# Patient Record
Sex: MALE | Race: WHITE | NOT HISPANIC OR LATINO | Employment: UNEMPLOYED | URBAN - METROPOLITAN AREA
[De-identification: names, ages, dates, MRNs, and addresses within clinical notes are randomized per-mention and may not be internally consistent; named-entity substitution may affect disease eponyms.]

---

## 2017-01-03 ENCOUNTER — ALLSCRIPTS OFFICE VISIT (OUTPATIENT)
Dept: OTHER | Facility: OTHER | Age: 57
End: 2017-01-03

## 2017-01-03 ENCOUNTER — HOSPITAL ENCOUNTER (OUTPATIENT)
Dept: RADIOLOGY | Facility: HOSPITAL | Age: 57
Discharge: HOME | End: 2017-01-03
Attending: ORTHOPAEDIC SURGERY
Payer: COMMERCIAL

## 2017-01-03 DIAGNOSIS — S82.891D OTHER FRACTURE OF RIGHT LOWER LEG, SUBSEQUENT ENCOUNTER FOR CLOSED FRACTURE WITH ROUTINE HEALING: ICD-10-CM

## 2017-01-03 PROCEDURE — 73610 X-RAY EXAM OF ANKLE: CPT

## 2017-01-12 ENCOUNTER — HOSPITAL ENCOUNTER (OUTPATIENT)
Dept: RADIOLOGY | Facility: HOSPITAL | Age: 57
Discharge: HOME | End: 2017-01-12
Attending: ORTHOPAEDIC SURGERY
Payer: COMMERCIAL

## 2017-01-12 ENCOUNTER — ALLSCRIPTS OFFICE VISIT (OUTPATIENT)
Dept: OTHER | Facility: OTHER | Age: 57
End: 2017-01-12

## 2017-01-12 DIAGNOSIS — M79.671 PAIN OF RIGHT FOOT: ICD-10-CM

## 2017-01-12 PROCEDURE — 73630 X-RAY EXAM OF FOOT: CPT

## 2017-01-16 ENCOUNTER — TRANSCRIBE ORDERS (OUTPATIENT)
Dept: LAB | Facility: CLINIC | Age: 57
End: 2017-01-16

## 2017-01-16 ENCOUNTER — ALLSCRIPTS OFFICE VISIT (OUTPATIENT)
Dept: OTHER | Facility: OTHER | Age: 57
End: 2017-01-16

## 2017-01-16 DIAGNOSIS — M79.674 PAIN IN TOE OF RIGHT FOOT: Primary | ICD-10-CM

## 2017-01-16 DIAGNOSIS — M79.674 PAIN OF TOE OF RIGHT FOOT: ICD-10-CM

## 2017-01-17 ENCOUNTER — GENERIC CONVERSION - ENCOUNTER (OUTPATIENT)
Dept: OTHER | Facility: OTHER | Age: 57
End: 2017-01-17

## 2017-01-17 ENCOUNTER — HOSPITAL ENCOUNTER (OUTPATIENT)
Dept: ULTRASOUND IMAGING | Facility: CLINIC | Age: 57
Discharge: HOME | End: 2017-01-17
Payer: COMMERCIAL

## 2017-01-17 ENCOUNTER — HOSPITAL ENCOUNTER (OUTPATIENT)
Dept: MAMMOGRAPHY | Facility: CLINIC | Age: 57
Discharge: HOME | End: 2017-01-17
Payer: COMMERCIAL

## 2017-01-17 DIAGNOSIS — N64.4 BREAST PAIN: ICD-10-CM

## 2017-01-17 DIAGNOSIS — N64.4 MASTODYNIA: ICD-10-CM

## 2017-01-17 DIAGNOSIS — N62 HYPERTROPHY OF BREAST: ICD-10-CM

## 2017-01-17 PROCEDURE — 76642 ULTRASOUND BREAST LIMITED: CPT

## 2017-01-17 PROCEDURE — G0204 DX MAMMO INCL CAD BI: HCPCS

## 2017-01-19 ENCOUNTER — LAB CONVERSION - ENCOUNTER (OUTPATIENT)
Dept: OTHER | Facility: OTHER | Age: 57
End: 2017-01-19

## 2017-01-19 ENCOUNTER — GENERIC CONVERSION - ENCOUNTER (OUTPATIENT)
Dept: OTHER | Facility: OTHER | Age: 57
End: 2017-01-19

## 2017-01-19 LAB
A/G RATIO (HISTORICAL): 1.6 (CALC) (ref 1–2.5)
ALBUMIN SERPL BCP-MCNC: 4 G/DL (ref 3.6–5.1)
ALP SERPL-CCNC: 90 U/L (ref 40–115)
ALT SERPL W P-5'-P-CCNC: 32 U/L (ref 9–46)
AST SERPL W P-5'-P-CCNC: 34 U/L (ref 10–35)
BASOPHILS # BLD AUTO: 0.3 %
BASOPHILS # BLD AUTO: 20 CELLS/UL (ref 0–200)
BILIRUB SERPL-MCNC: 0.4 MG/DL (ref 0.2–1.2)
BUN SERPL-MCNC: 19 MG/DL (ref 7–25)
BUN/CREA RATIO (HISTORICAL): NORMAL (CALC) (ref 6–22)
CALCIUM SERPL-MCNC: 8.8 MG/DL (ref 8.6–10.3)
CHLORIDE SERPL-SCNC: 103 MMOL/L (ref 98–110)
CO2 SERPL-SCNC: 26 MMOL/L (ref 20–31)
CREAT SERPL-MCNC: 1.3 MG/DL (ref 0.7–1.33)
DEPRECATED RDW RBC AUTO: 16.4 % (ref 11–15)
EGFR AFRICAN AMERICAN (HISTORICAL): 70 ML/MIN/1.73M2
EGFR-AMERICAN CALC (HISTORICAL): 61 ML/MIN/1.73M2
EOSINOPHIL # BLD AUTO: 12.6 %
EOSINOPHIL # BLD AUTO: 857 CELLS/UL (ref 15–500)
GAMMA GLOBULIN (HISTORICAL): 2.5 G/DL (CALC) (ref 1.9–3.7)
GLUCOSE (HISTORICAL): 95 MG/DL (ref 65–99)
HBA1C MFR BLD HPLC: 5.7 % OF TOTAL HGB
HCT VFR BLD AUTO: 38.6 % (ref 38.5–50)
HGB BLD-MCNC: 12.7 G/DL (ref 13.2–17.1)
LYMPHOCYTES # BLD AUTO: 1897 CELLS/UL (ref 850–3900)
LYMPHOCYTES # BLD AUTO: 27.9 %
MCH RBC QN AUTO: 29.6 PG (ref 27–33)
MCHC RBC AUTO-ENTMCNC: 32.8 G/DL (ref 32–36)
MCV RBC AUTO: 90.1 FL (ref 80–100)
MONOCYTES # BLD AUTO: 476 CELLS/UL (ref 200–950)
MONOCYTES (HISTORICAL): 7 %
NEUTROPHILS # BLD AUTO: 3550 CELLS/UL (ref 1500–7800)
NEUTROPHILS # BLD AUTO: 52.2 %
PLATELET # BLD AUTO: 157 THOUSAND/UL (ref 140–400)
PMV BLD AUTO: 13.3 FL (ref 7.5–11.5)
POTASSIUM SERPL-SCNC: 4.3 MMOL/L (ref 3.5–5.3)
RBC # BLD AUTO: 4.28 MILLION/UL (ref 4.2–5.8)
SODIUM SERPL-SCNC: 140 MMOL/L (ref 135–146)
TOTAL PROTEIN (HISTORICAL): 6.5 G/DL (ref 6.1–8.1)
URIC ACID (HISTORICAL): 10.3 MG/DL (ref 4–8)
WBC # BLD AUTO: 6.8 THOUSAND/UL (ref 3.8–10.8)

## 2017-01-20 ENCOUNTER — HOSPITAL ENCOUNTER (OUTPATIENT)
Dept: RADIOLOGY | Facility: HOSPITAL | Age: 57
Discharge: HOME | End: 2017-01-20
Attending: ORTHOPAEDIC SURGERY
Payer: COMMERCIAL

## 2017-01-20 DIAGNOSIS — M79.671 RIGHT FOOT PAIN: ICD-10-CM

## 2017-01-20 PROCEDURE — 73718 MRI LOWER EXTREMITY W/O DYE: CPT

## 2017-01-23 ENCOUNTER — ALLSCRIPTS OFFICE VISIT (OUTPATIENT)
Dept: OTHER | Facility: OTHER | Age: 57
End: 2017-01-23

## 2017-01-23 LAB
BILIRUB UR QL STRIP: NEGATIVE
CLARITY UR: NORMAL
COLOR UR: YELLOW
GLUCOSE (HISTORICAL): NEGATIVE
HGB UR QL STRIP.AUTO: NEGATIVE
KETONES UR STRIP-MCNC: NEGATIVE MG/DL
LEUKOCYTE ESTERASE UR QL STRIP: NEGATIVE
NITRITE UR QL STRIP: NEGATIVE
PH UR STRIP.AUTO: 6.5 [PH]
PROT UR STRIP-MCNC: 1 MG/DL
SP GR UR STRIP.AUTO: 1
UROBILINOGEN UR QL STRIP.AUTO: 0.2

## 2017-01-25 ENCOUNTER — ALLSCRIPTS OFFICE VISIT (OUTPATIENT)
Dept: OTHER | Facility: OTHER | Age: 57
End: 2017-01-25

## 2017-02-07 ENCOUNTER — HOSPITAL ENCOUNTER (OUTPATIENT)
Dept: RADIOLOGY | Facility: HOSPITAL | Age: 57
Discharge: HOME | End: 2017-02-07
Attending: ORTHOPAEDIC SURGERY
Payer: COMMERCIAL

## 2017-02-07 ENCOUNTER — ALLSCRIPTS OFFICE VISIT (OUTPATIENT)
Dept: OTHER | Facility: OTHER | Age: 57
End: 2017-02-07

## 2017-02-07 DIAGNOSIS — S82.891D OTHER FRACTURE OF RIGHT LOWER LEG, SUBSEQUENT ENCOUNTER FOR CLOSED FRACTURE WITH ROUTINE HEALING: ICD-10-CM

## 2017-02-07 PROCEDURE — 73610 X-RAY EXAM OF ANKLE: CPT

## 2017-02-10 ENCOUNTER — GENERIC CONVERSION - ENCOUNTER (OUTPATIENT)
Dept: OTHER | Facility: OTHER | Age: 57
End: 2017-02-10

## 2017-02-10 ENCOUNTER — APPOINTMENT (OUTPATIENT)
Dept: PHYSICAL THERAPY | Facility: REHABILITATION | Age: 57
End: 2017-02-10
Payer: COMMERCIAL

## 2017-02-10 DIAGNOSIS — S82.891D OTHER FRACTURE OF RIGHT LOWER LEG, SUBSEQUENT ENCOUNTER FOR CLOSED FRACTURE WITH ROUTINE HEALING: ICD-10-CM

## 2017-02-10 PROCEDURE — 97161 PT EVAL LOW COMPLEX 20 MIN: CPT

## 2017-02-15 ENCOUNTER — APPOINTMENT (OUTPATIENT)
Dept: PHYSICAL THERAPY | Facility: REHABILITATION | Age: 57
End: 2017-02-15
Payer: COMMERCIAL

## 2017-02-15 PROCEDURE — 97140 MANUAL THERAPY 1/> REGIONS: CPT

## 2017-02-15 PROCEDURE — 97110 THERAPEUTIC EXERCISES: CPT

## 2017-02-22 ENCOUNTER — APPOINTMENT (OUTPATIENT)
Dept: PHYSICAL THERAPY | Facility: REHABILITATION | Age: 57
End: 2017-02-22
Payer: COMMERCIAL

## 2017-02-22 PROCEDURE — 97110 THERAPEUTIC EXERCISES: CPT

## 2017-02-22 PROCEDURE — 97140 MANUAL THERAPY 1/> REGIONS: CPT

## 2017-02-24 ENCOUNTER — APPOINTMENT (OUTPATIENT)
Dept: PHYSICAL THERAPY | Facility: REHABILITATION | Age: 57
End: 2017-02-24
Payer: COMMERCIAL

## 2017-02-24 PROCEDURE — 97110 THERAPEUTIC EXERCISES: CPT

## 2017-02-24 PROCEDURE — 97140 MANUAL THERAPY 1/> REGIONS: CPT

## 2017-02-27 ENCOUNTER — APPOINTMENT (OUTPATIENT)
Dept: PHYSICAL THERAPY | Facility: REHABILITATION | Age: 57
End: 2017-02-27
Payer: COMMERCIAL

## 2017-02-27 ENCOUNTER — ALLSCRIPTS OFFICE VISIT (OUTPATIENT)
Dept: OTHER | Facility: OTHER | Age: 57
End: 2017-02-27

## 2017-02-27 DIAGNOSIS — F11.20 UNCOMPLICATED OPIOID DEPENDENCE (HCC): ICD-10-CM

## 2017-02-27 DIAGNOSIS — G89.4 CHRONIC PAIN SYNDROME: ICD-10-CM

## 2017-02-27 DIAGNOSIS — M54.16 RADICULOPATHY OF LUMBAR REGION: ICD-10-CM

## 2017-02-27 DIAGNOSIS — Z79.899 OTHER LONG TERM (CURRENT) DRUG THERAPY: ICD-10-CM

## 2017-02-27 DIAGNOSIS — M48.061 SPINAL STENOSIS OF LUMBAR REGION: ICD-10-CM

## 2017-02-27 PROCEDURE — 97110 THERAPEUTIC EXERCISES: CPT

## 2017-02-27 PROCEDURE — 97164 PT RE-EVAL EST PLAN CARE: CPT

## 2017-03-01 ENCOUNTER — GENERIC CONVERSION - ENCOUNTER (OUTPATIENT)
Dept: OTHER | Facility: OTHER | Age: 57
End: 2017-03-01

## 2017-03-03 ENCOUNTER — APPOINTMENT (OUTPATIENT)
Dept: PHYSICAL THERAPY | Facility: REHABILITATION | Age: 57
End: 2017-03-03
Payer: COMMERCIAL

## 2017-03-03 PROCEDURE — 97110 THERAPEUTIC EXERCISES: CPT

## 2017-03-03 PROCEDURE — 97140 MANUAL THERAPY 1/> REGIONS: CPT

## 2017-03-06 ENCOUNTER — APPOINTMENT (OUTPATIENT)
Dept: PHYSICAL THERAPY | Facility: REHABILITATION | Age: 57
End: 2017-03-06
Payer: COMMERCIAL

## 2017-03-09 ENCOUNTER — APPOINTMENT (OUTPATIENT)
Dept: PHYSICAL THERAPY | Facility: REHABILITATION | Age: 57
End: 2017-03-09
Payer: COMMERCIAL

## 2017-03-09 PROCEDURE — 97140 MANUAL THERAPY 1/> REGIONS: CPT

## 2017-03-09 PROCEDURE — 97110 THERAPEUTIC EXERCISES: CPT

## 2017-03-13 ENCOUNTER — APPOINTMENT (OUTPATIENT)
Dept: PHYSICAL THERAPY | Facility: REHABILITATION | Age: 57
End: 2017-03-13
Payer: COMMERCIAL

## 2017-03-16 ENCOUNTER — APPOINTMENT (OUTPATIENT)
Dept: PHYSICAL THERAPY | Facility: REHABILITATION | Age: 57
End: 2017-03-16
Payer: COMMERCIAL

## 2017-03-16 PROCEDURE — 97110 THERAPEUTIC EXERCISES: CPT

## 2017-03-16 PROCEDURE — 97140 MANUAL THERAPY 1/> REGIONS: CPT

## 2017-03-20 ENCOUNTER — APPOINTMENT (OUTPATIENT)
Dept: PHYSICAL THERAPY | Facility: REHABILITATION | Age: 57
End: 2017-03-20
Payer: COMMERCIAL

## 2017-03-20 PROCEDURE — 97110 THERAPEUTIC EXERCISES: CPT

## 2017-03-23 ENCOUNTER — APPOINTMENT (OUTPATIENT)
Dept: PHYSICAL THERAPY | Facility: REHABILITATION | Age: 57
End: 2017-03-23
Payer: COMMERCIAL

## 2017-03-23 PROCEDURE — 97110 THERAPEUTIC EXERCISES: CPT

## 2017-03-27 ENCOUNTER — APPOINTMENT (OUTPATIENT)
Dept: PHYSICAL THERAPY | Facility: REHABILITATION | Age: 57
End: 2017-03-27
Payer: COMMERCIAL

## 2017-03-27 PROCEDURE — 97140 MANUAL THERAPY 1/> REGIONS: CPT

## 2017-03-27 PROCEDURE — 97110 THERAPEUTIC EXERCISES: CPT

## 2017-04-02 ENCOUNTER — LAB CONVERSION - ENCOUNTER (OUTPATIENT)
Dept: OTHER | Facility: OTHER | Age: 57
End: 2017-04-02

## 2017-04-02 LAB
BACTERIA UR QL AUTO: ABNORMAL /HPF
BILIRUB UR QL STRIP: NEGATIVE
CALCIUM OXALATE (HISTORICAL): ABNORMAL /HPF
COLOR UR: ABNORMAL
COMMENT (HISTORICAL): CLEAR
FECAL OCCULT BLOOD DIAGNOSTIC (HISTORICAL): NEGATIVE
GLUCOSE (HISTORICAL): NEGATIVE
HYALINE CASTS #/AREA URNS LPF: ABNORMAL /LPF
KETONES UR STRIP-MCNC: ABNORMAL MG/DL
LEUKOCYTE ESTERASE UR QL STRIP: NEGATIVE
NITRITE UR QL STRIP: NEGATIVE
PH UR STRIP.AUTO: 6 [PH] (ref 5–8)
PROSTATE SPECIFIC ANTIGEN TOTAL (HISTORICAL): 0.3 NG/ML
PROT UR STRIP-MCNC: ABNORMAL MG/DL
RBC (HISTORICAL): ABNORMAL /HPF
SP GR UR STRIP.AUTO: 1.03 (ref 1–1.03)
SQUAMOUS EPITHELIAL CELLS (HISTORICAL): ABNORMAL /HPF
URIC ACID (HISTORICAL): 8.4 MG/DL (ref 4–8)
WBC # BLD AUTO: ABNORMAL /HPF

## 2017-04-03 ENCOUNTER — GENERIC CONVERSION - ENCOUNTER (OUTPATIENT)
Dept: OTHER | Facility: OTHER | Age: 57
End: 2017-04-03

## 2017-04-03 ENCOUNTER — APPOINTMENT (OUTPATIENT)
Dept: PHYSICAL THERAPY | Facility: REHABILITATION | Age: 57
End: 2017-04-03
Payer: COMMERCIAL

## 2017-04-03 ENCOUNTER — LAB CONVERSION - ENCOUNTER (OUTPATIENT)
Dept: OTHER | Facility: OTHER | Age: 57
End: 2017-04-03

## 2017-04-03 LAB
BACTERIA UR QL AUTO: ABNORMAL /HPF
BILIRUB UR QL STRIP: NEGATIVE
CALCIUM OXALATE (HISTORICAL): ABNORMAL /HPF
COLOR UR: ABNORMAL
COMMENT (HISTORICAL): CLEAR
CREATININE, RANDOM URINE (HISTORICAL): 509 MG/DL (ref 20–370)
FECAL OCCULT BLOOD DIAGNOSTIC (HISTORICAL): NEGATIVE
GLUCOSE (HISTORICAL): NEGATIVE
HYALINE CASTS #/AREA URNS LPF: ABNORMAL /LPF
KETONES UR STRIP-MCNC: ABNORMAL MG/DL
LEUKOCYTE ESTERASE UR QL STRIP: NEGATIVE
NITRITE UR QL STRIP: NEGATIVE
PH UR STRIP.AUTO: 6 [PH] (ref 5–8)
PROT UR STRIP-MCNC: ABNORMAL MG/DL
PROT UR-MCNC: 40 MG/DL (ref 5–25)
PROT/CREAT UR: 79 MG/G CREAT (ref 22–128)
RBC (HISTORICAL): ABNORMAL /HPF
SP GR UR STRIP.AUTO: 1.03 (ref 1–1.03)
SQUAMOUS EPITHELIAL CELLS (HISTORICAL): ABNORMAL /HPF
WBC # BLD AUTO: ABNORMAL /HPF

## 2017-04-03 PROCEDURE — 97110 THERAPEUTIC EXERCISES: CPT

## 2017-04-03 PROCEDURE — 97112 NEUROMUSCULAR REEDUCATION: CPT

## 2017-04-06 ENCOUNTER — APPOINTMENT (OUTPATIENT)
Dept: PHYSICAL THERAPY | Facility: REHABILITATION | Age: 57
End: 2017-04-06
Payer: COMMERCIAL

## 2017-04-06 PROCEDURE — 97140 MANUAL THERAPY 1/> REGIONS: CPT

## 2017-04-06 PROCEDURE — 97110 THERAPEUTIC EXERCISES: CPT

## 2017-04-06 PROCEDURE — 97112 NEUROMUSCULAR REEDUCATION: CPT

## 2017-04-07 ENCOUNTER — ALLSCRIPTS OFFICE VISIT (OUTPATIENT)
Dept: OTHER | Facility: OTHER | Age: 57
End: 2017-04-07

## 2017-04-10 ENCOUNTER — APPOINTMENT (OUTPATIENT)
Dept: PHYSICAL THERAPY | Facility: REHABILITATION | Age: 57
End: 2017-04-10
Payer: COMMERCIAL

## 2017-04-10 PROCEDURE — 97140 MANUAL THERAPY 1/> REGIONS: CPT

## 2017-04-10 PROCEDURE — 97112 NEUROMUSCULAR REEDUCATION: CPT

## 2017-04-10 PROCEDURE — 97110 THERAPEUTIC EXERCISES: CPT

## 2017-04-13 ENCOUNTER — APPOINTMENT (OUTPATIENT)
Dept: PHYSICAL THERAPY | Facility: REHABILITATION | Age: 57
End: 2017-04-13
Payer: COMMERCIAL

## 2017-04-13 PROCEDURE — 97110 THERAPEUTIC EXERCISES: CPT

## 2017-04-13 PROCEDURE — 97112 NEUROMUSCULAR REEDUCATION: CPT

## 2017-04-13 PROCEDURE — 97140 MANUAL THERAPY 1/> REGIONS: CPT

## 2017-04-17 ENCOUNTER — TRANSCRIBE ORDERS (OUTPATIENT)
Dept: ADMINISTRATIVE | Facility: HOSPITAL | Age: 57
End: 2017-04-17

## 2017-04-17 ENCOUNTER — APPOINTMENT (OUTPATIENT)
Dept: PHYSICAL THERAPY | Facility: REHABILITATION | Age: 57
End: 2017-04-17
Payer: COMMERCIAL

## 2017-04-17 DIAGNOSIS — R79.89 OTHER ABNORMAL BLOOD CHEMISTRY: Primary | ICD-10-CM

## 2017-04-17 PROCEDURE — 97110 THERAPEUTIC EXERCISES: CPT

## 2017-04-17 PROCEDURE — 97140 MANUAL THERAPY 1/> REGIONS: CPT

## 2017-04-17 PROCEDURE — 97112 NEUROMUSCULAR REEDUCATION: CPT

## 2017-04-20 ENCOUNTER — APPOINTMENT (OUTPATIENT)
Dept: PHYSICAL THERAPY | Facility: REHABILITATION | Age: 57
End: 2017-04-20
Payer: COMMERCIAL

## 2017-04-20 PROCEDURE — 97112 NEUROMUSCULAR REEDUCATION: CPT

## 2017-04-20 PROCEDURE — 97110 THERAPEUTIC EXERCISES: CPT

## 2017-04-21 ENCOUNTER — HOSPITAL ENCOUNTER (OUTPATIENT)
Dept: ULTRASOUND IMAGING | Facility: HOSPITAL | Age: 57
Discharge: HOME | End: 2017-04-21
Attending: INTERNAL MEDICINE
Payer: COMMERCIAL

## 2017-04-21 DIAGNOSIS — R79.89 OTHER ABNORMAL BLOOD CHEMISTRY: ICD-10-CM

## 2017-04-21 PROCEDURE — 51798 US URINE CAPACITY MEASURE: CPT

## 2017-04-22 ENCOUNTER — LAB CONVERSION - ENCOUNTER (OUTPATIENT)
Dept: OTHER | Facility: OTHER | Age: 57
End: 2017-04-22

## 2017-04-22 LAB — CALCIUM SERPL-MCNC: 9.2 MG/DL (ref 8.6–10.3)

## 2017-04-24 ENCOUNTER — LAB CONVERSION - ENCOUNTER (OUTPATIENT)
Dept: OTHER | Facility: OTHER | Age: 57
End: 2017-04-24

## 2017-04-24 ENCOUNTER — APPOINTMENT (OUTPATIENT)
Dept: PHYSICAL THERAPY | Facility: REHABILITATION | Age: 57
End: 2017-04-24
Payer: COMMERCIAL

## 2017-04-24 ENCOUNTER — ALLSCRIPTS OFFICE VISIT (OUTPATIENT)
Dept: OTHER | Facility: OTHER | Age: 57
End: 2017-04-24

## 2017-04-24 LAB
BUN SERPL-MCNC: 13 MG/DL (ref 7–25)
BUN/CREA RATIO (HISTORICAL): ABNORMAL (CALC) (ref 6–22)
CALCIUM SERPL-MCNC: 9.2 MG/DL (ref 8.6–10.3)
CALCIUM SERPL-MCNC: 9.2 MG/DL (ref 8.6–10.3)
CHLORIDE SERPL-SCNC: 100 MMOL/L (ref 98–110)
CO2 SERPL-SCNC: 26 MMOL/L (ref 20–31)
CREAT SERPL-MCNC: 1.33 MG/DL (ref 0.7–1.33)
EGFR AFRICAN AMERICAN (HISTORICAL): 68 ML/MIN/1.73M2
EGFR-AMERICAN CALC (HISTORICAL): 59 ML/MIN/1.73M2
GLUCOSE (HISTORICAL): 91 MG/DL (ref 65–99)
PHOSPHATE SERPL-MCNC: 3.5 MG/DL (ref 2.5–4.5)
POTASSIUM SERPL-SCNC: 3.9 MMOL/L (ref 3.5–5.3)
PTH-INTACT SERPL-MCNC: 77 PG/ML (ref 14–64)
SODIUM SERPL-SCNC: 138 MMOL/L (ref 135–146)

## 2017-04-24 PROCEDURE — 97110 THERAPEUTIC EXERCISES: CPT

## 2017-04-24 PROCEDURE — 97140 MANUAL THERAPY 1/> REGIONS: CPT

## 2017-04-24 PROCEDURE — 97112 NEUROMUSCULAR REEDUCATION: CPT

## 2017-04-27 ENCOUNTER — APPOINTMENT (OUTPATIENT)
Dept: PHYSICAL THERAPY | Facility: REHABILITATION | Age: 57
End: 2017-04-27
Payer: COMMERCIAL

## 2017-05-04 ENCOUNTER — APPOINTMENT (OUTPATIENT)
Dept: PHYSICAL THERAPY | Facility: REHABILITATION | Age: 57
End: 2017-05-04
Payer: COMMERCIAL

## 2017-05-04 PROCEDURE — 97110 THERAPEUTIC EXERCISES: CPT

## 2017-05-04 PROCEDURE — 97140 MANUAL THERAPY 1/> REGIONS: CPT

## 2017-05-05 ENCOUNTER — APPOINTMENT (OUTPATIENT)
Dept: PHYSICAL THERAPY | Facility: REHABILITATION | Age: 57
End: 2017-05-05
Payer: COMMERCIAL

## 2017-05-10 ENCOUNTER — ALLSCRIPTS OFFICE VISIT (OUTPATIENT)
Dept: OTHER | Facility: OTHER | Age: 57
End: 2017-05-10

## 2017-06-15 ENCOUNTER — ALLSCRIPTS OFFICE VISIT (OUTPATIENT)
Dept: OTHER | Facility: OTHER | Age: 57
End: 2017-06-15

## 2017-06-22 ENCOUNTER — GENERIC CONVERSION - ENCOUNTER (OUTPATIENT)
Dept: OTHER | Facility: OTHER | Age: 57
End: 2017-06-22

## 2017-06-27 ENCOUNTER — TRANSCRIBE ORDERS (OUTPATIENT)
Dept: ADMINISTRATIVE | Facility: HOSPITAL | Age: 57
End: 2017-06-27

## 2017-06-27 DIAGNOSIS — R06.81 APNEA: Primary | ICD-10-CM

## 2017-07-19 ENCOUNTER — HOSPITAL ENCOUNTER (EMERGENCY)
Facility: HOSPITAL | Age: 57
Discharge: HOME | End: 2017-07-19
Attending: EMERGENCY MEDICINE
Payer: COMMERCIAL

## 2017-07-19 ENCOUNTER — APPOINTMENT (EMERGENCY)
Dept: CT IMAGING | Facility: HOSPITAL | Age: 57
End: 2017-07-19
Payer: COMMERCIAL

## 2017-07-19 VITALS
DIASTOLIC BLOOD PRESSURE: 80 MMHG | RESPIRATION RATE: 17 BRPM | OXYGEN SATURATION: 97 % | WEIGHT: 315 LBS | BODY MASS INDEX: 43.87 KG/M2 | HEART RATE: 84 BPM | SYSTOLIC BLOOD PRESSURE: 160 MMHG | TEMPERATURE: 97.9 F

## 2017-07-19 DIAGNOSIS — K52.9 GASTROENTERITIS: Primary | ICD-10-CM

## 2017-07-19 DIAGNOSIS — R11.0 NAUSEA: ICD-10-CM

## 2017-07-19 LAB
ALBUMIN SERPL BCP-MCNC: 3.8 G/DL (ref 3.5–5)
ALP SERPL-CCNC: 96 U/L (ref 46–116)
ALT SERPL W P-5'-P-CCNC: 47 U/L (ref 12–78)
ANION GAP SERPL CALCULATED.3IONS-SCNC: 8 MMOL/L (ref 4–13)
AST SERPL W P-5'-P-CCNC: 36 U/L (ref 5–45)
ATRIAL RATE: 79 BPM
BASOPHILS # BLD AUTO: 0.01 THOUSANDS/ΜL (ref 0–0.1)
BASOPHILS NFR BLD AUTO: 0 % (ref 0–1)
BILIRUB SERPL-MCNC: 0.6 MG/DL (ref 0.2–1)
BUN SERPL-MCNC: 16 MG/DL (ref 5–25)
CALCIUM SERPL-MCNC: 9.2 MG/DL (ref 8.3–10.1)
CHLORIDE SERPL-SCNC: 98 MMOL/L (ref 100–108)
CO2 SERPL-SCNC: 33 MMOL/L (ref 21–32)
CREAT SERPL-MCNC: 1.43 MG/DL (ref 0.6–1.3)
EOSINOPHIL # BLD AUTO: 0.03 THOUSAND/ΜL (ref 0–0.61)
EOSINOPHIL NFR BLD AUTO: 0 % (ref 0–6)
ERYTHROCYTE [DISTWIDTH] IN BLOOD BY AUTOMATED COUNT: 15.7 % (ref 11.6–15.1)
GFR SERPL CREATININE-BSD FRML MDRD: 51 ML/MIN/1.73SQ M
GLUCOSE SERPL-MCNC: 140 MG/DL (ref 65–140)
HCT VFR BLD AUTO: 43.4 % (ref 36.5–49.3)
HGB BLD-MCNC: 13.5 G/DL (ref 12–17)
LACTATE SERPL-SCNC: 1.6 MMOL/L (ref 0.5–2)
LIPASE SERPL-CCNC: 107 U/L (ref 73–393)
LYMPHOCYTES # BLD AUTO: 1.25 THOUSANDS/ΜL (ref 0.6–4.47)
LYMPHOCYTES NFR BLD AUTO: 13 % (ref 14–44)
MCH RBC QN AUTO: 25.1 PG (ref 26.8–34.3)
MCHC RBC AUTO-ENTMCNC: 31.1 G/DL (ref 31.4–37.4)
MCV RBC AUTO: 81 FL (ref 82–98)
MONOCYTES # BLD AUTO: 0.85 THOUSAND/ΜL (ref 0.17–1.22)
MONOCYTES NFR BLD AUTO: 9 % (ref 4–12)
NEUTROPHILS # BLD AUTO: 7.46 THOUSANDS/ΜL (ref 1.85–7.62)
NEUTS SEG NFR BLD AUTO: 78 % (ref 43–75)
P AXIS: 69 DEGREES
PLATELET # BLD AUTO: 176 THOUSANDS/UL (ref 149–390)
POTASSIUM SERPL-SCNC: 3 MMOL/L (ref 3.5–5.3)
PR INTERVAL: 148 MS
PROT SERPL-MCNC: 7.9 G/DL (ref 6.4–8.2)
QRS AXIS: -41 DEGREES
QRSD INTERVAL: 150 MS
QT INTERVAL: 450 MS
QTC INTERVAL: 516 MS
RBC # BLD AUTO: 5.37 MILLION/UL (ref 3.88–5.62)
SODIUM SERPL-SCNC: 139 MMOL/L (ref 136–145)
T WAVE AXIS: 3 DEGREES
TROPONIN I SERPL-MCNC: 0.02 NG/ML
VENTRICULAR RATE: 79 BPM
WBC # BLD AUTO: 9.6 THOUSAND/UL (ref 4.31–10.16)

## 2017-07-19 PROCEDURE — 36415 COLL VENOUS BLD VENIPUNCTURE: CPT | Performed by: EMERGENCY MEDICINE

## 2017-07-19 PROCEDURE — 84484 ASSAY OF TROPONIN QUANT: CPT | Performed by: PHYSICIAN ASSISTANT

## 2017-07-19 PROCEDURE — 85025 COMPLETE CBC W/AUTO DIFF WBC: CPT | Performed by: EMERGENCY MEDICINE

## 2017-07-19 PROCEDURE — 74177 CT ABD & PELVIS W/CONTRAST: CPT

## 2017-07-19 PROCEDURE — 83605 ASSAY OF LACTIC ACID: CPT | Performed by: EMERGENCY MEDICINE

## 2017-07-19 PROCEDURE — 80053 COMPREHEN METABOLIC PANEL: CPT | Performed by: EMERGENCY MEDICINE

## 2017-07-19 PROCEDURE — 96361 HYDRATE IV INFUSION ADD-ON: CPT

## 2017-07-19 PROCEDURE — 83690 ASSAY OF LIPASE: CPT | Performed by: EMERGENCY MEDICINE

## 2017-07-19 PROCEDURE — 96376 TX/PRO/DX INJ SAME DRUG ADON: CPT

## 2017-07-19 PROCEDURE — 93005 ELECTROCARDIOGRAM TRACING: CPT | Performed by: PHYSICIAN ASSISTANT

## 2017-07-19 PROCEDURE — 99284 EMERGENCY DEPT VISIT MOD MDM: CPT

## 2017-07-19 PROCEDURE — 96374 THER/PROPH/DIAG INJ IV PUSH: CPT

## 2017-07-19 RX ORDER — HYDROMORPHONE HYDROCHLORIDE 2 MG/1
2 TABLET ORAL ONCE
Status: COMPLETED | OUTPATIENT
Start: 2017-07-19 | End: 2017-07-19

## 2017-07-19 RX ORDER — MAGNESIUM HYDROXIDE/ALUMINUM HYDROXICE/SIMETHICONE 120; 1200; 1200 MG/30ML; MG/30ML; MG/30ML
30 SUSPENSION ORAL ONCE
Status: COMPLETED | OUTPATIENT
Start: 2017-07-19 | End: 2017-07-19

## 2017-07-19 RX ORDER — ONDANSETRON 2 MG/ML
4 INJECTION INTRAMUSCULAR; INTRAVENOUS ONCE
Status: COMPLETED | OUTPATIENT
Start: 2017-07-19 | End: 2017-07-19

## 2017-07-19 RX ORDER — POTASSIUM CHLORIDE 20 MEQ/1
40 TABLET, EXTENDED RELEASE ORAL ONCE
Status: COMPLETED | OUTPATIENT
Start: 2017-07-19 | End: 2017-07-19

## 2017-07-19 RX ORDER — PANTOPRAZOLE SODIUM 20 MG/1
20 TABLET, DELAYED RELEASE ORAL ONCE
Status: COMPLETED | OUTPATIENT
Start: 2017-07-19 | End: 2017-07-19

## 2017-07-19 RX ORDER — ONDANSETRON 4 MG/1
4 TABLET, ORALLY DISINTEGRATING ORAL EVERY 8 HOURS PRN
Qty: 16 TABLET | Refills: 0 | Status: SHIPPED | OUTPATIENT
Start: 2017-07-19 | End: 2018-01-31 | Stop reason: ALTCHOICE

## 2017-07-19 RX ADMIN — PANTOPRAZOLE SODIUM 20 MG: 20 TABLET, DELAYED RELEASE ORAL at 13:55

## 2017-07-19 RX ADMIN — HYDROMORPHONE HYDROCHLORIDE 2 MG: 2 TABLET ORAL at 15:00

## 2017-07-19 RX ADMIN — LIDOCAINE HYDROCHLORIDE 15 ML: 20 SOLUTION ORAL; TOPICAL at 15:00

## 2017-07-19 RX ADMIN — IOHEXOL 100 ML: 350 INJECTION, SOLUTION INTRAVENOUS at 12:28

## 2017-07-19 RX ADMIN — ONDANSETRON 4 MG: 2 INJECTION INTRAMUSCULAR; INTRAVENOUS at 12:00

## 2017-07-19 RX ADMIN — ALUMINUM HYDROXIDE, MAGNESIUM HYDROXIDE, AND SIMETHICONE 30 ML: 200; 200; 20 SUSPENSION ORAL at 15:00

## 2017-07-19 RX ADMIN — SODIUM CHLORIDE 1000 ML: 0.9 INJECTION, SOLUTION INTRAVENOUS at 12:00

## 2017-07-19 RX ADMIN — POTASSIUM CHLORIDE 40 MEQ: 1500 TABLET, EXTENDED RELEASE ORAL at 13:55

## 2017-07-19 RX ADMIN — ONDANSETRON 4 MG: 2 INJECTION INTRAMUSCULAR; INTRAVENOUS at 15:00

## 2017-08-03 ENCOUNTER — ALLSCRIPTS OFFICE VISIT (OUTPATIENT)
Dept: OTHER | Facility: OTHER | Age: 57
End: 2017-08-03

## 2017-08-03 DIAGNOSIS — F11.20 UNCOMPLICATED OPIOID DEPENDENCE (HCC): ICD-10-CM

## 2017-08-03 DIAGNOSIS — G89.4 CHRONIC PAIN SYNDROME: ICD-10-CM

## 2017-08-03 DIAGNOSIS — Z79.891 LONG TERM CURRENT USE OF OPIATE ANALGESIC: ICD-10-CM

## 2017-08-07 DIAGNOSIS — E03.9 HYPOTHYROIDISM: ICD-10-CM

## 2017-08-07 DIAGNOSIS — I10 ESSENTIAL (PRIMARY) HYPERTENSION: ICD-10-CM

## 2017-08-07 DIAGNOSIS — E78.5 HYPERLIPIDEMIA: ICD-10-CM

## 2017-08-07 DIAGNOSIS — R73.01 IMPAIRED FASTING GLUCOSE: ICD-10-CM

## 2017-08-07 DIAGNOSIS — E79.0 HYPERURICEMIA WITHOUT SIGNS OF INFLAMMATORY ARTHRITIS AND TOPHACEOUS DISEASE: ICD-10-CM

## 2017-08-07 DIAGNOSIS — E29.1 TESTICULAR HYPOFUNCTION: ICD-10-CM

## 2017-08-11 ENCOUNTER — ALLSCRIPTS OFFICE VISIT (OUTPATIENT)
Dept: OTHER | Facility: OTHER | Age: 57
End: 2017-08-11

## 2017-08-15 ENCOUNTER — GENERIC CONVERSION - ENCOUNTER (OUTPATIENT)
Dept: OTHER | Facility: OTHER | Age: 57
End: 2017-08-15

## 2017-08-15 ENCOUNTER — ALLSCRIPTS OFFICE VISIT (OUTPATIENT)
Dept: RADIOLOGY | Facility: CLINIC | Age: 57
End: 2017-08-15
Payer: COMMERCIAL

## 2017-09-07 ENCOUNTER — TRANSCRIBE ORDERS (OUTPATIENT)
Dept: ADMINISTRATIVE | Facility: HOSPITAL | Age: 57
End: 2017-09-07

## 2017-09-07 DIAGNOSIS — R06.83 SNORES: Primary | ICD-10-CM

## 2017-10-02 ENCOUNTER — ALLSCRIPTS OFFICE VISIT (OUTPATIENT)
Dept: OTHER | Facility: OTHER | Age: 57
End: 2017-10-02

## 2017-10-02 DIAGNOSIS — E79.0 HYPERURICEMIA WITHOUT SIGNS OF INFLAMMATORY ARTHRITIS AND TOPHACEOUS DISEASE: ICD-10-CM

## 2017-10-02 DIAGNOSIS — E34.9 ENDOCRINE DISORDER: ICD-10-CM

## 2017-10-02 DIAGNOSIS — N18.2 CHRONIC KIDNEY DISEASE, STAGE II (MILD): ICD-10-CM

## 2017-10-11 ENCOUNTER — HOSPITAL ENCOUNTER (OUTPATIENT)
Dept: SLEEP CENTER | Facility: CLINIC | Age: 57
Discharge: HOME | End: 2017-10-11
Payer: COMMERCIAL

## 2017-10-11 ENCOUNTER — TRANSCRIBE ORDERS (OUTPATIENT)
Dept: SLEEP CENTER | Facility: CLINIC | Age: 57
End: 2017-10-11

## 2017-10-11 DIAGNOSIS — R06.83 SNORES: ICD-10-CM

## 2017-10-11 DIAGNOSIS — G47.33 OSA (OBSTRUCTIVE SLEEP APNEA): Primary | ICD-10-CM

## 2017-10-13 ENCOUNTER — TRANSCRIBE ORDERS (OUTPATIENT)
Dept: ADMINISTRATIVE | Facility: HOSPITAL | Age: 57
End: 2017-10-13

## 2017-10-13 DIAGNOSIS — G89.4 CHRONIC PAIN SYNDROME: Primary | ICD-10-CM

## 2017-10-25 ENCOUNTER — HOSPITAL ENCOUNTER (OUTPATIENT)
Dept: SLEEP CENTER | Facility: CLINIC | Age: 57
Discharge: HOME | End: 2017-10-25
Payer: COMMERCIAL

## 2017-10-25 DIAGNOSIS — G47.33 OSA (OBSTRUCTIVE SLEEP APNEA): ICD-10-CM

## 2017-10-25 PROCEDURE — 95811 POLYSOM 6/>YRS CPAP 4/> PARM: CPT

## 2017-10-26 ENCOUNTER — HOSPITAL ENCOUNTER (OUTPATIENT)
Dept: MRI IMAGING | Facility: HOSPITAL | Age: 57
Discharge: HOME | End: 2017-10-26
Payer: COMMERCIAL

## 2017-10-26 DIAGNOSIS — G89.4 CHRONIC PAIN SYNDROME: ICD-10-CM

## 2017-10-26 PROCEDURE — 72146 MRI CHEST SPINE W/O DYE: CPT
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
## 2017-10-30 ENCOUNTER — GENERIC CONVERSION - ENCOUNTER (OUTPATIENT)
Dept: OTHER | Facility: OTHER | Age: 57
End: 2017-10-30

## 2017-10-31 ENCOUNTER — HOSPITAL ENCOUNTER (OUTPATIENT)
Dept: SLEEP CENTER | Facility: CLINIC | Age: 57
Discharge: HOME | End: 2017-10-31
Payer: COMMERCIAL

## 2017-10-31 ENCOUNTER — TRANSCRIBE ORDERS (OUTPATIENT)
Dept: SLEEP CENTER | Facility: CLINIC | Age: 57
End: 2017-10-31

## 2017-10-31 DIAGNOSIS — G47.33 OSA (OBSTRUCTIVE SLEEP APNEA): ICD-10-CM

## 2017-10-31 DIAGNOSIS — G47.33 OSA (OBSTRUCTIVE SLEEP APNEA): Primary | ICD-10-CM

## 2017-11-22 ENCOUNTER — TRANSCRIBE ORDERS (OUTPATIENT)
Dept: SLEEP CENTER | Facility: CLINIC | Age: 57
End: 2017-11-22

## 2017-11-22 ENCOUNTER — HOSPITAL ENCOUNTER (OUTPATIENT)
Dept: SLEEP CENTER | Facility: CLINIC | Age: 57
Discharge: HOME | End: 2017-11-22
Payer: COMMERCIAL

## 2017-11-22 DIAGNOSIS — G47.33 OSA (OBSTRUCTIVE SLEEP APNEA): Primary | ICD-10-CM

## 2017-11-22 DIAGNOSIS — G47.33 OSA (OBSTRUCTIVE SLEEP APNEA): ICD-10-CM
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
## 2017-11-27 ENCOUNTER — ALLSCRIPTS OFFICE VISIT (OUTPATIENT)
Dept: OTHER | Facility: OTHER | Age: 57
End: 2017-11-27

## 2017-11-27 DIAGNOSIS — G89.4 CHRONIC PAIN SYNDROME: ICD-10-CM

## 2017-11-27 DIAGNOSIS — F11.20 UNCOMPLICATED OPIOID DEPENDENCE (HCC): ICD-10-CM

## 2017-11-27 DIAGNOSIS — Z79.891 LONG TERM CURRENT USE OF OPIATE ANALGESIC: ICD-10-CM
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
## 2018-01-03 ENCOUNTER — HOSPITAL ENCOUNTER (OUTPATIENT)
Dept: SLEEP CENTER | Facility: CLINIC | Age: 58
Discharge: HOME | End: 2018-01-04
Payer: COMMERCIAL

## 2018-01-03 DIAGNOSIS — G47.33 OSA (OBSTRUCTIVE SLEEP APNEA): ICD-10-CM
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
## 2018-01-10 NOTE — RESULT NOTES
Verified Results  (1) CBC/PLT/DIFF 56Brt8658 04:00PM RickyntvinoddamarisOsman Shaq     Test Name Result Flag Reference   WBC COUNT 10 15 Thousand/uL  4 31-10 16   RBC COUNT 4 48 Million/uL  3 88-5 62   HEMOGLOBIN 13 1 g/dL  12 0-17 0   HEMATOCRIT 40 2 %  36 5-49 3   MCV 90 fL  82-98   MCH 29 2 pg  26 8-34 3   MCHC 32 6 g/dL  31 4-37 4   RDW 16 6 % H 11 6-15 1   MPV 13 9 fL H 8 9-12 7   PLATELET COUNT 439 Thousands/uL  149-390   nRBC AUTOMATED 0 /100 WBCs     NEUTROPHILS RELATIVE PERCENT 61 %  43-75   LYMPHOCYTES RELATIVE PERCENT 22 %  14-44   MONOCYTES RELATIVE PERCENT 10 %  4-12   EOSINOPHILS RELATIVE PERCENT 7 % H 0-6   BASOPHILS RELATIVE PERCENT 0 %  0-1   NEUTROPHILS ABSOLUTE COUNT 6 19 Thousands/?L  1 85-7 62   LYMPHOCYTES ABSOLUTE COUNT 2 18 Thousands/?L  0 60-4 47   MONOCYTES ABSOLUTE COUNT 0 99 Thousand/?L  0 17-1 22   EOSINOPHILS ABSOLUTE COUNT 0 74 Thousand/?L H 0 00-0 61   BASOPHILS ABSOLUTE COUNT 0 03 Thousands/?L  0 00-0 10     (1) COMPREHENSIVE METABOLIC PANEL 14AAP5665 72:82LC DougabbevinoddamarisOsman Shaq     Test Name Result Flag Reference   GLUCOSE,RANDM 87 mg/dL     If the patient is fasting, the ADA then defines impaired fasting glucose as > 100 mg/dL and diabetes as > or equal to 123 mg/dL     SODIUM 138 mmol/L  136-145   POTASSIUM 4 2 mmol/L  3 5-5 3   CHLORIDE 98 mmol/L L 100-108   CARBON DIOXIDE 29 mmol/L  21-32   ANION GAP (CALC) 11 mmol/L  4-13   BLOOD UREA NITROGEN 29 mg/dL H 5-25   CREATININE 2 07 mg/dL H 0 60-1 30   Standardized to IDMS reference method   CALCIUM 9 7 mg/dL  8 3-10 1   BILI, TOTAL 0 47 mg/dL  0 20-1 00   ALK PHOSPHATAS 92 U/L     ALT (SGPT) 27 U/L  12-78   AST(SGOT) 23 U/L  5-45   ALBUMIN 3 6 g/dL  3 5-5 0   TOTAL PROTEIN 7 5 g/dL  6 4-8 2   eGFR Non-African American 33 3 ml/min/1 73sq m     [place] [employer] Disease Education Program recommendations are as follows:  GFR calculation is accurate only with a steady state creatinine  Chronic Kidney disease less than 60 ml/min/1 73 sq  meters  Kidney failure less than 15 ml/min/1 73 sq  meters

## 2018-01-10 NOTE — MISCELLANEOUS
Message  Peer to peer conversation completed with insurance company  Patient only meets in home sleep study criteria at this time with increase BMI and day time sleepiness  Will need to cancel incenter sleep study and order in home study        Signatures   Electronically signed by : Micaela Keene; Sep  7 2017  4:51PM EST                       (Author)

## 2018-01-11 ENCOUNTER — ALLSCRIPTS OFFICE VISIT (OUTPATIENT)
Dept: OTHER | Facility: OTHER | Age: 58
End: 2018-01-11

## 2018-01-11 DIAGNOSIS — E79.0 HYPERURICEMIA WITHOUT SIGNS OF INFLAMMATORY ARTHRITIS AND TOPHACEOUS DISEASE: ICD-10-CM

## 2018-01-11 DIAGNOSIS — I10 ESSENTIAL (PRIMARY) HYPERTENSION: ICD-10-CM

## 2018-01-11 DIAGNOSIS — R06.00 DYSPNEA: ICD-10-CM

## 2018-01-11 DIAGNOSIS — R73.01 IMPAIRED FASTING GLUCOSE: ICD-10-CM

## 2018-01-11 DIAGNOSIS — G47.33 OBSTRUCTIVE SLEEP APNEA: ICD-10-CM

## 2018-01-11 DIAGNOSIS — E29.1 TESTICULAR HYPOFUNCTION: ICD-10-CM

## 2018-01-11 DIAGNOSIS — E03.9 HYPOTHYROIDISM: ICD-10-CM

## 2018-01-11 DIAGNOSIS — N18.2 CHRONIC KIDNEY DISEASE, STAGE II (MILD): ICD-10-CM

## 2018-01-11 DIAGNOSIS — E66.01 MORBID (SEVERE) OBESITY DUE TO EXCESS CALORIES (HCC): ICD-10-CM

## 2018-01-11 DIAGNOSIS — E78.5 HYPERLIPIDEMIA: ICD-10-CM

## 2018-01-11 NOTE — RESULT NOTES
Verified Results  MAMMO DIAGNOSTIC BILATERAL W CAD [id number] 11:01AM Edwin Bernard     Test Name Result Flag Reference   [facility] DIAGNOSTIC BILATERAL W CAD (Report)     Reason for exam: clinical finding  Mammo Diagnostic Bilateral W CAD: January 17, 2017 - Check In #:    [de-identified]   Bilateral MLO and CC view(s) were taken  Technologist: Mario Trujillo, RT(R)(M)   Prior study comparison: May 23, 2014, bilateral WB digitl bilat    rhonda performed at [address] [place] [address]  There are scattered fibroglandular densities  Mammogram    demonstrates mild asymmetric left-sided gynecomastia with typical   feathery appearance of tissue behind the nipple  No discrete    mass lesion identified  No suspicious calcifications  The right   side is unremarkable  Ultrasound of the left periareolar region   demonstrates minimal glandular tissue  Comparison image of the    right demonstrates lesser amount  No shadowing masses are seen  No dilated ducts  US Breast Left Limited: January 17, 2017 - Check In #: [de-identified]   Technologist: Shirley Lin RDMS   Heterogeneity can be either focal or diffuse  The breast    echotexture is characterized by multiple small areas of increased   and decreased echogenicity  Shadowing may occur at the    interfaces of the fat lobules and parenchyma  This pattern occurs   in younger breasts and those with heterogeneously dense    parenchyma depicted mammographically  See above      See above     ASSESSMENT: BiRad:2 - Benign (Overall)   Diag Mammogram: BiRad:2 - benign finding  Left breast Left Brst US: BiRad:2 - benign finding in the left    breast      Recommendation:   Clinical management     Analyzed by CAD     Transcription Location: [address]   Signing Station: [id number]     Risk Value(s):   Myriad Table: 1 5%   Signed by:   Naeem Lagos MD   1/17/17       Discussion/Summary   Normal diagnostic mammogram and ultrasound of the left breast  No abnormality is seen
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
## 2018-01-12 ENCOUNTER — TRANSCRIBE ORDERS (OUTPATIENT)
Dept: ADMINISTRATIVE | Facility: HOSPITAL | Age: 58
End: 2018-01-12

## 2018-01-12 VITALS
HEART RATE: 68 BPM | BODY MASS INDEX: 39.17 KG/M2 | SYSTOLIC BLOOD PRESSURE: 148 MMHG | RESPIRATION RATE: 16 BRPM | HEIGHT: 75 IN | DIASTOLIC BLOOD PRESSURE: 80 MMHG | WEIGHT: 315 LBS

## 2018-01-12 DIAGNOSIS — R06.00 DYSPNEA, UNSPECIFIED TYPE: ICD-10-CM

## 2018-01-12 DIAGNOSIS — I10 ESSENTIAL HYPERTENSION, MALIGNANT: Primary | ICD-10-CM

## 2018-01-12 DIAGNOSIS — G47.33 OBSTRUCTIVE SLEEP APNEA SYNDROME: ICD-10-CM

## 2018-01-12 DIAGNOSIS — E66.01 MORBID OBESITY (HCC): ICD-10-CM
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
## 2018-01-13 VITALS
HEART RATE: 82 BPM | BODY MASS INDEX: 39.17 KG/M2 | SYSTOLIC BLOOD PRESSURE: 124 MMHG | RESPIRATION RATE: 18 BRPM | DIASTOLIC BLOOD PRESSURE: 84 MMHG | HEIGHT: 75 IN | WEIGHT: 315 LBS | TEMPERATURE: 98.5 F

## 2018-01-13 VITALS
DIASTOLIC BLOOD PRESSURE: 88 MMHG | HEIGHT: 75 IN | BODY MASS INDEX: 39.17 KG/M2 | HEART RATE: 70 BPM | WEIGHT: 315 LBS | SYSTOLIC BLOOD PRESSURE: 140 MMHG

## 2018-01-13 VITALS
RESPIRATION RATE: 16 BRPM | WEIGHT: 315 LBS | HEIGHT: 75 IN | BODY MASS INDEX: 39.17 KG/M2 | TEMPERATURE: 98 F | SYSTOLIC BLOOD PRESSURE: 122 MMHG | OXYGEN SATURATION: 98 % | DIASTOLIC BLOOD PRESSURE: 82 MMHG | HEART RATE: 80 BPM

## 2018-01-13 VITALS
RESPIRATION RATE: 18 BRPM | WEIGHT: 315 LBS | DIASTOLIC BLOOD PRESSURE: 78 MMHG | HEART RATE: 74 BPM | HEIGHT: 75 IN | BODY MASS INDEX: 39.17 KG/M2 | SYSTOLIC BLOOD PRESSURE: 128 MMHG

## 2018-01-13 VITALS
HEART RATE: 76 BPM | SYSTOLIC BLOOD PRESSURE: 123 MMHG | RESPIRATION RATE: 18 BRPM | DIASTOLIC BLOOD PRESSURE: 80 MMHG | BODY MASS INDEX: 44.89 KG/M2 | WEIGHT: 315 LBS

## 2018-01-13 VITALS
HEART RATE: 82 BPM | DIASTOLIC BLOOD PRESSURE: 78 MMHG | RESPIRATION RATE: 16 BRPM | HEIGHT: 75 IN | WEIGHT: 315 LBS | SYSTOLIC BLOOD PRESSURE: 126 MMHG | BODY MASS INDEX: 39.17 KG/M2

## 2018-01-13 VITALS
SYSTOLIC BLOOD PRESSURE: 134 MMHG | HEART RATE: 76 BPM | BODY MASS INDEX: 39.17 KG/M2 | HEIGHT: 75 IN | OXYGEN SATURATION: 97 % | TEMPERATURE: 98.5 F | DIASTOLIC BLOOD PRESSURE: 88 MMHG | WEIGHT: 315 LBS

## 2018-01-13 VITALS
BODY MASS INDEX: 39.17 KG/M2 | SYSTOLIC BLOOD PRESSURE: 160 MMHG | HEIGHT: 75 IN | DIASTOLIC BLOOD PRESSURE: 82 MMHG | WEIGHT: 315 LBS | HEART RATE: 74 BPM

## 2018-01-13 VITALS
DIASTOLIC BLOOD PRESSURE: 78 MMHG | RESPIRATION RATE: 16 BRPM | HEIGHT: 75 IN | WEIGHT: 315 LBS | HEART RATE: 84 BPM | SYSTOLIC BLOOD PRESSURE: 132 MMHG | BODY MASS INDEX: 39.17 KG/M2
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
## 2018-01-14 VITALS — DIASTOLIC BLOOD PRESSURE: 83 MMHG | HEART RATE: 65 BPM | SYSTOLIC BLOOD PRESSURE: 130 MMHG | RESPIRATION RATE: 18 BRPM

## 2018-01-14 VITALS
RESPIRATION RATE: 16 BRPM | DIASTOLIC BLOOD PRESSURE: 86 MMHG | WEIGHT: 315 LBS | HEART RATE: 72 BPM | BODY MASS INDEX: 39.17 KG/M2 | HEIGHT: 75 IN | TEMPERATURE: 98 F | SYSTOLIC BLOOD PRESSURE: 136 MMHG | OXYGEN SATURATION: 96 %

## 2018-01-14 VITALS
HEART RATE: 78 BPM | DIASTOLIC BLOOD PRESSURE: 80 MMHG | HEIGHT: 75 IN | BODY MASS INDEX: 39.17 KG/M2 | WEIGHT: 315 LBS | SYSTOLIC BLOOD PRESSURE: 140 MMHG | RESPIRATION RATE: 17 BRPM | TEMPERATURE: 98.1 F

## 2018-01-14 VITALS — DIASTOLIC BLOOD PRESSURE: 84 MMHG | SYSTOLIC BLOOD PRESSURE: 125 MMHG | RESPIRATION RATE: 18 BRPM | HEART RATE: 68 BPM

## 2018-01-14 NOTE — RESULT NOTES
Verified Results  (1) URIC ACID 01Apr2017 10:31AM Maricarmen Chan     Test Name Result Flag Reference   URIC ACID 8 4 mg/dL H 4 0-8 0   Therapeutic target for gout patients: <6 0 mg/dL     (1) PSA (SCREEN) (Dx V76 44 Screen for Prostate Cancer) 01Apr2017 10:31AM Maricarmen Chan   REPORT COMMENT:  FASTING:YES     Test Name Result Flag Reference   PSA, TOTAL 0 3 ng/mL  < OR = 4 0   This test was performed using the Siemens  chemiluminescent method  Values obtained from  different assay methods cannot be used  interchangeably  PSA levels, regardless of  value, should not be interpreted as absolute  evidence of the presence or absence of disease

## 2018-01-15 NOTE — RESULT NOTES
Verified Results  *US BREAST LEFT LIMITED (DIAGNOSTIC) [id number] 11:02AM Verla Kidney Order Number: [id number]    - Patient Instructions: To schedule this appointment, please contact Central Scheduling at [phone]  Test Name Result Flag Reference   US BREAST LEFT LIMITED (Report)     Reason for exam: clinical finding  Mammo Diagnostic Bilateral W CAD: January 17, 2017 - Check In #:    [de-identified]   Bilateral MLO and CC view(s) were taken  Technologist: Fannie Hodgkin, RT(R)(M)   Prior study comparison: May 23, 2014, bilateral WB digitl bilat    rhonda performed at [address] [place] [address]  There are scattered fibroglandular densities  Mammogram    demonstrates mild asymmetric left-sided gynecomastia with typical   feathery appearance of tissue behind the nipple  No discrete    mass lesion identified  No suspicious calcifications  The right   side is unremarkable  Ultrasound of the left periareolar region   demonstrates minimal glandular tissue  Comparison image of the    right demonstrates lesser amount  No shadowing masses are seen  No dilated ducts  US Breast Left Limited: January 17, 2017 - Check In #: [de-identified]   Technologist: Juan Avendaño RDMS   Heterogeneity can be either focal or diffuse  The breast    echotexture is characterized by multiple small areas of increased   and decreased echogenicity  Shadowing may occur at the    interfaces of the fat lobules and parenchyma  This pattern occurs   in younger breasts and those with heterogeneously dense    parenchyma depicted mammographically  See above      See above     ASSESSMENT: BiRad:2 - Benign (Overall)   Diag Mammogram: BiRad:2 - benign finding  Left breast Left Brst US: BiRad:2 - benign finding in the left    breast      Recommendation:   Clinical management     Analyzed by CAD     Transcription Location: [address]   Signing Station: [id number]     Risk Value(s):   Myriad Table: 1 5%   Signed by:   Phil Otoole MD   1/17/17       Discussion/Summary   Normal ultrasound of the left breast
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
## 2018-01-16 NOTE — RESULT NOTES
Message   Recorded as Task   Date: 05/18/2016 10:53 AM, Created By: Roger Strange   Task Name: Med Renewal Request   Assigned To: Hillary Morgan   Regarding Patient: Marlin Gardner, Status: Active   Comment:    Zaragoza,Kelly - 18 May 2016 10:53 AM     TASK CREATED  Pt LM on [facility] procedure line 5/17/16 @3009 requesting a script be sent for Lorzone  Pt states he spoke to Jessica Mckee about this at last office visit  Yasmin Cadet - 18 May 2016 11:10 AM     TASK REPLIED TO: Previously Assigned To [facility] clinical,Team  sent lorzone to pharmacy  i prescribed it 1 tab BID  As we talked about at last ov, he can take 1/2 tab during the day if too sedating and 1 full tab at night   Anna Lal - 18 May 2016 3:12 PM     TASK EDITED  Pt  is aware          Signatures   Electronically signed by : Rohit Quinones, ; May 18 2016  3:12PM EST                       (Author)
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
## 2018-01-18 NOTE — RESULT NOTES
Verified Results  (1) COMPREHENSIVE METABOLIC PANEL 81WZD3798 46:84ZT Juan José Leggett     Test Name Result Flag Reference   GLUCOSE 95 mg/dL  65-99   Fasting reference interval   UREA NITROGEN (BUN) 19 mg/dL  7-25   CREATININE 1 30 mg/dL  0 70-1 33   For patients >52years of age, the reference limit  for Creatinine is approximately 13% higher for people  identified as -American  eGFR NON-AFR   AMERICAN 61 mL/min/1 73m2  > OR = 60   ALKALINE PHOSPHATASE 90 U/L     AST 34 U/L  10-35   ALT 32 U/L  9-46   CALCIUM 8 8 mg/dL  8 6-10 3   PROTEIN, TOTAL 6 5 g/dL  6 1-8 1   ALBUMIN 4 0 g/dL  3 6-5 1   GLOBULIN 2 5 g/dL (calc)  1 9-3 7   ALBUMIN/GLOBULIN RATIO 1 6 (calc)  1 0-2 5   BILIRUBIN, TOTAL 0 4 mg/dL  0 2-1 2   eGFR AFRICAN AMERICAN 70 mL/min/1 73m2  > OR = 60   BUN/CREATININE RATIO   5-12   NOT APPLICABLE (calc)   SODIUM 140 mmol/L  135-146   POTASSIUM 4 3 mmol/L  3 5-5 3   CHLORIDE 103 mmol/L     CARBON DIOXIDE 26 mmol/L  20-31     (1) CBC/PLT/DIFF 66NPL4519 12:51PM Juan José Leggett     Test Name Result Flag Reference   NEUTROPHILS 52 2 %     LYMPHOCYTES 27 9 %     MONOCYTES 7 0 %     EOSINOPHILS 12 6 %     BASOPHILS 0 3 %     MPV 13 3 fL H 7 5-11 5   ABSOLUTE NEUTROPHILS 3550 cells/uL  3717-3315   ABSOLUTE LYMPHOCYTES 1897 cells/uL  850-3900   ABSOLUTE MONOCYTES 476 cells/uL  200-950   ABSOLUTE EOSINOPHILS 857 cells/uL H    ABSOLUTE BASOPHILS 20 cells/uL  0-200   HEMATOCRIT 38 6 %  38 5-50 0   MCV 90 1 fL  80 0-100 0   MCH 29 6 pg  27 0-33 0   MCHC 32 8 g/dL  32 0-36 0   RDW 16 4 % H 11 0-15 0   PLATELET COUNT 093 Thousand/uL  140-400   WHITE BLOOD CELL COUNT 6 8 Thousand/uL  3 8-10 8   RED BLOOD CELL COUNT 4 28 Million/uL  4 20-5 80   HEMOGLOBIN 12 7 g/dL L 13 2-17 1     (Q) HEMOGLOBIN A1c 55EPB4565 12:51PM Juan José Leggett   REPORT COMMENT:  FASTING:YES     Test Name Result Flag Reference   HEMOGLOBIN A1c 5 7 % of total Hgb H <5 7   According to ADA guidelines, hemoglobin A1c <7 0%  represents optimal control in non-pregnant diabetic  patients  Different metrics may apply to specific  patient populations  Standards of Medical Care in    Diabetes Care  2013;36:s11-s66     For the purpose of screening for the presence of  diabetes  <5 7%       Consistent with the absence of diabetes  5 7-6 4%    Consistent with increased risk for diabetes              (prediabetes)  >or=6 5%    Consistent with diabetes     This assay result is consistent with an increased risk  of diabetes  Currently, no consensus exists for use of hemoglobin  A1c for diagnosis of diabetes for children

## 2018-01-22 ENCOUNTER — GENERIC CONVERSION - ENCOUNTER (OUTPATIENT)
Dept: OTHER | Facility: OTHER | Age: 58
End: 2018-01-22

## 2018-01-22 ENCOUNTER — TRANSCRIBE ORDERS (OUTPATIENT)
Dept: ADMINISTRATIVE | Facility: HOSPITAL | Age: 58
End: 2018-01-22

## 2018-01-22 ENCOUNTER — HOSPITAL ENCOUNTER (OUTPATIENT)
Dept: RADIOLOGY | Facility: HOSPITAL | Age: 58
Discharge: HOME | End: 2018-01-22
Attending: INTERNAL MEDICINE
Payer: COMMERCIAL

## 2018-01-22 ENCOUNTER — HOSPITAL ENCOUNTER (OUTPATIENT)
Dept: NON INVASIVE DIAGNOSTICS | Facility: CLINIC | Age: 58
Discharge: HOME | End: 2018-01-22
Payer: COMMERCIAL

## 2018-01-22 VITALS
BODY MASS INDEX: 40.43 KG/M2 | RESPIRATION RATE: 16 BRPM | HEIGHT: 74 IN | WEIGHT: 315 LBS | HEART RATE: 88 BPM | DIASTOLIC BLOOD PRESSURE: 76 MMHG | SYSTOLIC BLOOD PRESSURE: 138 MMHG

## 2018-01-22 DIAGNOSIS — R06.00 DYSPNEA, UNSPECIFIED TYPE: ICD-10-CM

## 2018-01-22 DIAGNOSIS — I10 ESSENTIAL HYPERTENSION, MALIGNANT: ICD-10-CM

## 2018-01-22 DIAGNOSIS — E66.01 MORBID OBESITY (HCC): ICD-10-CM

## 2018-01-22 DIAGNOSIS — R06.02 SOB (SHORTNESS OF BREATH): Primary | ICD-10-CM

## 2018-01-22 DIAGNOSIS — G47.33 OBSTRUCTIVE SLEEP APNEA SYNDROME: ICD-10-CM

## 2018-01-22 DIAGNOSIS — R06.02 SOB (SHORTNESS OF BREATH): ICD-10-CM

## 2018-01-22 PROCEDURE — 93306 TTE W/DOPPLER COMPLETE: CPT

## 2018-01-22 PROCEDURE — 71046 X-RAY EXAM CHEST 2 VIEWS: CPT

## 2018-01-24 LAB
ALBUMIN SERPL-MCNC: 4.1 G/DL (ref 3.6–5.1)
ALBUMIN/GLOB SERPL: 1.6 (CALC) (ref 1–2.5)
ALP SERPL-CCNC: 74 U/L (ref 40–115)
ALT SERPL-CCNC: 21 U/L (ref 9–46)
AST SERPL-CCNC: 26 U/L (ref 10–35)
BASOPHILS # BLD AUTO: 61 CELLS/UL (ref 0–200)
BASOPHILS NFR BLD AUTO: 0.8 %
BILIRUB SERPL-MCNC: 0.5 MG/DL (ref 0.2–1.2)
BUN SERPL-MCNC: 17 MG/DL (ref 7–25)
BUN/CREAT SERPL: NORMAL (CALC) (ref 6–22)
CALCIUM SERPL-MCNC: 9.1 MG/DL (ref 8.6–10.3)
CHLORIDE SERPL-SCNC: 100 MMOL/L (ref 98–110)
CHOLEST SERPL-MCNC: 117 MG/DL
CHOLEST/HDLC SERPL: 3.2 (CALC)
CO2 SERPL-SCNC: 27 MMOL/L (ref 20–31)
CORTIS SERPL-MCNC: 7.6 MCG/DL
CREAT SERPL-MCNC: 1.3 MG/DL (ref 0.7–1.33)
EOSINOPHIL # BLD AUTO: 1155 CELLS/UL (ref 15–500)
EOSINOPHIL NFR BLD AUTO: 15.2 %
ERYTHROCYTE [DISTWIDTH] IN BLOOD BY AUTOMATED COUNT: 16.4 % (ref 11–15)
GLOBULIN SER CALC-MCNC: 2.6 G/DL (CALC) (ref 1.9–3.7)
GLUCOSE SERPL-MCNC: 98 MG/DL (ref 65–99)
HBA1C MFR BLD: 5.7 % OF TOTAL HGB
HCT VFR BLD AUTO: 31.3 % (ref 38.5–50)
HDLC SERPL-MCNC: 37 MG/DL
HGB BLD-MCNC: 9.1 G/DL (ref 13.2–17.1)
LDLC SERPL CALC-MCNC: 57 MG/DL (CALC)
LYMPHOCYTES # BLD AUTO: 1406 CELLS/UL (ref 850–3900)
LYMPHOCYTES NFR BLD AUTO: 18.5 %
MCH RBC QN AUTO: 21.9 PG (ref 27–33)
MCHC RBC AUTO-ENTMCNC: 29.1 G/DL (ref 32–36)
MCV RBC AUTO: 75.4 FL (ref 80–100)
MONOCYTES # BLD AUTO: 813 CELLS/UL (ref 200–950)
MONOCYTES NFR BLD AUTO: 10.7 %
NEUTROPHILS # BLD AUTO: 4165 CELLS/UL (ref 1500–7800)
NEUTROPHILS NFR BLD AUTO: 54.8 %
NONHDLC SERPL-MCNC: 80 MG/DL (CALC)
PLATELET # BLD AUTO: 192 THOUSAND/UL (ref 140–400)
PMV BLD REES-ECKER: ABNORMAL FL
POTASSIUM SERPL-SCNC: 4.3 MMOL/L (ref 3.5–5.3)
PROT SERPL-MCNC: 6.7 G/DL (ref 6.1–8.1)
RBC # BLD AUTO: 4.15 MILLION/UL (ref 4.2–5.8)
SL AMB EGFR AFRICAN AMERICAN: 70 ML/MIN/1.73M2
SL AMB EGFR NON AFRICAN AMERICAN: 60 ML/MIN/1.73M2
SODIUM SERPL-SCNC: 135 MMOL/L (ref 135–146)
T4 FREE SERPL-MCNC: 1.2 NG/DL (ref 0.8–1.8)
TESTOST FREE SERPL-MCNC: 35 PG/ML (ref 35–155)
TESTOST SERPL-MCNC: 302 NG/DL (ref 250–1100)
TRIGL SERPL-MCNC: 157 MG/DL
TSH SERPL-ACNC: 2.83 MIU/L (ref 0.4–4.5)
WBC # BLD AUTO: 7.6 THOUSAND/UL (ref 3.8–10.8)

## 2018-01-24 NOTE — RESULT NOTES
Discussion/Summary   normal cardiac function on echocardiogram  Not a cardiac issue causing your shortness of breath  Please follow-up with your pulmonologist       Verified Results  ECHO COMPLETE WITH CONTRAST IF INDICATED [id number] 02:48PM Darlyn Chen     Test Name Result Flag Reference   ECHO COMPLETE WITH CONTRAST IF INDICATED (Report)     [address]   [phone]     Transthoracic Echocardiogram   2D, M-mode, Doppler, and Color Doppler     Study date: 2018     Patient: Lauro Spencer   MR number: [id number]   Account number: [de-identified]   : 1960   Age: 61 years   Gender: Male   Status: Outpatient   Location: [address] [place] [address]   Height: 74 in   Weight: 368 3 lb   BP: 138/ 76 mmHg     Indications: Shortness of breath  Diagnoses: R06 00 - Dyspnea, unspecified     Sonographer: SHIRLEY Huertas   Referring Physician: Yadi Starr DO   Group: [facility]   Interpreting Physician: Israel Hummel MD     SUMMARY     PROCEDURE INFORMATION:   This was a technically difficult study  LEFT VENTRICLE:   Systolic function was normal  Ejection fraction was estimated to be 70 %  There were no regional wall motion abnormalities  Wall thickness was mildly increased  RIGHT VENTRICLE:   The size was at the upper limits of normal    Systolic function was normal      LEFT ATRIUM:   The atrium was mildly dilated  TRICUSPID VALVE:   There was mild regurgitation  Estimated peak PA pressure was 40 mmHg  The findings suggest mild pulmonary hypertension  HISTORY: PRIOR HISTORY: Shortness of breath, Hypertension, Hyperlipidemia, JULIUS, Hypothyroid, Obesity     PROCEDURE: The study was performed in the [address] [place] [address]  This was a routine study  The transthoracic approach was used  The study included complete 2D imaging, M-mode, complete spectral Doppler, and color Doppler  The   heart rate was 64 bpm, at the start of the study  Images were obtained from the parasternal, apical, subcostal, and suprasternal notch acoustic windows  Intravenous contrast ( 0 6 ml Definity Solution) was administered to opacify the left   ventricle  Echocardiographic views were limited due to decreased penetration  This was a technically difficult study  LEFT VENTRICLE: Size was normal  Systolic function was normal  Ejection fraction was estimated to be 70 %  There were no regional wall motion abnormalities  Wall thickness was mildly increased  DOPPLER: Left ventricular diastolic function   parameters were normal      RIGHT VENTRICLE: The size was at the upper limits of normal  Systolic function was normal  Wall thickness was normal      LEFT ATRIUM: The atrium was mildly dilated  RIGHT ATRIUM: Size was normal      MITRAL VALVE: Valve structure was normal  There was normal leaflet separation  DOPPLER: The transmitral velocity was within the normal range  There was no evidence for stenosis  There was no significant regurgitation  AORTIC VALVE: The valve was trileaflet  Leaflets exhibited normal thickness and normal cuspal separation  DOPPLER: Transaortic velocity was within the normal range  There was no evidence for stenosis  There was no significant   regurgitation  TRICUSPID VALVE: The valve structure was normal  There was normal leaflet separation  DOPPLER: The transtricuspid velocity was within the normal range  There was no evidence for stenosis  There was mild regurgitation  Estimated peak PA   pressure was 40 mmHg  The findings suggest mild pulmonary hypertension  PULMONIC VALVE: Leaflets exhibited normal thickness, no calcification, and normal cuspal separation  DOPPLER: The transpulmonic velocity was within the normal range  There was no significant regurgitation  PERICARDIUM: There was no pericardial effusion  The pericardium was normal in appearance       AORTA: The root exhibited normal size  SYSTEMIC VEINS: IVC: The inferior vena cava was not well visualized       SYSTEM MEASUREMENT TABLES     2D   %FS: 30 47 %   AV Diam: 3 75 cm   EDV(Teich): 160 33 ml   EF(Cube): 66 38 %   EF(Teich): 57 21 %   ESV(Cube): 62 4 ml   ESV(Teich): 68 61 ml   IVSd: 1 41 cm   LA Area: 22 6 cm2   LA Diam: 5 14 cm   LVEDV MOD A4C: 162 95 ml   LVEF MOD A4C: 67 02 %   LVESV MOD A4C: 53 74 ml   LVIDd: 5 7 cm   LVIDs: 3 97 cm   LVLd A4C: 9 55 cm   LVLs A4C: 7 97 cm   LVPWd: 1 39 cm   RA Area: 18 35 cm2   RV Diam: 3 82 cm   SI(Cube): 43 7 ml/m2   SI(Teich): 32 53 ml/m2   SV MOD A4C: 109 21 ml   SV(Cube): 123 23 ml   SV(Teich): 91 72 ml     CW   TR MaxP 45 mmHg   TR Vmax: 3 09 m/s     MM   TAPSE: 2 64 cm     PW   E': 0 1 m/s   E/E': 10 42   MV A Josr: 0 72 m/s   MV Dec [place]: 4 92 m/s2   MV DecT: 216 49 ms   MV E Josr: 1 07 m/s   MV E/A Ratio: 1 47     [employer] Accredited [facility]     Prepared and electronically signed by     Santhosh Ordonez MD   Signed 2018 16:15:59

## 2018-01-31 ENCOUNTER — OFFICE VISIT (OUTPATIENT)
Dept: FAMILY MEDICINE CLINIC | Facility: CLINIC | Age: 58
End: 2018-01-31
Payer: COMMERCIAL

## 2018-01-31 VITALS
HEIGHT: 74 IN | SYSTOLIC BLOOD PRESSURE: 148 MMHG | DIASTOLIC BLOOD PRESSURE: 72 MMHG | BODY MASS INDEX: 40.43 KG/M2 | WEIGHT: 315 LBS

## 2018-01-31 DIAGNOSIS — E34.9 TESTOSTERONE DEFICIENCY: ICD-10-CM

## 2018-01-31 DIAGNOSIS — E03.9 HYPOTHYROIDISM, UNSPECIFIED TYPE: Chronic | ICD-10-CM

## 2018-01-31 DIAGNOSIS — I10 ESSENTIAL HYPERTENSION: Chronic | ICD-10-CM

## 2018-01-31 DIAGNOSIS — D50.9 HYPOCHROMIC MICROCYTIC ANEMIA: ICD-10-CM

## 2018-01-31 DIAGNOSIS — D50.9 MICROCYTIC HYPOCHROMIC ANEMIA: Primary | ICD-10-CM

## 2018-01-31 PROCEDURE — 99214 OFFICE O/P EST MOD 30 MIN: CPT | Performed by: FAMILY MEDICINE

## 2018-01-31 RX ORDER — AMLODIPINE BESYLATE 10 MG/1
10 TABLET ORAL DAILY
Qty: 90 TABLET | Refills: 1 | Status: SHIPPED | OUTPATIENT
Start: 2018-01-31 | End: 2018-08-05 | Stop reason: SDUPTHER

## 2018-01-31 RX ORDER — RANITIDINE 150 MG/1
1 CAPSULE ORAL
COMMUNITY
Start: 2017-05-10 | End: 2018-01-31 | Stop reason: ALTCHOICE

## 2018-01-31 RX ORDER — FEBUXOSTAT 40 MG/1
1 TABLET, FILM COATED ORAL DAILY
COMMUNITY
Start: 2017-04-07 | End: 2018-04-12 | Stop reason: SINTOL

## 2018-01-31 RX ORDER — ROSUVASTATIN CALCIUM 5 MG/1
5 TABLET, COATED ORAL DAILY
Qty: 90 TABLET | Refills: 1 | Status: SHIPPED | OUTPATIENT
Start: 2018-01-31 | End: 2018-09-07 | Stop reason: SDUPTHER

## 2018-01-31 RX ORDER — LEVOTHYROXINE SODIUM 0.03 MG/1
25 TABLET ORAL DAILY
Qty: 90 TABLET | Refills: 1 | Status: SHIPPED | OUTPATIENT
Start: 2018-01-31 | End: 2018-09-19 | Stop reason: SDUPTHER

## 2018-01-31 RX ORDER — NEBIVOLOL 5 MG/1
5 TABLET ORAL DAILY
Qty: 90 TABLET | Refills: 1 | Status: SHIPPED | OUTPATIENT
Start: 2018-01-31 | End: 2018-08-23 | Stop reason: SDUPTHER
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
## 2018-02-07 PROBLEM — N18.2 CHRONIC KIDNEY DISEASE, STAGE 2 (MILD): Status: ACTIVE | Noted: 2017-05-10

## 2018-02-07 PROBLEM — N52.1 ERECTILE DISORDER DUE TO MEDICAL CONDITION IN MALE PATIENT: Status: ACTIVE | Noted: 2017-08-11

## 2018-02-08 ENCOUNTER — OFFICE VISIT (OUTPATIENT)
Dept: PAIN MEDICINE | Facility: CLINIC | Age: 58
End: 2018-02-08
Payer: COMMERCIAL

## 2018-02-08 ENCOUNTER — TELEPHONE (OUTPATIENT)
Dept: PAIN MEDICINE | Facility: CLINIC | Age: 58
End: 2018-02-08

## 2018-02-08 VITALS
HEIGHT: 74 IN | WEIGHT: 315 LBS | BODY MASS INDEX: 40.43 KG/M2 | RESPIRATION RATE: 16 BRPM | HEART RATE: 76 BPM | SYSTOLIC BLOOD PRESSURE: 162 MMHG | DIASTOLIC BLOOD PRESSURE: 84 MMHG

## 2018-02-08 DIAGNOSIS — M51.36 DISC DEGENERATION, LUMBAR: ICD-10-CM

## 2018-02-08 DIAGNOSIS — F11.20 UNCOMPLICATED OPIOID DEPENDENCE (HCC): ICD-10-CM

## 2018-02-08 DIAGNOSIS — M96.1 POSTLAMINECTOMY SYNDROME, LUMBAR: ICD-10-CM

## 2018-02-08 DIAGNOSIS — G89.4 CHRONIC PAIN SYNDROME: Primary | ICD-10-CM

## 2018-02-08 DIAGNOSIS — Z79.891 LONG-TERM CURRENT USE OF OPIATE ANALGESIC: ICD-10-CM

## 2018-02-08 PROCEDURE — 99214 OFFICE O/P EST MOD 30 MIN: CPT | Performed by: NURSE PRACTITIONER

## 2018-02-08 RX ORDER — HYDROCODONE BITARTRATE AND ACETAMINOPHEN 10; 325 MG/1; MG/1
1 TABLET ORAL 2 TIMES DAILY PRN
Qty: 60 TABLET | Refills: 0 | Status: SHIPPED | OUTPATIENT
Start: 2018-02-08 | End: 2018-02-08 | Stop reason: SDUPTHER

## 2018-02-08 RX ORDER — HYDROCODONE BITARTRATE AND ACETAMINOPHEN 10; 325 MG/1; MG/1
1 TABLET ORAL 2 TIMES DAILY PRN
Qty: 60 TABLET | Refills: 0 | Status: SHIPPED | OUTPATIENT
Start: 2018-02-08 | End: 2018-04-05 | Stop reason: SDUPTHER
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
## 2018-02-08 NOTE — TELEPHONE ENCOUNTER
Received fax from [facility] stating SCS trial is denied stating UDS showed positive for alcohol  Asencio Mood, discussed with pt today at [facility]  Pt was given a copy of denial  Ref # [id number], CPB referenced #[id number]  Denial sent to be scanned into pt's chart

## 2018-02-19 ENCOUNTER — OFFICE VISIT (OUTPATIENT)
Dept: FAMILY MEDICINE CLINIC | Facility: CLINIC | Age: 58
End: 2018-02-19
Payer: COMMERCIAL

## 2018-02-19 VITALS
DIASTOLIC BLOOD PRESSURE: 72 MMHG | TEMPERATURE: 98.7 F | WEIGHT: 315 LBS | HEIGHT: 74 IN | SYSTOLIC BLOOD PRESSURE: 158 MMHG | HEART RATE: 80 BPM | BODY MASS INDEX: 40.43 KG/M2

## 2018-02-19 DIAGNOSIS — L03.031 PARONYCHIA OF GREAT TOE, RIGHT: Primary | ICD-10-CM

## 2018-02-19 PROCEDURE — 99213 OFFICE O/P EST LOW 20 MIN: CPT | Performed by: PHYSICIAN ASSISTANT

## 2018-02-19 RX ORDER — CEPHALEXIN 500 MG/1
500 CAPSULE ORAL EVERY 8 HOURS SCHEDULED
Qty: 21 CAPSULE | Refills: 0 | Status: SHIPPED | OUTPATIENT
Start: 2018-02-19 | End: 2018-02-26

## 2018-02-19 NOTE — PROGRESS NOTES
Assessment/Plan:     Diagnoses and all orders for this visit:    Paronychia of great toe, right  -     cephalexin (KEFLEX) 500 mg capsule; Take 1 capsule (500 mg total) by mouth every 8 (eight) hours for 7 days        - Keflex for 7 days, previously used with patient for paronychia  - No fluctuance or abscess noted so no need for drainage at this moment  - Recommend to continue to keep it clean and continue with use of neosporin  - Directed to return if symptoms persist despite antibiotics, spreading redness, fever or chills  Subjective:      Patient ID: Faby Soto is a 61 y o  male  Faby Soto is presenting today for an infection of his lateral paronychia of right big toe for 4 days  He noticed warm, red, pressure with purulent drainage  He has been using neosporin and epsom salts with mild improvement  Improves the pain but continues with discharge  He denies fevers, chills, spreading of the redness, numbness or tingling of his right foot  He has had this every several years which have been treated and managed well with antibiotic  Most frequently in 2016 he took Keflex for 7 days and it was resolved  He wanted Dr Cristy Homans to know that his androgel is causing rashes and desquamation of his skin where applied  Toe Pain          The following portions of the patient's history were reviewed and updated as appropriate: allergies, current medications, past medical history, past social history and problem list     Review of Systems   Constitutional: Negative for activity change, chills, fatigue, fever and unexpected weight change  HENT: Negative for rhinorrhea and sore throat  Respiratory: Negative for cough, shortness of breath and wheezing  Cardiovascular: Negative for chest pain, palpitations and leg swelling  Gastrointestinal: Negative for constipation, diarrhea, nausea and vomiting  Musculoskeletal: Negative for back pain, neck pain and neck stiffness     Skin: Positive for rash ( on bilateral shins where patches were applyed) and wound (right big toe)  Objective:  /72 (BP Location: Left arm)   Pulse 80   Temp 98 7 °F (37 1 °C) (Tympanic)   Ht 6' 2" (1 88 m)   Wt (!) 170 kg (375 lb)   BMI 48 15 kg/m²      Physical Exam   Constitutional: He is oriented to person, place, and time  He appears well-developed and well-nourished  No distress  HENT:   Head: Normocephalic and atraumatic  Cardiovascular: Normal rate, regular rhythm, normal heart sounds and intact distal pulses  Exam reveals no gallop and no friction rub  No murmur heard  Pulmonary/Chest: Effort normal and breath sounds normal  No respiratory distress  He has no wheezes  Musculoskeletal: Normal range of motion  Right foot: There is normal range of motion, no tenderness, no swelling, normal capillary refill and no crepitus  Feet:    Neurological: He is alert and oriented to person, place, and time  Skin: Skin is warm and dry  He is not diaphoretic  Psychiatric: He has a normal mood and affect

## 2018-02-19 NOTE — LETTER
Bruce,  This is MANOLO Mckeon  I wanted to inform you, in all your wisdom, that Mr Princess Costa has had rashes where he applied his androgel patches  He has since discontinued using them because the skill itches and desquamates  Not all of his skin, just below where the patches are placed  If it was all his skin I would be more concerned  I just wanted you to know, that he wanted you to know  I want you to know that    Curly Brain

## 2018-02-20 ENCOUNTER — TELEPHONE (OUTPATIENT)
Dept: PAIN MEDICINE | Facility: CLINIC | Age: 58
End: 2018-02-20

## 2018-02-20 NOTE — TELEPHONE ENCOUNTER
Attempt to reach pt to discuss appeal letter that was written and sent  LM informing pt of such and that I would be in contact when I hear something from [facility]

## 2018-03-08 DIAGNOSIS — G89.4 CHRONIC PAIN SYNDROME: Primary | ICD-10-CM

## 2018-03-08 DIAGNOSIS — M96.1 POSTLAMINECTOMY SYNDROME, LUMBAR REGION: ICD-10-CM

## 2018-03-08 NOTE — TELEPHONE ENCOUNTER
Received authorization confirmation from [employer] [facility] #[id number], valid 1/23/18-7/23/18  Spoke with pt and he is scheduled as follows:  4/5/18 @ [id number] w/ Wayne Box for H&P  4/12/18 arriving @ 1015 for 1120 trial  4/18/18 @ 1400 w/ Jasmin Sellers for lead removal  PCN allergy  Pt takes ASA 325mg but states it is just preventative and that it was never prescribed by a doctor just recommended    Email sent to [employer] to inform them of trial dates

## 2018-04-05 ENCOUNTER — TRANSCRIBE ORDERS (OUTPATIENT)
Dept: LAB | Facility: CLINIC | Age: 58
End: 2018-04-05

## 2018-04-05 ENCOUNTER — OFFICE VISIT (OUTPATIENT)
Dept: PAIN MEDICINE | Facility: CLINIC | Age: 58
End: 2018-04-05
Payer: COMMERCIAL

## 2018-04-05 VITALS
WEIGHT: 315 LBS | SYSTOLIC BLOOD PRESSURE: 126 MMHG | BODY MASS INDEX: 40.43 KG/M2 | HEART RATE: 84 BPM | HEIGHT: 74 IN | DIASTOLIC BLOOD PRESSURE: 72 MMHG

## 2018-04-05 DIAGNOSIS — M96.1 POSTLAMINECTOMY SYNDROME, LUMBAR: ICD-10-CM

## 2018-04-05 DIAGNOSIS — F11.20 UNCOMPLICATED OPIOID DEPENDENCE (HCC): ICD-10-CM

## 2018-04-05 DIAGNOSIS — G89.4 CHRONIC PAIN SYNDROME: Primary | ICD-10-CM

## 2018-04-05 DIAGNOSIS — M47.816 SPONDYLOSIS OF LUMBAR REGION WITHOUT MYELOPATHY OR RADICULOPATHY: ICD-10-CM

## 2018-04-05 DIAGNOSIS — M48.061 SPINAL STENOSIS OF LUMBAR REGION, UNSPECIFIED WHETHER NEUROGENIC CLAUDICATION PRESENT: ICD-10-CM

## 2018-04-05 PROCEDURE — 99214 OFFICE O/P EST MOD 30 MIN: CPT | Performed by: NURSE PRACTITIONER

## 2018-04-05 RX ORDER — HYDROCODONE BITARTRATE AND ACETAMINOPHEN 10; 325 MG/1; MG/1
1 TABLET ORAL 2 TIMES DAILY PRN
Qty: 60 TABLET | Refills: 0 | Status: SHIPPED | OUTPATIENT
Start: 2018-04-05 | End: 2018-05-31 | Stop reason: SDUPTHER

## 2018-04-05 RX ORDER — HYDROCODONE BITARTRATE AND ACETAMINOPHEN 10; 325 MG/1; MG/1
1 TABLET ORAL 2 TIMES DAILY PRN
Qty: 60 TABLET | Refills: 0 | Status: SHIPPED | OUTPATIENT
Start: 2018-04-05 | End: 2018-04-05 | Stop reason: SDUPTHER
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
## 2018-04-06 LAB
ALBUMIN SERPL-MCNC: 4.6 G/DL (ref 3.6–5.1)
ALBUMIN/GLOB SERPL: 1.6 (CALC) (ref 1–2.5)
ALP SERPL-CCNC: 85 U/L (ref 40–115)
ALT SERPL-CCNC: 28 U/L (ref 9–46)
APTT PPP: 26 SEC (ref 22–34)
AST SERPL-CCNC: 29 U/L (ref 10–35)
BASOPHILS # BLD AUTO: 67 CELLS/UL (ref 0–200)
BASOPHILS NFR BLD AUTO: 0.7 %
BILIRUB SERPL-MCNC: 0.4 MG/DL (ref 0.2–1.2)
BUN SERPL-MCNC: 18 MG/DL (ref 7–25)
BUN/CREAT SERPL: ABNORMAL (CALC) (ref 6–22)
CALCIUM SERPL-MCNC: 9.9 MG/DL (ref 8.6–10.3)
CHLORIDE SERPL-SCNC: 98 MMOL/L (ref 98–110)
CO2 SERPL-SCNC: 28 MMOL/L (ref 20–31)
CREAT SERPL-MCNC: 1.33 MG/DL (ref 0.7–1.33)
EOSINOPHIL # BLD AUTO: 703 CELLS/UL (ref 15–500)
EOSINOPHIL NFR BLD AUTO: 7.4 %
ERYTHROCYTE [DISTWIDTH] IN BLOOD BY AUTOMATED COUNT: 19.4 % (ref 11–15)
GLOBULIN SER CALC-MCNC: 2.8 G/DL (CALC) (ref 1.9–3.7)
GLUCOSE SERPL-MCNC: 105 MG/DL (ref 65–99)
HCT VFR BLD AUTO: 47.5 % (ref 38.5–50)
HGB BLD-MCNC: 14.7 G/DL (ref 13.2–17.1)
INR PPP: 1
LYMPHOCYTES # BLD AUTO: 1264 CELLS/UL (ref 850–3900)
LYMPHOCYTES NFR BLD AUTO: 13.3 %
MCH RBC QN AUTO: 25.3 PG (ref 27–33)
MCHC RBC AUTO-ENTMCNC: 30.9 G/DL (ref 32–36)
MCV RBC AUTO: 81.9 FL (ref 80–100)
MONOCYTES # BLD AUTO: 808 CELLS/UL (ref 200–950)
MONOCYTES NFR BLD AUTO: 8.5 %
NEUTROPHILS # BLD AUTO: 6660 CELLS/UL (ref 1500–7800)
NEUTROPHILS NFR BLD AUTO: 70.1 %
PLATELET # BLD AUTO: 143 THOUSAND/UL (ref 140–400)
PMV BLD REES-ECKER: ABNORMAL FL
POTASSIUM SERPL-SCNC: 4.8 MMOL/L (ref 3.5–5.3)
PROT SERPL-MCNC: 7.4 G/DL (ref 6.1–8.1)
PROTHROMBIN TIME: 10.3 SEC (ref 9–11.5)
RBC # BLD AUTO: 5.8 MILLION/UL (ref 4.2–5.8)
SL AMB EGFR AFRICAN AMERICAN: 68 ML/MIN/1.73M2
SL AMB EGFR NON AFRICAN AMERICAN: 59 ML/MIN/1.73M2
SODIUM SERPL-SCNC: 136 MMOL/L (ref 135–146)
WBC # BLD AUTO: 9.5 THOUSAND/UL (ref 3.8–10.8)

## 2018-04-12 ENCOUNTER — TELEPHONE (OUTPATIENT)
Dept: PAIN MEDICINE | Facility: CLINIC | Age: 58
End: 2018-04-12

## 2018-04-12 ENCOUNTER — HOSPITAL ENCOUNTER (OUTPATIENT)
Dept: RADIOLOGY | Facility: CLINIC | Age: 58
Discharge: HOME | End: 2018-04-12
Payer: COMMERCIAL

## 2018-04-12 VITALS
TEMPERATURE: 98.9 F | HEART RATE: 61 BPM | OXYGEN SATURATION: 96 % | SYSTOLIC BLOOD PRESSURE: 163 MMHG | DIASTOLIC BLOOD PRESSURE: 84 MMHG | RESPIRATION RATE: 20 BRPM

## 2018-04-12 DIAGNOSIS — G89.4 CHRONIC PAIN SYNDROME: ICD-10-CM

## 2018-04-12 DIAGNOSIS — Z98.890 POSTPROCEDURAL STATE: Primary | ICD-10-CM

## 2018-04-12 DIAGNOSIS — M96.1 POSTLAMINECTOMY SYNDROME, LUMBAR REGION: ICD-10-CM

## 2018-04-12 PROCEDURE — C1787 PATIENT PROGR, NEUROSTIM: HCPCS

## 2018-04-12 PROCEDURE — 95972 ALYS CPLX SP/PN NPGT W/PRGRM: CPT | Performed by: ANESTHESIOLOGY

## 2018-04-12 PROCEDURE — 96365 THER/PROPH/DIAG IV INF INIT: CPT

## 2018-04-12 PROCEDURE — 63650 IMPLANT NEUROELECTRODES: CPT | Performed by: ANESTHESIOLOGY

## 2018-04-12 PROCEDURE — C1897 LEAD, NEUROSTIM TEST KIT: HCPCS

## 2018-04-12 RX ORDER — CLINDAMYCIN PHOSPHATE 600 MG/50ML
600 INJECTION INTRAVENOUS ONCE
Status: COMPLETED | OUTPATIENT
Start: 2018-04-12 | End: 2018-04-12

## 2018-04-12 RX ORDER — CIPROFLOXACIN 500 MG/1
500 TABLET, FILM COATED ORAL EVERY 12 HOURS SCHEDULED
Qty: 14 TABLET | Refills: 0 | Status: SHIPPED | OUTPATIENT
Start: 2018-04-12 | End: 2018-04-19

## 2018-04-12 RX ADMIN — CLINDAMYCIN PHOSPHATE 600 MG: 600 INJECTION INTRAVENOUS at 10:35

## 2018-04-12 NOTE — TELEPHONE ENCOUNTER
S/w Dr Lindquist Falling, he had advised the patient to stop his Tizanidine while taking the Cipro  Called [facility] pharmacist and made them aware of this as well

## 2018-04-12 NOTE — DISCHARGE INSTRUCTIONS
SPINE AND PAIN: SPINAL CORD STIMULATION/PERIPHERAL NERVE STIMULATION TRIAL/ PERMANENT IMPLANT DISCHARGE INSTRUCTIONS    ACTIVITY RESTRICTIONS DURING TRIAL PERIOD  · Do not drive with the SCS "on"  · Do not bend or twist at the waist   · Do not lift anything that weighs over 5 pounds  · When you lie down, "log roll" (keep spine aligned) to change positions  Do not sleep on your stomach  · Limit stair climbing and strenuous activity  CARE OF THE INJECTION SITE  · CHECK THE DRESSING DAILY  It should intact  · Notify the [facility] if you have any of the following: redness, drainage, swelling, chills or fever over 100°F   · Do not change dressing, only reinforce edges if necessary  · Keep the dressing dry  Do not shower, (sponge baths only) until evaluated in office  SPECIAL INSTRUCTIONS  · Take your temperature daily  · Follow-up for lead removal scheduled for ***  · The  box is expensive  DO NOT drop or get wet  · Do not pull on the cable or leads  Do not disconnect the cable and lead  · Do activities that normally cause you to have pain and try different  settings  · Obtain post procedure x-ray shortly after procedure if ordered by physician  MEDICATIONS  · Continue to take all routine medications  · Our office may have instructed you to hold some medications  · You may resume _______________________________________________  · Take antiobiotic as prescribed:_____________________________________  If you have any problems specifically related to your procedure, please call our office at [phone]  Problems not related to your procedure should be directed at your primary care physician  Contact the company representative with any questions regarding settings or operation of the external  box

## 2018-04-12 NOTE — INTERVAL H&P NOTE
Update: H&P reviewed  After examining the patient, I find no changed to the H&P since it had been written  Patient re-evaluated   Accept as history and physical     Lion Jaime MD/April 12, 2018/11:26 AM
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
## 2018-04-12 NOTE — TELEPHONE ENCOUNTER
[facility] called  Prescription for Cipro 500mg by mouth every 12 hours for 7 days has a  reaction to the Tizanidine  It could cause an increase of the Tizanidine stength  Is this still ok for him to take the Cipro? Call back # [phone]  The pharmacist needs approval before he can fill the medication

## 2018-04-13 NOTE — TELEPHONE ENCOUNTER
S/W pt who reports some expected soreness from the procedure  He said he was fairly active yest and has no pain to speak of in his LB and hips  Wife looked at his drsg while I was on the ph with the pt and there is a 25 cent area of blood on the drsg where the lead was put in, this is slightly increased from yest  I informed pt this can occur due to pt having been on ASA, which is being held presently  They will keep and eye on the drainage and aware to contact the office if drsg becomes saturated with blood  Pt denied fever yest hasn't taken it yet today, he is taking his abx and holding his tizanidine while on the abx  Pt aware he may use his pain meds as prescribed for the soreness/pain  I confirmed lead removal for 4/18, arriving at 1:45

## 2018-04-17 ENCOUNTER — OFFICE VISIT (OUTPATIENT)
Dept: NEUROLOGY | Facility: CLINIC | Age: 58
End: 2018-04-17
Payer: COMMERCIAL

## 2018-04-17 VITALS — SYSTOLIC BLOOD PRESSURE: 150 MMHG | DIASTOLIC BLOOD PRESSURE: 94 MMHG | HEART RATE: 78 BPM | RESPIRATION RATE: 12 BRPM

## 2018-04-17 DIAGNOSIS — R53.1 WEAKNESS: ICD-10-CM

## 2018-04-17 DIAGNOSIS — R52 PAIN: Primary | ICD-10-CM

## 2018-04-17 DIAGNOSIS — R20.0 NUMBNESS: ICD-10-CM

## 2018-04-17 PROCEDURE — 99244 OFF/OP CNSLTJ NEW/EST MOD 40: CPT | Performed by: PSYCHIATRY & NEUROLOGY

## 2018-04-17 RX ORDER — ROSUVASTATIN CALCIUM 5 MG/1
5 TABLET, COATED ORAL DAILY
COMMUNITY

## 2018-04-17 RX ORDER — HYDROCODONE BITARTRATE AND ACETAMINOPHEN 10; 325 MG/1; MG/1
1 TABLET ORAL 2 TIMES DAILY PRN
COMMUNITY

## 2018-04-17 RX ORDER — CIPROFLOXACIN 500 MG/1
500 TABLET ORAL 2 TIMES DAILY
COMMUNITY

## 2018-04-17 RX ORDER — LORATADINE 10 MG/1
10 TABLET ORAL DAILY
COMMUNITY

## 2018-04-17 RX ORDER — HYDROMORPHONE HYDROCHLORIDE 12 MG/1
TABLET, EXTENDED RELEASE ORAL DAILY
COMMUNITY

## 2018-04-17 RX ORDER — NEBIVOLOL 5 MG/1
5 TABLET ORAL DAILY
COMMUNITY

## 2018-04-17 RX ORDER — LEVOTHYROXINE SODIUM 25 UG/1
25 TABLET ORAL
COMMUNITY

## 2018-04-17 RX ORDER — ASPIRIN 325 MG
325 TABLET ORAL DAILY
COMMUNITY

## 2018-04-17 RX ORDER — ESOMEPRAZOLE MAGNESIUM 40 MG/1
40 CAPSULE, DELAYED RELEASE ORAL
COMMUNITY

## 2018-04-17 RX ORDER — TIZANIDINE 2 MG/1
2 TABLET ORAL DAILY
COMMUNITY

## 2018-04-17 RX ORDER — AMLODIPINE BESYLATE 10 MG/1
10 TABLET ORAL DAILY
COMMUNITY
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
## 2018-04-17 NOTE — ASSESSMENT & PLAN NOTE
I believe that the patient's numbness is likely due to an ulnar neuropathy at the elbow although theoretically a cervical radiculopathy is possible.  He will follow-up for an EMG looking for evidence of a neuromuscular disorder.  If there is no evidence of an ulnar neuropathy at the elbow, we will likely proceed with an MRI of the cervical spine.  Additional diagnostics and treatments will depend on his clinical course and the results of his pending studies.

## 2018-04-17 NOTE — ASSESSMENT & PLAN NOTE
The patient reported weakness in the hand which is likely related to an ulnar neuropathy at the elbow although a cervical radiculopathy is possible.  In the future he may benefit from physical therapy for not only strengthen conditioning but dexterity and fine motor movements.

## 2018-04-18 ENCOUNTER — OFFICE VISIT (OUTPATIENT)
Dept: PAIN MEDICINE | Facility: CLINIC | Age: 58
End: 2018-04-18

## 2018-04-18 VITALS
WEIGHT: 315 LBS | HEIGHT: 74 IN | HEART RATE: 68 BPM | SYSTOLIC BLOOD PRESSURE: 138 MMHG | DIASTOLIC BLOOD PRESSURE: 96 MMHG | TEMPERATURE: 98.6 F | BODY MASS INDEX: 40.43 KG/M2

## 2018-04-18 DIAGNOSIS — M54.16 LUMBAR RADICULOPATHY: Primary | ICD-10-CM

## 2018-04-18 DIAGNOSIS — M48.061 SPINAL STENOSIS OF LUMBAR REGION, UNSPECIFIED WHETHER NEUROGENIC CLAUDICATION PRESENT: ICD-10-CM

## 2018-04-18 DIAGNOSIS — M96.1 POSTLAMINECTOMY SYNDROME, LUMBAR: ICD-10-CM

## 2018-04-18 PROCEDURE — 99024 POSTOP FOLLOW-UP VISIT: CPT | Performed by: NURSE PRACTITIONER
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
## 2018-04-23 ENCOUNTER — OFFICE VISIT (OUTPATIENT)
Dept: OBGYN CLINIC | Facility: CLINIC | Age: 58
End: 2018-04-23
Payer: COMMERCIAL

## 2018-04-23 VITALS
SYSTOLIC BLOOD PRESSURE: 172 MMHG | HEART RATE: 73 BPM | BODY MASS INDEX: 40.43 KG/M2 | HEIGHT: 74 IN | DIASTOLIC BLOOD PRESSURE: 92 MMHG | WEIGHT: 315 LBS

## 2018-04-23 DIAGNOSIS — G56.22 CUBITAL TUNNEL SYNDROME ON LEFT: Primary | ICD-10-CM

## 2018-04-23 PROCEDURE — 99203 OFFICE O/P NEW LOW 30 MIN: CPT | Performed by: ORTHOPAEDIC SURGERY
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
## 2018-04-30 ENCOUNTER — HOSPITAL ENCOUNTER (OUTPATIENT)
Dept: RADIOLOGY | Facility: CLINIC | Age: 58
Discharge: HOME | End: 2018-04-30
Attending: PHYSICAL MEDICINE & REHABILITATION
Payer: COMMERCIAL

## 2018-04-30 DIAGNOSIS — G56.22 CUBITAL TUNNEL SYNDROME ON LEFT: ICD-10-CM

## 2018-04-30 PROCEDURE — 95908 NRV CNDJ TST 3-4 STUDIES: CPT | Performed by: PHYSICAL MEDICINE & REHABILITATION

## 2018-04-30 PROCEDURE — 95886 MUSC TEST DONE W/N TEST COMP: CPT | Performed by: PHYSICAL MEDICINE & REHABILITATION

## 2018-04-30 PROCEDURE — 95860 NEEDLE EMG 1 EXTREMITY: CPT | Performed by: PHYSICAL MEDICINE & REHABILITATION

## 2018-04-30 NOTE — PROCEDURES
Procedures      Electromyogram and Nerve Conduction Velocity Procedure Note    HX:  50-year-old right-hand-dominant male referred by Orthopedics for electrodiagnostic study of the left upper extremity  He has a history of prior study done at [facility] few years ago but he cannot recall findings  He is currently complaining of longstanding symptoms of numbness and tingling in the 4th and 5th digits of the left hand  There are no associated more proximal symptoms no history of cervical shoulder trauma  He presents today for electrodiagnostic study  PMH:  Hypothyroidism, chronic pain, obesity    Exam:     On exam he is diminished sensation of pain in the ulnar territory in the left upper extremity  Radial and median territories are intact, reflexes are globally diminished but there is no 5 signs of focal or lateralizing motor weakness  No long tract signs, no fasciculations tremors or atrophy  Procedure:  Verbal informed consent was obtained as with all electrodiagnostic medicine patients  As with all patients this patient was informed that they may terminate the  examine at any time  Patient tolerated the procedure well with no adverse effects reported or observed  Please note that due to the marked subcutaneous adipose tissue percutaneous shocks were not uniformly able to deep lower as the nerve trunks and therefore nerve conduction studies today are limited   Findings:  Please see the [employer] data printout  Please note that due to the marked subcutaneous adipose tissue percutaneous shocks were not uniformly able to deep lower as the nerve trunks and therefore nerve conduction studies today are limited  There is significant drop in amplitude for the left ulnar motor response with stimulation above the elbow as compared with stimulation at the wrist   No response could be obtained from below the elbow despite multiple repositioning is in the high gain techniques      The left ulnar sensory response was absent  The left median sensory response was reduced with borderline slowing  The left median motor response with elbow stimulation, not be obtained  There was significant but partial conduction block of the median motor response comparing that obtained with wrist stimulation versus palmar stimulation  Electromyography:  No abnormal spontaneous potentials of found the following left-sided muscles:  Cervical paraspinals, abductor pollicis brevis 1st dorsal interosseous, extension did try incontinence, brachioradialis, biceps, triceps, abductor digiti minimi  Motor units in all muscles were normal as was recruitment even in the ulnar innervated muscles  Conclusion:   1  There is evidence for significant conduction block of the ulnar nerve with stimulation above the elbow however due to adiposity for the localization cannot be pinpointed  Regardless there is no evidence of any motor axonal loss occurring in the distal musculature and recruitment in these muscles as at this time normal     2   There is incidental findings of mild/acute median mononeuropathy at the left wrist     3   There is no electrophysiologic dated indicated suggest a cervical radiculopathy or plexopathy or large fiber polyneuropathy  Recommendations:     Service get imaging of the ulnar nerve in the region of the elbow IE ultrasound versus MRI may be of further diagnostic yield

## 2018-05-14 ENCOUNTER — OFFICE VISIT (OUTPATIENT)
Dept: NEUROSURGERY | Facility: CLINIC | Age: 58
End: 2018-05-14
Payer: COMMERCIAL

## 2018-05-14 ENCOUNTER — OFFICE VISIT (OUTPATIENT)
Dept: OBGYN CLINIC | Facility: CLINIC | Age: 58
End: 2018-05-14
Payer: COMMERCIAL

## 2018-05-14 VITALS
BODY MASS INDEX: 40.43 KG/M2 | WEIGHT: 315 LBS | DIASTOLIC BLOOD PRESSURE: 122 MMHG | SYSTOLIC BLOOD PRESSURE: 154 MMHG | HEIGHT: 74 IN | TEMPERATURE: 98.8 F | HEART RATE: 71 BPM | RESPIRATION RATE: 18 BRPM

## 2018-05-14 VITALS — BODY MASS INDEX: 40.43 KG/M2 | WEIGHT: 315 LBS | HEIGHT: 74 IN

## 2018-05-14 DIAGNOSIS — G56.22 CUBITAL TUNNEL SYNDROME ON LEFT: Primary | ICD-10-CM

## 2018-05-14 DIAGNOSIS — M54.16 LUMBAR RADICULOPATHY: ICD-10-CM

## 2018-05-14 DIAGNOSIS — G89.4 CHRONIC PAIN SYNDROME: Primary | ICD-10-CM

## 2018-05-14 DIAGNOSIS — M96.1 POSTLAMINECTOMY SYNDROME, LUMBAR: ICD-10-CM

## 2018-05-14 DIAGNOSIS — M48.061 SPINAL STENOSIS OF LUMBAR REGION, UNSPECIFIED WHETHER NEUROGENIC CLAUDICATION PRESENT: ICD-10-CM

## 2018-05-14 PROCEDURE — 99243 OFF/OP CNSLTJ NEW/EST LOW 30: CPT | Performed by: NEUROLOGICAL SURGERY

## 2018-05-14 PROCEDURE — 99213 OFFICE O/P EST LOW 20 MIN: CPT | Performed by: ORTHOPAEDIC SURGERY

## 2018-05-14 RX ORDER — ASPIRIN 81 MG/1
81 TABLET ORAL DAILY
COMMUNITY
End: 2018-06-19 | Stop reason: HOSPADM

## 2018-05-14 NOTE — PROGRESS NOTES
Assessment/Plan:    No problem-specific Assessment & Plan notes found for this encounter  Diagnoses and all orders for this visit:    Chronic pain syndrome  -     Case request operating room: placement of thoracic spinal cord stimulator with left buttock generator; Standing  -     Case request operating room: placement of thoracic spinal cord stimulator with left buttock generator    Lumbar radiculopathy  -     Ambulatory referral to Neurosurgery  -     Case request operating room: placement of thoracic spinal cord stimulator with left buttock generator; Standing  -     Case request operating room: placement of thoracic spinal cord stimulator with left buttock generator    Postlaminectomy syndrome, lumbar  -     Ambulatory referral to Neurosurgery  -     Case request operating room: placement of thoracic spinal cord stimulator with left buttock generator; Standing  -     Case request operating room: placement of thoracic spinal cord stimulator with left buttock generator    Spinal stenosis of lumbar region, unspecified whether neurogenic claudication present  -     Ambulatory referral to Neurosurgery    Other orders  -     aspirin (ECOTRIN LOW STRENGTH) 81 mg EC tablet; Take 81 mg by mouth daily  -     Void on call to OR; Standing  -     Insert peripheral IV; Standing        Summary:   this is a 67-year-old male with chronic pain involving his lower back and bilateral posterior lower extremities  Primary lower back pain  History of lumbar fusion  Underwent successful spinal cord stimulator trial with Dr Giovanny Mendiola and presents for permanent implantation  Favor placement of a [employer] Penta paddle electrode covering the T8-9 interspace  MRI thoracic spine with mild degenerative changes  Morbid obesity on exam     He is taking hydromorphone and hydrocodone  The risks and benefits of the procedure reviewed with the patient and wife and they wish to proceed    These risks include, but are not limited to bleeding, infection, paralysis, device malfunction, device malpositioning, and failure of treatment  Subjective:      Patient ID: Venu Flannery is a 61 y o  male  HPI    This is a pleasant 41-year-old male with a longstanding history of chronic pain that is primarily in his lower back  He describes a constant aching dull lower back pain  He also reports intermittent bilateral posterior leg pain to his feet  He underwent a lumbar fusion in 2008 by Dr Swan Esters  He noted some relief at that time, but his pain progressively worsened  He presents now after undergoing a successful spinal cord stimulator trial with Dr Vonnie Bingham in which he obtained 60% pain relief  He wishes to proceed with permanent implantation  He was most pleased with his increased activity throughout the trial     He is presently taking Dilaudid as well as hydrocodone  He has been on these medications for 6 years  The following portions of the patient's history were reviewed and updated as appropriate: allergies, current medications, past family history, past medical history, past social history and past surgical history  Review of Systems   Constitutional: Negative  HENT: Negative  Eyes: Negative  Respiratory: Positive for apnea (CPAP)  Cardiovascular: Negative  Gastrointestinal: Negative  Endocrine: Negative  Genitourinary: Negative  Musculoskeletal: Positive for back pain (center LBP raidiating down bialteral legs)  Skin: Negative  Allergic/Immunologic: Negative  Neurological: Positive for numbness (left hand, standing and sitting for long period )  Hematological:        ASA 81 MG   Psychiatric/Behavioral: Negative            Objective:      BP (!) 154/122 (BP Location: Right arm, Patient Position: Sitting, Cuff Size: Standard)   Pulse 71   Temp 98 8 °F (37 1 °C) (Tympanic)   Resp 18   Ht 6' 2" (1 88 m)   Wt (!) 164 kg (361 lb)   BMI 46 35 kg/m²          Physical Exam Constitutional: He is oriented to person, place, and time  Morbid obesity   HENT:   Head: Normocephalic  Eyes: Pupils are equal, round, and reactive to light  Neck: Normal range of motion  Pulmonary/Chest: Effort normal    Neurological: He is alert and oriented to person, place, and time  He has normal strength and normal reflexes  No sensory deficit  Psychiatric: He has a normal mood and affect  His behavior is normal            Results:   Reviewed imaging and report of spinal cord stimulator trial with Dr Latosha Nguyen  Single midline [employer] percutaneous lead placed to the top of T8 using burst programming  Reviewed imaging and report of MRI thoracic spine October 2017  Mild degenerative changes

## 2018-05-16 PROBLEM — G89.4 CHRONIC PAIN SYNDROME: Status: ACTIVE | Noted: 2018-05-16

## 2018-05-23 ENCOUNTER — EVALUATION (OUTPATIENT)
Dept: OCCUPATIONAL THERAPY | Facility: CLINIC | Age: 58
End: 2018-05-23
Payer: COMMERCIAL

## 2018-05-23 DIAGNOSIS — G56.22 CUBITAL TUNNEL SYNDROME ON LEFT: ICD-10-CM

## 2018-05-23 PROCEDURE — L3702 EO W/O JOINTS CF: HCPCS | Performed by: OCCUPATIONAL THERAPIST

## 2018-05-23 PROCEDURE — 97165 OT EVAL LOW COMPLEX 30 MIN: CPT | Performed by: OCCUPATIONAL THERAPIST

## 2018-05-23 PROCEDURE — G8990 OTHER PT/OT CURRENT STATUS: HCPCS | Performed by: OCCUPATIONAL THERAPIST

## 2018-05-23 PROCEDURE — G8991 OTHER PT/OT GOAL STATUS: HCPCS | Performed by: OCCUPATIONAL THERAPIST
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
## 2018-05-29 ENCOUNTER — APPOINTMENT (OUTPATIENT)
Dept: OCCUPATIONAL THERAPY | Facility: CLINIC | Age: 58
End: 2018-05-29
Payer: COMMERCIAL

## 2018-05-31 ENCOUNTER — OFFICE VISIT (OUTPATIENT)
Dept: PAIN MEDICINE | Facility: CLINIC | Age: 58
End: 2018-05-31
Payer: COMMERCIAL

## 2018-05-31 ENCOUNTER — TRANSCRIBE ORDERS (OUTPATIENT)
Dept: RADIOLOGY | Facility: CLINIC | Age: 58
End: 2018-05-31

## 2018-05-31 ENCOUNTER — APPOINTMENT (OUTPATIENT)
Dept: RADIOLOGY | Facility: CLINIC | Age: 58
End: 2018-05-31
Payer: COMMERCIAL

## 2018-05-31 ENCOUNTER — OFFICE VISIT (OUTPATIENT)
Dept: CARDIOLOGY CLINIC | Facility: CLINIC | Age: 58
End: 2018-05-31
Payer: COMMERCIAL

## 2018-05-31 VITALS
DIASTOLIC BLOOD PRESSURE: 98 MMHG | SYSTOLIC BLOOD PRESSURE: 160 MMHG | TEMPERATURE: 98.2 F | HEIGHT: 74 IN | WEIGHT: 315 LBS | BODY MASS INDEX: 40.43 KG/M2 | HEART RATE: 78 BPM

## 2018-05-31 VITALS
SYSTOLIC BLOOD PRESSURE: 158 MMHG | DIASTOLIC BLOOD PRESSURE: 98 MMHG | HEIGHT: 75 IN | WEIGHT: 315 LBS | HEART RATE: 72 BPM | BODY MASS INDEX: 39.17 KG/M2

## 2018-05-31 DIAGNOSIS — M54.16 LUMBAR RADICULOPATHY: ICD-10-CM

## 2018-05-31 DIAGNOSIS — E78.00 PURE HYPERCHOLESTEROLEMIA: ICD-10-CM

## 2018-05-31 DIAGNOSIS — M48.061 SPINAL STENOSIS OF LUMBAR REGION, UNSPECIFIED WHETHER NEUROGENIC CLAUDICATION PRESENT: ICD-10-CM

## 2018-05-31 DIAGNOSIS — Z01.810 PREOP CARDIOVASCULAR EXAM: ICD-10-CM

## 2018-05-31 DIAGNOSIS — G89.4 CHRONIC PAIN SYNDROME: ICD-10-CM

## 2018-05-31 DIAGNOSIS — I10 ESSENTIAL HYPERTENSION: Primary | Chronic | ICD-10-CM

## 2018-05-31 DIAGNOSIS — M96.1 POSTLAMINECTOMY SYNDROME, LUMBAR: ICD-10-CM

## 2018-05-31 DIAGNOSIS — G89.4 CHRONIC PAIN SYNDROME: Primary | ICD-10-CM

## 2018-05-31 DIAGNOSIS — F11.20 UNCOMPLICATED OPIOID DEPENDENCE (HCC): ICD-10-CM

## 2018-05-31 DIAGNOSIS — M51.37 DISC DEGENERATION, LUMBOSACRAL: ICD-10-CM

## 2018-05-31 DIAGNOSIS — M47.816 SPONDYLOSIS OF LUMBAR REGION WITHOUT MYELOPATHY OR RADICULOPATHY: ICD-10-CM

## 2018-05-31 LAB
ATRIAL RATE: 68 BPM
PR INTERVAL: 146 MS
QRS AXIS: -32 DEGREES
QRSD INTERVAL: 142 MS
QT INTERVAL: 450 MS
QTC INTERVAL: 478 MS
T WAVE AXIS: -13 DEGREES
VENTRICULAR RATE: 68 BPM

## 2018-05-31 PROCEDURE — 71046 X-RAY EXAM CHEST 2 VIEWS: CPT

## 2018-05-31 PROCEDURE — 93005 ELECTROCARDIOGRAM TRACING: CPT

## 2018-05-31 PROCEDURE — 99214 OFFICE O/P EST MOD 30 MIN: CPT | Performed by: NURSE PRACTITIONER

## 2018-05-31 PROCEDURE — 99244 OFF/OP CNSLTJ NEW/EST MOD 40: CPT | Performed by: INTERNAL MEDICINE

## 2018-05-31 PROCEDURE — 93010 ELECTROCARDIOGRAM REPORT: CPT | Performed by: INTERNAL MEDICINE

## 2018-05-31 RX ORDER — HYDROCODONE BITARTRATE AND ACETAMINOPHEN 10; 325 MG/1; MG/1
TABLET ORAL
Qty: 60 TABLET | Refills: 0 | Status: SHIPPED | OUTPATIENT
Start: 2018-05-31 | End: 2018-07-26 | Stop reason: SDUPTHER

## 2018-05-31 RX ORDER — HYDROCODONE BITARTRATE AND ACETAMINOPHEN 10; 325 MG/1; MG/1
TABLET ORAL
Qty: 60 TABLET | Refills: 0 | Status: SHIPPED | OUTPATIENT
Start: 2018-05-31 | End: 2018-05-31 | Stop reason: SDUPTHER
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
## 2018-06-01 ENCOUNTER — OFFICE VISIT (OUTPATIENT)
Dept: NEUROSURGERY | Facility: CLINIC | Age: 58
End: 2018-06-01

## 2018-06-01 VITALS
WEIGHT: 315 LBS | HEART RATE: 84 BPM | BODY MASS INDEX: 39.17 KG/M2 | DIASTOLIC BLOOD PRESSURE: 109 MMHG | HEIGHT: 75 IN | TEMPERATURE: 98.4 F | RESPIRATION RATE: 16 BRPM | SYSTOLIC BLOOD PRESSURE: 158 MMHG

## 2018-06-01 DIAGNOSIS — Z01.818 PREOPERATIVE EXAMINATION: ICD-10-CM

## 2018-06-01 DIAGNOSIS — G89.4 CHRONIC PAIN SYNDROME: Primary | ICD-10-CM

## 2018-06-01 DIAGNOSIS — M54.16 LUMBAR RADICULOPATHY: ICD-10-CM

## 2018-06-01 DIAGNOSIS — M96.1 POSTLAMINECTOMY SYNDROME, LUMBAR: ICD-10-CM

## 2018-06-01 DIAGNOSIS — M48.061 SPINAL STENOSIS OF LUMBAR REGION, UNSPECIFIED WHETHER NEUROGENIC CLAUDICATION PRESENT: ICD-10-CM

## 2018-06-01 LAB
ABO GROUP BLD: NORMAL
ALBUMIN SERPL-MCNC: 4.5 G/DL (ref 3.6–5.1)
ALBUMIN/GLOB SERPL: 1.6 (CALC) (ref 1–2.5)
ALP SERPL-CCNC: 105 U/L (ref 40–115)
ALT SERPL-CCNC: 22 U/L (ref 9–46)
APPEARANCE UR: CLEAR
APTT PPP: 28 SEC (ref 22–34)
AST SERPL-CCNC: 24 U/L (ref 10–35)
BACTERIA UR QL AUTO: ABNORMAL /HPF
BASOPHILS # BLD AUTO: 54 CELLS/UL (ref 0–200)
BASOPHILS NFR BLD AUTO: 0.6 %
BILIRUB SERPL-MCNC: 0.7 MG/DL (ref 0.2–1.2)
BILIRUB UR QL STRIP: NEGATIVE
BLD GP AB SCN SERPL QL: NORMAL
BUN SERPL-MCNC: 15 MG/DL (ref 7–25)
BUN/CREAT SERPL: ABNORMAL (CALC) (ref 6–22)
CALCIUM SERPL-MCNC: 9.8 MG/DL (ref 8.6–10.3)
CHLORIDE SERPL-SCNC: 98 MMOL/L (ref 98–110)
CO2 SERPL-SCNC: 26 MMOL/L (ref 20–31)
COLOR UR: ABNORMAL
CREAT SERPL-MCNC: 1.21 MG/DL (ref 0.7–1.33)
EOSINOPHIL # BLD AUTO: 783 CELLS/UL (ref 15–500)
EOSINOPHIL NFR BLD AUTO: 8.7 %
ERYTHROCYTE [DISTWIDTH] IN BLOOD BY AUTOMATED COUNT: 15.6 % (ref 11–15)
EST. AVERAGE GLUCOSE BLD GHB EST-MCNC: 128 (CALC)
EST. AVERAGE GLUCOSE BLD GHB EST-SCNC: 7.1 (CALC)
GLOBULIN SER CALC-MCNC: 2.8 G/DL (CALC) (ref 1.9–3.7)
GLUCOSE SERPL-MCNC: 116 MG/DL (ref 65–99)
GLUCOSE UR QL STRIP: NEGATIVE
HBA1C MFR BLD: 6.1 % OF TOTAL HGB
HCT VFR BLD AUTO: 45.5 % (ref 38.5–50)
HGB BLD-MCNC: 14.8 G/DL (ref 13.2–17.1)
HGB UR QL STRIP: NEGATIVE
HYALINE CASTS #/AREA URNS LPF: ABNORMAL /LPF
INR PPP: 1.1
KETONES UR QL STRIP: ABNORMAL
LEUKOCYTE ESTERASE UR QL STRIP: ABNORMAL
LYMPHOCYTES # BLD AUTO: 1071 CELLS/UL (ref 850–3900)
LYMPHOCYTES NFR BLD AUTO: 11.9 %
MCH RBC QN AUTO: 27.2 PG (ref 27–33)
MCHC RBC AUTO-ENTMCNC: 32.5 G/DL (ref 32–36)
MCV RBC AUTO: 83.5 FL (ref 80–100)
MONOCYTES # BLD AUTO: 828 CELLS/UL (ref 200–950)
MONOCYTES NFR BLD AUTO: 9.2 %
NEUTROPHILS # BLD AUTO: 6264 CELLS/UL (ref 1500–7800)
NEUTROPHILS NFR BLD AUTO: 69.6 %
NITRITE UR QL STRIP: NEGATIVE
PH UR STRIP: 6 [PH] (ref 5–8)
PLATELET # BLD AUTO: 152 THOUSAND/UL (ref 140–400)
PMV BLD REES-ECKER: ABNORMAL FL
POTASSIUM SERPL-SCNC: 4.2 MMOL/L (ref 3.5–5.3)
PROT SERPL-MCNC: 7.3 G/DL (ref 6.1–8.1)
PROT UR QL STRIP: ABNORMAL
PROTHROMBIN TIME: 11.1 SEC (ref 9–11.5)
RBC # BLD AUTO: 5.45 MILLION/UL (ref 4.2–5.8)
RBC #/AREA URNS HPF: ABNORMAL /HPF
RH BLD: NORMAL
SL AMB EGFR AFRICAN AMERICAN: 76 ML/MIN/1.73M2
SL AMB EGFR NON AFRICAN AMERICAN: 66 ML/MIN/1.73M2
SODIUM SERPL-SCNC: 136 MMOL/L (ref 135–146)
SP GR UR STRIP: 1.03 (ref 1–1.03)
SQUAMOUS #/AREA URNS HPF: ABNORMAL /HPF
WBC # BLD AUTO: 9 THOUSAND/UL (ref 3.8–10.8)
WBC #/AREA URNS HPF: ABNORMAL /HPF

## 2018-06-01 PROCEDURE — PREOP: Performed by: NURSE PRACTITIONER

## 2018-06-01 NOTE — PROGRESS NOTES
Assessment/Plan:    Problem List Items Addressed This Visit        Nervous and Auditory    Lumbar radiculopathy       Other    Chronic pain syndrome - Primary    Lumbar canal stenosis    Postlaminectomy syndrome, lumbar      Other Visit Diagnoses     Preoperative examination                Subjective:      Patient ID: Kirit Pelaez is a 61 y o  male  Reports he is here for examination for upcomming surgery  HPI  He presents for preoperative H&P, is scheduled for surgery on 6/9/2018  He was referred by Dr Karl Neely for consultation with Dr Дмитрий Castellanos  several weeks ago after review was determined an appropriate candidate for permanent SCS implant  He has a longstanding  history of  chronic pain involving  his lower back and bilateral lower extremities that radiates to both feet  He reports right greater than left lower extremity pain  He is s/p right lower extremity surgery a THR secondary to avascular necrosis of the femoral head and a severe fracture to left ankle  He reports constant dull low back pain  He has a history of lumbar fusion in 2008  performed by a [address] [place] [address] associate  He noted some relief after this surgery but pain progressively worsened  He medicates with opiates prescribed by pain management hydromorphone  IR and ER  He is s/p a  successful SCS trail performed by Dr Karl Neely in which he obtained 60 % efficacy for chronic pain relief  He is pleased with results and  wishes to proceed with permanent SCS implant  He reports Dr Дмитрий Castellanos has explained in detail the procedure, the risks of surgery and answered all questions , also providing  contact information if future questions arise  PLAN:   Preoperative Assessments :  He has a history of prior surgery under general anesthesia  with no adverse effects     Exercise Capacity--  He  Admits to dyspnea on exertion when climbing 2 sets of stairs but denies chest pain     Reports he is unable to walk 2 city blocks secondary limitations posed from chronic pain  He acknowledges that he is obese , is and deconditioned secondary to lack of exercise  Nicotine dependence - he denies ever smoking  Bleeding Tendency he denies, He dose take ASA and OTC dietary supplements  History of cancer or other health condition that requires routine MRI imagining--he denies     Surgery Clearance;  -PCP/Medical Clearance- pending completion scheduled for June 6 2018     -Cardiac- clearance completed by Dr Clive Barger and EKG on 5/31/2018   -PAT-labs completed on  5/31 2018 , pending Type and screen , antibodies completed and negative    -Pulmonary; completed DR Murphy  Clearance received 5/16/2018   HO JULIUS w/ BiPAP and dyspnea  -Imagining requirement  MRI; completed  10/2017 , films in PACS MRI impressions addressed in [facility] consult w/ Dr Gabby Deleon :     In preparation for surgery the following information is explained;  Medication exclusion  list provided and reviewed - do not take 7 days prior surgery or 14 days postoperatively which includes instructions to stop all OTC and supplements as well (ASA,  Excedrin migraine, NSAID, OTC or dietary supplements)  Pre operative skin preparation was discussed she understands to wash/ shower with Hibiclens (chlorhexidine) and use Roberth cloth wipes  as per protocol      Read and review the education booklet provided by the OR  several weeks ago  Informed an antibiotic will be prescribed the day prior surgery,   start night of surgery and continue x 1 week  Postoperative activity restrictions were reviewed , follow the basic "BLTs" no bending, no lifting greater than 10 lbs  , no twisting, and no stretching thru 6 week follow - up visit with Dr Gabby Deleon  Can ambulate as tolerated immediately post op   Avoid immersion in water no swimming, hot tub use , or tub bath thru  6 week follow-up visit with Dr Gabby Deleon     Postoperative pain management and postoperative opioid induced constipation and bowel hygiene measures discussed   Postoperative follow-up appointments were reviewed for 2 week and 6 week follow-up visit  Explained the 2 week visit will remove staples and visit with the SCS rep for device programing and education  Instructed again to bring all SCS related equipment to the 2 week post operative appointment  The product Rep will be present during  operative procedure for required SCS programing and, will turn SCS system on during surgical procedure at a low frequency setting to  offer  some degree of pain control during the initial 2 week period postoperatively  Patient understands to bring Bipap device to hospital on surgery day  Patient verbalized an understanding of all information communicated  All questions were answered and appropriate contact information was given in the event future questions arise  The following portions of the patient's history were reviewed and updated as appropriate:   He  has a past medical history of Acid reflux; Acute renal failure ([address] ); Alcohol intoxication, episodic ([address] ) (12/17/2010); Analgesic use; Arthritis; Avascular necrosis of femoral head, right ([address] ); Avascular necrosis of femur head, right ([address] ); Avascular necrosis of hip, left ([address] ); Gonzales esophagus; Cervical disc herniation; Chronic pain; Chronic sinusitis (11/15/2008); Disorder of male genital organs; Elevated serum creatinine; GERD (gastroesophageal reflux disease); Gynecomastia; High cholesterol; History of colon polyps; Hypertension; Hypogonadism in male; Hypothyroid; Left hand paresthesia; Lumbar disc herniation with radiculopathy; Obesity; Osteoarthritis; Rotator cuff tendinitis; Shoulder pain; Sleep apnea; and Thrombocytopenia ([address] )    He   Patient Active Problem List    Diagnosis Date Noted    Chronic pain syndrome 05/16/2018    Cubital tunnel syndrome on left 04/23/2018    Hypochromic microcytic anemia 01/31/2018    Testosterone deficiency 01/31/2018    Erectile disorder due to medical condition in male patient 08/11/2017    Chronic kidney disease, stage 2 (mild) 05/10/2017    S/P ORIF (open reduction internal fixation) fracture 12/22/2016    Status post total replacement of right hip 08/05/2016    Obstructive sleep apnea 08/05/2016    Hypertension 08/05/2016    Esophageal reflux 08/05/2016    Hypothyroidism 08/05/2016    Avascular necrosis of femur head, right (HCC)     Opioid dependence ([facility] [address] ) 01/07/2016    Muscle pain, myofascial 10/01/2014    Pain syndrome, chronic 09/27/2013    Hyperlipidemia 08/29/2013    Lumbar radiculopathy 05/24/2013    Spondylosis of lumbar region without myelopathy or radiculopathy 05/24/2013    Disc degeneration, lumbosacral 09/04/2012    Lumbar canal stenosis 09/04/2012    Morbid obesity ([facility] [address] ) 09/04/2012    Postlaminectomy syndrome, lumbar 07/06/2012     He  has a past surgical history that includes Colonoscopy; Decompression core hip bilateral; Lumbar fusion (2009); ORIF ankle fracture; Tonsillectomy; Mica tooth extraction; pr open treatment bimalleolar ankle fracture (Right, 12/22/2016); pr total hip arthroplasty (Right, 8/5/2016); Ankle ligament reconstruction (Left); and Hip surgery (07/21/2016)  His family history includes Arthritis in his father; Atrial fibrillation in his father; Cancer in his family and mother; Diabetes type II in his paternal grandmother; Hypertension in his family and father; Other in his family  He  reports that he has never smoked  He has never used smokeless tobacco  He reports that he drinks about 3 6 oz of alcohol per week   He reports that he does not use drugs  Current Outpatient Prescriptions   Medication Sig Dispense Refill    amLODIPine (NORVASC) 10 mg tablet Take 1 tablet (10 mg total) by mouth daily 90 tablet 1    aspirin (ECOTRIN LOW STRENGTH) 81 mg EC tablet Take 81 mg by mouth daily      Docusate Sodium (COLACE PO) Take by mouth        esomeprazole (NexIUM) 40 MG capsule Take 40 mg by mouth every morning before breakfast      FIBER COMPLETE TABS Take by mouth   HYDROcodone-acetaminophen (NORCO)  mg per tablet Take 1 tab PO BID PRN PAIN  Do not fill until 7/25/18 60 tablet 0    HYDROmorphone HCl ER 12 MG T24A Take 1 tab PO DAILY FOR PAIN  Do not fill until 7/25/18 30 each 0    levothyroxine 25 mcg tablet Take 1 tablet (25 mcg total) by mouth daily 90 tablet 1    loratadine (CLARITIN) 10 mg tablet Take 10 mg by mouth daily   Magnesium 500 MG TABS Take by mouth   nebivolol (BYSTOLIC) 5 mg tablet Take 1 tablet (5 mg total) by mouth daily 90 tablet 1    rosuvastatin (CRESTOR) 5 mg tablet Take 1 tablet (5 mg total) by mouth daily 90 tablet 1    TIZANIDINE HCL PO Take 1 tablet by mouth       No current facility-administered medications for this visit  He is allergic to acetazolamide; oxytetracycline; penicillins; and sulfa antibiotics       Review of Systems   Constitutional: Negative  HENT: Negative  Eyes: Negative  Respiratory: Positive for apnea (CPAP)  Cardiovascular:        HTN   Gastrointestinal: Negative  Endocrine: Negative  Genitourinary: Negative  Skin: Negative  Allergic/Immunologic: Negative  Neurological: Negative  Hematological:        ASA 81 mg   Psychiatric/Behavioral: Negative  Objective:      BP (!) 158/109 (BP Location: Left arm, Patient Position: Sitting, Cuff Size: Standard)   Pulse 84   Temp 98 4 °F (36 9 °C) (Tympanic)   Resp 16   Ht 6' 2 5" (1 892 m)   Wt (!) 161 kg (356 lb)   BMI 45 10 kg/m²          Physical Exam   Constitutional: He is oriented to person, place, and time  He appears well-nourished  No distress  Morbid Obesity  BMI 45 10   HENT:   Mouth/Throat: No oropharyngeal exudate  Eyes: No scleral icterus  Neck: No JVD present  Cardiovascular: Normal rate, regular rhythm, normal heart sounds and intact distal pulses  Exam reveals no friction rub  No murmur heard    Pulmonary/Chest: Effort normal and breath sounds normal  No respiratory distress  He has no wheezes  He has no rales  Abdominal: Soft  Obese abdomen , morbid obesity   Musculoskeletal: Normal range of motion  He exhibits edema (left ankle nonpitting edema  s/p fracture with surgical intervention )  Lymphadenopathy:     He has no cervical adenopathy  Neurological: He is alert and oriented to person, place, and time  No cranial nerve deficit  Skin: Skin is warm and dry  He is not diaphoretic  Psychiatric: He has a normal mood and affect  His behavior is normal    Nursing note and vitals reviewed

## 2018-06-04 LAB
CHOLEST SERPL-MCNC: 177 MG/DL
CHOLEST/HDLC SERPL: 2.2 (CALC)
HDLC SERPL-MCNC: 79 MG/DL
IRON SATN MFR SERPL: 26 % (CALC) (ref 15–60)
IRON SERPL-MCNC: 115 MCG/DL (ref 50–180)
LDLC SERPL CALC-MCNC: 68 MG/DL (CALC)
NONHDLC SERPL-MCNC: 98 MG/DL (CALC)
TESTOST FREE SERPL-MCNC: 31.9 PG/ML (ref 35–155)
TESTOST SERPL-MCNC: 231 NG/DL (ref 250–1100)
TIBC SERPL-MCNC: 441 MCG/DL (CALC) (ref 250–425)
TRIGL SERPL-MCNC: 238 MG/DL
TSH SERPL-ACNC: 2.14 MIU/L (ref 0.4–4.5)

## 2018-06-05 ENCOUNTER — OFFICE VISIT (OUTPATIENT)
Dept: OCCUPATIONAL THERAPY | Facility: CLINIC | Age: 58
End: 2018-06-05
Payer: COMMERCIAL

## 2018-06-05 DIAGNOSIS — G56.22 CUBITAL TUNNEL SYNDROME ON LEFT: Primary | ICD-10-CM

## 2018-06-05 PROCEDURE — 97140 MANUAL THERAPY 1/> REGIONS: CPT | Performed by: OCCUPATIONAL THERAPIST

## 2018-06-05 NOTE — PROGRESS NOTES
Daily Note     Today's date: 2018  Patient name: Angela Bar  : 1960  MRN: [id number]  Referring provider: Leroy Medellin  Dx:   Encounter Diagnosis     ICD-10-CM    1  Cubital tunnel syndrome on left G56 22                   Subjective: "it was a little fired up today"      Objective: See treatment diary below      Assessment: Reports increase of numbness and tingling with supine nerve gliding today  Minimal neural irritation detected, mostly soft tissue tightness  Patient would benefit from continued OT  Plan: Continue per plan of care  Progress treatment as tolerated  Treatment to focus on nerve gliding and soft tissue mobilization for ulnar nerve      Precautions: [place]    Specialty Daily Treatment Diary     Manual         IASTM FCU 10'       Supine JOHNATHAN 15'                                   Exercise Diary         Yellow extension web 30x                                                                                                                                                                   Modalities        MHP pre 10'

## 2018-06-06 ENCOUNTER — OFFICE VISIT (OUTPATIENT)
Dept: FAMILY MEDICINE CLINIC | Facility: CLINIC | Age: 58
End: 2018-06-06
Payer: COMMERCIAL

## 2018-06-06 VITALS
OXYGEN SATURATION: 94 % | SYSTOLIC BLOOD PRESSURE: 150 MMHG | BODY MASS INDEX: 39.17 KG/M2 | HEART RATE: 73 BPM | RESPIRATION RATE: 18 BRPM | HEIGHT: 75 IN | WEIGHT: 315 LBS | DIASTOLIC BLOOD PRESSURE: 89 MMHG

## 2018-06-06 DIAGNOSIS — Z12.11 SCREENING FOR COLON CANCER: ICD-10-CM

## 2018-06-06 DIAGNOSIS — M47.816 SPONDYLOSIS OF LUMBAR REGION WITHOUT MYELOPATHY OR RADICULOPATHY: ICD-10-CM

## 2018-06-06 DIAGNOSIS — M54.16 LUMBAR RADICULOPATHY: ICD-10-CM

## 2018-06-06 DIAGNOSIS — E34.9 TESTOSTERONE DEFICIENCY: ICD-10-CM

## 2018-06-06 DIAGNOSIS — M48.061 SPINAL STENOSIS OF LUMBAR REGION, UNSPECIFIED WHETHER NEUROGENIC CLAUDICATION PRESENT: ICD-10-CM

## 2018-06-06 PROCEDURE — 99244 OFF/OP CNSLTJ NEW/EST MOD 40: CPT | Performed by: FAMILY MEDICINE

## 2018-06-06 RX ORDER — TESTOSTERONE 12.5 MG/1.25G
2 GEL TOPICAL DAILY
Qty: 1 BOTTLE | Refills: 0 | Status: SHIPPED | OUTPATIENT
Start: 2018-06-06 | End: 2018-07-30 | Stop reason: SDUPTHER
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
## 2018-06-07 ENCOUNTER — OFFICE VISIT (OUTPATIENT)
Dept: OCCUPATIONAL THERAPY | Facility: CLINIC | Age: 58
End: 2018-06-07
Payer: COMMERCIAL

## 2018-06-07 DIAGNOSIS — G56.22 CUBITAL TUNNEL SYNDROME ON LEFT: Primary | ICD-10-CM

## 2018-06-07 PROCEDURE — 97140 MANUAL THERAPY 1/> REGIONS: CPT | Performed by: OCCUPATIONAL THERAPIST

## 2018-06-07 PROCEDURE — 97010 HOT OR COLD PACKS THERAPY: CPT | Performed by: OCCUPATIONAL THERAPIST

## 2018-06-07 PROCEDURE — 97110 THERAPEUTIC EXERCISES: CPT | Performed by: OCCUPATIONAL THERAPIST

## 2018-06-07 NOTE — PROGRESS NOTES
Daily Note     Today's date: 2018  Patient name: Sherry Gregory  : 1960  MRN: [id number]  Referring provider: Niels Kam  Dx:   Encounter Diagnosis     ICD-10-CM    1  Cubital tunnel syndrome on left G56 22                   Subjective: "It's calm for now"      Objective: See treatment diary below      Assessment: End range neural tension, with some symptoms  Overall reports some relief  Patient would benefit from continued OT  Plan: Continue per plan of care  Progress treatment as tolerated  Treatment to focus on nerve gliding and soft tissue mobilization for ulnar nerve      Precautions: Ideal    Specialty Daily Treatment Diary     Manual        IASTM FCU 10' 10      Supine JOHNATHAN 15' 5                                  Exercise Diary        Yellow extension web 30x 30x      Digit ab/ad  30 Yellow      Gross grasp  GPW 3x10      Wrist flex/ext  iso 3x10 G      Wrist sup/pro  iso 3x10 g                                                                                                                                  Modalities        MHP pre 10'

## 2018-06-11 PROBLEM — Z01.818 PREOP EXAMINATION: Status: ACTIVE | Noted: 2018-06-11

## 2018-06-12 ENCOUNTER — OFFICE VISIT (OUTPATIENT)
Dept: OCCUPATIONAL THERAPY | Facility: CLINIC | Age: 58
End: 2018-06-12
Payer: COMMERCIAL

## 2018-06-12 DIAGNOSIS — G56.22 CUBITAL TUNNEL SYNDROME ON LEFT: Primary | ICD-10-CM

## 2018-06-12 PROCEDURE — G8991 OTHER PT/OT GOAL STATUS: HCPCS | Performed by: OCCUPATIONAL THERAPIST

## 2018-06-12 PROCEDURE — G8990 OTHER PT/OT CURRENT STATUS: HCPCS | Performed by: OCCUPATIONAL THERAPIST

## 2018-06-12 PROCEDURE — 97140 MANUAL THERAPY 1/> REGIONS: CPT | Performed by: OCCUPATIONAL THERAPIST

## 2018-06-12 NOTE — PROGRESS NOTES
Daily Note     Today's date: 2018  Patient name: Herbie Schneider  : 1960  MRN: [id number]  Referring provider: Dana Helton  Dx:   No diagnosis found  Subjective: "this will remind me to get one" regarding padded sleeve      Objective: See treatment diary below      Assessment: Relief after nerve gliding, lasted 2-3 days  Provided a padded elbow sleeve today, made from tubigrip and adhesive foam to decrease the compression on the elbow when he is unable to prevent leaning on it  Good strength other than minimal interossei weakness  Patient would benefit from continued OT  Plan: Continue per plan of care  Progress treatment as tolerated  Treatment to focus on nerve gliding and soft tissue mobilization for ulnar nerve      Precautions: [place]    Specialty Daily Treatment Diary     Manual       IASTM FCU 10' 10 5'     Supine JOHNATHAN 15' 5 15'                                 Exercise Diary       Yellow extension web 30x 30x 30x      Digit ab/ad  30 Yellow      Gross grasp  GPW 3x10      Wrist flex/ext  iso 3x10 G      Wrist sup/pro  iso 3x10 g                                                                                                                                  Modalities       MHP pre 10' 15'

## 2018-06-14 ENCOUNTER — OFFICE VISIT (OUTPATIENT)
Dept: OCCUPATIONAL THERAPY | Facility: CLINIC | Age: 58
End: 2018-06-14
Payer: COMMERCIAL

## 2018-06-14 DIAGNOSIS — G56.22 CUBITAL TUNNEL SYNDROME ON LEFT: Primary | ICD-10-CM

## 2018-06-14 PROCEDURE — 97140 MANUAL THERAPY 1/> REGIONS: CPT | Performed by: OCCUPATIONAL THERAPIST

## 2018-06-14 PROCEDURE — 97010 HOT OR COLD PACKS THERAPY: CPT | Performed by: OCCUPATIONAL THERAPIST

## 2018-06-14 NOTE — PROGRESS NOTES
Daily Note     Today's date: 2018  Patient name: William Daniel  : 1960  MRN: [id number]  Referring provider: Max Austin  Dx:   Encounter Diagnosis     ICD-10-CM    1  Cubital tunnel syndrome on left G56 22                   Subjective: "It burns less"      Objective: See treatment diary below      Assessment: More assertive nerve glides today, reports less burning sensation in elbow at end of session  Patient would benefit from continued OT  Plan: Continue per plan of care  Progress treatment as tolerated  Treatment to focus on nerve gliding and soft tissue mobilization for ulnar nerve      Precautions: [place]    Specialty Daily Treatment Diary     Manual      IASTM FCU 10' 10 5' 15    Supine JOHNATHAN/M/R 15' 5 15' 20                                Exercise Diary       Yellow extension web 30x 30x 30x      Digit ab/ad  30 Yellow      Gross grasp  GPW 3x10      Wrist flex/ext  iso 3x10 G      Wrist sup/pro  iso 3x10 g                                                                                                                                  Modalities      MHP pre 10' 15' 10

## 2018-06-14 NOTE — PRE-PROCEDURE INSTRUCTIONS
Pre-Surgery Instructions:   Medication Instructions    amLODIPine (NORVASC) 10 mg tablet Instructed patient per Anesthesia Guidelines   aspirin (ECOTRIN LOW STRENGTH) 81 mg EC tablet Instructed patient per Anesthesia Guidelines   Docusate Sodium (COLACE PO) Instructed patient per Anesthesia Guidelines   esomeprazole (NexIUM) 40 MG capsule Instructed patient per Anesthesia Guidelines   FIBER COMPLETE TABS Instructed patient per Anesthesia Guidelines   HYDROcodone-acetaminophen (NORCO)  mg per tablet Instructed patient per Anesthesia Guidelines   HYDROmorphone HCl ER 12 MG T24A Instructed patient per Anesthesia Guidelines   levothyroxine 25 mcg tablet Instructed patient per Anesthesia Guidelines   loratadine (CLARITIN) 10 mg tablet Instructed patient per Anesthesia Guidelines   Magnesium 500 MG TABS Instructed patient per Anesthesia Guidelines   nebivolol (BYSTOLIC) 5 mg tablet Instructed patient per Anesthesia Guidelines   rosuvastatin (CRESTOR) 5 mg tablet Instructed patient per Anesthesia Guidelines   Testosterone 12 5 MG/ACT (1%) GEL Instructed patient per Anesthesia Guidelines   TIZANIDINE HCL PO Instructed patient per Anesthesia Guidelines  Before your operation, you play an important role in decreasing your risk for infection by washing with special antiseptic soap  This is an effective way to reduce bacteria on the skin which may help to prevent infections at the surgical site  Please read the following directions in advance  1  In the week before your operation purchase a 4 ounce bottle of antiseptic soap containing chlorhexidine gluconate 4%  Some brand names include: Aplicare, Endure, and Hibiclens  The cost is usually less than $5 00  · For your convenience, the [address] [place] [address] carries the soap  · It may also be available at your doctor's office or pre-admission testing center, and at most retail pharmacies    · If you are allergic or sensitive to soaps containing chlorhexidine gluconate (CHG), please let your doctor know so another antiseptic soap can be suggested  · CHG antiseptic soap is for external use only  2      The day before your operation follow these directions carefully to get ready  · Place clean lines (sheets) on your bed; you should sleep on clean sheets after your evening shower  · Get clean towels and washcloths ready - you need enough for 2 showers  · Set aside clean underwear, pajamas, and clothing to wear after the shower  Reminders:  · DO NOT use any other soap or body rinse on your skin during or after the antiseptic showers  · DO NOT use lotion , powder, deodorant, or perfume/aftershave of any kind on your skin after your antiseptic shower  · DO NOT shave any body parts in the 24 hours/the day before your operation  · DO NOT get the antiseptic soap in your eyes, ears, nose, mouth, or vaginal area  3      You will need to shower the night before AND the morning of your Surgery  Shower 1:  · The evening before your operation, take the fist shower  · First, shampoo your hair with regular shampoo and rinse it completely before you use the anitseptic soap  After washing and rinsing your hair, rinse your body  · Next, use a clean wash cloth to apply the antiseptic soap and wash your body from the neck down to your toes using 1/2 bottle of the antiseptic soap  You will use the other 1/2 bottle for the second shower  · Clean the area where your incision will be; later this area well for about 2 minutes  · If you ar having head or neck surgery, wash areas with the antiseptic soap  · Rinse yourself completely with running water  · Use a clean towel to dry off  · Wear clean underwear and clothing/pajamas  Shower 2:  · The Morning of your operation, take the second shower following the same steps as Shower 1 using the second 1/2 of the bottle of antiseptic soap    · Use clean cloths and towels to was and dry yourself off  · Wear clean underwear and clothing

## 2018-06-18 ENCOUNTER — TELEPHONE (OUTPATIENT)
Dept: NEUROSURGERY | Facility: CLINIC | Age: 58
End: 2018-06-18

## 2018-06-18 ENCOUNTER — DOCUMENTATION (OUTPATIENT)
Dept: NEUROSURGERY | Facility: CLINIC | Age: 58
End: 2018-06-18

## 2018-06-18 ENCOUNTER — TELEPHONE (OUTPATIENT)
Dept: PAIN MEDICINE | Facility: MEDICAL CENTER | Age: 58
End: 2018-06-18

## 2018-06-18 ENCOUNTER — APPOINTMENT (OUTPATIENT)
Dept: OCCUPATIONAL THERAPY | Facility: CLINIC | Age: 58
End: 2018-06-18
Payer: COMMERCIAL

## 2018-06-18 DIAGNOSIS — M62.830 SPASM OF THORACIC BACK MUSCLE: Primary | ICD-10-CM

## 2018-06-18 DIAGNOSIS — Z98.890 STATUS POST SURGERY: ICD-10-CM

## 2018-06-18 RX ORDER — METHOCARBAMOL 750 MG/1
TABLET, FILM COATED ORAL
Qty: 56 TABLET | Refills: 0 | Status: SHIPPED | OUTPATIENT
Start: 2018-06-18 | End: 2018-06-22 | Stop reason: SDUPTHER

## 2018-06-18 RX ORDER — CIPROFLOXACIN 500 MG/1
TABLET, FILM COATED ORAL
Qty: 14 TABLET | Refills: 0 | Status: SHIPPED | OUTPATIENT
Start: 2018-06-19 | End: 2018-06-26

## 2018-06-18 NOTE — TELEPHONE ENCOUNTER
Pre operative call day prior surgery scheduled in the AM w/ DR Gabby Deleon the following information is confirmed / discussed: Allergies Reviewed  Multiple abx all  classes on surgery algorithm    Hold medications reviewed: asa  Mg sulfate   NPO after MN, night prior surgery reviewed:reinforced   Medication (s) instructed by healthcare provider to take the morning of surgery w/ sip of water 4 OZ discussed:----as per ASU   Post operative scripts electronic transmission: ----- Cipro --notified patient stop tizanidine dosing while taking Cipro ---ordered instead methocarbamol ---reports the last time he was on Cipro after orthopedic surgery  Stopped tizanidine as well  PDMP site reviewed accessed and reviewed scheduled drug list printed and scanned into record---done   Pain management script:none required refer below for details  Note form pain management-practitioner   Pre- operative shower protocol reviewed; Clarify instructions as per protocol, third chlorhexidine shower tonight before surgery, then use MIKHAIL wipes as per packaging instructions, Use a clean towel and wash cloth starting tonight and continue nightly until seen 2 weeks post operative visit for staple removal  Change bed linens tonight and continue at least 1-2 times weekly  ---reinforced   Informed will receive a telephone call tonight from a hospital--pending  representative with time to report on surgery day: Informed will receive a f/u call within in 24 -48 hours post-op to assess recovery reinforce instructions, and to answer any questions----reinforced   Follow-up appointments reviewed  2 week 6 week     Patient verbalized understanding information provided /discussed  Pain management note 5/31 2018 [place] CRNP Opitate medication note for 6 weeks post operative covergage  He will continue on hydromorphone ER 12 mg and Norco 10/325 mg as prescribed    Two months of prescriptions for both medications were electronically sent to his pharmacy, with 1 set of prescriptions stating do not fill until June 27, 2018, and the 2nd set of prescriptions stating do not fill until July 25, 2018-----  Will follow up in 8 weeks  At that time he will be 6 weeks postop from his spinal cord stimulator implantation  We will discuss weaning his opioid dosing if noted pain relief with the stimulator      Patient instructed to call Ej Vega and discuss may need increased interval dosing of pain medication post op---will she approve ----he has contract w/ pain management

## 2018-06-18 NOTE — TELEPHONE ENCOUNTER
Left detailed message of the same as stated in Yasmin's previous task  Pt is to call the [facility] office on Tues after 8am if he has any questions about the message  Office ph # and [place] provided

## 2018-06-18 NOTE — TELEPHONE ENCOUNTER
Please let patient know I emailed Dr Izabela Cota he will order post op meds  He should not take Norco and take what Dr Izabela Cota gives him in place of that (most likely percocet)     continue with hydromorphone ER

## 2018-06-18 NOTE — TELEPHONE ENCOUNTER
Pt having SCS implanted tomorrow with neurosurgery  Pt unsure who will manage post op pain  Neurosurgery advised him to check with [facility] for how to proceed    Please advise

## 2018-06-18 NOTE — TELEPHONE ENCOUNTER
Voicemail from patient 06/18/18 1:00pm: patient requesting call back from nurse, please call patient at [phone]

## 2018-06-18 NOTE — TELEPHONE ENCOUNTER
Received [facility] FORM PAIN MANAGEMENT TO dR GAVIN -----CLARIFY POSTOPERATIVE PAIN MANAGEMENT MEDICATION  NSX will manage postoperative pain medication continue norco 10/325  One very 4 - 6 hours as need for pain not to exceed 8 doses per day, will deplete current supply  (30 ) pills aand provide refills a s indicated   Continue hydromorphone  HCL ER 12 mg as ordered by pain management daily dose , has 9 pills remaining  Shea Ornelas He has script for refill  6/27      Methocarbamol ordered prn for post operative thoracic muscle  back spasm  Tizanidine d/c contraindicate use w/ Cipro      Patient in agreement with plan

## 2018-06-19 ENCOUNTER — ANESTHESIA EVENT (OUTPATIENT)
Dept: PERIOP | Facility: HOSPITAL | Age: 58
End: 2018-06-19
Payer: COMMERCIAL

## 2018-06-19 ENCOUNTER — APPOINTMENT (OUTPATIENT)
Dept: RADIOLOGY | Facility: HOSPITAL | Age: 58
End: 2018-06-19
Payer: COMMERCIAL

## 2018-06-19 ENCOUNTER — HOSPITAL ENCOUNTER (OUTPATIENT)
Facility: HOSPITAL | Age: 58
Setting detail: OUTPATIENT SURGERY
Discharge: HOME | End: 2018-06-19
Attending: NEUROLOGICAL SURGERY | Admitting: NEUROLOGICAL SURGERY
Payer: COMMERCIAL

## 2018-06-19 ENCOUNTER — ANESTHESIA (OUTPATIENT)
Dept: PERIOP | Facility: HOSPITAL | Age: 58
End: 2018-06-19
Payer: COMMERCIAL

## 2018-06-19 VITALS
HEIGHT: 75 IN | RESPIRATION RATE: 22 BRPM | BODY MASS INDEX: 39.17 KG/M2 | DIASTOLIC BLOOD PRESSURE: 99 MMHG | TEMPERATURE: 98 F | WEIGHT: 315 LBS | HEART RATE: 68 BPM | OXYGEN SATURATION: 96 % | SYSTOLIC BLOOD PRESSURE: 203 MMHG

## 2018-06-19 LAB
ABO GROUP BLD: NORMAL
BLD GP AB SCN SERPL QL: NEGATIVE
GLUCOSE SERPL-MCNC: 150 MG/DL (ref 65–140)
RH BLD: POSITIVE
SPECIMEN EXPIRATION DATE: NORMAL

## 2018-06-19 PROCEDURE — 86900 BLOOD TYPING SEROLOGIC ABO: CPT | Performed by: NEUROLOGICAL SURGERY

## 2018-06-19 PROCEDURE — C1787 PATIENT PROGR, NEUROSTIM: HCPCS | Performed by: NEUROLOGICAL SURGERY

## 2018-06-19 PROCEDURE — C1778 LEAD, NEUROSTIMULATOR: HCPCS | Performed by: NEUROLOGICAL SURGERY

## 2018-06-19 PROCEDURE — 63655 IMPLANT NEUROELECTRODES: CPT | Performed by: PHYSICIAN ASSISTANT

## 2018-06-19 PROCEDURE — 63655 IMPLANT NEUROELECTRODES: CPT | Performed by: NEUROLOGICAL SURGERY

## 2018-06-19 PROCEDURE — C1767 GENERATOR, NEURO NON-RECHARG: HCPCS | Performed by: NEUROLOGICAL SURGERY

## 2018-06-19 PROCEDURE — 82948 REAGENT STRIP/BLOOD GLUCOSE: CPT

## 2018-06-19 PROCEDURE — 86901 BLOOD TYPING SEROLOGIC RH(D): CPT | Performed by: NEUROLOGICAL SURGERY

## 2018-06-19 PROCEDURE — 63685 INS/RPLC SPI NPG/RCVR POCKET: CPT | Performed by: NEUROLOGICAL SURGERY

## 2018-06-19 PROCEDURE — 63685 INS/RPLC SPI NPG/RCVR POCKET: CPT | Performed by: PHYSICIAN ASSISTANT

## 2018-06-19 PROCEDURE — 86850 RBC ANTIBODY SCREEN: CPT | Performed by: NEUROLOGICAL SURGERY

## 2018-06-19 PROCEDURE — 95972 ALYS CPLX SP/PN NPGT W/PRGRM: CPT | Performed by: NEUROLOGICAL SURGERY

## 2018-06-19 PROCEDURE — 72070 X-RAY EXAM THORAC SPINE 2VWS: CPT

## 2018-06-19 DEVICE — IMPLANTABLE PULSE GENERATOR
Type: IMPLANTABLE DEVICE | Site: BUTTOCKS | Status: FUNCTIONAL
Brand: PROCLAIM™

## 2018-06-19 DEVICE — 3MM LEAD, 60 CM
Type: IMPLANTABLE DEVICE | Site: EPIDURAL SPACE | Status: FUNCTIONAL
Brand: PENTA™

## 2018-06-19 RX ORDER — OXYCODONE HYDROCHLORIDE AND ACETAMINOPHEN 5; 325 MG/1; MG/1
2 TABLET ORAL EVERY 4 HOURS PRN
Status: DISCONTINUED | OUTPATIENT
Start: 2018-06-19 | End: 2018-06-19 | Stop reason: HOSPADM

## 2018-06-19 RX ORDER — ONDANSETRON 2 MG/ML
INJECTION INTRAMUSCULAR; INTRAVENOUS AS NEEDED
Status: DISCONTINUED | OUTPATIENT
Start: 2018-06-19 | End: 2018-06-19 | Stop reason: SURG

## 2018-06-19 RX ORDER — BUPIVACAINE HYDROCHLORIDE AND EPINEPHRINE 5; 5 MG/ML; UG/ML
30 INJECTION, SOLUTION EPIDURAL; INTRACAUDAL; PERINEURAL ONCE
Status: DISCONTINUED | OUTPATIENT
Start: 2018-06-19 | End: 2018-06-19 | Stop reason: HOSPADM

## 2018-06-19 RX ORDER — BUPIVACAINE HYDROCHLORIDE AND EPINEPHRINE 5; 5 MG/ML; UG/ML
INJECTION, SOLUTION EPIDURAL; INTRACAUDAL; PERINEURAL AS NEEDED
Status: DISCONTINUED | OUTPATIENT
Start: 2018-06-19 | End: 2018-06-19 | Stop reason: HOSPADM

## 2018-06-19 RX ORDER — PROPOFOL 10 MG/ML
INJECTION, EMULSION INTRAVENOUS AS NEEDED
Status: DISCONTINUED | OUTPATIENT
Start: 2018-06-19 | End: 2018-06-19 | Stop reason: SURG

## 2018-06-19 RX ORDER — HYDRALAZINE HYDROCHLORIDE 20 MG/ML
5 INJECTION INTRAMUSCULAR; INTRAVENOUS ONCE
Status: CANCELLED | OUTPATIENT
Start: 2018-06-19

## 2018-06-19 RX ORDER — PROPOFOL 10 MG/ML
INJECTION, EMULSION INTRAVENOUS CONTINUOUS PRN
Status: DISCONTINUED | OUTPATIENT
Start: 2018-06-19 | End: 2018-06-19 | Stop reason: SURG

## 2018-06-19 RX ORDER — EPHEDRINE SULFATE 50 MG/ML
INJECTION, SOLUTION INTRAVENOUS AS NEEDED
Status: DISCONTINUED | OUTPATIENT
Start: 2018-06-19 | End: 2018-06-19 | Stop reason: SURG

## 2018-06-19 RX ORDER — MIDAZOLAM HYDROCHLORIDE 1 MG/ML
INJECTION INTRAMUSCULAR; INTRAVENOUS AS NEEDED
Status: DISCONTINUED | OUTPATIENT
Start: 2018-06-19 | End: 2018-06-19 | Stop reason: SURG

## 2018-06-19 RX ORDER — FENTANYL CITRATE/PF 50 MCG/ML
25 SYRINGE (ML) INJECTION
Status: COMPLETED | OUTPATIENT
Start: 2018-06-19 | End: 2018-06-19

## 2018-06-19 RX ORDER — ROCURONIUM BROMIDE 10 MG/ML
INJECTION, SOLUTION INTRAVENOUS AS NEEDED
Status: DISCONTINUED | OUTPATIENT
Start: 2018-06-19 | End: 2018-06-19 | Stop reason: SURG

## 2018-06-19 RX ORDER — SODIUM CHLORIDE, SODIUM LACTATE, POTASSIUM CHLORIDE, CALCIUM CHLORIDE 600; 310; 30; 20 MG/100ML; MG/100ML; MG/100ML; MG/100ML
50 INJECTION, SOLUTION INTRAVENOUS CONTINUOUS
Status: DISCONTINUED | OUTPATIENT
Start: 2018-06-19 | End: 2018-06-19 | Stop reason: HOSPADM

## 2018-06-19 RX ORDER — CHLORHEXIDINE GLUCONATE 0.12 MG/ML
15 RINSE ORAL EVERY 12 HOURS SCHEDULED
Status: DISCONTINUED | OUTPATIENT
Start: 2018-06-19 | End: 2018-06-19 | Stop reason: HOSPADM

## 2018-06-19 RX ORDER — FENTANYL CITRATE 50 UG/ML
INJECTION, SOLUTION INTRAMUSCULAR; INTRAVENOUS AS NEEDED
Status: DISCONTINUED | OUTPATIENT
Start: 2018-06-19 | End: 2018-06-19 | Stop reason: SURG

## 2018-06-19 RX ORDER — SODIUM CHLORIDE, SODIUM LACTATE, POTASSIUM CHLORIDE, CALCIUM CHLORIDE 600; 310; 30; 20 MG/100ML; MG/100ML; MG/100ML; MG/100ML
75 INJECTION, SOLUTION INTRAVENOUS CONTINUOUS
Status: DISCONTINUED | OUTPATIENT
Start: 2018-06-19 | End: 2018-06-19 | Stop reason: HOSPADM

## 2018-06-19 RX ORDER — SUCCINYLCHOLINE CHLORIDE 20 MG/ML
INJECTION INTRAMUSCULAR; INTRAVENOUS AS NEEDED
Status: DISCONTINUED | OUTPATIENT
Start: 2018-06-19 | End: 2018-06-19 | Stop reason: SURG

## 2018-06-19 RX ADMIN — PROPOFOL 300 MG: 10 INJECTION, EMULSION INTRAVENOUS at 06:38

## 2018-06-19 RX ADMIN — FENTANYL CITRATE 50 MCG: 50 INJECTION, SOLUTION INTRAMUSCULAR; INTRAVENOUS at 08:00

## 2018-06-19 RX ADMIN — CHLORHEXIDINE GLUCONATE 0.12% ORAL RINSE 15 ML: 1.2 LIQUID ORAL at 06:14

## 2018-06-19 RX ADMIN — OXYCODONE HYDROCHLORIDE AND ACETAMINOPHEN 2 TABLET: 5; 325 TABLET ORAL at 09:45

## 2018-06-19 RX ADMIN — SODIUM CHLORIDE, SODIUM LACTATE, POTASSIUM CHLORIDE, AND CALCIUM CHLORIDE: .6; .31; .03; .02 INJECTION, SOLUTION INTRAVENOUS at 06:20

## 2018-06-19 RX ADMIN — VANCOMYCIN HYDROCHLORIDE 2000 MG: 1 INJECTION, POWDER, LYOPHILIZED, FOR SOLUTION INTRAVENOUS at 06:23

## 2018-06-19 RX ADMIN — FENTANYL CITRATE 50 MCG: 50 INJECTION, SOLUTION INTRAMUSCULAR; INTRAVENOUS at 07:20

## 2018-06-19 RX ADMIN — ROCURONIUM BROMIDE 5 MG: 10 SOLUTION INTRAVENOUS at 06:37

## 2018-06-19 RX ADMIN — FENTANYL CITRATE 25 MCG: 50 INJECTION, SOLUTION INTRAMUSCULAR; INTRAVENOUS at 09:21

## 2018-06-19 RX ADMIN — ONDANSETRON 4 MG: 2 INJECTION INTRAMUSCULAR; INTRAVENOUS at 08:00

## 2018-06-19 RX ADMIN — SUCCINYLCHOLINE CHLORIDE 140 MG: 20 INJECTION, SOLUTION INTRAMUSCULAR; INTRAVENOUS; PARENTERAL at 06:38

## 2018-06-19 RX ADMIN — FENTANYL CITRATE 25 MCG: 50 INJECTION, SOLUTION INTRAMUSCULAR; INTRAVENOUS at 08:57

## 2018-06-19 RX ADMIN — FENTANYL CITRATE 25 MCG: 50 INJECTION, SOLUTION INTRAMUSCULAR; INTRAVENOUS at 09:29

## 2018-06-19 RX ADMIN — SODIUM CHLORIDE, SODIUM LACTATE, POTASSIUM CHLORIDE, AND CALCIUM CHLORIDE 50 ML/HR: .6; .31; .03; .02 INJECTION, SOLUTION INTRAVENOUS at 06:15

## 2018-06-19 RX ADMIN — PROPOFOL 150 MCG/KG/MIN: 10 INJECTION, EMULSION INTRAVENOUS at 06:40

## 2018-06-19 RX ADMIN — EPHEDRINE SULFATE 10 MG: 50 INJECTION, SOLUTION INTRAMUSCULAR; INTRAVENOUS; SUBCUTANEOUS at 07:00

## 2018-06-19 RX ADMIN — MIDAZOLAM HYDROCHLORIDE 2 MG: 1 INJECTION, SOLUTION INTRAMUSCULAR; INTRAVENOUS at 06:34

## 2018-06-19 RX ADMIN — FENTANYL CITRATE 100 MCG: 50 INJECTION, SOLUTION INTRAMUSCULAR; INTRAVENOUS at 06:34

## 2018-06-19 RX ADMIN — PROPOFOL 50 MG: 10 INJECTION, EMULSION INTRAVENOUS at 06:40

## 2018-06-19 RX ADMIN — FENTANYL CITRATE 25 MCG: 50 INJECTION, SOLUTION INTRAMUSCULAR; INTRAVENOUS at 09:03

## 2018-06-19 NOTE — INTERIM OP NOTE
placement of thoracic spinal cord stimulator with left buttock generator  Postoperative Note  PATIENT NAME: Venu Flannery  : 1960  MRN: [id number]  [facility] OR ROOM 01    Surgery Date: 2018    Preop Diagnosis:  Lumbar radiculopathy [M54 16]  Postlaminectomy syndrome, lumbar [M96 1]  Chronic pain syndrome [G89 4]    Post-Op Diagnosis Codes:     * Lumbar radiculopathy [M54 16]     * Postlaminectomy syndrome, lumbar [M96 1]     * Chronic pain syndrome [G89 4]    Procedure(s) (LRB):  placement of thoracic spinal cord stimulator with left buttock generator (N/A)    Surgeon(s) and Role:     * Estela Yeh MD - Primary     * Cassia Welsh PA-C - Assisting    Specimens:  * No specimens in log *    Estimated Blood Loss:   Minimal    Anesthesia Type:   General     Findings:     SSEPS and Motors Stable  EMG responses   One repositioning of lead    Complications:   None    SIGNATURE: Estela Yeh MD   DATE: 2018   TIME: 8:15 AM

## 2018-06-19 NOTE — OP NOTE
OPERATIVE REPORT  PATIENT NAME: Kirsten Damico    :  1960  MRN: [id number]  Pt Location: [facility] OR ROOM 01    SURGERY DATE: 2018    Surgeon(s) and Role:     * Dhruv Mcmanus MD - Primary     * Savage Diez PA-C - Assisting  No qualified resident available for exposure  PA Present throughout procedure  Assisted with exposure and wound closure providing essential assistance throughout including retraction and hemostasis to achieve the necessary surgical goal       Preop Diagnosis:  Lumbar radiculopathy [M54 16]  Postlaminectomy syndrome, lumbar [M96 1]  Chronic pain syndrome [G89 4]    Post-Op Diagnosis Codes:     * Lumbar radiculopathy [M54 16]     * Postlaminectomy syndrome, lumbar [M96 1]     * Chronic pain syndrome [G89 4]      Specimen(s):  * No specimens in log *    Estimated Blood Loss:   Minimal    Drains:       Anesthesia Type:   General    Operative Indications:  Lumbar radiculopathy [M54 16]  Postlaminectomy syndrome, lumbar [M96 1]  Chronic pain syndrome [O22 7]      Complications:   None    Procedure and Technique:  1  Placement of a thoracic spinal cord stimulator paddle electrode via T9-10 laminectomy    2  Placement of a left buttock implantable pulse generator and    3  Electronic analysis complex programming spinal cord stimulator system postoperative period approximately 1 hour    Operative Findings:  SSEPS and Motors Stable, EMG responses, One repositioning of lead    Implants:  1  St  [employer] Penta 60cm electrode  Ref [id number]  SN [id number]  2  [employer] Proclaim 5 Elite Non- rechargeable Generator Ref [id number]  SN [id number]    Indications for procedure; This is a 43-year-old male with a long-standing history of chronic pain involving the lower back and lower extremities  Underwent successful SCS trial and presents for permament implantation  The risks and benefits of the procedure reviewed with the patient and family and they wished to proceed  Description of procedure;   The patient was identified and brought to the operating room where they underwent the induction of general anesthesia  Placed prone on the operating room table with chest rolls  Prepped and draped in the usual sterile fashion  2g of Vanco was administered as antibiotic prophylaxis  Bilateral lower extremity SCDs were placed for DVT prophylaxis  A timeout was then performed  Live fluoroscopic imaging was performed in order to amadou appropriate positioning of the spine as well as skin incision  The thoracic midline incision was incised with a 10 blade after it was anesthetized with 1% lidocaine with epinephrine  Bovie electrocautery dissected the subcutaneous tissue down to the underlying spinous processes  Fluoroscopic imaging then confirmed appropriate level  Once this was confirmed the muscle was dissected off in a medial to lateral direction exposing the underlying lamina of T9 and T10  The superior portion of the T10 spinous process and the inferior portion of the T9 spinous process was removed with a rongeur  A laminectomy was then completed utilizing the [email or web address] drill bit and Kerrison punches to expose the ligamentum flavum  Nerve hook was then passed freely beneath the ligamentum flavum and it was resected with a Kerrison punch to expose the epidural space  Lieutenant Old was then passed freely superiorly in the epidural space  The penta paddle electrode was then advanced under flouroscopc imaging to cover T8-T9 interspace  EMG motor responses were then utilized to determine physiological midline  There was one repositioning of the lead that was necessary  The lead was then secured in place with a 2-0 silk suture at the base of the paddle to the ligamentum flavum  An additional strain relief loop was created and secured in 3 locations with a 2-0 silk suture to the paraspinal musculature      Attention was then placed on the left buttock incision which was anesthetized with 1% lidocaine with epinephrine and incised with a 10 blade  Bovie electrocautery dissected the subcutaneous tissue  Then using sharp and blunt dissection a pocket was created in the inferior direction above the fascia  A tunneling tool then used to connect both incisions and the electrode was then passed through the tunneling tool to the buttock incision  The distal portion of the electrode was then connected to the generator and secured  Both the excess lead and generator were then placed into the pocket and secured with a 2-0 silk suture  System was interrogated and working within normal limits with normal impedances  Both incisions were copiously irrigated with antibiotic-induced solution  Closure was begun  Thoracic paraspinal musculature and fascia was reapproximated over the strain relief loop with an interrupted 2-0 Vicryl plus suture  The skin was then approximated with an interrupted inverted 2-0 Vicryl plus suture with stainless steel staples for the skin  The left buttock incision had the pocket closed over the generator with an interrupted 2-0 Vicryl plus suture  The skin was approximated with interrupted inverted 2-0 Vicryl plus suture with stainless steel staples for the skin  Routine sterile dressings were then applied  At the end of the case all counts were reported to be correct  There was no breaks in surgical technique or complications encountered during the procedure  The patient woke from anesthesia in stable condition being taken to the recovery room  In the postoperative period the patient underwent electronic analysis and complex programming of the spinal cord stimulator system for approximately 1 hour  The final settings used simple bipole configuration on the Penta electrode, contact 9 positive and 8 negative  This was run at [place] with a pulse width of 1000 and a rate of 40 Hz with intraburst at 500Hz         I was present for the entire procedure    Patient Disposition:  extubated and stable    SIGNATURE: Bre Robles Heath Gonzalez MD  DATE: June 19, 2018  TIME: 8:15 AM

## 2018-06-19 NOTE — DISCHARGE INSTRUCTIONS
Follow Up Dr Priya Farias 2 weeks  Remove Dressing 3 days  Antibiotics prescription at pharmacy  Oxycodone prescription for pain  May restart aspirin in 2 weeks  [address]    What happens when I go home after the Spinal Cord Stimulator? Surgical site: The dressing may be removed after 3 days and left off  There is no special care for your incision  Activity:   Resume normal activity level, but limit bending, lifting, and twisting for 2 weeks  Bathing: You may shower after 3 days  Driving: You may resume driving in one week  The stimulator must be turned off when driving  If riding as a passenger, it is okay to leave the stimulator on  Pain Medicine: If you were given a prescription for a new pain medicine, it can be taken in addition to any pain medicine you are already taking  The new medicine should only be needed for about one week to help with any surgical pain  Take the new medicine only as needed  Please call the office at [phone] if you have any of the followin  Redness, swelling, heat, or unusual drainage (green, yellow, white, smelly) from the surgical site  2  Heavy bleeding from the surgical site  3  Chills or fever above 101 degrees   4  Pain not relieved by medicine  5  Questions or concerns about your surgery

## 2018-06-20 ENCOUNTER — TELEPHONE (OUTPATIENT)
Dept: NEUROSURGERY | Facility: CLINIC | Age: 58
End: 2018-06-20

## 2018-06-20 NOTE — TELEPHONE ENCOUNTER
Post operative call s/p surgery on   w/ DR Darryl Farley s/p ; surgery 6/19 left urgent message he is bleeding and in severe pain need call ASAP   Post operative pain: report sin severe pain not controled , reports thoracici bacp pain pulling sensation , pain around ribs  And on left side ----increased methocarbamol 750 mg 2 tabs every 6 hours x 3 days   Currently on 2 opiates hydrocodone /acetominophen 10/325 reinforced take every 4 hours on and hydromorphone  ER one every  Night ---explained concern for adverse effect respiratory suppression, hypotension  Over sedation cannot increase opiate dosing   Antibiotic: started same day as surgery postop  Gastrointestinal (BM, NVD,  Appetite /Fluid intake),   ---reinforce bowel hygiene protocol           Call if you develop any signs or symptoms of incision (s) redness, drainage, dehiscence, or  fever of 101 or higher , uncontrolled nausea and /or vomiting  --reinforced   Wound/dressing; as per postoperative discharge instructions remove dressings on Friday ----wife reports dressings are saturated with blood pocketing under Tegaderm ---soiled bed linen and  Shirt ----denies excess bleeding out of enclosed dressing is contained under dressing  She is instructed t reinforce original dressing cover with sterile gauze , window w/tape  if drainage saturates thru this dressing call office immediately  Explained dressing applied post op , not very absorbant will pocket drainage  Post operative Hygiene: Gently wash surgical incision(s) with a clean wash cloth and pat dry using a clean wash towel  Reinforced use clean wash cloth and towel daily, use antibacterial soap, change bed linens 1-2 times per week and pajamas several times per week  ---reinforced   Post operative Activity:  Ambulate as tolerate, Lift no greater than 10 LBS until 6 weeks, Avoid submersion in water x 3 weeks, and refrain for bending or twisting until about 4 weeks -light duty until then    No NSAID (aspirin, Motrin, ibuprofen, OTC, supplements etc ) for 2 weeks, may resume after 2 week postoperative visit --reinforced   Post Operative Visits: 2 week staple removal programing   Patent verbalized an understanding of information communicated

## 2018-06-20 NOTE — TELEPHONE ENCOUNTER
Telephoned patient as f/u from earlier today   Taking Norco every 4 hrs   Hydromorphone HCL ER 12 mg every am   Methocarbamol 1500 mg every 6 hours x 3 days   Encourage activity as tolerated , reviewed adverse effects of < mobility post op and opiate use   Rich for thromboembolic event  Wife reinforced dressings --no further concern for excess bleeding  Advised if develop respiratory distress or increased somnolence, chest pain call 911 immediately and go to closest ED   He does not have narcan in the home

## 2018-06-21 ENCOUNTER — APPOINTMENT (OUTPATIENT)
Dept: OCCUPATIONAL THERAPY | Facility: CLINIC | Age: 58
End: 2018-06-21
Payer: COMMERCIAL

## 2018-06-22 ENCOUNTER — TELEPHONE (OUTPATIENT)
Dept: PAIN MEDICINE | Facility: MEDICAL CENTER | Age: 58
End: 2018-06-22

## 2018-06-22 ENCOUNTER — TELEPHONE (OUTPATIENT)
Dept: NEUROSURGERY | Facility: CLINIC | Age: 58
End: 2018-06-22

## 2018-06-22 DIAGNOSIS — G89.18 POSTOPERATIVE PAIN: ICD-10-CM

## 2018-06-22 DIAGNOSIS — Z98.890 STATUS POST SURGERY: Primary | ICD-10-CM

## 2018-06-22 DIAGNOSIS — M62.830 SPASM OF THORACIC BACK MUSCLE: ICD-10-CM

## 2018-06-22 RX ORDER — OXYCODONE HYDROCHLORIDE AND ACETAMINOPHEN 5; 325 MG/1; MG/1
TABLET ORAL
Qty: 56 TABLET | Refills: 0 | Status: SHIPPED | OUTPATIENT
Start: 2018-06-22 | End: 2018-09-20

## 2018-06-22 RX ORDER — METHOCARBAMOL 750 MG/1
TABLET, FILM COATED ORAL
Qty: 30 TABLET | Refills: 0 | Status: SHIPPED | OUTPATIENT
Start: 2018-06-22 | End: 2018-07-30

## 2018-06-22 NOTE — TELEPHONE ENCOUNTER
Received in-box message this morning from Pain management patient telephoned then regarding his pain medication  Telephoned patient he reports he call Rufus flores left message wanted to speak with her about his pain medication and what she would order   He feel his post operative pain is not we,l controlled with current regimen   He request additional opiate pain med be added to current regimen  Reviewed med regimen immediate acting breakthrough pain medication Norco 10/325 increased postoperatively to every 4 hours from BID  Continue same dosing hydromorphone 12 mg ER daily  He remarked post op felt good after a dose of percocet  Change immediate acting opiate to percocet 5/325  one or 2 tabs every 4 hours as need for pain  He agrees to stop Norco immediately and start percocet today  He will continue with methocarbamol 750 mg by mouth every 6 hours  2 tabs (1500 mg ) for an additional 2 days thru 6/25 then decrease to  One tab 750 mg every 6 hours  He reports contined severe pain in the upper back "where bone was removed to insert the implant"  Describes pain as sharp knife like shooting pain, worse with  movement turning , getting in and OOB the pain is severe  Asked if Methocarbamol is helping report he does not know because the pain is so severe in the upper back area every time he moves  Verified pharmacy [facility]     Both patient and wife are on intercom during conversation as with every phone call, both are in agreement with plan  Wife added during conversation that patient would deplete supply for Deport by 0900 Sunday

## 2018-06-22 NOTE — TELEPHONE ENCOUNTER
Left vm on number provided for pt to c/b  C/B # and OH provided  *PLS TRY AND GET NURSE ON PHONE WHEN PT C/B  *

## 2018-06-22 NOTE — TELEPHONE ENCOUNTER
Pt is calling asking if either Eduardo Norman or Dr Rosalee Manjarrez can give him a call to discuss his pain management   Pt can be reached at [phone]

## 2018-06-25 DIAGNOSIS — G47.33 OSA (OBSTRUCTIVE SLEEP APNEA): Primary | ICD-10-CM

## 2018-06-25 DIAGNOSIS — G47.31 CENTRAL SLEEP APNEA: ICD-10-CM

## 2018-06-29 ENCOUNTER — TELEPHONE (OUTPATIENT)
Dept: NEUROSURGERY | Facility: CLINIC | Age: 58
End: 2018-06-29

## 2018-06-29 NOTE — TELEPHONE ENCOUNTER
Telephoned patient as a post operative f/u to assess pain, from last week discussion---he reports he is doing fine his pain is controlled  He is not happy how his pain was managed the first few day after surgery , I did not order him oxycodone to provide adequate pain med relief  The [place] 0-325  Every 4 hrs and hydromorphone ER 1 2 mg was not adequate, I did  nothing to help him  He had the feeling I was telling him to "mckeon up and deal with it"  He reports today he is not in pain and doing fine , he started to tell me what he did not like  ("what I did not like") and the phone disconnected   I attempted to return the call on both listed number with no answer

## 2018-07-03 ENCOUNTER — OFFICE VISIT (OUTPATIENT)
Dept: NEUROSURGERY | Facility: CLINIC | Age: 58
End: 2018-07-03

## 2018-07-03 VITALS
HEART RATE: 68 BPM | TEMPERATURE: 99.5 F | RESPIRATION RATE: 16 BRPM | HEIGHT: 75 IN | DIASTOLIC BLOOD PRESSURE: 85 MMHG | SYSTOLIC BLOOD PRESSURE: 143 MMHG

## 2018-07-03 DIAGNOSIS — G89.4 CHRONIC PAIN SYNDROME: Primary | ICD-10-CM

## 2018-07-03 DIAGNOSIS — M54.16 LUMBAR RADICULOPATHY: ICD-10-CM

## 2018-07-03 DIAGNOSIS — Z48.89 AFTERCARE FOLLOWING SURGERY: ICD-10-CM

## 2018-07-03 DIAGNOSIS — M96.1 POSTLAMINECTOMY SYNDROME, LUMBAR: ICD-10-CM

## 2018-07-03 PROCEDURE — 99024 POSTOP FOLLOW-UP VISIT: CPT | Performed by: NURSE PRACTITIONER

## 2018-07-03 RX ORDER — TIZANIDINE 4 MG/1
1 TABLET ORAL DAILY PRN
Refills: 4 | COMMUNITY
Start: 2018-06-27 | End: 2018-07-30 | Stop reason: SDUPTHER

## 2018-07-03 NOTE — PROGRESS NOTES
Assessment/Plan:    Problem List Items Addressed This Visit        Nervous and Auditory    Lumbar radiculopathy       Other    Chronic pain syndrome - Primary    Postlaminectomy syndrome, lumbar      Other Visit Diagnoses     Aftercare following surgery                Subjective:      Patient ID: Daorn Snyder is a 61 y o  male  Reports he is here form staple removal feels much better  HPI   He presents for 2 week post operative visit accompanied by wife he is s/p surgery with Dr Bertin Larios on 6/19/2018 for placement of thoracic spinal cord stimulator with left buttock generator (N/A Spine Thoracic)  He has a longstanding  History chronic  Pain involving the lower back and lower extremities  After undergoing a successful SCS he consulted with DR Bertin Larios  And was deemed an appropriate  candidate for permanent SCS implant  He reports 25 % efficacy fr chronic pain symptom relief  Reports he can sit for longer periods 45 minutes or longer, he was unable to sit this long in the past, he no longer has the pain and numbness in his legs and feet  Reports he is no longer having postoperative pain issues, he is fine on current regimen of hydrocodone and hyromorphone as prescribed previously, just filled a script     Staples are removed from thoracic area (13) and left buttock (12), tolerated procedure well   incisions clean, dry and intact, without erythema, dehiscence, drainage or s/s of infection, well healed with well approximated wound edges except the left buttock lateral end wound edges are not well approximated -stri strips applied   The following instructions are reviewed in detail and continue thru next 4 weeks (6 week post op)    At 2 weeks postop can resume restricted medications such as ASA, products containing ASA, NSAID, fish oils, and OTC products or as previously directed  Resume driving 2 week postoperatively    Continue to observe incisions for redness, drainage, swelling dehiscence, increased pain, fever >/= 101, warmth to touch incision or skin surrounding, if occur call or report to office immediately for reassessment  Continue showers using clean towel and wash cloth with OTC antibacterial bodywash for an additional 2 weeks  Do not apply lotions, creams, powder, or ointments to surgical incisions  Activity as tolerated, no bending, lifting  greater than 10 lbs, turning, or stretching,  ambulation as tolerated  No Immersion in water such as swimming, hot tub, or tub bath  Do not wet left buttock where steri strips applied  Keep dry , will fall off in 7 - 10 days   Informed of follow-up appointment in 4 week with Dr Anurag Hilliard  If  there is any significant change in his neurologic status, call and/or return to the office immediately for reassessment   Will discuss during 6 week postoperative  visit need for therapy consult for strengthening  He met with product rep for device programing and teaching  Rep reports after session with patient all involved chronic pain areas  are covered with thoracic Spinal Cord Stimulator system  REFER TO ATTACHMENT FOR PROGRAMING REPORT   Explained chronic pain relief may not be immediate after reprogramming can take  Up to 72 hours to feel effect   Contact Rep immediately if there is any change in efficacy or concern over SCS system  Contact office if unable to reach Rep or if the reps   intervention does  not resolve issue  These findings, impressions and recommendations are reviewed in great detail with the patient  Patient verbalizes an understanding of information  The following portions of the patient's history were reviewed and updated as appropriate:   He  has a past medical history of Acid reflux; Acute renal failure ([address] ); Alcohol intoxication, episodic ([address] ) (12/17/2010); Analgesic use; Arthritis; Avascular necrosis of femoral head, right ([address] ); Avascular necrosis of femur head, right ([address] );  Avascular necrosis of hip, left ([address] ); Gonzales esophagus; Cervical disc herniation; Chronic pain; Chronic pain disorder; Chronic sinusitis (11/15/2008); Disorder of male genital organs; Elevated serum creatinine; GERD (gastroesophageal reflux disease); Gynecomastia; High cholesterol; History of colon polyps; Hypertension; Hypogonadism in male; Hypothyroid; Left hand paresthesia; Lumbar disc herniation with radiculopathy; Obesity; Osteoarthritis; Rotator cuff tendinitis; Shoulder pain; Sleep apnea; and Thrombocytopenia ([facility] [address] )  He   Patient Active Problem List    Diagnosis Date Noted    Preop examination 06/11/2018    Chronic pain syndrome 05/16/2018    Cubital tunnel syndrome on left 04/23/2018    Hypochromic microcytic anemia 01/31/2018    Testosterone deficiency 01/31/2018    Erectile disorder due to medical condition in male patient 08/11/2017    Chronic kidney disease, stage 2 (mild) 05/10/2017    S/P ORIF (open reduction internal fixation) fracture 12/22/2016    Status post total replacement of right hip 08/05/2016    Obstructive sleep apnea 08/05/2016    Hypertension 08/05/2016    Esophageal reflux 08/05/2016    Hypothyroidism 08/05/2016    Avascular necrosis of femur head, right (HCC)     Opioid dependence ([facility] [address] [facility] ) 01/07/2016    Muscle pain, myofascial 10/01/2014    Pain syndrome, chronic 09/27/2013    Hyperlipidemia 08/29/2013    Lumbar radiculopathy 05/24/2013    Spondylosis of lumbar region without myelopathy or radiculopathy 05/24/2013    Disc degeneration, lumbosacral 09/04/2012    Lumbar canal stenosis 09/04/2012    Morbid obesity ([facility] [address] ) 09/04/2012    Postlaminectomy syndrome, lumbar 07/06/2012     He  has a past surgical history that includes Colonoscopy; Decompression core hip bilateral; Lumbar fusion (2009); ORIF ankle fracture; Tonsillectomy; Cunningham tooth extraction; pr open treatment bimalleolar ankle fracture (Right, 12/22/2016); pr total hip arthroplasty (Right, 8/5/2016); Ankle ligament reconstruction (Left);  Hip surgery (07/21/2016); Joint replacement; Back surgery; Fracture surgery; and pr surg implnt neuroelect,epidural (N/A, 6/19/2018)  His family history includes Arthritis in his father; Atrial fibrillation in his father; Cancer in his family and mother; Diabetes type II in his paternal grandmother; Hypertension in his family and father; Other in his family  He  reports that he has never smoked  He has never used smokeless tobacco  He reports that he drinks about 3 6 oz of alcohol per week   He reports that he does not use drugs  Current Outpatient Prescriptions   Medication Sig Dispense Refill    amLODIPine (NORVASC) 10 mg tablet Take 1 tablet (10 mg total) by mouth daily 90 tablet 1    Docusate Sodium (COLACE PO) Take by mouth   esomeprazole (NexIUM) 40 MG capsule Take 40 mg by mouth every morning before breakfast      FIBER COMPLETE TABS Take by mouth   HYDROcodone-acetaminophen (NORCO)  mg per tablet Take 1 tab PO BID PRN PAIN  Do not fill until 7/25/18 60 tablet 0    HYDROmorphone HCl ER 12 MG T24A Take 1 tab PO DAILY FOR PAIN  Do not fill until 7/25/18 30 each 0    levothyroxine 25 mcg tablet Take 1 tablet (25 mcg total) by mouth daily 90 tablet 1    loratadine (CLARITIN) 10 mg tablet Take 10 mg by mouth daily        methocarbamol (ROBAXIN) 750 mg tablet Continue 2 tablets 1500 mg by mouth  thru 6/25 then resume 750 mg by mouth every 6 hours as needed for thoracic muscle spasm 30 tablet 0    nebivolol (BYSTOLIC) 5 mg tablet Take 1 tablet (5 mg total) by mouth daily 90 tablet 1    rosuvastatin (CRESTOR) 5 mg tablet Take 1 tablet (5 mg total) by mouth daily 90 tablet 1    Testosterone 12 5 MG/ACT (1%) GEL Place 2 actuation on the skin daily 1 Bottle 0    tiZANidine (ZANAFLEX) 4 mg tablet Take 1 tablet by mouth daily as needed  4    oxyCODONE-acetaminophen (PERCOCET) 5-325 mg per tablet May take one or 2 tablets by mouth every 4 hours as needed for post operative pain not to exceed 8 doses per day 56 tablet 0     No current facility-administered medications for this visit  He is allergic to nsaids; acetazolamide; oxytetracycline; penicillins; and sulfa antibiotics       Review of Systems   Constitutional: Negative for chills, fatigue and fever  Eyes: Negative for pain and visual disturbance  Respiratory: Negative for cough, shortness of breath and wheezing  Cardiovascular: Negative for chest pain and palpitations  Gastrointestinal: Negative for abdominal pain and nausea  Musculoskeletal: Positive for back pain (pain located around the thoracic incision)  Negative for arthralgias, gait problem, neck pain and neck stiffness  Neurological: Positive for numbness  Negative for dizziness, speech difficulty, weakness and headaches  Objective:      /85 (BP Location: Left arm, Patient Position: Sitting, Cuff Size: Standard)   Pulse 68   Temp 99 5 °F (37 5 °C) (Tympanic)   Resp 16   Ht 6' 2 5" (1 892 m)          Physical Exam   Constitutional: He is oriented to person, place, and time  He appears well-developed and well-nourished  HENT:   Head: Normocephalic and atraumatic  Cardiovascular: Normal rate and regular rhythm  Pulmonary/Chest: Effort normal and breath sounds normal    Lymphadenopathy:     He has no cervical adenopathy  Neurological: He is alert and oriented to person, place, and time  Skin: Skin is warm and dry  Psychiatric: He has a normal mood and affect  Nursing note and vitals reviewed

## 2018-07-10 ENCOUNTER — TELEPHONE (OUTPATIENT)
Dept: SLEEP CENTER | Facility: CLINIC | Age: 58
End: 2018-07-10

## 2018-07-10 NOTE — TELEPHONE ENCOUNTER
----- Message from Cali Hooker sent at 7/6/2018  3:58 PM EDT -----  Lm to call and schedule  ----- Message -----  From: Edi Pinedo RN  Sent: 6/27/2018  11:14 AM  To: [facility]    Please schedule  ----- Message -----  From: Irma Glass MD  Sent: 6/25/2018   1:44 PM  To: [facility]    The patient needs a BiPAP titration study  He has witnessed apneas while using BiPAP auto

## 2018-07-11 ENCOUNTER — TELEPHONE (OUTPATIENT)
Dept: NEUROLOGY | Facility: CLINIC | Age: 58
End: 2018-07-11

## 2018-07-11 NOTE — TELEPHONE ENCOUNTER
Mr. Charles called to discuss the results of the EMG that he had. The test was performed at [facility] and the report and data are on your desk. [phone].

## 2018-07-18 ENCOUNTER — EVALUATION (OUTPATIENT)
Dept: OCCUPATIONAL THERAPY | Facility: CLINIC | Age: 58
End: 2018-07-18
Payer: COMMERCIAL

## 2018-07-18 DIAGNOSIS — G56.22 CUBITAL TUNNEL SYNDROME ON LEFT: Primary | ICD-10-CM

## 2018-07-18 PROCEDURE — 97140 MANUAL THERAPY 1/> REGIONS: CPT | Performed by: OCCUPATIONAL THERAPIST

## 2018-07-18 PROCEDURE — 97110 THERAPEUTIC EXERCISES: CPT | Performed by: OCCUPATIONAL THERAPIST

## 2018-07-18 PROCEDURE — G8991 OTHER PT/OT GOAL STATUS: HCPCS | Performed by: OCCUPATIONAL THERAPIST

## 2018-07-18 PROCEDURE — G8990 OTHER PT/OT CURRENT STATUS: HCPCS | Performed by: OCCUPATIONAL THERAPIST

## 2018-07-18 NOTE — PROGRESS NOTES
Daily Note     Today's date: 2018  Patient name: Thais Littlejohn  : 1960  MRN: [id number]  Referring provider: Yesenia Grant  Dx:   Encounter Diagnosis     ICD-10-CM    1  Cubital tunnel syndrome on left G56 22                   Subjective: 5/10 severity of numbness    Objective: See treatment diary below      Assessment: See re-eval   Patient would benefit from continued OT  Plan: Continue per plan of care  Progress treatment as tolerated  Treatment to focus on nerve gliding and soft tissue mobilization for ulnar nerve  Continuing with 2 weeks of therapy  Then re-assess to determine if continuing       Precautions: [place]    Specialty Daily Treatment Diary     Manual     IASTM FCU 10' 10 5' 15 5'   Supine JOHNATHAN/M/R 15' 5 15' 20 15'                               Exercise Diary       Yellow extension web 30x 30x 30x      Digit ab/ad  30 Yellow      Gross grasp  GPW 3x10      Wrist flex/ext  iso 3x10 G      Wrist sup/pro  iso 3x10 g                                                                                                                                  Modalities      MHP pre 10' 15' 10
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
## 2018-07-20 ENCOUNTER — TELEPHONE (OUTPATIENT)
Dept: NEUROSURGERY | Facility: CLINIC | Age: 58
End: 2018-07-20

## 2018-07-20 NOTE — TELEPHONE ENCOUNTER
Andrew reports this pt requested a script for Methocarbamol transmitted 6/22 be filled now  This med in all doses is on backorder  Discussed with Angely KNIGHT who origionally prescribed,  and at a higher dose post-op do to pt c/o severe spasm  Per [facility] instructed pharmacy to void that particular script at this time  Pt will call the office if there is a need, and the med is not avail at this time anyway  She stated an understanding, and will notify the pt

## 2018-07-23 ENCOUNTER — HOSPITAL ENCOUNTER (OUTPATIENT)
Dept: SLEEP CENTER | Facility: CLINIC | Age: 58
Discharge: HOME | End: 2018-07-23
Payer: COMMERCIAL

## 2018-07-23 DIAGNOSIS — G47.31 CENTRAL SLEEP APNEA: ICD-10-CM

## 2018-07-23 DIAGNOSIS — G47.33 OSA (OBSTRUCTIVE SLEEP APNEA): ICD-10-CM

## 2018-07-23 PROCEDURE — 95811 POLYSOM 6/>YRS CPAP 4/> PARM: CPT

## 2018-07-24 DIAGNOSIS — G47.33 OSA (OBSTRUCTIVE SLEEP APNEA): Primary | ICD-10-CM

## 2018-07-24 NOTE — PROGRESS NOTES
Sleep Study Documentation    Pre-Sleep Study       Sleep testing procedure explained to patient:YES    Patient napped prior to study:YES- more than 30 minutes  Napped after 2PM: no    Caffeine:Dayshift worker after 12PM   Caffeine use:YES- soda  12 ounces    Alcohol:Dayshift workers after 5PM: Alcohol use:NO    Typical day for patient:YES       Study Documentation  Treatment   Optimal PAP pressure: 22/18 cmH2O  Leak:Small  Snore:Eliminated  REM Obtained:yes  Supplemental O2: no    Minimum SaO2 84%  Baseline SaO2 90%  PAP mask tried (list all) Resmed AirFit F20 full face  PAP mask choice (final) Resmed AirFit F20 full face  PAP mask type:full face  PAP pressure at which snoring was eliminated 20/16 cmH2O  Minimum SaO2 at final PAP pressure 92%  Mode of Therapy:BiPAP  ETCO2:No  CPAP changed to BiPAP:Study was initiated as BiPAP    Mode of Therapy:BiPAP    EKG abnormalities: no     EEG abnormalities: no    Study Terminated:no    Patient classification: ? Post-Sleep Study    Medication used at bedtime or during sleep study:YES other prescription medications    Patient reports time it took to fall asleep:less than 20 minutes    Patient reports waking up during study:1 to 2 times  Patient reports returning to sleep in 10 to 30 minutes  Patient reports sleeping 4 to 6 hours without dreaming  Patient reports sleep during study:better than usual    Patient rated sleepiness: Somewhat sleepy or tired    PAP treatment:yes: Post PAP treatment patient reports feeling unsure if a change is noted and  would not wear PAP mask at home  Too tight hurts bridge of nose

## 2018-07-25 ENCOUNTER — TELEPHONE (OUTPATIENT)
Dept: NEUROLOGY | Facility: CLINIC | Age: 58
End: 2018-07-25

## 2018-07-25 NOTE — TELEPHONE ENCOUNTER
Mr. Charles's  called asking for an updated report since reviewing his EMG that was performed at [facility] and faxed to you. What should I tell him or send him? He asked that I send it ASAP because the court date is approaching.

## 2018-07-26 ENCOUNTER — OFFICE VISIT (OUTPATIENT)
Dept: PAIN MEDICINE | Facility: CLINIC | Age: 58
End: 2018-07-26
Payer: COMMERCIAL

## 2018-07-26 ENCOUNTER — TELEPHONE (OUTPATIENT)
Dept: SLEEP CENTER | Facility: CLINIC | Age: 58
End: 2018-07-26

## 2018-07-26 VITALS
WEIGHT: 315 LBS | BODY MASS INDEX: 40.43 KG/M2 | DIASTOLIC BLOOD PRESSURE: 100 MMHG | HEIGHT: 74 IN | HEART RATE: 70 BPM | TEMPERATURE: 97.5 F | SYSTOLIC BLOOD PRESSURE: 160 MMHG

## 2018-07-26 DIAGNOSIS — G89.4 CHRONIC PAIN SYNDROME: Primary | ICD-10-CM

## 2018-07-26 DIAGNOSIS — M51.36 LUMBAR DEGENERATIVE DISC DISEASE: ICD-10-CM

## 2018-07-26 DIAGNOSIS — M48.061 SPINAL STENOSIS OF LUMBAR REGION WITHOUT NEUROGENIC CLAUDICATION: ICD-10-CM

## 2018-07-26 DIAGNOSIS — F11.20 UNCOMPLICATED OPIOID DEPENDENCE (HCC): ICD-10-CM

## 2018-07-26 DIAGNOSIS — M96.1 POSTLAMINECTOMY SYNDROME, LUMBAR: ICD-10-CM

## 2018-07-26 PROCEDURE — 80305 DRUG TEST PRSMV DIR OPT OBS: CPT | Performed by: NURSE PRACTITIONER

## 2018-07-26 PROCEDURE — 99214 OFFICE O/P EST MOD 30 MIN: CPT | Performed by: NURSE PRACTITIONER

## 2018-07-26 RX ORDER — HYDROCODONE BITARTRATE AND ACETAMINOPHEN 10; 325 MG/1; MG/1
TABLET ORAL
Qty: 60 TABLET | Refills: 0 | Status: SHIPPED | OUTPATIENT
Start: 2018-07-26 | End: 2018-07-26 | Stop reason: SDUPTHER

## 2018-07-26 RX ORDER — HYDROCODONE BITARTRATE AND ACETAMINOPHEN 10; 325 MG/1; MG/1
TABLET ORAL
Qty: 60 TABLET | Refills: 0 | Status: SHIPPED | OUTPATIENT
Start: 2018-07-26 | End: 2018-09-20 | Stop reason: SDUPTHER

## 2018-07-26 NOTE — TELEPHONE ENCOUNTER
Left message for patient regarding pressure change  Pressure will e changed by modem  Instructed to call if any questions or problem with pressure    Rx to [address]
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
## 2018-07-30 ENCOUNTER — TELEPHONE (OUTPATIENT)
Dept: NEUROLOGY | Facility: CLINIC | Age: 58
End: 2018-07-30

## 2018-07-30 ENCOUNTER — TELEPHONE (OUTPATIENT)
Dept: PAIN MEDICINE | Facility: CLINIC | Age: 58
End: 2018-07-30

## 2018-07-30 DIAGNOSIS — G89.4 CHRONIC PAIN SYNDROME: ICD-10-CM

## 2018-07-30 DIAGNOSIS — M79.18 MYOFASCIAL PAIN SYNDROME: Primary | ICD-10-CM

## 2018-07-30 DIAGNOSIS — E34.9 TESTOSTERONE DEFICIENCY: ICD-10-CM

## 2018-07-30 DIAGNOSIS — M96.1 POSTLAMINECTOMY SYNDROME, LUMBAR: ICD-10-CM

## 2018-07-30 RX ORDER — TIZANIDINE 4 MG/1
4 TABLET ORAL
Qty: 30 TABLET | Refills: 1 | Status: SHIPPED | OUTPATIENT
Start: 2018-07-30 | End: 2018-09-20 | Stop reason: SDUPTHER

## 2018-07-30 NOTE — TELEPHONE ENCOUNTER
[place]'s  Anderson Dodd called back as you instructed on Friday to discuss your impressions of the EMG he had faxed over from [facility]. He is anxious to speak with you as the deadline is Tuesday, July 31, 2018. He can be reached at [phone].

## 2018-07-30 NOTE — TELEPHONE ENCOUNTER
Sent 2 months of scripts again  One to fill now and 1 to fill 8/28/18  Sorry I am not sure why they didn't get it  It was sent from our end   Please have him check with the pharmacy today to make addie they got the second script

## 2018-07-30 NOTE — TELEPHONE ENCOUNTER
S/W patient, states he has been without his medications now for 2 days because Andrew states they never got the electronic prescription  Offered emotional support   Please advise  Thanks

## 2018-07-30 NOTE — TELEPHONE ENCOUNTER
S/W patient made aware prescription was sent to [facility]  Patient states that he also needs a prescription filled for Tizanidine  Made NM aware and Tizanidine prescription sent to [facility]

## 2018-07-30 NOTE — TELEPHONE ENCOUNTER
Patient called states the issue in getting his HYDROmorphone HCl ER 12 MG T24A is still not complete, he has been without this medication for 2 days  This should go to [address] [place] [address] in [place]      Please call pt to discuss status ASAP and as to when the medication will be available to  today

## 2018-07-30 NOTE — TELEPHONE ENCOUNTER
Call taken off answering service from 18 10:07 pm  Dr : Sridevi Chowdhury  Pt name: Briana How  : [birth year]  Emerg?: N  Msg: Pt states rx is not honoring electronic script sent in and as a result pt will be out of medication please call pt

## 2018-08-01 RX ORDER — TESTOSTERONE 12.5 MG/1.25G
2 GEL TOPICAL DAILY
Qty: 1 BOTTLE | Refills: 0 | Status: SHIPPED | OUTPATIENT
Start: 2018-08-01 | End: 2018-10-01 | Stop reason: SDUPTHER

## 2018-08-02 ENCOUNTER — OFFICE VISIT (OUTPATIENT)
Dept: NEUROSURGERY | Facility: CLINIC | Age: 58
End: 2018-08-02

## 2018-08-02 VITALS
SYSTOLIC BLOOD PRESSURE: 120 MMHG | HEIGHT: 74 IN | WEIGHT: 315 LBS | BODY MASS INDEX: 40.43 KG/M2 | DIASTOLIC BLOOD PRESSURE: 80 MMHG | TEMPERATURE: 99.1 F

## 2018-08-02 DIAGNOSIS — G89.4 CHRONIC PAIN SYNDROME: Primary | ICD-10-CM

## 2018-08-02 PROCEDURE — 99024 POSTOP FOLLOW-UP VISIT: CPT | Performed by: NEUROLOGICAL SURGERY

## 2018-08-02 NOTE — PROGRESS NOTES
Assessment/Plan:    No problem-specific Assessment & Plan notes found for this encounter  Diagnoses and all orders for this visit:    Chronic pain syndrome        Summary: This is a 69-year-old male with chronic pain involving his lower back and bilateral posterior lower extremities  Primary low back pain  History of lumbar fusion  Underwent successful spinal cord stimulator trial with Dr Latosha Nguyen  He is now 6 weeks from thoracic spinal cord stimulator placement and is doing well  No programming adjustment at today's visit  He will follow-up as needed with me  He will continue to follow up with Dr Latosha Nguyen  Subjective:      Patient ID: Lane Ward is a 61 y o  male  HPI   He is now 6 weeks from thoracic spinal cord stimulator placement is doing well  He is noting approximately 50-60 percent pain relief  Denies incisional pain  No fevers or chills  He reports that he feels that he had increased pain the first 1-2 days following surgery secondary to not receiving enough narcotic medication in addition to his current regimen  Review of his history is that he is a pleasant 69-year-old male with history of chronic pain that involves primarily his lower back  History of lumbar fusion in 2008 by Dr Tyler Parsons  Underwent successful spinal cord stimulator trial by Dr Latosha Nguyen  The following portions of the patient's history were reviewed and updated as appropriate: allergies, current medications, past family history, past medical history, past social history and past surgical history  Review of Systems   Constitutional: Negative  HENT: Negative  Eyes: Negative  Respiratory: Negative  Cardiovascular: Negative  Gastrointestinal: Negative  Endocrine: Negative  Genitourinary: Negative  Musculoskeletal: Positive for back pain (LBP and bilateral leg pain improving with SCS)  Skin: Negative  Allergic/Immunologic: Negative  Neurological: Negative  Hematological: Negative  Psychiatric/Behavioral: Negative  Objective:      /80 (BP Location: Right arm) Comment: recheck  Temp 99 1 °F (37 3 °C) (Tympanic)   Ht 6' 2" (1 88 m)   Wt (!) 163 kg (360 lb)   BMI 46 22 kg/m²          Physical Exam   Neurological: He has normal strength  No sensory deficit       Incisions well healed

## 2018-08-05 DIAGNOSIS — I10 ESSENTIAL HYPERTENSION: Chronic | ICD-10-CM

## 2018-08-06 RX ORDER — AMLODIPINE BESYLATE 10 MG/1
TABLET ORAL
Qty: 90 TABLET | Refills: 1 | Status: SHIPPED | OUTPATIENT
Start: 2018-08-06 | End: 2019-01-15 | Stop reason: SDUPTHER

## 2018-08-06 NOTE — PROGRESS NOTES
Did not return to therapy after this visit  Patient status relating to their referred diagnosis is unknown after this service date

## 2018-08-23 DIAGNOSIS — I10 ESSENTIAL HYPERTENSION: Chronic | ICD-10-CM

## 2018-08-26 RX ORDER — NEBIVOLOL 5 MG/1
5 TABLET ORAL DAILY
Qty: 90 TABLET | Refills: 0 | Status: SHIPPED | OUTPATIENT
Start: 2018-08-26 | End: 2018-10-01 | Stop reason: SDUPTHER

## 2018-09-07 DIAGNOSIS — I10 ESSENTIAL HYPERTENSION: Chronic | ICD-10-CM

## 2018-09-07 RX ORDER — ROSUVASTATIN CALCIUM 5 MG/1
TABLET, COATED ORAL
Qty: 90 TABLET | Refills: 1 | Status: SHIPPED | OUTPATIENT
Start: 2018-09-07 | End: 2019-07-19 | Stop reason: SDUPTHER

## 2018-09-10 ENCOUNTER — TELEPHONE (OUTPATIENT)
Dept: NEPHROLOGY | Facility: CLINIC | Age: 58
End: 2018-09-10

## 2018-09-10 NOTE — TELEPHONE ENCOUNTER
Left message on patient's phone to call back to schedule a follow up appointment with Dr Gretchen Tinoco

## 2018-09-19 DIAGNOSIS — E03.9 HYPOTHYROIDISM, UNSPECIFIED TYPE: Chronic | ICD-10-CM

## 2018-09-19 NOTE — TELEPHONE ENCOUNTER
PT CALLED FOR REFILLS, SYNTHROID AND ALSO STATES YOU CHANGED HIS BYSTOLIC TO 64XH AND PHARMACY SENT 5MG  I DID NOT SEE THAT YOU CHANGED THIS?   IF SO PT NEEDS NEW SCRIPT ALSO FOR BYSTOLIC 37YI  [place]  LAST OV 6/6 FOR PRE-OP, DID NOT SEE WHEN TO RETURN  LAST TSH 5/31- NORMAL

## 2018-09-20 ENCOUNTER — OFFICE VISIT (OUTPATIENT)
Dept: PAIN MEDICINE | Facility: CLINIC | Age: 58
End: 2018-09-20
Payer: COMMERCIAL

## 2018-09-20 VITALS
WEIGHT: 315 LBS | SYSTOLIC BLOOD PRESSURE: 160 MMHG | BODY MASS INDEX: 40.43 KG/M2 | TEMPERATURE: 97.6 F | HEART RATE: 72 BPM | DIASTOLIC BLOOD PRESSURE: 92 MMHG | HEIGHT: 74 IN

## 2018-09-20 DIAGNOSIS — E66.01 MORBID OBESITY (HCC): ICD-10-CM

## 2018-09-20 DIAGNOSIS — F11.20 UNCOMPLICATED OPIOID DEPENDENCE (HCC): ICD-10-CM

## 2018-09-20 DIAGNOSIS — E03.9 HYPOTHYROIDISM, UNSPECIFIED TYPE: Primary | Chronic | ICD-10-CM

## 2018-09-20 DIAGNOSIS — N18.2 CHRONIC KIDNEY DISEASE, STAGE 2 (MILD): ICD-10-CM

## 2018-09-20 DIAGNOSIS — D50.9 HYPOCHROMIC MICROCYTIC ANEMIA: ICD-10-CM

## 2018-09-20 DIAGNOSIS — G89.4 CHRONIC PAIN SYNDROME: Primary | ICD-10-CM

## 2018-09-20 DIAGNOSIS — M51.37 DISC DEGENERATION, LUMBOSACRAL: ICD-10-CM

## 2018-09-20 DIAGNOSIS — M47.816 SPONDYLOSIS OF LUMBAR REGION WITHOUT MYELOPATHY OR RADICULOPATHY: ICD-10-CM

## 2018-09-20 DIAGNOSIS — M96.1 POSTLAMINECTOMY SYNDROME, LUMBAR: ICD-10-CM

## 2018-09-20 DIAGNOSIS — E78.00 PURE HYPERCHOLESTEROLEMIA: ICD-10-CM

## 2018-09-20 DIAGNOSIS — I10 ESSENTIAL HYPERTENSION: Chronic | ICD-10-CM

## 2018-09-20 DIAGNOSIS — E34.9 TESTOSTERONE DEFICIENCY: ICD-10-CM

## 2018-09-20 DIAGNOSIS — M79.18 MYOFASCIAL PAIN SYNDROME: ICD-10-CM

## 2018-09-20 PROCEDURE — 99214 OFFICE O/P EST MOD 30 MIN: CPT | Performed by: NURSE PRACTITIONER

## 2018-09-20 PROCEDURE — 95971 ALYS SMPL SP/PN NPGT W/PRGRM: CPT | Performed by: NURSE PRACTITIONER

## 2018-09-20 RX ORDER — LEVOTHYROXINE SODIUM 0.03 MG/1
25 TABLET ORAL DAILY
Qty: 90 TABLET | Refills: 0 | Status: SHIPPED | OUTPATIENT
Start: 2018-09-20 | End: 2018-09-20 | Stop reason: SDUPTHER

## 2018-09-20 RX ORDER — LEVOTHYROXINE SODIUM 0.03 MG/1
25 TABLET ORAL DAILY
Qty: 30 TABLET | Refills: 0 | Status: SHIPPED | OUTPATIENT
Start: 2018-09-20 | End: 2018-10-01 | Stop reason: SDUPTHER

## 2018-09-20 RX ORDER — TIZANIDINE 4 MG/1
4 TABLET ORAL
Qty: 30 TABLET | Refills: 5 | Status: SHIPPED | OUTPATIENT
Start: 2018-09-20 | End: 2019-01-14 | Stop reason: SDUPTHER

## 2018-09-20 RX ORDER — HYDROCODONE BITARTRATE AND ACETAMINOPHEN 10; 325 MG/1; MG/1
TABLET ORAL
Qty: 60 TABLET | Refills: 0 | Status: SHIPPED | OUTPATIENT
Start: 2018-09-20 | End: 2018-11-05 | Stop reason: SDUPTHER
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
## 2018-09-20 NOTE — TELEPHONE ENCOUNTER
PT WAS INFORMED  HE SAID IT WILL TAKE 3 WEEKS FOR LEVOTHYROXINE TO COME, CAN YOU SEND 30 TO LOCAL PHARMACY

## 2018-09-23 LAB
ALBUMIN SERPL-MCNC: 4.3 G/DL (ref 3.6–5.1)
ALBUMIN/GLOB SERPL: 1.6 (CALC) (ref 1–2.5)
ALP SERPL-CCNC: 93 U/L (ref 40–115)
ALT SERPL-CCNC: 31 U/L (ref 9–46)
AST SERPL-CCNC: 34 U/L (ref 10–35)
BASOPHILS # BLD AUTO: 56 CELLS/UL (ref 0–200)
BASOPHILS NFR BLD AUTO: 0.6 %
BILIRUB SERPL-MCNC: 0.4 MG/DL (ref 0.2–1.2)
BUN SERPL-MCNC: 20 MG/DL (ref 7–25)
BUN/CREAT SERPL: ABNORMAL (CALC) (ref 6–22)
CALCIUM SERPL-MCNC: 9.8 MG/DL (ref 8.6–10.3)
CHLORIDE SERPL-SCNC: 101 MMOL/L (ref 98–110)
CHOLEST SERPL-MCNC: 167 MG/DL
CHOLEST/HDLC SERPL: 3.6 (CALC)
CO2 SERPL-SCNC: 29 MMOL/L (ref 20–32)
CREAT SERPL-MCNC: 1.21 MG/DL (ref 0.7–1.33)
EOSINOPHIL # BLD AUTO: 1100 CELLS/UL (ref 15–500)
EOSINOPHIL NFR BLD AUTO: 11.7 %
ERYTHROCYTE [DISTWIDTH] IN BLOOD BY AUTOMATED COUNT: 14.1 % (ref 11–15)
GLOBULIN SER CALC-MCNC: 2.7 G/DL (CALC) (ref 1.9–3.7)
GLUCOSE SERPL-MCNC: 122 MG/DL (ref 65–99)
HBA1C MFR BLD: 6 % OF TOTAL HGB
HCT VFR BLD AUTO: 43.2 % (ref 38.5–50)
HDLC SERPL-MCNC: 47 MG/DL
HGB BLD-MCNC: 13.8 G/DL (ref 13.2–17.1)
LDLC SERPL CALC-MCNC: 89 MG/DL (CALC)
LYMPHOCYTES # BLD AUTO: 1354 CELLS/UL (ref 850–3900)
LYMPHOCYTES NFR BLD AUTO: 14.4 %
MCH RBC QN AUTO: 28.3 PG (ref 27–33)
MCHC RBC AUTO-ENTMCNC: 31.9 G/DL (ref 32–36)
MCV RBC AUTO: 88.7 FL (ref 80–100)
MONOCYTES # BLD AUTO: 790 CELLS/UL (ref 200–950)
MONOCYTES NFR BLD AUTO: 8.4 %
NEUTROPHILS # BLD AUTO: 6101 CELLS/UL (ref 1500–7800)
NEUTROPHILS NFR BLD AUTO: 64.9 %
NONHDLC SERPL-MCNC: 120 MG/DL (CALC)
PLATELET # BLD AUTO: 157 THOUSAND/UL (ref 140–400)
PMV BLD REES-ECKER: 14.1 FL (ref 7.5–12.5)
POTASSIUM SERPL-SCNC: 4.5 MMOL/L (ref 3.5–5.3)
PROT SERPL-MCNC: 7 G/DL (ref 6.1–8.1)
RBC # BLD AUTO: 4.87 MILLION/UL (ref 4.2–5.8)
SL AMB EGFR AFRICAN AMERICAN: 76 ML/MIN/1.73M2
SL AMB EGFR NON AFRICAN AMERICAN: 66 ML/MIN/1.73M2
SODIUM SERPL-SCNC: 139 MMOL/L (ref 135–146)
TESTOST FREE SERPL-MCNC: 75.2 PG/ML (ref 35–155)
TESTOST SERPL-MCNC: 545 NG/DL (ref 250–1100)
TRIGL SERPL-MCNC: 222 MG/DL
TSH SERPL-ACNC: 2.61 MIU/L (ref 0.4–4.5)
WBC # BLD AUTO: 9.4 THOUSAND/UL (ref 3.8–10.8)

## 2018-10-01 ENCOUNTER — OFFICE VISIT (OUTPATIENT)
Dept: FAMILY MEDICINE CLINIC | Facility: CLINIC | Age: 58
End: 2018-10-01
Payer: COMMERCIAL

## 2018-10-01 VITALS
OXYGEN SATURATION: 97 % | HEIGHT: 74 IN | RESPIRATION RATE: 18 BRPM | WEIGHT: 315 LBS | HEART RATE: 68 BPM | SYSTOLIC BLOOD PRESSURE: 144 MMHG | DIASTOLIC BLOOD PRESSURE: 76 MMHG | BODY MASS INDEX: 40.43 KG/M2

## 2018-10-01 DIAGNOSIS — G47.33 OBSTRUCTIVE SLEEP APNEA: Chronic | ICD-10-CM

## 2018-10-01 DIAGNOSIS — I10 ESSENTIAL HYPERTENSION: Primary | Chronic | ICD-10-CM

## 2018-10-01 DIAGNOSIS — D50.9 HYPOCHROMIC MICROCYTIC ANEMIA: ICD-10-CM

## 2018-10-01 DIAGNOSIS — E78.2 MIXED HYPERLIPIDEMIA: ICD-10-CM

## 2018-10-01 DIAGNOSIS — Z23 FLU VACCINE NEED: ICD-10-CM

## 2018-10-01 DIAGNOSIS — E03.9 HYPOTHYROIDISM, UNSPECIFIED TYPE: Chronic | ICD-10-CM

## 2018-10-01 DIAGNOSIS — E34.9 TESTOSTERONE DEFICIENCY: ICD-10-CM

## 2018-10-01 DIAGNOSIS — N18.2 CHRONIC KIDNEY DISEASE, STAGE 2 (MILD): ICD-10-CM

## 2018-10-01 DIAGNOSIS — E66.01 MORBID OBESITY (HCC): ICD-10-CM

## 2018-10-01 PROCEDURE — 90471 IMMUNIZATION ADMIN: CPT

## 2018-10-01 PROCEDURE — 99215 OFFICE O/P EST HI 40 MIN: CPT | Performed by: FAMILY MEDICINE

## 2018-10-01 PROCEDURE — 90682 RIV4 VACC RECOMBINANT DNA IM: CPT

## 2018-10-01 RX ORDER — TESTOSTERONE 12.5 MG/1.25G
2 GEL TOPICAL DAILY
Qty: 1 BOTTLE | Refills: 5 | Status: SHIPPED | OUTPATIENT
Start: 2018-10-01 | End: 2019-07-31

## 2018-10-01 RX ORDER — NEBIVOLOL 5 MG/1
5 TABLET ORAL DAILY
Qty: 90 TABLET | Refills: 1 | Status: SHIPPED | OUTPATIENT
Start: 2018-10-01 | End: 2019-06-20 | Stop reason: SDUPTHER

## 2018-10-01 RX ORDER — LEVOTHYROXINE SODIUM 0.03 MG/1
25 TABLET ORAL DAILY
Qty: 90 TABLET | Refills: 1 | Status: SHIPPED | OUTPATIENT
Start: 2018-10-01 | End: 2020-03-16

## 2018-10-01 NOTE — ASSESSMENT & PLAN NOTE
Hypothyroidism is well controlled on levothyroxine 25 micrograms once daily  Refill provided    Recheck thyroid function studies in 6 months

## 2018-10-01 NOTE — ASSESSMENT & PLAN NOTE
Patient has been having considerable difficulty weight loss  He is limited by his orthopedic condition in regards to his activity level  I would like to refer the patient to [address] [place] [address] weight management program for medical supervised weight loss  He does have history of hypertension    Would not be a good candidate for stimulant medication for weight loss

## 2018-10-01 NOTE — ASSESSMENT & PLAN NOTE
AndroGel has been very effective for patient his testosterone level has improved tremendously  Patient does notice a significant difference  Repeat testosterone levels in 6 months  Refill provided of AndroGel  Prior to prescribing the controlled substance, a patient search was performed on the [facility] prescription drug monitoring program web site  There was no evidence of diversion or misuse    Prescription provided
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
## 2018-10-01 NOTE — ASSESSMENT & PLAN NOTE
Hyperlipidemia is well controlled on Crestor 5 milligrams once daily  Continue same    Recheck lipid profile in 6 months

## 2018-10-01 NOTE — ASSESSMENT & PLAN NOTE
Hypertension is fairly well controlled on amlodipine  10 milligrams once daily and Bystolic 5 milligrams  Refills provided    Re-evaluate in 6 months

## 2018-10-01 NOTE — ASSESSMENT & PLAN NOTE
Weight loss would be beneficial   Patient does have CPAP device    He does follow up with pulmonologist   He is compliant with use of his CPAP

## 2018-11-02 ENCOUNTER — TELEPHONE (OUTPATIENT)
Dept: RADIOLOGY | Facility: CLINIC | Age: 58
End: 2018-11-02

## 2018-11-02 NOTE — TELEPHONE ENCOUNTER
Received a message from [employer],  staff, that pt stopped in while in the building and said that his pharmacy lost the electronic script for his hydrocodone  He uses [facility] on [address]     *I looked at last ov note from 9/20/18 w/ NM and it states that the pt didn't bring his pill bottles in for appt  Pt told NM he had a half of a bottle still at home so Raheel Ibarra only sent a 1 month script for the hydrocodone w/ DNFB 9/26/18 to his pharmacy  Raheel Ibarra did send 2 months of scripts for the hydromorphone for 9/20/18 and DNFB 10/18/18 to his pharmacy  Pt has ov 11/15/18 w/ NM  I left vm for pt on his cell and home # to c/b to discuss prescription issue  C/B # and OH provided

## 2018-11-05 DIAGNOSIS — G89.4 CHRONIC PAIN SYNDROME: ICD-10-CM

## 2018-11-05 RX ORDER — HYDROCODONE BITARTRATE AND ACETAMINOPHEN 10; 325 MG/1; MG/1
TABLET ORAL
Qty: 60 TABLET | Refills: 0 | Status: SHIPPED | OUTPATIENT
Start: 2018-11-05 | End: 2018-11-19 | Stop reason: SDUPTHER

## 2018-11-05 NOTE — TELEPHONE ENCOUNTER
Left vm on home and cell for pt to c/b and s/w nurse  C/B # and OH provided  Pls get nurse on ph when pt c/b

## 2018-11-05 NOTE — TELEPHONE ENCOUNTER
I will send a second script for norco today to [facility]     I did not send the second script because he said he had half a bottle at home but did not bring it in to office

## 2018-11-07 NOTE — TELEPHONE ENCOUNTER
Pt returned call and attempted to get nurse on the phone, however, no one picked up  Pt is aware that rx was sent to the pharmacy  Confirmed pt's upcoming appt on 11/15 with NM  Pt can be reached at [phone] with any other questions

## 2018-11-13 ENCOUNTER — OFFICE VISIT (OUTPATIENT)
Dept: BARIATRICS | Facility: CLINIC | Age: 58
End: 2018-11-13
Payer: COMMERCIAL

## 2018-11-13 VITALS
HEIGHT: 74 IN | WEIGHT: 315 LBS | TEMPERATURE: 98.2 F | SYSTOLIC BLOOD PRESSURE: 150 MMHG | HEART RATE: 69 BPM | BODY MASS INDEX: 40.43 KG/M2 | DIASTOLIC BLOOD PRESSURE: 80 MMHG | RESPIRATION RATE: 17 BRPM

## 2018-11-13 DIAGNOSIS — G47.33 OBSTRUCTIVE SLEEP APNEA: Chronic | ICD-10-CM

## 2018-11-13 DIAGNOSIS — G89.4 CHRONIC PAIN SYNDROME: ICD-10-CM

## 2018-11-13 DIAGNOSIS — I10 ESSENTIAL HYPERTENSION: Chronic | ICD-10-CM

## 2018-11-13 DIAGNOSIS — K21.9 GASTROESOPHAGEAL REFLUX DISEASE, ESOPHAGITIS PRESENCE NOT SPECIFIED: Chronic | ICD-10-CM

## 2018-11-13 DIAGNOSIS — E03.9 HYPOTHYROIDISM, UNSPECIFIED TYPE: Chronic | ICD-10-CM

## 2018-11-13 DIAGNOSIS — E66.01 MORBID OBESITY (HCC): Primary | ICD-10-CM

## 2018-11-13 DIAGNOSIS — R73.03 PREDIABETES: ICD-10-CM

## 2018-11-13 PROCEDURE — 99204 OFFICE O/P NEW MOD 45 MIN: CPT | Performed by: PHYSICIAN ASSISTANT

## 2018-11-13 RX ORDER — TRAZODONE HYDROCHLORIDE 100 MG/1
TABLET ORAL
Refills: 5 | COMMUNITY
Start: 2018-10-28 | End: 2022-03-08

## 2018-11-13 NOTE — PATIENT INSTRUCTIONS
Goals: Food log (ie ) www myfitnesspal com,sparkpeople  com,loseit com,calorieking  com,etc    No sugary beverages  At least 64oz of water daily  Increase physical activity by 10 minutes daily   Gradually increase physical activity to a goal of 5 days per week for 30 minutes of MODERATE intensity PLUS 2 days per week of FULL BODY resistance training  2000 calories per day  5-10 servings of fruits and vegetables per day

## 2018-11-13 NOTE — ASSESSMENT & PLAN NOTE
-HgbA1c elevated at 6  -advised to avoid refined carbohydrates   Increase exercise as tolerated  -can consider metformin

## 2018-11-13 NOTE — ASSESSMENT & PLAN NOTE
-has had difficulty finding a mask that he tolerated  -risks of untreaed JULIUS discussed with patient  -advised follow up sleep medicine
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
## 2018-11-13 NOTE — ASSESSMENT & PLAN NOTE
-Discussed options of HealthyCORE-Intensive Lifestyle Intervention Program, Very Low Calorie Diet-VLCD, Conservative Program, Edwin-En-Y Gastric Bypass and Vertical Sleeve Gastrectomy and the role of weight loss medications   -Initial weight loss goal of 5-10% weight loss for improved health  -Screening labs: reviewed labs - HgbA1c and Triglycerides elevated  -Patient is interested in pursuing conservative program  Given sample of vanilla shake/bar as has some interest in VLCD but wants to trial product and possibly do it with his wife

## 2018-11-13 NOTE — ASSESSMENT & PLAN NOTE
-s/p spinal cord stimulator  -opoid dependent followed by pain management  -weight loss may help improve symptoms of chronic back pain

## 2018-11-19 ENCOUNTER — APPOINTMENT (OUTPATIENT)
Dept: RADIOLOGY | Facility: CLINIC | Age: 58
End: 2018-11-19
Payer: COMMERCIAL

## 2018-11-19 ENCOUNTER — TRANSCRIBE ORDERS (OUTPATIENT)
Dept: RADIOLOGY | Facility: CLINIC | Age: 58
End: 2018-11-19

## 2018-11-19 ENCOUNTER — OFFICE VISIT (OUTPATIENT)
Dept: PAIN MEDICINE | Facility: CLINIC | Age: 58
End: 2018-11-19
Payer: COMMERCIAL

## 2018-11-19 VITALS
TEMPERATURE: 97.8 F | WEIGHT: 315 LBS | BODY MASS INDEX: 40.43 KG/M2 | DIASTOLIC BLOOD PRESSURE: 100 MMHG | SYSTOLIC BLOOD PRESSURE: 170 MMHG | HEART RATE: 84 BPM | HEIGHT: 74 IN

## 2018-11-19 DIAGNOSIS — M47.816 SPONDYLOSIS OF LUMBAR REGION WITHOUT MYELOPATHY OR RADICULOPATHY: ICD-10-CM

## 2018-11-19 DIAGNOSIS — F11.20 UNCOMPLICATED OPIOID DEPENDENCE (HCC): ICD-10-CM

## 2018-11-19 DIAGNOSIS — G89.4 CHRONIC PAIN SYNDROME: Primary | ICD-10-CM

## 2018-11-19 DIAGNOSIS — M96.1 POSTLAMINECTOMY SYNDROME, LUMBAR: ICD-10-CM

## 2018-11-19 DIAGNOSIS — M25.561 ACUTE PAIN OF RIGHT KNEE: ICD-10-CM

## 2018-11-19 PROCEDURE — 80305 DRUG TEST PRSMV DIR OPT OBS: CPT | Performed by: NURSE PRACTITIONER

## 2018-11-19 PROCEDURE — 73562 X-RAY EXAM OF KNEE 3: CPT

## 2018-11-19 PROCEDURE — 99214 OFFICE O/P EST MOD 30 MIN: CPT | Performed by: NURSE PRACTITIONER

## 2018-11-19 RX ORDER — HYDROCODONE BITARTRATE AND ACETAMINOPHEN 10; 325 MG/1; MG/1
TABLET ORAL
Qty: 60 TABLET | Refills: 0 | Status: SHIPPED | OUTPATIENT
Start: 2018-11-19 | End: 2018-11-19 | Stop reason: SDUPTHER

## 2018-11-19 RX ORDER — HYDROCODONE BITARTRATE AND ACETAMINOPHEN 10; 325 MG/1; MG/1
TABLET ORAL
Qty: 60 TABLET | Refills: 0 | Status: SHIPPED | OUTPATIENT
Start: 2018-11-19 | End: 2019-01-14 | Stop reason: SDUPTHER
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
## 2018-11-20 ENCOUNTER — TELEPHONE (OUTPATIENT)
Dept: PAIN MEDICINE | Facility: MEDICAL CENTER | Age: 58
End: 2018-11-20

## 2018-11-20 DIAGNOSIS — G89.4 CHRONIC PAIN SYNDROME: ICD-10-CM

## 2018-11-20 NOTE — TELEPHONE ENCOUNTER
Received a call from the phone room stating when patient was in to see Sue Gillespie yesterday he requested he be able to fill his script today because he is leaving today to see a sick family member and will be gone for a period of time and will be without his Hydromorphone  Pt asking for this to be take care of ASAP because he needs to leave this morning

## 2018-11-20 NOTE — TELEPHONE ENCOUNTER
S/w pt, he saw Rosa Elena Ishan yesterday and was given two rx's for his Hydromorphone, one with a DNFB of 11/23 and one for December  Pt said Rosa Elena Olmstead should have made the DNF for today, pt's last rx for Hydromorphone had a DNF of 10/18 and pt got it filled on 10/25  Pt is leaving this morning to go out of town, b/c his mother is dying, which he explained to Rosa Elena Olmstead yesterday  Pt requesting a new rx be sent with a DNF date of today, pt can bring in other paper rx to void

## 2018-11-26 ENCOUNTER — TELEPHONE (OUTPATIENT)
Dept: PAIN MEDICINE | Facility: CLINIC | Age: 58
End: 2018-11-26

## 2018-11-26 NOTE — TELEPHONE ENCOUNTER
CURT WADE,  CASSANDRA patient, made aware of results of right knee x-ray  Patient states he would like to thing about the injection before he schedules it  Advised will make NM aware        ----- Message from [address] sent at 11/26/2018 12:17 PM EST -----  Please call the patient regarding his right knee xray  Showed some mild arthritis in the medial aspect of the joint  If interested we can do a right knee injection under ultrasound  Let me know and I will put order in and let Pat know   Thanks

## 2018-12-31 DIAGNOSIS — E03.9 HYPOTHYROIDISM, UNSPECIFIED TYPE: Chronic | ICD-10-CM

## 2018-12-31 RX ORDER — LEVOTHYROXINE SODIUM 25 MCG
TABLET ORAL
Qty: 90 TABLET | Refills: 0 | Status: SHIPPED | OUTPATIENT
Start: 2018-12-31 | End: 2019-05-16 | Stop reason: SDUPTHER

## 2019-01-02 ENCOUNTER — OFFICE VISIT (OUTPATIENT)
Dept: BARIATRICS | Facility: CLINIC | Age: 59
End: 2019-01-02
Payer: COMMERCIAL

## 2019-01-02 VITALS
DIASTOLIC BLOOD PRESSURE: 80 MMHG | WEIGHT: 315 LBS | RESPIRATION RATE: 20 BRPM | SYSTOLIC BLOOD PRESSURE: 140 MMHG | HEIGHT: 74 IN | TEMPERATURE: 97.5 F | HEART RATE: 61 BPM | BODY MASS INDEX: 40.43 KG/M2

## 2019-01-02 DIAGNOSIS — R73.03 PREDIABETES: ICD-10-CM

## 2019-01-02 DIAGNOSIS — E66.01 MORBID OBESITY (HCC): Primary | ICD-10-CM

## 2019-01-02 DIAGNOSIS — I10 ESSENTIAL HYPERTENSION: Chronic | ICD-10-CM

## 2019-01-02 DIAGNOSIS — G47.33 OBSTRUCTIVE SLEEP APNEA: Chronic | ICD-10-CM

## 2019-01-02 PROCEDURE — 99213 OFFICE O/P EST LOW 20 MIN: CPT | Performed by: PHYSICIAN ASSISTANT

## 2019-01-02 NOTE — ASSESSMENT & PLAN NOTE
-has had difficulty finding a mask that he tolerated  -risks of untreated JULIUS discussed with patient  -advised follow up sleep medicine- patient cancelled appointment

## 2019-01-02 NOTE — ASSESSMENT & PLAN NOTE
-Patient is pursuing Conservative Program  -Initial weight loss goal of 5-10% weight loss for improved health  -has not implemented many changes since here last  Mother passed away and reports has not had a lot of time to dedicate to himself  -discussed setting small goals, focusing on implementing small changes to build off of  -encouraged food logging and structuring meals better, avoid skipping meals    Initial: 375 7 lbs  Current: 382 4 lbs  Change: + 6 7

## 2019-01-02 NOTE — PATIENT INSTRUCTIONS
Goals: Food log (ie ) www myfitnesspal com,sparkpeople  com,loseit com,calorieking  com,etc    No sugary beverages  At least 64oz of water daily  Increase physical activity by 10 minutes daily  Gradually increase physical activity to a goal of 5 days per week for 30 minutes of MODERATE intensity PLUS 2 days per week of FULL BODY resistance training  2000 calories per day  25-35 grams of dietary fiber per day, gradually increasing  5-10 serving of fruits and vegetables per day  Small goal of 5 minutes of walking or biking   Increase increments as tolerated  Please incorporate breakfast meal

## 2019-01-02 NOTE — PROGRESS NOTES
Assessment/Plan: Morbid obesity ([facility] [address] )  -Patient is pursuing Conservative Program  -Initial weight loss goal of 5-10% weight loss for improved health  -has not implemented many changes since here last  Mother passed away and reports has not had a lot of time to dedicate to himself  -discussed setting small goals, focusing on implementing small changes to build off of  -encouraged food logging and structuring meals better, avoid skipping meals    Initial: 375 7 lbs  Current: 382 4 lbs  Change: + 6 7    Hypertension  -on antihypertensives  -likely to improve with weight loss    Obstructive sleep apnea  -has had difficulty finding a mask that he tolerated  -risks of untreated JULIUS discussed with patient  -advised follow up sleep medicine- patient cancelled appointment    Prediabetes  -HgbA1c elevated at 6  -advised to avoid refined carbohydrates  Increase exercise as tolerated  -can consider metformin    Goals:    Food log (ie ) www myfitnesspal com,sparkpeople  com,loseit com,calorieking  com,etc    No sugary beverages  At least 64oz of water daily  Increase physical activity by 10 minutes daily  Gradually increase physical activity to a goal of 5 days per week for 30 minutes of MODERATE intensity PLUS 2 days per week of FULL BODY resistance training  2000 calories per day  25-35 grams of dietary fiber per day, gradually increasing  5-10 serving of fruits and vegetables per day  Small goal of 5 minutes of walking or biking  Increase increments as tolerated    Follow up in approximately 6 weeks with Non-Surgical Physician/Advanced Practitioner  15 minute visit, >50% face-to-face time spent counseling patient on diet behavior and exercise modification for weight loss     Diagnoses and all orders for this visit:    Morbid obesity ([facility] )    Essential hypertension    Obstructive sleep apnea    Prediabetes          Subjective:   Chief Complaint   Patient presents with    Follow-up     6 week follow up         Patient ID: Nikunj Webster  is a 60 y o  male with excess weight/obesity here to pursue weight managment  Patient is pursuing Conservative Program      HPI Patient presents for MWM follow up  Food logging: not food logging  Fruit/Vegetable servings: 2-4 servings per day, 3 fruit and 1 vegetable  Discussed having more vegetables than fruit and avoiding excess bananas/grapes  Exercise: no formal exercise, has treadmill in home  Hydration: 64 oz fluid per day of zero calorie beverages    The following portions of the patient's history were reviewed and updated as appropriate: allergies, current medications, past family history, past medical history, past social history, past surgical history and problem list     Review of Systems   Respiratory: Negative for cough and shortness of breath  Cardiovascular: Negative for chest pain  Gastrointestinal: Negative for abdominal pain  Objective:    /80 (BP Location: Left arm, Patient Position: Sitting, Cuff Size: Adult)   Pulse 61   Temp 97 5 °F (36 4 °C) (Tympanic)   Resp 20   Ht 6' 1 5" (1 867 m)   Wt (!) 173 kg (382 lb 6 4 oz)   BMI 49 77 kg/m²      Physical Exam   Constitutional: He is oriented to person, place, and time  He appears well-developed  HENT:   Head: Normocephalic  Pulmonary/Chest: Effort normal    Neurological: He is alert and oriented to person, place, and time  Psychiatric: He has a normal mood and affect  His behavior is normal    Nursing note and vitals reviewed

## 2019-01-14 ENCOUNTER — OFFICE VISIT (OUTPATIENT)
Dept: PAIN MEDICINE | Facility: CLINIC | Age: 59
End: 2019-01-14
Payer: COMMERCIAL

## 2019-01-14 VITALS
TEMPERATURE: 98.2 F | HEIGHT: 74 IN | BODY MASS INDEX: 40.43 KG/M2 | RESPIRATION RATE: 16 BRPM | DIASTOLIC BLOOD PRESSURE: 86 MMHG | SYSTOLIC BLOOD PRESSURE: 148 MMHG | WEIGHT: 315 LBS

## 2019-01-14 DIAGNOSIS — M48.061 SPINAL STENOSIS OF LUMBAR REGION, UNSPECIFIED WHETHER NEUROGENIC CLAUDICATION PRESENT: ICD-10-CM

## 2019-01-14 DIAGNOSIS — M96.1 POSTLAMINECTOMY SYNDROME, LUMBAR: ICD-10-CM

## 2019-01-14 DIAGNOSIS — G89.4 CHRONIC PAIN SYNDROME: Primary | ICD-10-CM

## 2019-01-14 DIAGNOSIS — M54.16 LUMBAR RADICULOPATHY: ICD-10-CM

## 2019-01-14 DIAGNOSIS — M79.18 MYOFASCIAL PAIN SYNDROME: ICD-10-CM

## 2019-01-14 DIAGNOSIS — F11.20 UNCOMPLICATED OPIOID DEPENDENCE (HCC): ICD-10-CM

## 2019-01-14 PROCEDURE — 99214 OFFICE O/P EST MOD 30 MIN: CPT | Performed by: NURSE PRACTITIONER

## 2019-01-14 PROCEDURE — 80305 DRUG TEST PRSMV DIR OPT OBS: CPT | Performed by: NURSE PRACTITIONER

## 2019-01-14 RX ORDER — TIZANIDINE 4 MG/1
4 TABLET ORAL
Qty: 30 TABLET | Refills: 5 | Status: SHIPPED | OUTPATIENT
Start: 2019-01-14 | End: 2019-03-25

## 2019-01-14 RX ORDER — HYDROCODONE BITARTRATE AND ACETAMINOPHEN 10; 325 MG/1; MG/1
TABLET ORAL
Qty: 60 TABLET | Refills: 0 | Status: SHIPPED | OUTPATIENT
Start: 2019-01-14 | End: 2019-03-25 | Stop reason: SDUPTHER
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
## 2019-01-15 DIAGNOSIS — I10 ESSENTIAL HYPERTENSION: Chronic | ICD-10-CM

## 2019-01-15 RX ORDER — AMLODIPINE BESYLATE 10 MG/1
TABLET ORAL
Qty: 90 TABLET | Refills: 1 | Status: SHIPPED | OUTPATIENT
Start: 2019-01-15 | End: 2019-07-31 | Stop reason: SDUPTHER

## 2019-02-25 ENCOUNTER — OFFICE VISIT (OUTPATIENT)
Dept: PAIN MEDICINE | Facility: CLINIC | Age: 59
End: 2019-02-25
Payer: COMMERCIAL

## 2019-02-25 VITALS
HEIGHT: 74 IN | HEART RATE: 68 BPM | RESPIRATION RATE: 20 BRPM | WEIGHT: 315 LBS | BODY MASS INDEX: 40.43 KG/M2 | TEMPERATURE: 97.9 F | DIASTOLIC BLOOD PRESSURE: 80 MMHG | SYSTOLIC BLOOD PRESSURE: 146 MMHG

## 2019-02-25 DIAGNOSIS — M48.061 SPINAL STENOSIS OF LUMBAR REGION, UNSPECIFIED WHETHER NEUROGENIC CLAUDICATION PRESENT: ICD-10-CM

## 2019-02-25 DIAGNOSIS — G89.4 CHRONIC PAIN SYNDROME: Primary | ICD-10-CM

## 2019-02-25 DIAGNOSIS — M51.37 DISC DEGENERATION, LUMBOSACRAL: ICD-10-CM

## 2019-02-25 DIAGNOSIS — M96.1 POSTLAMINECTOMY SYNDROME, LUMBAR: ICD-10-CM

## 2019-02-25 DIAGNOSIS — M47.816 SPONDYLOSIS OF LUMBAR REGION WITHOUT MYELOPATHY OR RADICULOPATHY: ICD-10-CM

## 2019-02-25 DIAGNOSIS — F11.20 UNCOMPLICATED OPIOID DEPENDENCE (HCC): ICD-10-CM

## 2019-02-25 PROCEDURE — 99214 OFFICE O/P EST MOD 30 MIN: CPT | Performed by: NURSE PRACTITIONER
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
## 2019-03-25 ENCOUNTER — OFFICE VISIT (OUTPATIENT)
Dept: PAIN MEDICINE | Facility: CLINIC | Age: 59
End: 2019-03-25
Payer: COMMERCIAL

## 2019-03-25 VITALS
DIASTOLIC BLOOD PRESSURE: 86 MMHG | BODY MASS INDEX: 40.43 KG/M2 | RESPIRATION RATE: 16 BRPM | HEART RATE: 59 BPM | SYSTOLIC BLOOD PRESSURE: 142 MMHG | HEIGHT: 74 IN | WEIGHT: 315 LBS

## 2019-03-25 DIAGNOSIS — M79.18 MYOFASCIAL PAIN SYNDROME: ICD-10-CM

## 2019-03-25 DIAGNOSIS — M47.816 SPONDYLOSIS OF LUMBAR REGION WITHOUT MYELOPATHY OR RADICULOPATHY: ICD-10-CM

## 2019-03-25 DIAGNOSIS — Z79.891 LONG-TERM CURRENT USE OF OPIATE ANALGESIC: ICD-10-CM

## 2019-03-25 DIAGNOSIS — F11.20 UNCOMPLICATED OPIOID DEPENDENCE (HCC): ICD-10-CM

## 2019-03-25 DIAGNOSIS — M51.37 DISC DEGENERATION, LUMBOSACRAL: ICD-10-CM

## 2019-03-25 DIAGNOSIS — G89.4 CHRONIC PAIN SYNDROME: Primary | ICD-10-CM

## 2019-03-25 PROBLEM — M51.16 LUMBAR DISC HERNIATION WITH RADICULOPATHY: Status: ACTIVE | Noted: 2019-03-25

## 2019-03-25 PROCEDURE — 99214 OFFICE O/P EST MOD 30 MIN: CPT | Performed by: NURSE PRACTITIONER

## 2019-03-25 RX ORDER — HYDROCODONE BITARTRATE AND ACETAMINOPHEN 10; 325 MG/1; MG/1
TABLET ORAL
Qty: 42 TABLET | Refills: 0 | Status: SHIPPED | OUTPATIENT
Start: 2019-03-25 | End: 2019-07-15

## 2019-03-25 RX ORDER — CHLORZOXAZONE 500 MG/1
500 TABLET ORAL 3 TIMES DAILY PRN
Qty: 90 TABLET | Refills: 5 | Status: SHIPPED | OUTPATIENT
Start: 2019-03-25 | End: 2019-07-15 | Stop reason: SDUPTHER
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
## 2019-04-29 ENCOUNTER — OFFICE VISIT (OUTPATIENT)
Dept: PAIN MEDICINE | Facility: CLINIC | Age: 59
End: 2019-04-29
Payer: COMMERCIAL

## 2019-04-29 VITALS
BODY MASS INDEX: 40.43 KG/M2 | WEIGHT: 315 LBS | HEART RATE: 69 BPM | DIASTOLIC BLOOD PRESSURE: 98 MMHG | HEIGHT: 74 IN | TEMPERATURE: 98.8 F | SYSTOLIC BLOOD PRESSURE: 179 MMHG

## 2019-04-29 DIAGNOSIS — M96.1 POSTLAMINECTOMY SYNDROME, LUMBAR: ICD-10-CM

## 2019-04-29 DIAGNOSIS — M51.37 DISC DEGENERATION, LUMBOSACRAL: ICD-10-CM

## 2019-04-29 DIAGNOSIS — M79.18 MYOFASCIAL PAIN SYNDROME: ICD-10-CM

## 2019-04-29 DIAGNOSIS — F11.20 UNCOMPLICATED OPIOID DEPENDENCE (HCC): ICD-10-CM

## 2019-04-29 DIAGNOSIS — Z79.891 LONG-TERM CURRENT USE OF OPIATE ANALGESIC: ICD-10-CM

## 2019-04-29 DIAGNOSIS — M48.061 SPINAL STENOSIS OF LUMBAR REGION, UNSPECIFIED WHETHER NEUROGENIC CLAUDICATION PRESENT: ICD-10-CM

## 2019-04-29 DIAGNOSIS — M47.816 SPONDYLOSIS OF LUMBAR REGION WITHOUT MYELOPATHY OR RADICULOPATHY: ICD-10-CM

## 2019-04-29 DIAGNOSIS — G89.4 CHRONIC PAIN SYNDROME: Primary | ICD-10-CM

## 2019-04-29 PROCEDURE — 99214 OFFICE O/P EST MOD 30 MIN: CPT | Performed by: NURSE PRACTITIONER

## 2019-04-29 RX ORDER — PREGABALIN 75 MG/1
CAPSULE ORAL
Qty: 60 CAPSULE | Refills: 1 | Status: SHIPPED | OUTPATIENT
Start: 2019-04-29 | End: 2019-07-15 | Stop reason: SDUPTHER

## 2019-04-29 RX ORDER — HYDROCODONE BITARTRATE AND ACETAMINOPHEN 5; 325 MG/1; MG/1
TABLET ORAL
Qty: 10 TABLET | Refills: 0 | Status: SHIPPED | OUTPATIENT
Start: 2019-04-29 | End: 2019-07-15

## 2019-05-09 ENCOUNTER — TELEPHONE (OUTPATIENT)
Dept: OBGYN CLINIC | Facility: HOSPITAL | Age: 59
End: 2019-05-09

## 2019-05-16 DIAGNOSIS — E03.9 HYPOTHYROIDISM, UNSPECIFIED TYPE: Chronic | ICD-10-CM

## 2019-05-16 RX ORDER — LEVOTHYROXINE SODIUM 25 MCG
TABLET ORAL
Qty: 90 TABLET | Refills: 0 | Status: SHIPPED | OUTPATIENT
Start: 2019-05-16 | End: 2019-09-27 | Stop reason: SDUPTHER

## 2019-06-20 DIAGNOSIS — I10 ESSENTIAL HYPERTENSION: Chronic | ICD-10-CM

## 2019-06-20 RX ORDER — NEBIVOLOL HYDROCHLORIDE 5 MG/1
TABLET ORAL
Qty: 90 TABLET | Refills: 1 | OUTPATIENT
Start: 2019-06-20

## 2019-06-20 RX ORDER — NEBIVOLOL 5 MG/1
5 TABLET ORAL DAILY
Qty: 30 TABLET | Refills: 0 | Status: SHIPPED | OUTPATIENT
Start: 2019-06-20 | End: 2019-07-25 | Stop reason: SDUPTHER

## 2019-07-15 ENCOUNTER — OFFICE VISIT (OUTPATIENT)
Dept: PAIN MEDICINE | Facility: CLINIC | Age: 59
End: 2019-07-15
Payer: COMMERCIAL

## 2019-07-15 VITALS
WEIGHT: 315 LBS | TEMPERATURE: 97.6 F | BODY MASS INDEX: 40.43 KG/M2 | DIASTOLIC BLOOD PRESSURE: 99 MMHG | HEIGHT: 74 IN | SYSTOLIC BLOOD PRESSURE: 185 MMHG | HEART RATE: 78 BPM

## 2019-07-15 DIAGNOSIS — M54.16 LUMBAR RADICULOPATHY: ICD-10-CM

## 2019-07-15 DIAGNOSIS — M51.16 LUMBAR DISC DISEASE WITH RADICULOPATHY: ICD-10-CM

## 2019-07-15 DIAGNOSIS — M96.1 POSTLAMINECTOMY SYNDROME, LUMBAR: ICD-10-CM

## 2019-07-15 DIAGNOSIS — M79.18 MYOFASCIAL PAIN SYNDROME: ICD-10-CM

## 2019-07-15 DIAGNOSIS — M47.816 LUMBAR SPONDYLOSIS: ICD-10-CM

## 2019-07-15 DIAGNOSIS — G89.4 CHRONIC PAIN SYNDROME: Primary | ICD-10-CM

## 2019-07-15 PROCEDURE — 99213 OFFICE O/P EST LOW 20 MIN: CPT | Performed by: NURSE PRACTITIONER

## 2019-07-15 RX ORDER — CHLORZOXAZONE 500 MG/1
500 TABLET ORAL 3 TIMES DAILY PRN
Qty: 90 TABLET | Refills: 5 | Status: SHIPPED | OUTPATIENT
Start: 2019-07-15 | End: 2020-05-19 | Stop reason: HOSPADM

## 2019-07-15 RX ORDER — PREGABALIN 100 MG/1
CAPSULE ORAL
Qty: 60 CAPSULE | Refills: 5 | Status: SHIPPED | OUTPATIENT
Start: 2019-07-15 | End: 2020-03-16
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
## 2019-07-19 DIAGNOSIS — I10 ESSENTIAL HYPERTENSION: Chronic | ICD-10-CM

## 2019-07-19 RX ORDER — ROSUVASTATIN CALCIUM 5 MG/1
TABLET, COATED ORAL
Qty: 90 TABLET | Refills: 1 | Status: SHIPPED | OUTPATIENT
Start: 2019-07-19 | End: 2020-01-06

## 2019-07-22 ENCOUNTER — TELEPHONE (OUTPATIENT)
Dept: FAMILY MEDICINE CLINIC | Facility: CLINIC | Age: 59
End: 2019-07-22

## 2019-07-22 NOTE — TELEPHONE ENCOUNTER
Received refill request from pharmacy fax for [address]  Patient has an appointment on 7/31/19  Please call patient to make sure he will have enough until follow up

## 2019-07-23 NOTE — TELEPHONE ENCOUNTER
LEFT DETAILED MESSAGE; IF PATIENT NEEDS HE CAN CALL THE OFFICE OTHER WISE WE WILL SEE HIM ON THE 31[address]   ADVISED THIS IN MESSAGE

## 2019-07-24 LAB
ALBUMIN SERPL-MCNC: 4.2 G/DL (ref 3.6–5.1)
ALBUMIN/CREAT UR: 13 MCG/MG CREAT
ALBUMIN/GLOB SERPL: 1.6 (CALC) (ref 1–2.5)
ALP SERPL-CCNC: 85 U/L (ref 40–115)
ALT SERPL-CCNC: 85 U/L (ref 9–46)
AST SERPL-CCNC: 58 U/L (ref 10–35)
BASOPHILS # BLD AUTO: 58 CELLS/UL (ref 0–200)
BASOPHILS NFR BLD AUTO: 0.8 %
BILIRUB SERPL-MCNC: 0.6 MG/DL (ref 0.2–1.2)
BUN SERPL-MCNC: 11 MG/DL (ref 7–25)
BUN/CREAT SERPL: ABNORMAL (CALC) (ref 6–22)
CALCIUM SERPL-MCNC: 9.3 MG/DL (ref 8.6–10.3)
CHLORIDE SERPL-SCNC: 101 MMOL/L (ref 98–110)
CHOLEST SERPL-MCNC: 188 MG/DL
CHOLEST/HDLC SERPL: 3.9 (CALC)
CO2 SERPL-SCNC: 25 MMOL/L (ref 20–32)
CREAT SERPL-MCNC: 1.14 MG/DL (ref 0.7–1.33)
CREAT UR-MCNC: 279 MG/DL (ref 20–320)
EOSINOPHIL # BLD AUTO: 1380 CELLS/UL (ref 15–500)
EOSINOPHIL NFR BLD AUTO: 18.9 %
ERYTHROCYTE [DISTWIDTH] IN BLOOD BY AUTOMATED COUNT: 16.7 % (ref 11–15)
GLOBULIN SER CALC-MCNC: 2.6 G/DL (CALC) (ref 1.9–3.7)
GLUCOSE SERPL-MCNC: 87 MG/DL (ref 65–99)
HBA1C MFR BLD: 5.2 % OF TOTAL HGB
HCT VFR BLD AUTO: 46.2 % (ref 38.5–50)
HDLC SERPL-MCNC: 48 MG/DL
HGB BLD-MCNC: 15 G/DL (ref 13.2–17.1)
LDLC SERPL CALC-MCNC: 119 MG/DL (CALC)
LYMPHOCYTES # BLD AUTO: 1628 CELLS/UL (ref 850–3900)
LYMPHOCYTES NFR BLD AUTO: 22.3 %
MCH RBC QN AUTO: 29.5 PG (ref 27–33)
MCHC RBC AUTO-ENTMCNC: 32.5 G/DL (ref 32–36)
MCV RBC AUTO: 90.8 FL (ref 80–100)
MICROALBUMIN UR-MCNC: 3.5 MG/DL
MONOCYTES # BLD AUTO: 752 CELLS/UL (ref 200–950)
MONOCYTES NFR BLD AUTO: 10.3 %
NEUTROPHILS # BLD AUTO: 3482 CELLS/UL (ref 1500–7800)
NEUTROPHILS NFR BLD AUTO: 47.7 %
NONHDLC SERPL-MCNC: 140 MG/DL (CALC)
PLATELET # BLD AUTO: 85 THOUSAND/UL (ref 140–400)
POTASSIUM SERPL-SCNC: 4.1 MMOL/L (ref 3.5–5.3)
PROT SERPL-MCNC: 6.8 G/DL (ref 6.1–8.1)
RBC # BLD AUTO: 5.09 MILLION/UL (ref 4.2–5.8)
SL AMB EGFR AFRICAN AMERICAN: 81 ML/MIN/1.73M2
SL AMB EGFR NON AFRICAN AMERICAN: 70 ML/MIN/1.73M2
SODIUM SERPL-SCNC: 138 MMOL/L (ref 135–146)
TESTOST FREE SERPL-MCNC: 36.4 PG/ML (ref 35–155)
TESTOST SERPL-MCNC: 513 NG/DL (ref 250–1100)
TRIGL SERPL-MCNC: 100 MG/DL
TSH SERPL-ACNC: 1.48 MIU/L (ref 0.4–4.5)
WBC # BLD AUTO: 7.3 THOUSAND/UL (ref 3.8–10.8)

## 2019-07-25 DIAGNOSIS — I10 ESSENTIAL HYPERTENSION: Chronic | ICD-10-CM

## 2019-07-25 RX ORDER — NEBIVOLOL 5 MG/1
5 TABLET ORAL DAILY
Qty: 30 TABLET | Refills: 0 | Status: SHIPPED | OUTPATIENT
Start: 2019-07-25 | End: 2019-07-31 | Stop reason: SDUPTHER

## 2019-07-25 NOTE — TELEPHONE ENCOUNTER
Patient needs to local as he did not receive from his mail order pharmacy  [address]   Patient does have an up coming follow up

## 2019-07-31 ENCOUNTER — OFFICE VISIT (OUTPATIENT)
Dept: FAMILY MEDICINE CLINIC | Facility: CLINIC | Age: 59
End: 2019-07-31
Payer: COMMERCIAL

## 2019-07-31 VITALS
HEIGHT: 74 IN | WEIGHT: 315 LBS | BODY MASS INDEX: 40.43 KG/M2 | SYSTOLIC BLOOD PRESSURE: 124 MMHG | RESPIRATION RATE: 16 BRPM | HEART RATE: 72 BPM | OXYGEN SATURATION: 98 % | DIASTOLIC BLOOD PRESSURE: 76 MMHG

## 2019-07-31 DIAGNOSIS — E78.2 MIXED HYPERLIPIDEMIA: Primary | ICD-10-CM

## 2019-07-31 DIAGNOSIS — I10 ESSENTIAL HYPERTENSION: Chronic | ICD-10-CM

## 2019-07-31 DIAGNOSIS — M54.16 LUMBAR RADICULOPATHY: ICD-10-CM

## 2019-07-31 DIAGNOSIS — G47.33 OBSTRUCTIVE SLEEP APNEA: Chronic | ICD-10-CM

## 2019-07-31 DIAGNOSIS — R73.03 PREDIABETES: ICD-10-CM

## 2019-07-31 DIAGNOSIS — E34.9 TESTOSTERONE DEFICIENCY: ICD-10-CM

## 2019-07-31 DIAGNOSIS — M79.18 MUSCLE PAIN, MYOFASCIAL: ICD-10-CM

## 2019-07-31 DIAGNOSIS — Z12.5 PROSTATE CANCER SCREENING: ICD-10-CM

## 2019-07-31 DIAGNOSIS — E66.01 MORBID OBESITY (HCC): ICD-10-CM

## 2019-07-31 DIAGNOSIS — E03.9 HYPOTHYROIDISM, UNSPECIFIED TYPE: ICD-10-CM

## 2019-07-31 DIAGNOSIS — Z11.59 NEED FOR HEPATITIS C SCREENING TEST: ICD-10-CM

## 2019-07-31 PROCEDURE — 1036F TOBACCO NON-USER: CPT | Performed by: FAMILY MEDICINE

## 2019-07-31 PROCEDURE — 99215 OFFICE O/P EST HI 40 MIN: CPT | Performed by: FAMILY MEDICINE

## 2019-07-31 PROCEDURE — 3008F BODY MASS INDEX DOCD: CPT | Performed by: FAMILY MEDICINE

## 2019-07-31 PROCEDURE — 3074F SYST BP LT 130 MM HG: CPT | Performed by: FAMILY MEDICINE

## 2019-07-31 RX ORDER — NEBIVOLOL 5 MG/1
5 TABLET ORAL DAILY
Qty: 90 TABLET | Refills: 1 | Status: SHIPPED | OUTPATIENT
Start: 2019-07-31 | End: 2020-03-11

## 2019-07-31 RX ORDER — AMLODIPINE BESYLATE 10 MG/1
TABLET ORAL
Qty: 90 TABLET | Refills: 1 | Status: SHIPPED | OUTPATIENT
Start: 2019-07-31 | End: 2019-07-31

## 2019-07-31 NOTE — ASSESSMENT & PLAN NOTE
Follow-up with pain management as scheduled  With his weight loss his lower back pain has improved and he is no longer taking narcotic pain medications    He remains on Parafon Forte for muscular spasms and Lyrica 100 mg once daily
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
## 2019-07-31 NOTE — ASSESSMENT & PLAN NOTE
Cholesterol is slightly more elevated likely due to ketogenic diet which does allow for increased intake of fats  Patient remains Crestor 5 mg once daily    Repeat lipid profile in 6 months

## 2019-07-31 NOTE — ASSESSMENT & PLAN NOTE
Hemoglobin A1c has improved to 5 2% with significant weight loss and dietary changes  Commended on this    Repeat hemoglobin A1c in 6 months

## 2019-07-31 NOTE — ASSESSMENT & PLAN NOTE
Hypertension is significantly improved with weight loss  Patient remains on Bystolic 5 mg once daily which was refilled    Will discontinue amlodipine 10 mg once daily

## 2019-07-31 NOTE — ASSESSMENT & PLAN NOTE
Hypothyroidism remains well controlled on levothyroxine 25 mcg once daily  Patient likely be this for the rest of his life    Repeat thyroid function studies in 6 months

## 2019-07-31 NOTE — ASSESSMENT & PLAN NOTE
Patient did run out of testosterone gel  With his significant weight loss his testosterone level has improved  There is a certain amount of estrogen that is made in adipose tissue  With his significant weight loss there has likely been a decrease in the amount of circulating estrogen which has allowed his testosterone to improve  Patient is off of testosterone supplementation    Repeat testosterone level in 6 months

## 2019-07-31 NOTE — ASSESSMENT & PLAN NOTE
Patient is commended on his significant weight loss through dietary modification  Now that he is feeling better hopefully he is able to incorporate some exercise

## 2019-09-02 ENCOUNTER — HOSPITAL ENCOUNTER (EMERGENCY)
Facility: HOSPITAL | Age: 59
Discharge: HOME | End: 2019-09-02
Attending: EMERGENCY MEDICINE | Admitting: EMERGENCY MEDICINE
Payer: COMMERCIAL

## 2019-09-02 ENCOUNTER — APPOINTMENT (EMERGENCY)
Dept: RADIOLOGY | Facility: HOSPITAL | Age: 59
End: 2019-09-02
Payer: COMMERCIAL

## 2019-09-02 VITALS
RESPIRATION RATE: 20 BRPM | TEMPERATURE: 98 F | OXYGEN SATURATION: 96 % | SYSTOLIC BLOOD PRESSURE: 150 MMHG | HEART RATE: 64 BPM | WEIGHT: 315 LBS | BODY MASS INDEX: 41.06 KG/M2 | DIASTOLIC BLOOD PRESSURE: 81 MMHG

## 2019-09-02 DIAGNOSIS — M79.672 LEFT FOOT PAIN: Primary | ICD-10-CM

## 2019-09-02 PROCEDURE — 99283 EMERGENCY DEPT VISIT LOW MDM: CPT

## 2019-09-02 PROCEDURE — 99284 EMERGENCY DEPT VISIT MOD MDM: CPT | Performed by: EMERGENCY MEDICINE

## 2019-09-02 PROCEDURE — 73630 X-RAY EXAM OF FOOT: CPT

## 2019-09-02 RX ORDER — OXYCODONE HYDROCHLORIDE 5 MG/1
5 TABLET ORAL ONCE
Status: COMPLETED | OUTPATIENT
Start: 2019-09-02 | End: 2019-09-02

## 2019-09-02 RX ADMIN — OXYCODONE HYDROCHLORIDE 5 MG: 5 TABLET ORAL at 13:07

## 2019-09-02 NOTE — DISCHARGE INSTRUCTIONS
Diagnosis; left foot pain     - activity as tolerated     - for severe pain -- oxycodone 1 tablet on a as needed basis only for severe pain     - please call  the podiatrist tomorrow to schedule an appointment    - please return to  the er for any worsening/ intractable pain // any inability to walk-- any increasing swelling/ redness of  foot- any redness spreading up leg or an y new/ worsening/concerning symptoms to you

## 2019-09-05 ENCOUNTER — OFFICE VISIT (OUTPATIENT)
Dept: PODIATRY | Facility: CLINIC | Age: 59
End: 2019-09-05
Payer: COMMERCIAL

## 2019-09-05 VITALS
DIASTOLIC BLOOD PRESSURE: 92 MMHG | SYSTOLIC BLOOD PRESSURE: 148 MMHG | HEART RATE: 68 BPM | WEIGHT: 315 LBS | HEIGHT: 74 IN | BODY MASS INDEX: 40.43 KG/M2

## 2019-09-05 DIAGNOSIS — R73.03 PREDIABETES: ICD-10-CM

## 2019-09-05 DIAGNOSIS — M19.072 OSTEOARTHRITIS OF LEFT MIDFOOT: Primary | ICD-10-CM

## 2019-09-05 PROCEDURE — 99203 OFFICE O/P NEW LOW 30 MIN: CPT | Performed by: PODIATRIST

## 2019-09-05 PROCEDURE — 20600 DRAIN/INJ JOINT/BURSA W/O US: CPT | Performed by: PODIATRIST

## 2019-09-05 RX ORDER — LIDOCAINE HYDROCHLORIDE 10 MG/ML
1 INJECTION, SOLUTION INFILTRATION; PERINEURAL
Status: DISCONTINUED | OUTPATIENT
Start: 2019-09-05 | End: 2020-05-19 | Stop reason: HOSPADM

## 2019-09-05 RX ORDER — OXYCODONE HYDROCHLORIDE 5 MG/1
TABLET ORAL
Refills: 0 | COMMUNITY
Start: 2019-09-02 | End: 2019-11-15 | Stop reason: ALTCHOICE

## 2019-09-05 RX ORDER — TIZANIDINE 4 MG/1
TABLET ORAL
Refills: 5 | COMMUNITY
Start: 2019-08-19 | End: 2020-05-19 | Stop reason: HOSPADM

## 2019-09-05 RX ORDER — TRIAMCINOLONE ACETONIDE 40 MG/ML
40 INJECTION, SUSPENSION INTRA-ARTICULAR; INTRAMUSCULAR
Status: DISCONTINUED | OUTPATIENT
Start: 2019-09-05 | End: 2020-05-19 | Stop reason: HOSPADM

## 2019-09-05 RX ADMIN — LIDOCAINE HYDROCHLORIDE 1 ML: 10 INJECTION, SOLUTION INFILTRATION; PERINEURAL at 14:48

## 2019-09-05 RX ADMIN — TRIAMCINOLONE ACETONIDE 40 MG: 40 INJECTION, SUSPENSION INTRA-ARTICULAR; INTRAMUSCULAR at 14:48

## 2019-09-05 NOTE — PROGRESS NOTES
Assessment:  1  Osteoarthritis Left Lisfranc's joints    Plan:  1  I did review the plain xray report and images Left foot completed on 9/2/19; noting joint narrowing, alycia-articular spurring, sclerosis of the Lisfranc's joint complex  2  I discussed treatment options for osteoarthritis including benign neglect, NSAIDS, stiff-soled shoes, bracing, corticosteroid injections and surgical fusion  He wants to try injection therapy and will also purchase a pair of stiff-soled shoes  3  Injection given over the most painful joint (3rd metatarsocuneiform joint Left foot)  Small joint arthrocentesis  Date/Time: 9/5/2019 2:48 PM  Consent given by: patient  Supporting Documentation  Indications: pain   Procedure Details  Location: foot -   Needle size: 25 G  Ultrasound guidance: no  Approach: dorsal  Medications administered: 1 mL lidocaine 1 %; 40 mg triamcinolone acetonide 40 mg/mL    Patient tolerance: patient tolerated the procedure well with no immediate complications            Problem List Items Addressed This Visit        Musculoskeletal and Integument    Osteoarthritis of left midfoot - Primary       Other    Prediabetes             Diagnoses and all orders for this visit:    Osteoarthritis of left midfoot    Prediabetes    Other orders  -     oxyCODONE (ROXICODONE) 5 mg immediate release tablet; TK 1 T Q 4 TO 6 H PRN  -     tiZANidine (ZANAFLEX) 4 mg tablet          Subjective:      Patient ID: Felecia Newton is a 60 y o  male      HPI    The following portions of the patient's history were reviewed and updated as appropriate: allergies, current medications, past family history, past medical history, past social history, past surgical history and problem list     Review of Systems      Objective:      /92   Pulse 68   Ht 6' 2" (1 88 m) Comment: verbal  Wt (!) 144 kg (318 lb 3 2 oz)   BMI 40 85 kg/m²          Physical Exam
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
## 2019-09-27 DIAGNOSIS — E03.9 HYPOTHYROIDISM, UNSPECIFIED TYPE: Chronic | ICD-10-CM

## 2019-09-27 RX ORDER — LEVOTHYROXINE SODIUM 25 MCG
TABLET ORAL
Qty: 90 TABLET | Refills: 1 | Status: SHIPPED | OUTPATIENT
Start: 2019-09-27 | End: 2020-03-04

## 2019-11-14 ENCOUNTER — APPOINTMENT (EMERGENCY)
Dept: CT IMAGING | Facility: HOSPITAL | Age: 59
End: 2019-11-14
Payer: COMMERCIAL

## 2019-11-14 ENCOUNTER — APPOINTMENT (EMERGENCY)
Dept: RADIOLOGY | Facility: HOSPITAL | Age: 59
End: 2019-11-14
Payer: COMMERCIAL

## 2019-11-14 ENCOUNTER — HOSPITAL ENCOUNTER (EMERGENCY)
Facility: HOSPITAL | Age: 59
Discharge: HOME | End: 2019-11-14
Attending: EMERGENCY MEDICINE | Admitting: EMERGENCY MEDICINE
Payer: COMMERCIAL

## 2019-11-14 VITALS
WEIGHT: 315 LBS | TEMPERATURE: 97.7 F | RESPIRATION RATE: 18 BRPM | HEART RATE: 50 BPM | BODY MASS INDEX: 40.67 KG/M2 | OXYGEN SATURATION: 94 % | SYSTOLIC BLOOD PRESSURE: 171 MMHG | DIASTOLIC BLOOD PRESSURE: 78 MMHG

## 2019-11-14 DIAGNOSIS — S09.90XA INJURY OF HEAD, INITIAL ENCOUNTER: ICD-10-CM
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
## 2019-11-14 DIAGNOSIS — S29.019A THORACIC MYOFASCIAL STRAIN, INITIAL ENCOUNTER: ICD-10-CM

## 2019-11-14 PROCEDURE — 70450 CT HEAD/BRAIN W/O DYE: CPT

## 2019-11-14 PROCEDURE — 99284 EMERGENCY DEPT VISIT MOD MDM: CPT

## 2019-11-14 PROCEDURE — 99284 EMERGENCY DEPT VISIT MOD MDM: CPT | Performed by: EMERGENCY MEDICINE

## 2019-11-14 PROCEDURE — 72070 X-RAY EXAM THORAC SPINE 2VWS: CPT

## 2019-11-14 RX ORDER — METHOCARBAMOL 500 MG/1
500 TABLET, FILM COATED ORAL ONCE
Status: COMPLETED | OUTPATIENT
Start: 2019-11-14 | End: 2019-11-14

## 2019-11-14 RX ORDER — OXYCODONE HYDROCHLORIDE AND ACETAMINOPHEN 5; 325 MG/1; MG/1
1 TABLET ORAL ONCE
Status: COMPLETED | OUTPATIENT
Start: 2019-11-14 | End: 2019-11-14

## 2019-11-14 RX ADMIN — OXYCODONE HYDROCHLORIDE AND ACETAMINOPHEN 1 TABLET: 5; 325 TABLET ORAL at 12:17

## 2019-11-14 RX ADMIN — METHOCARBAMOL TABLETS 500 MG: 500 TABLET, COATED ORAL at 14:05
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
## 2019-11-15 ENCOUNTER — OFFICE VISIT (OUTPATIENT)
Dept: FAMILY MEDICINE CLINIC | Facility: CLINIC | Age: 59
End: 2019-11-15
Payer: COMMERCIAL

## 2019-11-15 VITALS
HEIGHT: 74 IN | HEART RATE: 77 BPM | BODY MASS INDEX: 40.43 KG/M2 | WEIGHT: 315 LBS | OXYGEN SATURATION: 98 % | SYSTOLIC BLOOD PRESSURE: 136 MMHG | DIASTOLIC BLOOD PRESSURE: 82 MMHG | RESPIRATION RATE: 18 BRPM
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
## 2019-11-15 DIAGNOSIS — M79.18 MUSCLE PAIN, MYOFASCIAL: Primary | ICD-10-CM

## 2019-11-15 DIAGNOSIS — M54.6 ACUTE LEFT-SIDED THORACIC BACK PAIN: ICD-10-CM

## 2019-11-15 DIAGNOSIS — M79.18 MYOFASCIAL PAIN SYNDROME: ICD-10-CM

## 2019-11-15 PROBLEM — S06.0X0A CONCUSSION WITHOUT LOSS OF CONSCIOUSNESS: Status: ACTIVE | Noted: 2019-11-15

## 2019-11-15 PROCEDURE — 1036F TOBACCO NON-USER: CPT | Performed by: FAMILY MEDICINE

## 2019-11-15 PROCEDURE — 99214 OFFICE O/P EST MOD 30 MIN: CPT | Performed by: FAMILY MEDICINE

## 2019-11-15 RX ORDER — OXYCODONE HYDROCHLORIDE AND ACETAMINOPHEN 5; 325 MG/1; MG/1
1 TABLET ORAL EVERY 4 HOURS PRN
Qty: 15 TABLET | Refills: 0 | Status: SHIPPED | OUTPATIENT
Start: 2019-11-15 | End: 2020-04-15 | Stop reason: SDUPTHER

## 2019-11-15 NOTE — ASSESSMENT & PLAN NOTE
- patient given information for chiropractor  Patient also given limited supply of p r n  Percocet    He is already on Lyrica

## 2019-11-15 NOTE — ASSESSMENT & PLAN NOTE
Patient being re-evaluated after fall in bathtub  No loss of consciousness    Based upon patient's striking his head, residual headache he likely did sustain a concussion mild, grade 1

## 2019-11-15 NOTE — ASSESSMENT & PLAN NOTE
I do not believe that the patient struck anything though he does have tenderness  This is likely related to his twisting  Recommended taking muscle relaxant that he does have at home  Patient has multiple muscle relaxants on his chart including Zanaflex, Robaxin, Parafon Lobo    Needs to find out which 1 he still is taking
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
## 2020-01-04 DIAGNOSIS — I10 ESSENTIAL HYPERTENSION: Chronic | ICD-10-CM

## 2020-01-06 ENCOUNTER — OFFICE VISIT (OUTPATIENT)
Dept: FAMILY MEDICINE CLINIC | Facility: CLINIC | Age: 60
End: 2020-01-06
Payer: COMMERCIAL

## 2020-01-06 VITALS
BODY MASS INDEX: 38.91 KG/M2 | HEART RATE: 78 BPM | WEIGHT: 303.2 LBS | HEIGHT: 74 IN | DIASTOLIC BLOOD PRESSURE: 80 MMHG | OXYGEN SATURATION: 98 % | TEMPERATURE: 98.1 F | RESPIRATION RATE: 16 BRPM | SYSTOLIC BLOOD PRESSURE: 148 MMHG

## 2020-01-06 DIAGNOSIS — S06.0X0S CONCUSSION WITHOUT LOSS OF CONSCIOUSNESS, SEQUELA (HCC): Primary | ICD-10-CM
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
## 2020-01-06 PROCEDURE — 1036F TOBACCO NON-USER: CPT | Performed by: FAMILY MEDICINE

## 2020-01-06 PROCEDURE — 3008F BODY MASS INDEX DOCD: CPT | Performed by: FAMILY MEDICINE

## 2020-01-06 PROCEDURE — 99214 OFFICE O/P EST MOD 30 MIN: CPT | Performed by: FAMILY MEDICINE

## 2020-01-06 RX ORDER — ROSUVASTATIN CALCIUM 5 MG/1
TABLET, COATED ORAL
Qty: 90 TABLET | Refills: 1 | Status: SHIPPED | OUTPATIENT
Start: 2020-01-06 | End: 2020-07-02

## 2020-01-06 RX ORDER — BUTALBITAL, ACETAMINOPHEN AND CAFFEINE 50; 325; 40 MG/1; MG/1; MG/1
1 TABLET ORAL EVERY 4 HOURS PRN
Qty: 30 TABLET | Refills: 0 | Status: SHIPPED | OUTPATIENT
Start: 2020-01-06 | End: 2020-01-09 | Stop reason: SDUPTHER

## 2020-01-06 NOTE — ASSESSMENT & PLAN NOTE
- patient had concussion without loss of consciousness  Since he is still having ongoing symptoms he live MRI of the brain without contrast    -referral to Neurology    -patient given prescription for p r n   Fioricet to take as needed for headache
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
## 2020-01-06 NOTE — ASSESSMENT & PLAN NOTE
- once again, recounted mechanism of injury  It is likely that the patient did sustain a grade 1 concussion as there was no reported loss of consciousness  CT scan of the brain was negative for acute injury  Since the patient is still having persistent headaches, nausea I will order MRI of the brain to evaluate further    Patient will also be referred to Neurology for further evaluation
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
## 2020-01-09 DIAGNOSIS — S06.0X0S CONCUSSION WITHOUT LOSS OF CONSCIOUSNESS, SEQUELA (HCC): ICD-10-CM

## 2020-01-09 RX ORDER — BUTALBITAL, ACETAMINOPHEN AND CAFFEINE 50; 325; 40 MG/1; MG/1; MG/1
1 TABLET ORAL EVERY 4 HOURS PRN
Qty: 30 TABLET | Refills: 0 | Status: SHIPPED | OUTPATIENT
Start: 2020-01-09 | End: 2020-05-13

## 2020-01-24 ENCOUNTER — HOSPITAL ENCOUNTER (OUTPATIENT)
Dept: RADIOLOGY | Facility: HOSPITAL | Age: 60
Discharge: HOME | End: 2020-01-24
Payer: COMMERCIAL

## 2020-01-24 DIAGNOSIS — S06.0X0S CONCUSSION WITHOUT LOSS OF CONSCIOUSNESS, SEQUELA (HCC): ICD-10-CM
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
## 2020-01-24 PROCEDURE — 70551 MRI BRAIN STEM W/O DYE: CPT

## 2020-01-29 ENCOUNTER — TELEPHONE (OUTPATIENT)
Dept: NEUROLOGY | Facility: CLINIC | Age: 60
End: 2020-01-29

## 2020-02-26 ENCOUNTER — TELEPHONE (OUTPATIENT)
Dept: FAMILY MEDICINE CLINIC | Facility: CLINIC | Age: 60
End: 2020-02-26

## 2020-02-26 LAB
ALBUMIN SERPL-MCNC: 4 G/DL (ref 3.6–5.1)
ALBUMIN/GLOB SERPL: 1.7 (CALC) (ref 1–2.5)
ALP SERPL-CCNC: 76 U/L (ref 35–144)
ALT SERPL-CCNC: 70 U/L (ref 9–46)
AST SERPL-CCNC: 72 U/L (ref 10–35)
BASOPHILS # BLD AUTO: 49 CELLS/UL (ref 0–200)
BASOPHILS NFR BLD AUTO: 0.6 %
BILIRUB SERPL-MCNC: 0.4 MG/DL (ref 0.2–1.2)
BUN SERPL-MCNC: 13 MG/DL (ref 7–25)
BUN/CREAT SERPL: ABNORMAL (CALC) (ref 6–22)
CALCIUM SERPL-MCNC: 9.3 MG/DL (ref 8.6–10.3)
CHLORIDE SERPL-SCNC: 101 MMOL/L (ref 98–110)
CHOLEST SERPL-MCNC: 190 MG/DL
CHOLEST/HDLC SERPL: 2.4 (CALC)
CO2 SERPL-SCNC: 27 MMOL/L (ref 20–32)
CREAT SERPL-MCNC: 0.99 MG/DL (ref 0.7–1.25)
EOSINOPHIL # BLD AUTO: 1199 CELLS/UL (ref 15–500)
EOSINOPHIL NFR BLD AUTO: 14.8 %
ERYTHROCYTE [DISTWIDTH] IN BLOOD BY AUTOMATED COUNT: 15.5 % (ref 11–15)
GLOBULIN SER CALC-MCNC: 2.4 G/DL (CALC) (ref 1.9–3.7)
GLUCOSE SERPL-MCNC: 98 MG/DL (ref 65–99)
HBA1C MFR BLD: 5.2 % OF TOTAL HGB
HCT VFR BLD AUTO: 46.3 % (ref 38.5–50)
HCV AB S/CO SERPL IA: 0.01
HCV AB SERPL QL IA: NORMAL
HDLC SERPL-MCNC: 79 MG/DL
HGB BLD-MCNC: 15.7 G/DL (ref 13.2–17.1)
LDLC SERPL CALC-MCNC: 94 MG/DL (CALC)
LYMPHOCYTES # BLD AUTO: 1774 CELLS/UL (ref 850–3900)
LYMPHOCYTES NFR BLD AUTO: 21.9 %
MCH RBC QN AUTO: 34.1 PG (ref 27–33)
MCHC RBC AUTO-ENTMCNC: 33.9 G/DL (ref 32–36)
MCV RBC AUTO: 100.4 FL (ref 80–100)
MONOCYTES # BLD AUTO: 810 CELLS/UL (ref 200–950)
MONOCYTES NFR BLD AUTO: 10 %
NEUTROPHILS # BLD AUTO: 4269 CELLS/UL (ref 1500–7800)
NEUTROPHILS NFR BLD AUTO: 52.7 %
NONHDLC SERPL-MCNC: 111 MG/DL (CALC)
PLATELET # BLD AUTO: 131 THOUSAND/UL (ref 140–400)
PMV BLD REES-ECKER: 13.8 FL (ref 7.5–12.5)
POTASSIUM SERPL-SCNC: 3.7 MMOL/L (ref 3.5–5.3)
PROT SERPL-MCNC: 6.4 G/DL (ref 6.1–8.1)
PSA SERPL-MCNC: 0.5 NG/ML
RBC # BLD AUTO: 4.61 MILLION/UL (ref 4.2–5.8)
SL AMB EGFR AFRICAN AMERICAN: 96 ML/MIN/1.73M2
SL AMB EGFR NON AFRICAN AMERICAN: 82 ML/MIN/1.73M2
SODIUM SERPL-SCNC: 139 MMOL/L (ref 135–146)
TESTOST FREE SERPL-MCNC: 35.8 PG/ML (ref 35–155)
TESTOST SERPL-MCNC: 362 NG/DL (ref 250–1100)
TRIGL SERPL-MCNC: 80 MG/DL
TSH SERPL-ACNC: 1.31 MIU/L (ref 0.4–4.5)
WBC # BLD AUTO: 8.1 THOUSAND/UL (ref 3.8–10.8)

## 2020-02-26 NOTE — TELEPHONE ENCOUNTER
----- Message from Sayra Garcia DO sent at 2/26/2020  3:12 PM EST -----  Please call patient to schedule follow-up appointment to review lab results

## 2020-03-03 DIAGNOSIS — E03.9 HYPOTHYROIDISM, UNSPECIFIED TYPE: Chronic | ICD-10-CM

## 2020-03-04 RX ORDER — LEVOTHYROXINE SODIUM 25 MCG
TABLET ORAL
Qty: 90 TABLET | Refills: 1 | Status: SHIPPED | OUTPATIENT
Start: 2020-03-04 | End: 2020-08-31

## 2020-03-11 DIAGNOSIS — I10 ESSENTIAL HYPERTENSION: Chronic | ICD-10-CM

## 2020-03-11 RX ORDER — NEBIVOLOL HYDROCHLORIDE 5 MG/1
TABLET ORAL
Qty: 90 TABLET | Refills: 1 | Status: SHIPPED | OUTPATIENT
Start: 2020-03-11 | End: 2020-05-19 | Stop reason: HOSPADM

## 2020-03-16 ENCOUNTER — OFFICE VISIT (OUTPATIENT)
Dept: FAMILY MEDICINE CLINIC | Facility: CLINIC | Age: 60
End: 2020-03-16
Payer: COMMERCIAL

## 2020-03-16 VITALS
TEMPERATURE: 98.5 F | SYSTOLIC BLOOD PRESSURE: 140 MMHG | OXYGEN SATURATION: 98 % | WEIGHT: 309 LBS | BODY MASS INDEX: 39.66 KG/M2 | HEIGHT: 74 IN | HEART RATE: 71 BPM | RESPIRATION RATE: 18 BRPM | DIASTOLIC BLOOD PRESSURE: 82 MMHG

## 2020-03-16 DIAGNOSIS — E34.9 TESTOSTERONE DEFICIENCY: ICD-10-CM

## 2020-03-16 DIAGNOSIS — E78.2 MIXED HYPERLIPIDEMIA: ICD-10-CM

## 2020-03-16 DIAGNOSIS — R73.03 PREDIABETES: ICD-10-CM

## 2020-03-16 DIAGNOSIS — E66.01 MORBID OBESITY (HCC): ICD-10-CM

## 2020-03-16 DIAGNOSIS — M51.16 LUMBAR DISC HERNIATION WITH RADICULOPATHY: ICD-10-CM

## 2020-03-16 DIAGNOSIS — M51.37 DISC DEGENERATION, LUMBOSACRAL: ICD-10-CM

## 2020-03-16 DIAGNOSIS — M54.16 LUMBAR RADICULOPATHY: ICD-10-CM

## 2020-03-16 DIAGNOSIS — R20.8 DYSESTHESIA: ICD-10-CM

## 2020-03-16 DIAGNOSIS — I10 ESSENTIAL HYPERTENSION: Chronic | ICD-10-CM

## 2020-03-16 DIAGNOSIS — M96.1 POSTLAMINECTOMY SYNDROME, LUMBAR: ICD-10-CM

## 2020-03-16 DIAGNOSIS — E03.9 HYPOTHYROIDISM, UNSPECIFIED TYPE: Primary | Chronic | ICD-10-CM

## 2020-03-16 PROBLEM — S06.0X0A CONCUSSION WITHOUT LOSS OF CONSCIOUSNESS: Status: RESOLVED | Noted: 2019-11-15 | Resolved: 2020-03-16

## 2020-03-16 PROBLEM — Z01.818 PREOP EXAMINATION: Status: RESOLVED | Noted: 2018-06-11 | Resolved: 2020-03-16

## 2020-03-16 PROCEDURE — 99215 OFFICE O/P EST HI 40 MIN: CPT | Performed by: FAMILY MEDICINE

## 2020-03-16 PROCEDURE — 3077F SYST BP >= 140 MM HG: CPT | Performed by: FAMILY MEDICINE

## 2020-03-16 PROCEDURE — 1036F TOBACCO NON-USER: CPT | Performed by: FAMILY MEDICINE

## 2020-03-16 PROCEDURE — 3008F BODY MASS INDEX DOCD: CPT | Performed by: FAMILY MEDICINE

## 2020-03-16 PROCEDURE — 3079F DIAST BP 80-89 MM HG: CPT | Performed by: FAMILY MEDICINE

## 2020-03-16 RX ORDER — PREGABALIN 25 MG/1
25 CAPSULE ORAL 2 TIMES DAILY
Qty: 60 CAPSULE | Refills: 1 | Status: SHIPPED | OUTPATIENT
Start: 2020-03-16 | End: 2020-06-16
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
## 2020-03-16 NOTE — ASSESSMENT & PLAN NOTE
Patient has more of a hypersensitivity of both his feet  This seemed to come about after the patient had discontinued taking Lyrica  I did explain to the patient that Lyrica is indicated for peripheral neuropathy and nerve hypersensitivity  Patient would be willing to go back on to Lyrica    Will also image lumbar spine with and without contrast given his prior surgical history

## 2020-03-16 NOTE — ASSESSMENT & PLAN NOTE
Hypothyroidism remains very well controlled on levothyroxine 25 mcg once daily  Continue same    Repeat thyroid function study to be done in 6 months

## 2020-03-16 NOTE — ASSESSMENT & PLAN NOTE
Patient's back pain had improved tremendously with weight loss as well as spinal stimulator  Patient was able to stop taking narcotic pain medications  Patient stop taking Lyrica close to 1 year ago  I would like to place the patient back on to Lyrica 25 mg twice daily  Prior to prescribing the controlled substance, a patient search was performed on the [place] prescription drug monitoring program web site  There was no evidence of diversion or misuse    Prescription provided

## 2020-03-16 NOTE — ASSESSMENT & PLAN NOTE
Hypertension is fairly well controlled on Bystolic 5 mg once daily  Patient does not need refill of this at this time  Patient was taken off of amlodipine previously    Patient does need to continue losing weight as his weight does have a significant impact on his hypertension

## 2020-03-16 NOTE — ASSESSMENT & PLAN NOTE
Hemoglobin A1c of 5 2% is still within the normal range  Patient has done tremendously well with weight loss  Continue to watch dietary intake of carbohydrates and sugars

## 2020-03-16 NOTE — ASSESSMENT & PLAN NOTE
Hyperlipidemia is very well controlled on Crestor 5 mg once daily  Continue same    Repeat lipid profile in 6 months

## 2020-03-16 NOTE — ASSESSMENT & PLAN NOTE
Patient does have history of disc herniation with radiculopathy  Last MRI of his lumbar spine was performed in 2014  Patient is still having some hyperesthesia in both feet    Given his history of spine surgery as well as spinal stimulator I would like to perform MRI of his lumbar spine

## 2020-03-31 ENCOUNTER — TELEPHONE (OUTPATIENT)
Dept: RADIOLOGY | Facility: HOSPITAL | Age: 60
End: 2020-03-31

## 2020-03-31 NOTE — TELEPHONE ENCOUNTER
Due to concern for coronavirus containment the patient was called to discuss the option to reschedule his upcoming elective MRI study of the lumbar spine  Due to implanted spinal cord stimulator that would need to be checked by a [employer] representative immediately prior and post MRI study, the patients study will need to be postponed to approximately 4 - 6 weeks from now  The [employer] device representatives are not currently coming into the hospitals due to COVID-19  No answer, left my phone number for a return call

## 2020-04-08 ENCOUNTER — TELEMEDICINE (OUTPATIENT)
Dept: FAMILY MEDICINE CLINIC | Facility: CLINIC | Age: 60
End: 2020-04-08
Payer: COMMERCIAL

## 2020-04-08 DIAGNOSIS — R05.8 ALLERGIC COUGH: Primary | ICD-10-CM

## 2020-04-08 PROCEDURE — 99213 OFFICE O/P EST LOW 20 MIN: CPT | Performed by: FAMILY MEDICINE

## 2020-04-08 RX ORDER — MONTELUKAST SODIUM 10 MG/1
10 TABLET ORAL
Qty: 30 TABLET | Refills: 5 | Status: SHIPPED | OUTPATIENT
Start: 2020-04-08 | End: 2020-05-28 | Stop reason: SDUPTHER

## 2020-04-08 RX ORDER — ALBUTEROL SULFATE 90 UG/1
2 AEROSOL, METERED RESPIRATORY (INHALATION) EVERY 6 HOURS PRN
Qty: 1 INHALER | Refills: 1 | Status: SHIPPED | OUTPATIENT
Start: 2020-04-08 | End: 2020-05-26

## 2020-04-15 ENCOUNTER — TELEMEDICINE (OUTPATIENT)
Dept: FAMILY MEDICINE CLINIC | Facility: CLINIC | Age: 60
End: 2020-04-15
Payer: COMMERCIAL

## 2020-04-15 DIAGNOSIS — Z20.828 EXPOSURE TO SARS-ASSOCIATED CORONAVIRUS: ICD-10-CM

## 2020-04-15 DIAGNOSIS — M47.816 SPONDYLOSIS OF LUMBAR REGION WITHOUT MYELOPATHY OR RADICULOPATHY: ICD-10-CM

## 2020-04-15 DIAGNOSIS — R05.8 ALLERGIC COUGH: Primary | ICD-10-CM

## 2020-04-15 DIAGNOSIS — M54.6 ACUTE LEFT-SIDED THORACIC BACK PAIN: ICD-10-CM

## 2020-04-15 PROCEDURE — 87635 SARS-COV-2 COVID-19 AMP PRB: CPT

## 2020-04-15 PROCEDURE — 99214 OFFICE O/P EST MOD 30 MIN: CPT | Performed by: FAMILY MEDICINE

## 2020-04-15 RX ORDER — OXYCODONE HYDROCHLORIDE AND ACETAMINOPHEN 5; 325 MG/1; MG/1
1 TABLET ORAL EVERY 4 HOURS PRN
Qty: 30 TABLET | Refills: 0 | Status: SHIPPED | OUTPATIENT
Start: 2020-04-15 | End: 2020-05-13

## 2020-04-16 ENCOUNTER — TELEPHONE (OUTPATIENT)
Dept: FAMILY MEDICINE CLINIC | Facility: CLINIC | Age: 60
End: 2020-04-16

## 2020-04-16 ENCOUNTER — TELEMEDICINE (OUTPATIENT)
Dept: FAMILY MEDICINE CLINIC | Facility: CLINIC | Age: 60
End: 2020-04-16
Payer: COMMERCIAL

## 2020-04-16 DIAGNOSIS — R05.8 ALLERGIC COUGH: Primary | ICD-10-CM

## 2020-04-16 LAB — SARS-COV-2 RNA SPEC QL NAA+PROBE: NOT DETECTED

## 2020-04-16 PROCEDURE — 99212 OFFICE O/P EST SF 10 MIN: CPT | Performed by: FAMILY MEDICINE

## 2020-04-20 ENCOUNTER — TELEMEDICINE (OUTPATIENT)
Dept: FAMILY MEDICINE CLINIC | Facility: CLINIC | Age: 60
End: 2020-04-20
Payer: COMMERCIAL

## 2020-04-20 ENCOUNTER — HOSPITAL ENCOUNTER (EMERGENCY)
Facility: HOSPITAL | Age: 60
Discharge: HOME | End: 2020-04-20
Attending: EMERGENCY MEDICINE | Admitting: EMERGENCY MEDICINE
Payer: COMMERCIAL

## 2020-04-20 ENCOUNTER — APPOINTMENT (EMERGENCY)
Dept: RADIOLOGY | Facility: HOSPITAL | Age: 60
End: 2020-04-20
Payer: COMMERCIAL

## 2020-04-20 VITALS
TEMPERATURE: 98.1 F | RESPIRATION RATE: 18 BRPM | OXYGEN SATURATION: 99 % | BODY MASS INDEX: 38.54 KG/M2 | HEART RATE: 104 BPM | WEIGHT: 300.2 LBS | DIASTOLIC BLOOD PRESSURE: 93 MMHG | SYSTOLIC BLOOD PRESSURE: 221 MMHG

## 2020-04-20 DIAGNOSIS — J06.9 URI (UPPER RESPIRATORY INFECTION): Primary | ICD-10-CM

## 2020-04-20 DIAGNOSIS — R52 BODY ACHES: ICD-10-CM

## 2020-04-20 DIAGNOSIS — R06.02 SHORTNESS OF BREATH: Primary | ICD-10-CM

## 2020-04-20 DIAGNOSIS — R05.9 COUGH: ICD-10-CM

## 2020-04-20 DIAGNOSIS — J02.9 PHARYNGITIS: ICD-10-CM

## 2020-04-20 LAB
ALBUMIN SERPL BCP-MCNC: 3.3 G/DL (ref 3.5–5)
ALP SERPL-CCNC: 87 U/L (ref 46–116)
ALT SERPL W P-5'-P-CCNC: 77 U/L (ref 12–78)
ANION GAP SERPL CALCULATED.3IONS-SCNC: 11 MMOL/L (ref 4–13)
APTT PPP: 28 SECONDS (ref 23–37)
AST SERPL W P-5'-P-CCNC: 92 U/L (ref 5–45)
BASOPHILS # BLD AUTO: 0.06 THOUSANDS/ΜL (ref 0–0.1)
BASOPHILS NFR BLD AUTO: 1 % (ref 0–1)
BILIRUB SERPL-MCNC: 0.8 MG/DL (ref 0.2–1)
BUN SERPL-MCNC: 12 MG/DL (ref 5–25)
CALCIUM SERPL-MCNC: 8.8 MG/DL (ref 8.3–10.1)
CHLORIDE SERPL-SCNC: 101 MMOL/L (ref 100–108)
CO2 SERPL-SCNC: 26 MMOL/L (ref 21–32)
CREAT SERPL-MCNC: 0.91 MG/DL (ref 0.6–1.3)
CRP SERPL QL: 8.9 MG/L
D DIMER PPP FEU-MCNC: 0.4 UG/ML FEU
EOSINOPHIL # BLD AUTO: 0.71 THOUSAND/ΜL (ref 0–0.61)
EOSINOPHIL NFR BLD AUTO: 9 % (ref 0–6)
ERYTHROCYTE [DISTWIDTH] IN BLOOD BY AUTOMATED COUNT: 16.9 % (ref 11.6–15.1)
FERRITIN SERPL-MCNC: 65 NG/ML (ref 8–388)
FIBRINOGEN PPP-MCNC: 339 MG/DL (ref 227–495)
GFR SERPL CREATININE-BSD FRML MDRD: 91 ML/MIN/1.73SQ M
GLUCOSE SERPL-MCNC: 98 MG/DL (ref 65–140)
HCT VFR BLD AUTO: 44.1 % (ref 36.5–49.3)
HGB BLD-MCNC: 15 G/DL (ref 12–17)
IMM GRANULOCYTES # BLD AUTO: 0.02 THOUSAND/UL (ref 0–0.2)
IMM GRANULOCYTES NFR BLD AUTO: 0 % (ref 0–2)
INR PPP: 1.09 (ref 0.84–1.19)
LACTATE SERPL-SCNC: 1.4 MMOL/L (ref 0.5–2)
LYMPHOCYTES # BLD AUTO: 1.44 THOUSANDS/ΜL (ref 0.6–4.47)
LYMPHOCYTES NFR BLD AUTO: 19 % (ref 14–44)
MCH RBC QN AUTO: 33.9 PG (ref 26.8–34.3)
MCHC RBC AUTO-ENTMCNC: 34 G/DL (ref 31.4–37.4)
MCV RBC AUTO: 100 FL (ref 82–98)
MONOCYTES # BLD AUTO: 0.82 THOUSAND/ΜL (ref 0.17–1.22)
MONOCYTES NFR BLD AUTO: 11 % (ref 4–12)
NEUTROPHILS # BLD AUTO: 4.53 THOUSANDS/ΜL (ref 1.85–7.62)
NEUTS SEG NFR BLD AUTO: 60 % (ref 43–75)
NRBC BLD AUTO-RTO: 0 /100 WBCS
NT-PROBNP SERPL-MCNC: 866 PG/ML
PLATELET # BLD AUTO: 109 THOUSANDS/UL (ref 149–390)
PMV BLD AUTO: 13.5 FL (ref 8.9–12.7)
POTASSIUM SERPL-SCNC: 4.1 MMOL/L (ref 3.5–5.3)
PROT SERPL-MCNC: 6.9 G/DL (ref 6.4–8.2)
PROTHROMBIN TIME: 13.5 SECONDS (ref 11.6–14.5)
RBC # BLD AUTO: 4.43 MILLION/UL (ref 3.88–5.62)
SARS-COV-2 RNA RESP QL NAA+PROBE: NEGATIVE
SODIUM SERPL-SCNC: 138 MMOL/L (ref 136–145)
TROPONIN I SERPL-MCNC: 0.04 NG/ML
WBC # BLD AUTO: 7.58 THOUSAND/UL (ref 4.31–10.16)

## 2020-04-20 PROCEDURE — 86140 C-REACTIVE PROTEIN: CPT | Performed by: EMERGENCY MEDICINE

## 2020-04-20 PROCEDURE — 85025 COMPLETE CBC W/AUTO DIFF WBC: CPT | Performed by: EMERGENCY MEDICINE

## 2020-04-20 PROCEDURE — 36415 COLL VENOUS BLD VENIPUNCTURE: CPT | Performed by: EMERGENCY MEDICINE

## 2020-04-20 PROCEDURE — 93005 ELECTROCARDIOGRAM TRACING: CPT

## 2020-04-20 PROCEDURE — 87635 SARS-COV-2 COVID-19 AMP PRB: CPT | Performed by: EMERGENCY MEDICINE

## 2020-04-20 PROCEDURE — 99442 PR PHYS/QHP TELEPHONE EVALUATION 11-20 MIN: CPT | Performed by: NURSE PRACTITIONER

## 2020-04-20 PROCEDURE — 99284 EMERGENCY DEPT VISIT MOD MDM: CPT | Performed by: EMERGENCY MEDICINE

## 2020-04-20 PROCEDURE — 87040 BLOOD CULTURE FOR BACTERIA: CPT | Performed by: EMERGENCY MEDICINE

## 2020-04-20 PROCEDURE — 85610 PROTHROMBIN TIME: CPT | Performed by: EMERGENCY MEDICINE

## 2020-04-20 PROCEDURE — 83615 LACTATE (LD) (LDH) ENZYME: CPT | Performed by: EMERGENCY MEDICINE

## 2020-04-20 PROCEDURE — 71045 X-RAY EXAM CHEST 1 VIEW: CPT

## 2020-04-20 PROCEDURE — 85379 FIBRIN DEGRADATION QUANT: CPT | Performed by: EMERGENCY MEDICINE

## 2020-04-20 PROCEDURE — 80053 COMPREHEN METABOLIC PANEL: CPT | Performed by: EMERGENCY MEDICINE

## 2020-04-20 PROCEDURE — 82728 ASSAY OF FERRITIN: CPT | Performed by: EMERGENCY MEDICINE

## 2020-04-20 PROCEDURE — 84484 ASSAY OF TROPONIN QUANT: CPT | Performed by: EMERGENCY MEDICINE

## 2020-04-20 PROCEDURE — 96374 THER/PROPH/DIAG INJ IV PUSH: CPT

## 2020-04-20 PROCEDURE — 83880 ASSAY OF NATRIURETIC PEPTIDE: CPT | Performed by: EMERGENCY MEDICINE

## 2020-04-20 PROCEDURE — 85384 FIBRINOGEN ACTIVITY: CPT | Performed by: EMERGENCY MEDICINE

## 2020-04-20 PROCEDURE — 83625 ASSAY OF LDH ENZYMES: CPT | Performed by: EMERGENCY MEDICINE

## 2020-04-20 PROCEDURE — 83605 ASSAY OF LACTIC ACID: CPT | Performed by: EMERGENCY MEDICINE

## 2020-04-20 PROCEDURE — 99285 EMERGENCY DEPT VISIT HI MDM: CPT

## 2020-04-20 PROCEDURE — 85730 THROMBOPLASTIN TIME PARTIAL: CPT | Performed by: EMERGENCY MEDICINE

## 2020-04-20 RX ORDER — FLUTICASONE PROPIONATE 50 MCG
1 SPRAY, SUSPENSION (ML) NASAL ONCE
Status: COMPLETED | OUTPATIENT
Start: 2020-04-20 | End: 2020-04-20

## 2020-04-20 RX ORDER — GUAIFENESIN 600 MG
600 TABLET, EXTENDED RELEASE 12 HR ORAL EVERY 12 HOURS SCHEDULED
Qty: 14 TABLET | Refills: 0 | Status: SHIPPED | OUTPATIENT
Start: 2020-04-20 | End: 2020-05-19 | Stop reason: HOSPADM

## 2020-04-20 RX ORDER — GUAIFENESIN 600 MG
600 TABLET, EXTENDED RELEASE 12 HR ORAL 2 TIMES DAILY
Status: DISCONTINUED | OUTPATIENT
Start: 2020-04-20 | End: 2020-04-20 | Stop reason: HOSPADM

## 2020-04-20 RX ORDER — ONDANSETRON 2 MG/ML
4 INJECTION INTRAMUSCULAR; INTRAVENOUS ONCE
Status: COMPLETED | OUTPATIENT
Start: 2020-04-20 | End: 2020-04-20

## 2020-04-20 RX ADMIN — FLUTICASONE PROPIONATE 1 SPRAY: 50 SPRAY, METERED NASAL at 19:30

## 2020-04-20 RX ADMIN — GUAIFENESIN 600 MG: 600 TABLET, EXTENDED RELEASE ORAL at 19:25

## 2020-04-20 RX ADMIN — ONDANSETRON 4 MG: 2 INJECTION INTRAMUSCULAR; INTRAVENOUS at 19:00

## 2020-04-21 LAB
ATRIAL RATE: 81 BPM
P AXIS: -71 DEGREES
QRS AXIS: -54 DEGREES
QRSD INTERVAL: 150 MS
QT INTERVAL: 452 MS
QTC INTERVAL: 525 MS
T WAVE AXIS: -4 DEGREES
VENTRICULAR RATE: 81 BPM

## 2020-04-21 PROCEDURE — 93010 ELECTROCARDIOGRAM REPORT: CPT | Performed by: INTERNAL MEDICINE

## 2020-04-22 LAB
LDH SERPL-CCNC: 222 IU/L (ref 121–224)
LDH1 CFR SERPL ELPH: 29 % (ref 17–32)
LDH2 CFR SERPL ELPH: 36 % (ref 25–40)
LDH3 CFR SERPL ELPH: 17 % (ref 17–27)
LDH4 CFR SERPL ELPH: 7 % (ref 5–13)
LDH5 CFR SERPL ELPH: 11 % (ref 4–20)

## 2020-04-25 LAB
BACTERIA BLD CULT: NORMAL
BACTERIA BLD CULT: NORMAL

## 2020-05-13 ENCOUNTER — APPOINTMENT (EMERGENCY)
Dept: RADIOLOGY | Facility: HOSPITAL | Age: 60
DRG: 202 | End: 2020-05-13
Payer: COMMERCIAL

## 2020-05-13 ENCOUNTER — APPOINTMENT (OUTPATIENT)
Dept: CT IMAGING | Facility: HOSPITAL | Age: 60
DRG: 202 | End: 2020-05-13
Payer: COMMERCIAL

## 2020-05-13 ENCOUNTER — TELEMEDICINE (OUTPATIENT)
Dept: FAMILY MEDICINE CLINIC | Facility: CLINIC | Age: 60
End: 2020-05-13

## 2020-05-13 ENCOUNTER — OFFICE VISIT (OUTPATIENT)
Dept: FAMILY MEDICINE CLINIC | Facility: CLINIC | Age: 60
End: 2020-05-13
Payer: COMMERCIAL

## 2020-05-13 ENCOUNTER — APPOINTMENT (EMERGENCY)
Dept: CT IMAGING | Facility: HOSPITAL | Age: 60
DRG: 202 | End: 2020-05-13
Payer: COMMERCIAL

## 2020-05-13 ENCOUNTER — HOSPITAL ENCOUNTER (INPATIENT)
Facility: HOSPITAL | Age: 60
LOS: 2 days | DRG: 202 | End: 2020-05-17
Attending: EMERGENCY MEDICINE | Admitting: HOSPITALIST
Payer: COMMERCIAL

## 2020-05-13 VITALS — TEMPERATURE: 99.6 F | OXYGEN SATURATION: 92 % | HEART RATE: 65 BPM

## 2020-05-13 DIAGNOSIS — R06.02 SOB (SHORTNESS OF BREATH): Primary | ICD-10-CM

## 2020-05-13 DIAGNOSIS — R06.02 SHORTNESS OF BREATH: ICD-10-CM

## 2020-05-13 DIAGNOSIS — R06.00 DOE (DYSPNEA ON EXERTION): ICD-10-CM

## 2020-05-13 DIAGNOSIS — R07.9 CHEST PAIN, UNSPECIFIED TYPE: ICD-10-CM

## 2020-05-13 DIAGNOSIS — R94.39 ABNORMAL STRESS TEST: ICD-10-CM

## 2020-05-13 DIAGNOSIS — R05.3 CHRONIC COUGH: ICD-10-CM

## 2020-05-13 DIAGNOSIS — R94.31 ST SEGMENT DEPRESSION: ICD-10-CM

## 2020-05-13 DIAGNOSIS — R07.9 CHEST PAIN: ICD-10-CM

## 2020-05-13 DIAGNOSIS — R06.00 DYSPNEA: Primary | ICD-10-CM

## 2020-05-13 DIAGNOSIS — R05.9 COUGH: Primary | ICD-10-CM

## 2020-05-13 DIAGNOSIS — R61 DIAPHORESIS: ICD-10-CM

## 2020-05-13 DIAGNOSIS — I10 HYPERTENSION: ICD-10-CM

## 2020-05-13 PROBLEM — I16.0 HYPERTENSIVE URGENCY: Status: ACTIVE | Noted: 2020-05-13

## 2020-05-13 PROBLEM — R06.09 DOE (DYSPNEA ON EXERTION): Status: ACTIVE | Noted: 2020-05-13

## 2020-05-13 LAB
ANION GAP SERPL CALCULATED.3IONS-SCNC: 9 MMOL/L (ref 4–13)
ATRIAL RATE: 75 BPM
BASOPHILS # BLD AUTO: 0.04 THOUSANDS/ΜL (ref 0–0.1)
BASOPHILS NFR BLD AUTO: 1 % (ref 0–1)
BUN SERPL-MCNC: 10 MG/DL (ref 5–25)
CALCIUM SERPL-MCNC: 8.9 MG/DL (ref 8.3–10.1)
CHLORIDE SERPL-SCNC: 101 MMOL/L (ref 100–108)
CO2 SERPL-SCNC: 27 MMOL/L (ref 21–32)
CREAT SERPL-MCNC: 1.13 MG/DL (ref 0.6–1.3)
D DIMER PPP FEU-MCNC: 0.56 UG/ML FEU
EOSINOPHIL # BLD AUTO: 0.4 THOUSAND/ΜL (ref 0–0.61)
EOSINOPHIL NFR BLD AUTO: 5 % (ref 0–6)
ERYTHROCYTE [DISTWIDTH] IN BLOOD BY AUTOMATED COUNT: 15.8 % (ref 11.6–15.1)
GFR SERPL CREATININE-BSD FRML MDRD: 70 ML/MIN/1.73SQ M
GLUCOSE SERPL-MCNC: 109 MG/DL (ref 65–140)
HCT VFR BLD AUTO: 44 % (ref 36.5–49.3)
HGB BLD-MCNC: 15.1 G/DL (ref 12–17)
IMM GRANULOCYTES # BLD AUTO: 0.02 THOUSAND/UL (ref 0–0.2)
IMM GRANULOCYTES NFR BLD AUTO: 0 % (ref 0–2)
LYMPHOCYTES # BLD AUTO: 1 THOUSANDS/ΜL (ref 0.6–4.47)
LYMPHOCYTES NFR BLD AUTO: 12 % (ref 14–44)
MCH RBC QN AUTO: 34.6 PG (ref 26.8–34.3)
MCHC RBC AUTO-ENTMCNC: 34.3 G/DL (ref 31.4–37.4)
MCV RBC AUTO: 101 FL (ref 82–98)
MONOCYTES # BLD AUTO: 0.7 THOUSAND/ΜL (ref 0.17–1.22)
MONOCYTES NFR BLD AUTO: 8 % (ref 4–12)
NEUTROPHILS # BLD AUTO: 6.17 THOUSANDS/ΜL (ref 1.85–7.62)
NEUTS SEG NFR BLD AUTO: 74 % (ref 43–75)
NRBC BLD AUTO-RTO: 0 /100 WBCS
NT-PROBNP SERPL-MCNC: 1281 PG/ML
P AXIS: -76 DEGREES
PLATELET # BLD AUTO: 102 THOUSANDS/UL (ref 149–390)
PLATELET # BLD AUTO: 106 THOUSANDS/UL (ref 149–390)
PMV BLD AUTO: 13.4 FL (ref 8.9–12.7)
PMV BLD AUTO: 13.4 FL (ref 8.9–12.7)
POTASSIUM SERPL-SCNC: 3.6 MMOL/L (ref 3.5–5.3)
PR INTERVAL: 142 MS
QRS AXIS: -44 DEGREES
QRSD INTERVAL: 146 MS
QT INTERVAL: 444 MS
QTC INTERVAL: 495 MS
RBC # BLD AUTO: 4.36 MILLION/UL (ref 3.88–5.62)
SARS-COV-2 RNA RESP QL NAA+PROBE: NEGATIVE
SODIUM SERPL-SCNC: 137 MMOL/L (ref 136–145)
T WAVE AXIS: 6 DEGREES
TROPONIN I SERPL-MCNC: 0.02 NG/ML
TROPONIN I SERPL-MCNC: 0.03 NG/ML
TROPONIN I SERPL-MCNC: 0.03 NG/ML
VENTRICULAR RATE: 75 BPM
WBC # BLD AUTO: 8.33 THOUSAND/UL (ref 4.31–10.16)

## 2020-05-13 PROCEDURE — 80048 BASIC METABOLIC PNL TOTAL CA: CPT | Performed by: PHYSICIAN ASSISTANT

## 2020-05-13 PROCEDURE — 93010 ELECTROCARDIOGRAM REPORT: CPT | Performed by: INTERNAL MEDICINE

## 2020-05-13 PROCEDURE — 94664 DEMO&/EVAL PT USE INHALER: CPT

## 2020-05-13 PROCEDURE — 1036F TOBACCO NON-USER: CPT | Performed by: PHYSICIAN ASSISTANT

## 2020-05-13 PROCEDURE — 87635 SARS-COV-2 COVID-19 AMP PRB: CPT | Performed by: PHYSICIAN ASSISTANT

## 2020-05-13 PROCEDURE — 36415 COLL VENOUS BLD VENIPUNCTURE: CPT | Performed by: PHYSICIAN ASSISTANT

## 2020-05-13 PROCEDURE — 85379 FIBRIN DEGRADATION QUANT: CPT | Performed by: PHYSICIAN ASSISTANT

## 2020-05-13 PROCEDURE — 85049 AUTOMATED PLATELET COUNT: CPT | Performed by: INTERNAL MEDICINE

## 2020-05-13 PROCEDURE — 3080F DIAST BP >= 90 MM HG: CPT | Performed by: PHYSICIAN ASSISTANT

## 2020-05-13 PROCEDURE — 3077F SYST BP >= 140 MM HG: CPT | Performed by: PHYSICIAN ASSISTANT

## 2020-05-13 PROCEDURE — 99220 PR INITIAL OBSERVATION CARE/DAY 70 MINUTES: CPT | Performed by: INTERNAL MEDICINE

## 2020-05-13 PROCEDURE — 85025 COMPLETE CBC W/AUTO DIFF WBC: CPT | Performed by: PHYSICIAN ASSISTANT

## 2020-05-13 PROCEDURE — 93005 ELECTROCARDIOGRAM TRACING: CPT

## 2020-05-13 PROCEDURE — 83880 ASSAY OF NATRIURETIC PEPTIDE: CPT | Performed by: PHYSICIAN ASSISTANT

## 2020-05-13 PROCEDURE — 84484 ASSAY OF TROPONIN QUANT: CPT | Performed by: PHYSICIAN ASSISTANT

## 2020-05-13 PROCEDURE — 99215 OFFICE O/P EST HI 40 MIN: CPT | Performed by: PHYSICIAN ASSISTANT

## 2020-05-13 PROCEDURE — 71045 X-RAY EXAM CHEST 1 VIEW: CPT

## 2020-05-13 PROCEDURE — 84484 ASSAY OF TROPONIN QUANT: CPT | Performed by: INTERNAL MEDICINE

## 2020-05-13 PROCEDURE — NC001 PR NO CHARGE: Performed by: FAMILY MEDICINE

## 2020-05-13 PROCEDURE — 94760 N-INVAS EAR/PLS OXIMETRY 1: CPT

## 2020-05-13 PROCEDURE — 93000 ELECTROCARDIOGRAM COMPLETE: CPT

## 2020-05-13 PROCEDURE — 99285 EMERGENCY DEPT VISIT HI MDM: CPT

## 2020-05-13 PROCEDURE — 99285 EMERGENCY DEPT VISIT HI MDM: CPT | Performed by: PHYSICIAN ASSISTANT

## 2020-05-13 PROCEDURE — 71275 CT ANGIOGRAPHY CHEST: CPT

## 2020-05-13 PROCEDURE — 94640 AIRWAY INHALATION TREATMENT: CPT

## 2020-05-13 RX ORDER — FORMOTEROL FUMARATE 20 UG/2ML
20 SOLUTION RESPIRATORY (INHALATION)
Status: DISCONTINUED | OUTPATIENT
Start: 2020-05-13 | End: 2020-05-14

## 2020-05-13 RX ORDER — PANTOPRAZOLE SODIUM 40 MG/1
40 TABLET, DELAYED RELEASE ORAL
Status: DISCONTINUED | OUTPATIENT
Start: 2020-05-14 | End: 2020-05-17 | Stop reason: HOSPADM

## 2020-05-13 RX ORDER — ACETAMINOPHEN 325 MG/1
650 TABLET ORAL EVERY 4 HOURS PRN
Status: DISCONTINUED | OUTPATIENT
Start: 2020-05-13 | End: 2020-05-17 | Stop reason: HOSPADM

## 2020-05-13 RX ORDER — HEPARIN SODIUM 5000 [USP'U]/ML
5000 INJECTION, SOLUTION INTRAVENOUS; SUBCUTANEOUS EVERY 8 HOURS SCHEDULED
Status: DISCONTINUED | OUTPATIENT
Start: 2020-05-13 | End: 2020-05-16

## 2020-05-13 RX ORDER — LORATADINE 10 MG/1
10 TABLET ORAL DAILY
Status: DISCONTINUED | OUTPATIENT
Start: 2020-05-14 | End: 2020-05-17 | Stop reason: HOSPADM

## 2020-05-13 RX ORDER — HYDRALAZINE HYDROCHLORIDE 20 MG/ML
10 INJECTION INTRAMUSCULAR; INTRAVENOUS EVERY 6 HOURS PRN
Status: DISCONTINUED | OUTPATIENT
Start: 2020-05-13 | End: 2020-05-17 | Stop reason: HOSPADM

## 2020-05-13 RX ORDER — MONTELUKAST SODIUM 10 MG/1
10 TABLET ORAL
Status: CANCELLED | OUTPATIENT
Start: 2020-05-13

## 2020-05-13 RX ORDER — ONDANSETRON 2 MG/ML
4 INJECTION INTRAMUSCULAR; INTRAVENOUS EVERY 6 HOURS PRN
Status: DISCONTINUED | OUTPATIENT
Start: 2020-05-13 | End: 2020-05-17 | Stop reason: HOSPADM

## 2020-05-13 RX ORDER — LISINOPRIL 10 MG/1
10 TABLET ORAL DAILY
Status: DISCONTINUED | OUTPATIENT
Start: 2020-05-14 | End: 2020-05-15

## 2020-05-13 RX ORDER — PRAVASTATIN SODIUM 40 MG
40 TABLET ORAL
Status: DISCONTINUED | OUTPATIENT
Start: 2020-05-13 | End: 2020-05-17 | Stop reason: HOSPADM

## 2020-05-13 RX ORDER — DOCUSATE SODIUM 100 MG/1
100 CAPSULE, LIQUID FILLED ORAL DAILY PRN
Status: DISCONTINUED | OUTPATIENT
Start: 2020-05-13 | End: 2020-05-17 | Stop reason: HOSPADM

## 2020-05-13 RX ORDER — NEBIVOLOL 5 MG/1
5 TABLET ORAL DAILY
Status: DISCONTINUED | OUTPATIENT
Start: 2020-05-14 | End: 2020-05-16

## 2020-05-13 RX ORDER — HYDRALAZINE HYDROCHLORIDE 20 MG/ML
10 INJECTION INTRAMUSCULAR; INTRAVENOUS ONCE
Status: COMPLETED | OUTPATIENT
Start: 2020-05-13 | End: 2020-05-13

## 2020-05-13 RX ORDER — PREGABALIN 25 MG/1
25 CAPSULE ORAL 2 TIMES DAILY
Status: DISCONTINUED | OUTPATIENT
Start: 2020-05-13 | End: 2020-05-17 | Stop reason: HOSPADM

## 2020-05-13 RX ORDER — LEVOTHYROXINE SODIUM 0.03 MG/1
25 TABLET ORAL
Status: DISCONTINUED | OUTPATIENT
Start: 2020-05-14 | End: 2020-05-17 | Stop reason: HOSPADM

## 2020-05-13 RX ORDER — BENZONATATE 100 MG/1
200 CAPSULE ORAL 3 TIMES DAILY PRN
Status: DISCONTINUED | OUTPATIENT
Start: 2020-05-13 | End: 2020-05-17 | Stop reason: HOSPADM

## 2020-05-13 RX ORDER — TRAZODONE HYDROCHLORIDE 50 MG/1
25 TABLET ORAL
Status: DISCONTINUED | OUTPATIENT
Start: 2020-05-13 | End: 2020-05-15

## 2020-05-13 RX ORDER — GUAIFENESIN 600 MG
600 TABLET, EXTENDED RELEASE 12 HR ORAL EVERY 12 HOURS SCHEDULED
Status: DISCONTINUED | OUTPATIENT
Start: 2020-05-13 | End: 2020-05-14

## 2020-05-13 RX ORDER — ACETAMINOPHEN 325 MG/1
650 TABLET ORAL ONCE
Status: COMPLETED | OUTPATIENT
Start: 2020-05-13 | End: 2020-05-13

## 2020-05-13 RX ADMIN — ACETAMINOPHEN 650 MG: 325 TABLET, FILM COATED ORAL at 18:12

## 2020-05-13 RX ADMIN — PRAVASTATIN SODIUM 40 MG: 40 TABLET ORAL at 20:12

## 2020-05-13 RX ADMIN — FORMOTEROL FUMARATE DIHYDRATE 20 MCG: 20 SOLUTION RESPIRATORY (INHALATION) at 20:43

## 2020-05-13 RX ADMIN — HYDRALAZINE HYDROCHLORIDE 10 MG: 20 INJECTION INTRAMUSCULAR; INTRAVENOUS at 16:35

## 2020-05-13 RX ADMIN — PREGABALIN 25 MG: 25 CAPSULE ORAL at 21:16

## 2020-05-13 RX ADMIN — GUAIFENESIN 600 MG: 600 TABLET, EXTENDED RELEASE ORAL at 20:12

## 2020-05-13 RX ADMIN — IOHEXOL 100 ML: 350 INJECTION, SOLUTION INTRAVENOUS at 14:33

## 2020-05-13 RX ADMIN — HEPARIN SODIUM 5000 UNITS: 5000 INJECTION INTRAVENOUS; SUBCUTANEOUS at 21:15

## 2020-05-14 ENCOUNTER — APPOINTMENT (OUTPATIENT)
Dept: NON INVASIVE DIAGNOSTICS | Facility: HOSPITAL | Age: 60
DRG: 202 | End: 2020-05-14
Payer: COMMERCIAL

## 2020-05-14 ENCOUNTER — APPOINTMENT (OUTPATIENT)
Dept: NUCLEAR MEDICINE | Facility: HOSPITAL | Age: 60
DRG: 202 | End: 2020-05-14
Payer: COMMERCIAL

## 2020-05-14 PROBLEM — R94.39 ABNORMAL STRESS TEST: Status: ACTIVE | Noted: 2020-05-14

## 2020-05-14 LAB
ANION GAP SERPL CALCULATED.3IONS-SCNC: 6 MMOL/L (ref 4–13)
ATRIAL RATE: 60 BPM
ATRIAL RATE: 67 BPM
ATRIAL RATE: 76 BPM
BUN SERPL-MCNC: 11 MG/DL (ref 5–25)
CALCIUM SERPL-MCNC: 8.7 MG/DL (ref 8.3–10.1)
CHLORIDE SERPL-SCNC: 102 MMOL/L (ref 100–108)
CHOLEST SERPL-MCNC: 154 MG/DL (ref 50–200)
CO2 SERPL-SCNC: 30 MMOL/L (ref 21–32)
CREAT SERPL-MCNC: 1.16 MG/DL (ref 0.6–1.3)
ERYTHROCYTE [DISTWIDTH] IN BLOOD BY AUTOMATED COUNT: 15.9 % (ref 11.6–15.1)
GFR SERPL CREATININE-BSD FRML MDRD: 68 ML/MIN/1.73SQ M
GLUCOSE SERPL-MCNC: 99 MG/DL (ref 65–140)
HCT VFR BLD AUTO: 42.7 % (ref 36.5–49.3)
HDLC SERPL-MCNC: 103 MG/DL
HGB BLD-MCNC: 14.3 G/DL (ref 12–17)
LDLC SERPL CALC-MCNC: 37 MG/DL (ref 0–100)
MAGNESIUM SERPL-MCNC: 1.2 MG/DL (ref 1.6–2.6)
MCH RBC QN AUTO: 34.8 PG (ref 26.8–34.3)
MCHC RBC AUTO-ENTMCNC: 33.5 G/DL (ref 31.4–37.4)
MCV RBC AUTO: 104 FL (ref 82–98)
NONHDLC SERPL-MCNC: 51 MG/DL
P AXIS: 119 DEGREES
PHOSPHATE SERPL-MCNC: 3.6 MG/DL (ref 2.3–4.1)
PLATELET # BLD AUTO: 82 THOUSANDS/UL (ref 149–390)
PMV BLD AUTO: 13.5 FL (ref 8.9–12.7)
POTASSIUM SERPL-SCNC: 3.8 MMOL/L (ref 3.5–5.3)
PR INTERVAL: 134 MS
PR INTERVAL: 146 MS
PR INTERVAL: 152 MS
QRS AXIS: -41 DEGREES
QRS AXIS: -48 DEGREES
QRS AXIS: -55 DEGREES
QRSD INTERVAL: 142 MS
QRSD INTERVAL: 148 MS
QRSD INTERVAL: 150 MS
QT INTERVAL: 450 MS
QT INTERVAL: 478 MS
QT INTERVAL: 502 MS
QTC INTERVAL: 502 MS
QTC INTERVAL: 505 MS
QTC INTERVAL: 506 MS
RBC # BLD AUTO: 4.11 MILLION/UL (ref 3.88–5.62)
SODIUM SERPL-SCNC: 138 MMOL/L (ref 136–145)
T WAVE AXIS: -20 DEGREES
T WAVE AXIS: -21 DEGREES
T WAVE AXIS: 5 DEGREES
TRIGL SERPL-MCNC: 71 MG/DL
TROPONIN I SERPL-MCNC: 0.02 NG/ML
VENTRICULAR RATE: 60 BPM
VENTRICULAR RATE: 67 BPM
VENTRICULAR RATE: 76 BPM
WBC # BLD AUTO: 7.3 THOUSAND/UL (ref 4.31–10.16)

## 2020-05-14 PROCEDURE — 78452 HT MUSCLE IMAGE SPECT MULT: CPT | Performed by: INTERNAL MEDICINE

## 2020-05-14 PROCEDURE — 93010 ELECTROCARDIOGRAM REPORT: CPT | Performed by: INTERNAL MEDICINE

## 2020-05-14 PROCEDURE — 93016 CV STRESS TEST SUPVJ ONLY: CPT | Performed by: INTERNAL MEDICINE

## 2020-05-14 PROCEDURE — 80048 BASIC METABOLIC PNL TOTAL CA: CPT | Performed by: HOSPITALIST

## 2020-05-14 PROCEDURE — 94760 N-INVAS EAR/PLS OXIMETRY 1: CPT

## 2020-05-14 PROCEDURE — 85027 COMPLETE CBC AUTOMATED: CPT | Performed by: HOSPITALIST

## 2020-05-14 PROCEDURE — 83735 ASSAY OF MAGNESIUM: CPT | Performed by: HOSPITALIST

## 2020-05-14 PROCEDURE — 99255 IP/OBS CONSLTJ NEW/EST HI 80: CPT | Performed by: INTERNAL MEDICINE

## 2020-05-14 PROCEDURE — 93005 ELECTROCARDIOGRAM TRACING: CPT

## 2020-05-14 PROCEDURE — 78452 HT MUSCLE IMAGE SPECT MULT: CPT

## 2020-05-14 PROCEDURE — 93017 CV STRESS TEST TRACING ONLY: CPT

## 2020-05-14 PROCEDURE — 84484 ASSAY OF TROPONIN QUANT: CPT | Performed by: INTERNAL MEDICINE

## 2020-05-14 PROCEDURE — 93306 TTE W/DOPPLER COMPLETE: CPT

## 2020-05-14 PROCEDURE — 84100 ASSAY OF PHOSPHORUS: CPT | Performed by: HOSPITALIST

## 2020-05-14 PROCEDURE — 94640 AIRWAY INHALATION TREATMENT: CPT

## 2020-05-14 PROCEDURE — 94762 N-INVAS EAR/PLS OXIMTRY CONT: CPT

## 2020-05-14 PROCEDURE — 80061 LIPID PANEL: CPT | Performed by: HOSPITALIST

## 2020-05-14 PROCEDURE — 93306 TTE W/DOPPLER COMPLETE: CPT | Performed by: INTERNAL MEDICINE

## 2020-05-14 PROCEDURE — A9502 TC99M TETROFOSMIN: HCPCS

## 2020-05-14 PROCEDURE — 99232 SBSQ HOSP IP/OBS MODERATE 35: CPT | Performed by: HOSPITALIST

## 2020-05-14 PROCEDURE — 93018 CV STRESS TEST I&R ONLY: CPT | Performed by: INTERNAL MEDICINE

## 2020-05-14 RX ORDER — ALBUTEROL SULFATE 90 UG/1
2 AEROSOL, METERED RESPIRATORY (INHALATION) EVERY 4 HOURS PRN
Status: DISCONTINUED | OUTPATIENT
Start: 2020-05-14 | End: 2020-05-17 | Stop reason: HOSPADM

## 2020-05-14 RX ORDER — DOXYCYCLINE HYCLATE 100 MG/1
100 CAPSULE ORAL EVERY 12 HOURS SCHEDULED
Status: DISCONTINUED | OUTPATIENT
Start: 2020-05-15 | End: 2020-05-17 | Stop reason: HOSPADM

## 2020-05-14 RX ORDER — MAGNESIUM SULFATE HEPTAHYDRATE 40 MG/ML
4 INJECTION, SOLUTION INTRAVENOUS ONCE
Status: COMPLETED | OUTPATIENT
Start: 2020-05-14 | End: 2020-05-14

## 2020-05-14 RX ADMIN — REGADENOSON 0.4 MG: 0.08 INJECTION, SOLUTION INTRAVENOUS at 10:11

## 2020-05-14 RX ADMIN — ACETAMINOPHEN 650 MG: 325 TABLET, FILM COATED ORAL at 19:04

## 2020-05-14 RX ADMIN — LEVOTHYROXINE SODIUM 25 MCG: 25 TABLET ORAL at 05:52

## 2020-05-14 RX ADMIN — PANTOPRAZOLE SODIUM 40 MG: 40 TABLET, DELAYED RELEASE ORAL at 05:52

## 2020-05-14 RX ADMIN — HYDRALAZINE HYDROCHLORIDE 10 MG: 20 INJECTION INTRAMUSCULAR; INTRAVENOUS at 18:12

## 2020-05-14 RX ADMIN — PANTOPRAZOLE SODIUM 40 MG: 40 TABLET, DELAYED RELEASE ORAL at 17:18

## 2020-05-14 RX ADMIN — ONDANSETRON 4 MG: 2 INJECTION INTRAMUSCULAR; INTRAVENOUS at 19:04

## 2020-05-14 RX ADMIN — ACETAMINOPHEN 650 MG: 325 TABLET, FILM COATED ORAL at 10:59

## 2020-05-14 RX ADMIN — FORMOTEROL FUMARATE DIHYDRATE 20 MCG: 20 SOLUTION RESPIRATORY (INHALATION) at 07:36

## 2020-05-14 RX ADMIN — PREGABALIN 25 MG: 25 CAPSULE ORAL at 18:08

## 2020-05-14 RX ADMIN — MAGNESIUM SULFATE HEPTAHYDRATE 4 G: 40 INJECTION, SOLUTION INTRAVENOUS at 10:37

## 2020-05-14 RX ADMIN — NEBIVOLOL HYDROCHLORIDE 5 MG: 5 TABLET ORAL at 08:14

## 2020-05-14 RX ADMIN — PREGABALIN 25 MG: 25 CAPSULE ORAL at 08:18

## 2020-05-14 RX ADMIN — HEPARIN SODIUM 5000 UNITS: 5000 INJECTION INTRAVENOUS; SUBCUTANEOUS at 21:15

## 2020-05-14 RX ADMIN — LORATADINE 10 MG: 10 TABLET ORAL at 08:15

## 2020-05-14 RX ADMIN — GUAIFENESIN 600 MG: 600 TABLET, EXTENDED RELEASE ORAL at 08:15

## 2020-05-14 RX ADMIN — HEPARIN SODIUM 5000 UNITS: 5000 INJECTION INTRAVENOUS; SUBCUTANEOUS at 05:53

## 2020-05-14 RX ADMIN — HEPARIN SODIUM 5000 UNITS: 5000 INJECTION INTRAVENOUS; SUBCUTANEOUS at 14:52

## 2020-05-14 RX ADMIN — LISINOPRIL 10 MG: 10 TABLET ORAL at 08:14

## 2020-05-14 RX ADMIN — PRAVASTATIN SODIUM 40 MG: 40 TABLET ORAL at 17:18

## 2020-05-15 LAB
B PARAP IS1001 DNA NPH QL NAA+NON-PROBE: NOT DETECTED
B PERT.PT PRMT NPH QL NAA+NON-PROBE: NOT DETECTED
C PNEUM DNA NPH QL NAA+NON-PROBE: NOT DETECTED
CHEST PAIN STATEMENT: NORMAL
FLUAV RNA NPH QL NAA+NON-PROBE: NOT DETECTED
FLUBV RNA NPH QL NAA+NON-PROBE: NOT DETECTED
HADV DNA NPH QL NAA+NON-PROBE: NOT DETECTED
HMPV RNA NPH QL NAA+NON-PROBE: NOT DETECTED
HPIV 1+2+3+4 RNA NPH QL NAA+NON-PROBE: NOT DETECTED
HPIV 1+2+3+4 RNA NPH QL NAA+NON-PROBE: NOT DETECTED
M PNEUMO DNA NPH QL NAA+NON-PROBE: NOT DETECTED
MAX DIASTOLIC BP: 112 MMHG
MAX HEART RATE: 112 BPM
MAX PREDICTED HEART RATE: 160 BPM
MAX. SYSTOLIC BP: 181 MMHG
PROTOCOL NAME: NORMAL
REASON FOR TERMINATION: NORMAL
RSV RNA NPH QL NAA+NON-PROBE: NOT DETECTED
RV+EV RNA NPH QL NAA+NON-PROBE: NOT DETECTED
TARGET HR FORMULA: NORMAL
TEST INDICATION: NORMAL
TIME IN EXERCISE PHASE: NORMAL

## 2020-05-15 PROCEDURE — 99232 SBSQ HOSP IP/OBS MODERATE 35: CPT | Performed by: HOSPITALIST

## 2020-05-15 PROCEDURE — 87205 SMEAR GRAM STAIN: CPT | Performed by: INTERNAL MEDICINE

## 2020-05-15 PROCEDURE — 87486 CHLMYD PNEUM DNA AMP PROBE: CPT | Performed by: NURSE PRACTITIONER

## 2020-05-15 PROCEDURE — 87070 CULTURE OTHR SPECIMN AEROBIC: CPT | Performed by: INTERNAL MEDICINE

## 2020-05-15 PROCEDURE — 87581 M.PNEUMON DNA AMP PROBE: CPT | Performed by: NURSE PRACTITIONER

## 2020-05-15 PROCEDURE — 99233 SBSQ HOSP IP/OBS HIGH 50: CPT | Performed by: INTERNAL MEDICINE

## 2020-05-15 PROCEDURE — 87633 RESP VIRUS 12-25 TARGETS: CPT | Performed by: NURSE PRACTITIONER

## 2020-05-15 PROCEDURE — 87798 DETECT AGENT NOS DNA AMP: CPT | Performed by: NURSE PRACTITIONER

## 2020-05-15 RX ORDER — LISINOPRIL 20 MG/1
20 TABLET ORAL DAILY
Status: DISCONTINUED | OUTPATIENT
Start: 2020-05-15 | End: 2020-05-17

## 2020-05-15 RX ORDER — FLUTICASONE PROPIONATE 50 MCG
2 SPRAY, SUSPENSION (ML) NASAL 2 TIMES DAILY
Status: DISCONTINUED | OUTPATIENT
Start: 2020-05-15 | End: 2020-05-17 | Stop reason: HOSPADM

## 2020-05-15 RX ORDER — TRAZODONE HYDROCHLORIDE 50 MG/1
50 TABLET ORAL
Status: DISCONTINUED | OUTPATIENT
Start: 2020-05-15 | End: 2020-05-17 | Stop reason: HOSPADM

## 2020-05-15 RX ADMIN — PRAVASTATIN SODIUM 40 MG: 40 TABLET ORAL at 17:17

## 2020-05-15 RX ADMIN — LISINOPRIL 20 MG: 20 TABLET ORAL at 08:14

## 2020-05-15 RX ADMIN — DOXYCYCLINE 100 MG: 100 CAPSULE ORAL at 21:22

## 2020-05-15 RX ADMIN — HEPARIN SODIUM 5000 UNITS: 5000 INJECTION INTRAVENOUS; SUBCUTANEOUS at 21:22

## 2020-05-15 RX ADMIN — HYDRALAZINE HYDROCHLORIDE 10 MG: 20 INJECTION INTRAMUSCULAR; INTRAVENOUS at 17:17

## 2020-05-15 RX ADMIN — HEPARIN SODIUM 5000 UNITS: 5000 INJECTION INTRAVENOUS; SUBCUTANEOUS at 14:32

## 2020-05-15 RX ADMIN — LORATADINE 10 MG: 10 TABLET ORAL at 08:14

## 2020-05-15 RX ADMIN — FLUTICASONE PROPIONATE 2 SPRAY: 50 SPRAY, METERED NASAL at 17:22

## 2020-05-15 RX ADMIN — ONDANSETRON 4 MG: 2 INJECTION INTRAMUSCULAR; INTRAVENOUS at 07:35

## 2020-05-15 RX ADMIN — HEPARIN SODIUM 5000 UNITS: 5000 INJECTION INTRAVENOUS; SUBCUTANEOUS at 04:58

## 2020-05-15 RX ADMIN — NEBIVOLOL HYDROCHLORIDE 5 MG: 5 TABLET ORAL at 08:14

## 2020-05-15 RX ADMIN — BENZONATATE 200 MG: 100 CAPSULE ORAL at 07:36

## 2020-05-15 RX ADMIN — PANTOPRAZOLE SODIUM 40 MG: 40 TABLET, DELAYED RELEASE ORAL at 04:58

## 2020-05-15 RX ADMIN — LEVOTHYROXINE SODIUM 25 MCG: 25 TABLET ORAL at 04:58

## 2020-05-15 RX ADMIN — PREGABALIN 25 MG: 25 CAPSULE ORAL at 17:17

## 2020-05-15 RX ADMIN — ACETAMINOPHEN 650 MG: 325 TABLET, FILM COATED ORAL at 07:35

## 2020-05-15 RX ADMIN — PREGABALIN 25 MG: 25 CAPSULE ORAL at 08:14

## 2020-05-15 RX ADMIN — TRAZODONE HYDROCHLORIDE 50 MG: 50 TABLET ORAL at 22:30

## 2020-05-15 RX ADMIN — PANTOPRAZOLE SODIUM 40 MG: 40 TABLET, DELAYED RELEASE ORAL at 17:17

## 2020-05-15 RX ADMIN — DOXYCYCLINE 100 MG: 100 CAPSULE ORAL at 08:14

## 2020-05-15 RX ADMIN — FLUTICASONE PROPIONATE 2 SPRAY: 50 SPRAY, METERED NASAL at 12:01

## 2020-05-16 ENCOUNTER — DOCUMENTATION (OUTPATIENT)
Dept: CCU | Facility: HOSPITAL | Age: 60
End: 2020-05-16

## 2020-05-16 LAB
ANION GAP SERPL CALCULATED.3IONS-SCNC: 10 MMOL/L (ref 4–13)
BASOPHILS # BLD AUTO: 0.04 THOUSANDS/ΜL (ref 0–0.1)
BASOPHILS NFR BLD AUTO: 1 % (ref 0–1)
BUN SERPL-MCNC: 15 MG/DL (ref 5–25)
CALCIUM SERPL-MCNC: 8.7 MG/DL (ref 8.3–10.1)
CHLORIDE SERPL-SCNC: 101 MMOL/L (ref 100–108)
CO2 SERPL-SCNC: 26 MMOL/L (ref 21–32)
CREAT SERPL-MCNC: 1.01 MG/DL (ref 0.6–1.3)
D DIMER PPP FEU-MCNC: 0.42 UG/ML FEU
EOSINOPHIL # BLD AUTO: 0.99 THOUSAND/ΜL (ref 0–0.61)
EOSINOPHIL NFR BLD AUTO: 18 % (ref 0–6)
ERYTHROCYTE [DISTWIDTH] IN BLOOD BY AUTOMATED COUNT: 16.7 % (ref 11.6–15.1)
GFR SERPL CREATININE-BSD FRML MDRD: 80 ML/MIN/1.73SQ M
GLUCOSE SERPL-MCNC: 99 MG/DL (ref 65–140)
HCT VFR BLD AUTO: 43.3 % (ref 36.5–49.3)
HGB BLD-MCNC: 14.7 G/DL (ref 12–17)
IMM GRANULOCYTES # BLD AUTO: 0.02 THOUSAND/UL (ref 0–0.2)
IMM GRANULOCYTES NFR BLD AUTO: 0 % (ref 0–2)
LYMPHOCYTES # BLD AUTO: 1.32 THOUSANDS/ΜL (ref 0.6–4.47)
LYMPHOCYTES NFR BLD AUTO: 23 % (ref 14–44)
MAGNESIUM SERPL-MCNC: 2.1 MG/DL (ref 1.6–2.6)
MCH RBC QN AUTO: 35.3 PG (ref 26.8–34.3)
MCHC RBC AUTO-ENTMCNC: 33.9 G/DL (ref 31.4–37.4)
MCV RBC AUTO: 104 FL (ref 82–98)
MONOCYTES # BLD AUTO: 0.73 THOUSAND/ΜL (ref 0.17–1.22)
MONOCYTES NFR BLD AUTO: 13 % (ref 4–12)
NEUTROPHILS # BLD AUTO: 2.54 THOUSANDS/ΜL (ref 1.85–7.62)
NEUTS SEG NFR BLD AUTO: 45 % (ref 43–75)
NRBC BLD AUTO-RTO: 0 /100 WBCS
PLATELET # BLD AUTO: 87 THOUSANDS/UL (ref 149–390)
PMV BLD AUTO: 13.2 FL (ref 8.9–12.7)
POTASSIUM SERPL-SCNC: 3.5 MMOL/L (ref 3.5–5.3)
PROCALCITONIN SERPL-MCNC: 0.18 NG/ML
RBC # BLD AUTO: 4.17 MILLION/UL (ref 3.88–5.62)
SODIUM SERPL-SCNC: 137 MMOL/L (ref 136–145)
WBC # BLD AUTO: 5.64 THOUSAND/UL (ref 4.31–10.16)

## 2020-05-16 PROCEDURE — 85025 COMPLETE CBC W/AUTO DIFF WBC: CPT | Performed by: INTERNAL MEDICINE

## 2020-05-16 PROCEDURE — 99233 SBSQ HOSP IP/OBS HIGH 50: CPT | Performed by: INTERNAL MEDICINE

## 2020-05-16 PROCEDURE — 94668 MNPJ CHEST WALL SBSQ: CPT

## 2020-05-16 PROCEDURE — 99232 SBSQ HOSP IP/OBS MODERATE 35: CPT | Performed by: HOSPITALIST

## 2020-05-16 PROCEDURE — 83735 ASSAY OF MAGNESIUM: CPT | Performed by: INTERNAL MEDICINE

## 2020-05-16 PROCEDURE — 94664 DEMO&/EVAL PT USE INHALER: CPT

## 2020-05-16 PROCEDURE — 84145 PROCALCITONIN (PCT): CPT | Performed by: INTERNAL MEDICINE

## 2020-05-16 PROCEDURE — 80048 BASIC METABOLIC PNL TOTAL CA: CPT | Performed by: INTERNAL MEDICINE

## 2020-05-16 PROCEDURE — 85379 FIBRIN DEGRADATION QUANT: CPT | Performed by: NURSE PRACTITIONER

## 2020-05-16 RX ORDER — FUROSEMIDE 20 MG/1
20 TABLET ORAL DAILY
Status: DISCONTINUED | OUTPATIENT
Start: 2020-05-16 | End: 2020-05-17 | Stop reason: HOSPADM

## 2020-05-16 RX ORDER — MUSCLE RUB CREAM 100; 150 MG/G; MG/G
CREAM TOPICAL 4 TIMES DAILY PRN
Status: DISCONTINUED | OUTPATIENT
Start: 2020-05-16 | End: 2020-05-17 | Stop reason: HOSPADM

## 2020-05-16 RX ORDER — HEPARIN SODIUM 5000 [USP'U]/ML
5000 INJECTION, SOLUTION INTRAVENOUS; SUBCUTANEOUS EVERY 8 HOURS SCHEDULED
Status: DISCONTINUED | OUTPATIENT
Start: 2020-05-17 | End: 2020-05-17 | Stop reason: HOSPADM

## 2020-05-16 RX ORDER — NEBIVOLOL 10 MG/1
10 TABLET ORAL DAILY
Status: DISCONTINUED | OUTPATIENT
Start: 2020-05-16 | End: 2020-05-17 | Stop reason: HOSPADM

## 2020-05-16 RX ADMIN — LEVOTHYROXINE SODIUM 25 MCG: 25 TABLET ORAL at 05:37

## 2020-05-16 RX ADMIN — PRAVASTATIN SODIUM 40 MG: 40 TABLET ORAL at 17:28

## 2020-05-16 RX ADMIN — MENTHOL, METHYL SALICYLATE: 10; 15 CREAM TOPICAL at 17:30

## 2020-05-16 RX ADMIN — FLUTICASONE PROPIONATE 2 SPRAY: 50 SPRAY, METERED NASAL at 17:28

## 2020-05-16 RX ADMIN — ACETAMINOPHEN 650 MG: 325 TABLET, FILM COATED ORAL at 19:29

## 2020-05-16 RX ADMIN — LISINOPRIL 20 MG: 20 TABLET ORAL at 08:04

## 2020-05-16 RX ADMIN — PANTOPRAZOLE SODIUM 40 MG: 40 TABLET, DELAYED RELEASE ORAL at 05:37

## 2020-05-16 RX ADMIN — DOXYCYCLINE 100 MG: 100 CAPSULE ORAL at 21:19

## 2020-05-16 RX ADMIN — DOXYCYCLINE 100 MG: 100 CAPSULE ORAL at 08:04

## 2020-05-16 RX ADMIN — TRAZODONE HYDROCHLORIDE 50 MG: 50 TABLET ORAL at 21:19

## 2020-05-16 RX ADMIN — NEBIVOLOL HYDROCHLORIDE 10 MG: 10 TABLET ORAL at 08:04

## 2020-05-16 RX ADMIN — PREGABALIN 25 MG: 25 CAPSULE ORAL at 17:28

## 2020-05-16 RX ADMIN — PANTOPRAZOLE SODIUM 40 MG: 40 TABLET, DELAYED RELEASE ORAL at 17:28

## 2020-05-16 RX ADMIN — LORATADINE 10 MG: 10 TABLET ORAL at 08:04

## 2020-05-16 RX ADMIN — HEPARIN SODIUM 5000 UNITS: 5000 INJECTION INTRAVENOUS; SUBCUTANEOUS at 05:37

## 2020-05-16 RX ADMIN — PREGABALIN 25 MG: 25 CAPSULE ORAL at 08:04

## 2020-05-16 RX ADMIN — MENTHOL, METHYL SALICYLATE 1 APPLICATION: 10; 15 CREAM TOPICAL at 08:43

## 2020-05-16 RX ADMIN — FUROSEMIDE 20 MG: 20 TABLET ORAL at 09:28

## 2020-05-16 RX ADMIN — FLUTICASONE PROPIONATE 2 SPRAY: 50 SPRAY, METERED NASAL at 08:05

## 2020-05-17 ENCOUNTER — HOSPITAL ENCOUNTER (INPATIENT)
Facility: HOSPITAL | Age: 60
LOS: 2 days | Discharge: HOME | DRG: 287 | End: 2020-05-19
Attending: INTERNAL MEDICINE | Admitting: GENERAL PRACTICE
Payer: COMMERCIAL

## 2020-05-17 VITALS
HEIGHT: 74 IN | SYSTOLIC BLOOD PRESSURE: 142 MMHG | RESPIRATION RATE: 16 BRPM | BODY MASS INDEX: 39.44 KG/M2 | OXYGEN SATURATION: 99 % | DIASTOLIC BLOOD PRESSURE: 92 MMHG | HEART RATE: 62 BPM | WEIGHT: 307.32 LBS | TEMPERATURE: 97.7 F

## 2020-05-17 DIAGNOSIS — R06.02 SHORTNESS OF BREATH: ICD-10-CM

## 2020-05-17 DIAGNOSIS — E78.2 MIXED HYPERLIPIDEMIA: ICD-10-CM

## 2020-05-17 DIAGNOSIS — I10 ESSENTIAL HYPERTENSION: Chronic | ICD-10-CM

## 2020-05-17 DIAGNOSIS — R07.9 CHEST PAIN, UNSPECIFIED TYPE: Primary | ICD-10-CM

## 2020-05-17 DIAGNOSIS — G47.33 OBSTRUCTIVE SLEEP APNEA: Chronic | ICD-10-CM

## 2020-05-17 PROBLEM — I51.89 DIASTOLIC DYSFUNCTION: Status: ACTIVE | Noted: 2020-05-17

## 2020-05-17 PROBLEM — I16.0 HYPERTENSIVE URGENCY: Status: RESOLVED | Noted: 2020-05-13 | Resolved: 2020-05-17

## 2020-05-17 LAB
BACTERIA SPT RESP CULT: ABNORMAL
BASOPHILS # BLD AUTO: 0.04 THOUSANDS/ΜL (ref 0–0.1)
BASOPHILS NFR BLD AUTO: 1 % (ref 0–1)
EOSINOPHIL # BLD AUTO: 1.34 THOUSAND/ΜL (ref 0–0.61)
EOSINOPHIL NFR BLD AUTO: 19 % (ref 0–6)
ERYTHROCYTE [DISTWIDTH] IN BLOOD BY AUTOMATED COUNT: 16.7 % (ref 11.6–15.1)
GRAM STN SPEC: ABNORMAL
HCT VFR BLD AUTO: 44.9 % (ref 36.5–49.3)
HGB BLD-MCNC: 14.7 G/DL (ref 12–17)
IMM GRANULOCYTES # BLD AUTO: 0.02 THOUSAND/UL (ref 0–0.2)
IMM GRANULOCYTES NFR BLD AUTO: 0 % (ref 0–2)
LYMPHOCYTES # BLD AUTO: 1.89 THOUSANDS/ΜL (ref 0.6–4.47)
LYMPHOCYTES NFR BLD AUTO: 27 % (ref 14–44)
MCH RBC QN AUTO: 35 PG (ref 26.8–34.3)
MCHC RBC AUTO-ENTMCNC: 32.7 G/DL (ref 31.4–37.4)
MCV RBC AUTO: 107 FL (ref 82–98)
MONOCYTES # BLD AUTO: 1.05 THOUSAND/ΜL (ref 0.17–1.22)
MONOCYTES NFR BLD AUTO: 15 % (ref 4–12)
NEUTROPHILS # BLD AUTO: 2.77 THOUSANDS/ΜL (ref 1.85–7.62)
NEUTS SEG NFR BLD AUTO: 38 % (ref 43–75)
NRBC BLD AUTO-RTO: 0 /100 WBCS
PLATELET # BLD AUTO: 94 THOUSANDS/UL (ref 149–390)
PMV BLD AUTO: 13.1 FL (ref 8.9–12.7)
RBC # BLD AUTO: 4.2 MILLION/UL (ref 3.88–5.62)
WBC # BLD AUTO: 7.11 THOUSAND/UL (ref 4.31–10.16)

## 2020-05-17 PROCEDURE — 85025 COMPLETE CBC W/AUTO DIFF WBC: CPT | Performed by: INTERNAL MEDICINE

## 2020-05-17 PROCEDURE — 99223 1ST HOSP IP/OBS HIGH 75: CPT | Performed by: INTERNAL MEDICINE

## 2020-05-17 PROCEDURE — 99222 1ST HOSP IP/OBS MODERATE 55: CPT | Performed by: INTERNAL MEDICINE

## 2020-05-17 PROCEDURE — 99239 HOSP IP/OBS DSCHRG MGMT >30: CPT | Performed by: HOSPITALIST

## 2020-05-17 PROCEDURE — 99232 SBSQ HOSP IP/OBS MODERATE 35: CPT | Performed by: NURSE PRACTITIONER

## 2020-05-17 RX ORDER — ASPIRIN 81 MG/1
324 TABLET, CHEWABLE ORAL ONCE
Status: CANCELLED | OUTPATIENT
Start: 2020-05-18

## 2020-05-17 RX ORDER — HEPARIN SODIUM 5000 [USP'U]/ML
5000 INJECTION, SOLUTION INTRAVENOUS; SUBCUTANEOUS EVERY 8 HOURS SCHEDULED
Status: CANCELLED | OUTPATIENT
Start: 2020-05-17

## 2020-05-17 RX ORDER — ASPIRIN 81 MG/1
324 TABLET, CHEWABLE ORAL ONCE
Status: COMPLETED | OUTPATIENT
Start: 2020-05-17 | End: 2020-05-18

## 2020-05-17 RX ORDER — AMLODIPINE BESYLATE 5 MG/1
5 TABLET ORAL DAILY
Status: DISCONTINUED | OUTPATIENT
Start: 2020-05-17 | End: 2020-05-17 | Stop reason: HOSPADM

## 2020-05-17 RX ORDER — MUSCLE RUB CREAM 100; 150 MG/G; MG/G
CREAM TOPICAL 4 TIMES DAILY PRN
Status: DISCONTINUED | OUTPATIENT
Start: 2020-05-17 | End: 2020-05-19 | Stop reason: HOSPADM

## 2020-05-17 RX ORDER — ONDANSETRON 2 MG/ML
4 INJECTION INTRAMUSCULAR; INTRAVENOUS EVERY 6 HOURS PRN
Status: CANCELLED | OUTPATIENT
Start: 2020-05-17

## 2020-05-17 RX ORDER — MUSCLE RUB CREAM 100; 150 MG/G; MG/G
CREAM TOPICAL 4 TIMES DAILY PRN
Status: CANCELLED | OUTPATIENT
Start: 2020-05-17

## 2020-05-17 RX ORDER — FUROSEMIDE 20 MG/1
20 TABLET ORAL DAILY
Status: DISCONTINUED | OUTPATIENT
Start: 2020-05-18 | End: 2020-05-19 | Stop reason: HOSPADM

## 2020-05-17 RX ORDER — TRAZODONE HYDROCHLORIDE 50 MG/1
50 TABLET ORAL
Status: CANCELLED | OUTPATIENT
Start: 2020-05-17

## 2020-05-17 RX ORDER — PRAVASTATIN SODIUM 40 MG
40 TABLET ORAL
Status: DISCONTINUED | OUTPATIENT
Start: 2020-05-17 | End: 2020-05-19 | Stop reason: HOSPADM

## 2020-05-17 RX ORDER — LEVOTHYROXINE SODIUM 0.03 MG/1
25 TABLET ORAL
Status: DISCONTINUED | OUTPATIENT
Start: 2020-05-18 | End: 2020-05-19 | Stop reason: HOSPADM

## 2020-05-17 RX ORDER — LISINOPRIL 20 MG/1
20 TABLET ORAL 2 TIMES DAILY
Status: DISCONTINUED | OUTPATIENT
Start: 2020-05-17 | End: 2020-05-17

## 2020-05-17 RX ORDER — BENZONATATE 100 MG/1
200 CAPSULE ORAL 3 TIMES DAILY PRN
Status: DISCONTINUED | OUTPATIENT
Start: 2020-05-17 | End: 2020-05-19 | Stop reason: HOSPADM

## 2020-05-17 RX ORDER — PRAVASTATIN SODIUM 40 MG
40 TABLET ORAL
Status: CANCELLED | OUTPATIENT
Start: 2020-05-17

## 2020-05-17 RX ORDER — SODIUM CHLORIDE 9 MG/ML
75 INJECTION, SOLUTION INTRAVENOUS CONTINUOUS
Status: DISCONTINUED | OUTPATIENT
Start: 2020-05-17 | End: 2020-05-19 | Stop reason: HOSPADM

## 2020-05-17 RX ORDER — DOCUSATE SODIUM 100 MG/1
100 CAPSULE, LIQUID FILLED ORAL DAILY PRN
Status: CANCELLED | OUTPATIENT
Start: 2020-05-17

## 2020-05-17 RX ORDER — AMLODIPINE BESYLATE 5 MG/1
5 TABLET ORAL DAILY
Status: CANCELLED | OUTPATIENT
Start: 2020-05-17

## 2020-05-17 RX ORDER — FLUTICASONE PROPIONATE 50 MCG
2 SPRAY, SUSPENSION (ML) NASAL 2 TIMES DAILY
Status: DISCONTINUED | OUTPATIENT
Start: 2020-05-17 | End: 2020-05-19 | Stop reason: HOSPADM

## 2020-05-17 RX ORDER — FLUTICASONE PROPIONATE 50 MCG
2 SPRAY, SUSPENSION (ML) NASAL 2 TIMES DAILY
Status: CANCELLED | OUTPATIENT
Start: 2020-05-17

## 2020-05-17 RX ORDER — DOXYCYCLINE HYCLATE 100 MG/1
100 CAPSULE ORAL EVERY 12 HOURS SCHEDULED
Status: DISCONTINUED | OUTPATIENT
Start: 2020-05-17 | End: 2020-05-19 | Stop reason: HOSPADM

## 2020-05-17 RX ORDER — BENZONATATE 100 MG/1
200 CAPSULE ORAL 3 TIMES DAILY PRN
Status: CANCELLED | OUTPATIENT
Start: 2020-05-17

## 2020-05-17 RX ORDER — SODIUM CHLORIDE 9 MG/ML
75 INJECTION, SOLUTION INTRAVENOUS CONTINUOUS
Status: CANCELLED | OUTPATIENT
Start: 2020-05-18

## 2020-05-17 RX ORDER — HYDRALAZINE HYDROCHLORIDE 20 MG/ML
10 INJECTION INTRAMUSCULAR; INTRAVENOUS EVERY 6 HOURS PRN
Status: CANCELLED | OUTPATIENT
Start: 2020-05-17

## 2020-05-17 RX ORDER — AMLODIPINE BESYLATE 5 MG/1
5 TABLET ORAL DAILY
Status: DISCONTINUED | OUTPATIENT
Start: 2020-05-17 | End: 2020-05-19

## 2020-05-17 RX ORDER — PANTOPRAZOLE SODIUM 40 MG/1
40 TABLET, DELAYED RELEASE ORAL
Status: DISCONTINUED | OUTPATIENT
Start: 2020-05-17 | End: 2020-05-19 | Stop reason: HOSPADM

## 2020-05-17 RX ORDER — DOCUSATE SODIUM 100 MG/1
100 CAPSULE, LIQUID FILLED ORAL DAILY PRN
Status: DISCONTINUED | OUTPATIENT
Start: 2020-05-17 | End: 2020-05-19 | Stop reason: HOSPADM

## 2020-05-17 RX ORDER — NEBIVOLOL 10 MG/1
10 TABLET ORAL DAILY
Status: CANCELLED | OUTPATIENT
Start: 2020-05-18

## 2020-05-17 RX ORDER — ACETAMINOPHEN 325 MG/1
650 TABLET ORAL EVERY 4 HOURS PRN
Status: CANCELLED | OUTPATIENT
Start: 2020-05-17

## 2020-05-17 RX ORDER — LORATADINE 10 MG/1
10 TABLET ORAL DAILY
Status: DISCONTINUED | OUTPATIENT
Start: 2020-05-18 | End: 2020-05-19 | Stop reason: HOSPADM

## 2020-05-17 RX ORDER — LEVOTHYROXINE SODIUM 0.03 MG/1
25 TABLET ORAL
Status: CANCELLED | OUTPATIENT
Start: 2020-05-18

## 2020-05-17 RX ORDER — PREGABALIN 25 MG/1
25 CAPSULE ORAL 2 TIMES DAILY
Status: CANCELLED | OUTPATIENT
Start: 2020-05-17

## 2020-05-17 RX ORDER — HEPARIN SODIUM 5000 [USP'U]/ML
5000 INJECTION, SOLUTION INTRAVENOUS; SUBCUTANEOUS EVERY 8 HOURS SCHEDULED
Status: DISCONTINUED | OUTPATIENT
Start: 2020-05-17 | End: 2020-05-19 | Stop reason: HOSPADM

## 2020-05-17 RX ORDER — DOXYCYCLINE HYCLATE 100 MG/1
100 CAPSULE ORAL EVERY 12 HOURS SCHEDULED
Status: CANCELLED | OUTPATIENT
Start: 2020-05-17 | End: 2020-05-25

## 2020-05-17 RX ORDER — HYDRALAZINE HYDROCHLORIDE 20 MG/ML
10 INJECTION INTRAMUSCULAR; INTRAVENOUS EVERY 6 HOURS PRN
Status: DISCONTINUED | OUTPATIENT
Start: 2020-05-17 | End: 2020-05-19 | Stop reason: HOSPADM

## 2020-05-17 RX ORDER — PREGABALIN 25 MG/1
25 CAPSULE ORAL 2 TIMES DAILY
Status: DISCONTINUED | OUTPATIENT
Start: 2020-05-17 | End: 2020-05-19 | Stop reason: HOSPADM

## 2020-05-17 RX ORDER — ALBUTEROL SULFATE 90 UG/1
2 AEROSOL, METERED RESPIRATORY (INHALATION) EVERY 4 HOURS PRN
Status: DISCONTINUED | OUTPATIENT
Start: 2020-05-17 | End: 2020-05-19 | Stop reason: HOSPADM

## 2020-05-17 RX ORDER — FUROSEMIDE 20 MG/1
20 TABLET ORAL DAILY
Status: CANCELLED | OUTPATIENT
Start: 2020-05-18

## 2020-05-17 RX ORDER — TRAZODONE HYDROCHLORIDE 50 MG/1
50 TABLET ORAL
Status: DISCONTINUED | OUTPATIENT
Start: 2020-05-17 | End: 2020-05-19 | Stop reason: HOSPADM

## 2020-05-17 RX ORDER — ONDANSETRON 2 MG/ML
4 INJECTION INTRAMUSCULAR; INTRAVENOUS EVERY 6 HOURS PRN
Status: DISCONTINUED | OUTPATIENT
Start: 2020-05-17 | End: 2020-05-19 | Stop reason: HOSPADM

## 2020-05-17 RX ORDER — ALBUTEROL SULFATE 90 UG/1
2 AEROSOL, METERED RESPIRATORY (INHALATION) EVERY 4 HOURS PRN
Status: CANCELLED | OUTPATIENT
Start: 2020-05-17

## 2020-05-17 RX ORDER — ACETAMINOPHEN 325 MG/1
650 TABLET ORAL EVERY 4 HOURS PRN
Status: DISCONTINUED | OUTPATIENT
Start: 2020-05-17 | End: 2020-05-19 | Stop reason: HOSPADM

## 2020-05-17 RX ORDER — NEBIVOLOL 10 MG/1
10 TABLET ORAL DAILY
Status: DISCONTINUED | OUTPATIENT
Start: 2020-05-18 | End: 2020-05-19 | Stop reason: HOSPADM

## 2020-05-17 RX ORDER — LORATADINE 10 MG/1
10 TABLET ORAL DAILY
Status: CANCELLED | OUTPATIENT
Start: 2020-05-18

## 2020-05-17 RX ORDER — PANTOPRAZOLE SODIUM 40 MG/1
40 TABLET, DELAYED RELEASE ORAL
Status: CANCELLED | OUTPATIENT
Start: 2020-05-17

## 2020-05-17 RX ADMIN — HYDRALAZINE HYDROCHLORIDE 10 MG: 20 INJECTION INTRAMUSCULAR; INTRAVENOUS at 23:04

## 2020-05-17 RX ADMIN — LISINOPRIL 20 MG: 20 TABLET ORAL at 09:47

## 2020-05-17 RX ADMIN — PRAVASTATIN SODIUM 40 MG: 40 TABLET ORAL at 16:29

## 2020-05-17 RX ADMIN — PREGABALIN 25 MG: 25 CAPSULE ORAL at 09:53

## 2020-05-17 RX ADMIN — PANTOPRAZOLE SODIUM 40 MG: 40 TABLET, DELAYED RELEASE ORAL at 16:29

## 2020-05-17 RX ADMIN — HEPARIN SODIUM 5000 UNITS: 5000 INJECTION INTRAVENOUS; SUBCUTANEOUS at 21:15

## 2020-05-17 RX ADMIN — PREGABALIN 25 MG: 25 CAPSULE ORAL at 18:57

## 2020-05-17 RX ADMIN — HEPARIN SODIUM 5000 UNITS: 5000 INJECTION INTRAVENOUS; SUBCUTANEOUS at 05:38

## 2020-05-17 RX ADMIN — TRAZODONE HYDROCHLORIDE 50 MG: 50 TABLET ORAL at 21:15

## 2020-05-17 RX ADMIN — DOXYCYCLINE 100 MG: 100 CAPSULE ORAL at 21:15

## 2020-05-17 RX ADMIN — FUROSEMIDE 20 MG: 20 TABLET ORAL at 09:47

## 2020-05-17 RX ADMIN — LORATADINE 10 MG: 10 TABLET ORAL at 09:47

## 2020-05-17 RX ADMIN — LEVOTHYROXINE SODIUM 25 MCG: 25 TABLET ORAL at 05:38

## 2020-05-17 RX ADMIN — NEBIVOLOL HYDROCHLORIDE 10 MG: 10 TABLET ORAL at 09:48

## 2020-05-17 RX ADMIN — DOCUSATE SODIUM 100 MG: 100 CAPSULE, LIQUID FILLED ORAL at 16:29

## 2020-05-17 RX ADMIN — PANTOPRAZOLE SODIUM 40 MG: 40 TABLET, DELAYED RELEASE ORAL at 05:38

## 2020-05-17 RX ADMIN — FLUTICASONE PROPIONATE 2 SPRAY: 50 SPRAY, METERED NASAL at 18:57

## 2020-05-17 RX ADMIN — AMLODIPINE BESYLATE 5 MG: 5 TABLET ORAL at 16:29

## 2020-05-17 RX ADMIN — HEPARIN SODIUM 5000 UNITS: 5000 INJECTION INTRAVENOUS; SUBCUTANEOUS at 13:37

## 2020-05-17 RX ADMIN — FLUTICASONE PROPIONATE 2 SPRAY: 50 SPRAY, METERED NASAL at 09:49

## 2020-05-17 RX ADMIN — DOXYCYCLINE 100 MG: 100 CAPSULE ORAL at 09:47

## 2020-05-18 ENCOUNTER — APPOINTMENT (INPATIENT)
Dept: NON INVASIVE DIAGNOSTICS | Facility: HOSPITAL | Age: 60
DRG: 287 | End: 2020-05-18
Payer: COMMERCIAL

## 2020-05-18 LAB
ANION GAP SERPL CALCULATED.3IONS-SCNC: 5 MMOL/L (ref 4–13)
ATRIAL RATE: 59 BPM
BASOPHILS # BLD AUTO: 0.04 THOUSANDS/ΜL (ref 0–0.1)
BASOPHILS NFR BLD AUTO: 1 % (ref 0–1)
BUN SERPL-MCNC: 13 MG/DL (ref 5–25)
CALCIUM SERPL-MCNC: 9.2 MG/DL (ref 8.3–10.1)
CHLORIDE SERPL-SCNC: 107 MMOL/L (ref 100–108)
CO2 SERPL-SCNC: 27 MMOL/L (ref 21–32)
CREAT SERPL-MCNC: 1.01 MG/DL (ref 0.6–1.3)
EOSINOPHIL # BLD AUTO: 1.01 THOUSAND/ΜL (ref 0–0.61)
EOSINOPHIL NFR BLD AUTO: 17 % (ref 0–6)
ERYTHROCYTE [DISTWIDTH] IN BLOOD BY AUTOMATED COUNT: 16.5 % (ref 11.6–15.1)
GFR SERPL CREATININE-BSD FRML MDRD: 80 ML/MIN/1.73SQ M
GLUCOSE SERPL-MCNC: 83 MG/DL (ref 65–140)
HCT VFR BLD AUTO: 42.9 % (ref 36.5–49.3)
HGB BLD-MCNC: 14.4 G/DL (ref 12–17)
IMM GRANULOCYTES # BLD AUTO: 0.01 THOUSAND/UL (ref 0–0.2)
IMM GRANULOCYTES NFR BLD AUTO: 0 % (ref 0–2)
LYMPHOCYTES # BLD AUTO: 1.31 THOUSANDS/ΜL (ref 0.6–4.47)
LYMPHOCYTES NFR BLD AUTO: 22 % (ref 14–44)
MAGNESIUM SERPL-MCNC: 1.9 MG/DL (ref 1.6–2.6)
MCH RBC QN AUTO: 35.2 PG (ref 26.8–34.3)
MCHC RBC AUTO-ENTMCNC: 33.6 G/DL (ref 31.4–37.4)
MCV RBC AUTO: 105 FL (ref 82–98)
MONOCYTES # BLD AUTO: 0.92 THOUSAND/ΜL (ref 0.17–1.22)
MONOCYTES NFR BLD AUTO: 16 % (ref 4–12)
NEUTROPHILS # BLD AUTO: 2.65 THOUSANDS/ΜL (ref 1.85–7.62)
NEUTS SEG NFR BLD AUTO: 44 % (ref 43–75)
NRBC BLD AUTO-RTO: 0 /100 WBCS
PLATELET # BLD AUTO: 91 THOUSANDS/UL (ref 149–390)
POTASSIUM SERPL-SCNC: 3.4 MMOL/L (ref 3.5–5.3)
PR INTERVAL: 162 MS
QRS AXIS: -54 DEGREES
QRSD INTERVAL: 152 MS
QT INTERVAL: 488 MS
QTC INTERVAL: 483 MS
RBC # BLD AUTO: 4.09 MILLION/UL (ref 3.88–5.62)
SODIUM SERPL-SCNC: 139 MMOL/L (ref 136–145)
T WAVE AXIS: -16 DEGREES
VENTRICULAR RATE: 59 BPM
WBC # BLD AUTO: 5.94 THOUSAND/UL (ref 4.31–10.16)

## 2020-05-18 PROCEDURE — 82810 BLOOD GASES O2 SAT ONLY: CPT

## 2020-05-18 PROCEDURE — 99152 MOD SED SAME PHYS/QHP 5/>YRS: CPT

## 2020-05-18 PROCEDURE — B2111ZZ FLUOROSCOPY OF MULTIPLE CORONARY ARTERIES USING LOW OSMOLAR CONTRAST: ICD-10-PCS | Performed by: INTERNAL MEDICINE

## 2020-05-18 PROCEDURE — C1769 GUIDE WIRE: HCPCS

## 2020-05-18 PROCEDURE — 4A023N8 MEASUREMENT OF CARDIAC SAMPLING AND PRESSURE, BILATERAL, PERCUTANEOUS APPROACH: ICD-10-PCS | Performed by: INTERNAL MEDICINE

## 2020-05-18 PROCEDURE — 93460 R&L HRT ART/VENTRICLE ANGIO: CPT | Performed by: INTERNAL MEDICINE

## 2020-05-18 PROCEDURE — 99153 MOD SED SAME PHYS/QHP EA: CPT

## 2020-05-18 PROCEDURE — C1894 INTRO/SHEATH, NON-LASER: HCPCS

## 2020-05-18 PROCEDURE — 99152 MOD SED SAME PHYS/QHP 5/>YRS: CPT | Performed by: INTERNAL MEDICINE

## 2020-05-18 PROCEDURE — 99232 SBSQ HOSP IP/OBS MODERATE 35: CPT | Performed by: INTERNAL MEDICINE

## 2020-05-18 PROCEDURE — 80048 BASIC METABOLIC PNL TOTAL CA: CPT | Performed by: INTERNAL MEDICINE

## 2020-05-18 PROCEDURE — 85025 COMPLETE CBC W/AUTO DIFF WBC: CPT | Performed by: INTERNAL MEDICINE

## 2020-05-18 PROCEDURE — 93005 ELECTROCARDIOGRAM TRACING: CPT

## 2020-05-18 PROCEDURE — 93010 ELECTROCARDIOGRAM REPORT: CPT | Performed by: INTERNAL MEDICINE

## 2020-05-18 PROCEDURE — 99232 SBSQ HOSP IP/OBS MODERATE 35: CPT | Performed by: PHYSICIAN ASSISTANT

## 2020-05-18 PROCEDURE — 83735 ASSAY OF MAGNESIUM: CPT | Performed by: INTERNAL MEDICINE

## 2020-05-18 PROCEDURE — 93460 R&L HRT ART/VENTRICLE ANGIO: CPT

## 2020-05-18 RX ORDER — HEPARIN SODIUM 1000 [USP'U]/ML
INJECTION, SOLUTION INTRAVENOUS; SUBCUTANEOUS CODE/TRAUMA/SEDATION MEDICATION
Status: DISCONTINUED | OUTPATIENT
Start: 2020-05-18 | End: 2020-05-19 | Stop reason: HOSPADM

## 2020-05-18 RX ORDER — MIDAZOLAM HYDROCHLORIDE 2 MG/2ML
INJECTION, SOLUTION INTRAMUSCULAR; INTRAVENOUS CODE/TRAUMA/SEDATION MEDICATION
Status: DISCONTINUED | OUTPATIENT
Start: 2020-05-18 | End: 2020-05-19 | Stop reason: HOSPADM

## 2020-05-18 RX ORDER — LIDOCAINE HYDROCHLORIDE 10 MG/ML
INJECTION, SOLUTION EPIDURAL; INFILTRATION; INTRACAUDAL; PERINEURAL CODE/TRAUMA/SEDATION MEDICATION
Status: DISCONTINUED | OUTPATIENT
Start: 2020-05-18 | End: 2020-05-19 | Stop reason: HOSPADM

## 2020-05-18 RX ORDER — SODIUM PHOSPHATE, DIBASIC AND SODIUM PHOSPHATE, MONOBASIC 7; 19 G/133ML; G/133ML
1 ENEMA RECTAL ONCE
Status: COMPLETED | OUTPATIENT
Start: 2020-05-18 | End: 2020-05-18

## 2020-05-18 RX ORDER — NITROGLYCERIN 20 MG/100ML
INJECTION INTRAVENOUS CODE/TRAUMA/SEDATION MEDICATION
Status: DISCONTINUED | OUTPATIENT
Start: 2020-05-18 | End: 2020-05-19 | Stop reason: HOSPADM

## 2020-05-18 RX ORDER — HYDRALAZINE HYDROCHLORIDE 20 MG/ML
5 INJECTION INTRAMUSCULAR; INTRAVENOUS ONCE
Status: COMPLETED | OUTPATIENT
Start: 2020-05-18 | End: 2020-05-18

## 2020-05-18 RX ORDER — VERAPAMIL HYDROCHLORIDE 2.5 MG/ML
INJECTION, SOLUTION INTRAVENOUS CODE/TRAUMA/SEDATION MEDICATION
Status: DISCONTINUED | OUTPATIENT
Start: 2020-05-18 | End: 2020-05-19 | Stop reason: HOSPADM

## 2020-05-18 RX ORDER — FENTANYL CITRATE 50 UG/ML
INJECTION, SOLUTION INTRAMUSCULAR; INTRAVENOUS CODE/TRAUMA/SEDATION MEDICATION
Status: DISCONTINUED | OUTPATIENT
Start: 2020-05-18 | End: 2020-05-19 | Stop reason: HOSPADM

## 2020-05-18 RX ORDER — SODIUM CHLORIDE 9 MG/ML
100 INJECTION, SOLUTION INTRAVENOUS CONTINUOUS
Status: DISPENSED | OUTPATIENT
Start: 2020-05-18 | End: 2020-05-18

## 2020-05-18 RX ORDER — POLYETHYLENE GLYCOL 3350 17 G/17G
17 POWDER, FOR SOLUTION ORAL DAILY
Status: DISCONTINUED | OUTPATIENT
Start: 2020-05-18 | End: 2020-05-19 | Stop reason: HOSPADM

## 2020-05-18 RX ADMIN — TRAZODONE HYDROCHLORIDE 50 MG: 50 TABLET ORAL at 22:46

## 2020-05-18 RX ADMIN — HYDRALAZINE HYDROCHLORIDE 5 MG: 20 INJECTION INTRAMUSCULAR; INTRAVENOUS at 03:33

## 2020-05-18 RX ADMIN — LIDOCAINE HYDROCHLORIDE 10 ML: 10 INJECTION, SOLUTION EPIDURAL; INFILTRATION; INTRACAUDAL; PERINEURAL at 12:34

## 2020-05-18 RX ADMIN — ACETAMINOPHEN 650 MG: 325 TABLET ORAL at 09:08

## 2020-05-18 RX ADMIN — IOHEXOL 100 ML: 350 INJECTION, SOLUTION INTRAVENOUS at 12:45

## 2020-05-18 RX ADMIN — SODIUM CHLORIDE 100 ML/HR: 0.9 INJECTION, SOLUTION INTRAVENOUS at 13:21

## 2020-05-18 RX ADMIN — LORATADINE 10 MG: 10 TABLET ORAL at 09:03

## 2020-05-18 RX ADMIN — LIDOCAINE HYDROCHLORIDE 1 ML: 10 INJECTION, SOLUTION EPIDURAL; INFILTRATION; INTRACAUDAL; PERINEURAL at 12:18

## 2020-05-18 RX ADMIN — SODIUM PHOSPHATE 1 ENEMA: 7; 19 ENEMA RECTAL at 21:23

## 2020-05-18 RX ADMIN — DOXYCYCLINE 100 MG: 100 CAPSULE ORAL at 09:03

## 2020-05-18 RX ADMIN — PREGABALIN 25 MG: 25 CAPSULE ORAL at 09:08

## 2020-05-18 RX ADMIN — PREGABALIN 25 MG: 25 CAPSULE ORAL at 17:00

## 2020-05-18 RX ADMIN — Medication 200 MCG: at 12:22

## 2020-05-18 RX ADMIN — FLUTICASONE PROPIONATE 2 SPRAY: 50 SPRAY, METERED NASAL at 09:03

## 2020-05-18 RX ADMIN — FENTANYL CITRATE 50 MCG: 50 INJECTION, SOLUTION INTRAMUSCULAR; INTRAVENOUS at 12:32

## 2020-05-18 RX ADMIN — FENTANYL CITRATE 50 MCG: 50 INJECTION, SOLUTION INTRAMUSCULAR; INTRAVENOUS at 12:17

## 2020-05-18 RX ADMIN — PANTOPRAZOLE SODIUM 40 MG: 40 TABLET, DELAYED RELEASE ORAL at 05:25

## 2020-05-18 RX ADMIN — ASPIRIN 81 MG 324 MG: 81 TABLET ORAL at 11:58

## 2020-05-18 RX ADMIN — DOXYCYCLINE 100 MG: 100 CAPSULE ORAL at 20:59

## 2020-05-18 RX ADMIN — HEPARIN SODIUM 4000 UNITS: 1000 INJECTION INTRAVENOUS; SUBCUTANEOUS at 12:22

## 2020-05-18 RX ADMIN — NEBIVOLOL HYDROCHLORIDE 10 MG: 10 TABLET ORAL at 09:04

## 2020-05-18 RX ADMIN — SODIUM CHLORIDE 75 ML/HR: 0.9 INJECTION, SOLUTION INTRAVENOUS at 05:26

## 2020-05-18 RX ADMIN — LEVOTHYROXINE SODIUM 25 MCG: 75 TABLET ORAL at 05:25

## 2020-05-18 RX ADMIN — MIDAZOLAM 2 MG: 1 INJECTION INTRAMUSCULAR; INTRAVENOUS at 12:17

## 2020-05-18 RX ADMIN — VERAPAMIL HYDROCHLORIDE 2.5 MG: 2.5 INJECTION INTRAVENOUS at 12:22

## 2020-05-18 RX ADMIN — AMLODIPINE BESYLATE 5 MG: 5 TABLET ORAL at 09:04

## 2020-05-18 RX ADMIN — PRAVASTATIN SODIUM 40 MG: 40 TABLET ORAL at 16:16

## 2020-05-18 RX ADMIN — PANTOPRAZOLE SODIUM 40 MG: 40 TABLET, DELAYED RELEASE ORAL at 16:16

## 2020-05-18 RX ADMIN — HEPARIN SODIUM 5000 UNITS: 5000 INJECTION INTRAVENOUS; SUBCUTANEOUS at 21:00

## 2020-05-18 RX ADMIN — HEPARIN SODIUM 5000 UNITS: 5000 INJECTION INTRAVENOUS; SUBCUTANEOUS at 05:25

## 2020-05-18 RX ADMIN — BISACODYL 10 MG: 5 TABLET, COATED ORAL at 16:59

## 2020-05-18 RX ADMIN — ONDANSETRON 4 MG: 2 INJECTION INTRAMUSCULAR; INTRAVENOUS at 10:28

## 2020-05-18 RX ADMIN — HEPARIN SODIUM 5000 UNITS: 5000 INJECTION INTRAVENOUS; SUBCUTANEOUS at 13:21

## 2020-05-18 RX ADMIN — FLUTICASONE PROPIONATE 2 SPRAY: 50 SPRAY, METERED NASAL at 17:00

## 2020-05-18 RX ADMIN — FUROSEMIDE 20 MG: 20 TABLET ORAL at 09:04

## 2020-05-18 RX ADMIN — HYDRALAZINE HYDROCHLORIDE 10 MG: 20 INJECTION INTRAMUSCULAR; INTRAVENOUS at 10:28

## 2020-05-18 RX ADMIN — MIDAZOLAM 2 MG: 1 INJECTION INTRAMUSCULAR; INTRAVENOUS at 12:32

## 2020-05-19 VITALS
WEIGHT: 302.69 LBS | RESPIRATION RATE: 18 BRPM | BODY MASS INDEX: 38.85 KG/M2 | TEMPERATURE: 98 F | HEART RATE: 94 BPM | OXYGEN SATURATION: 98 % | DIASTOLIC BLOOD PRESSURE: 84 MMHG | HEIGHT: 74 IN | SYSTOLIC BLOOD PRESSURE: 176 MMHG

## 2020-05-19 LAB
ANION GAP SERPL CALCULATED.3IONS-SCNC: 6 MMOL/L (ref 4–13)
BUN SERPL-MCNC: 12 MG/DL (ref 5–25)
CALCIUM SERPL-MCNC: 9.1 MG/DL (ref 8.3–10.1)
CHLORIDE SERPL-SCNC: 108 MMOL/L (ref 100–108)
CO2 SERPL-SCNC: 28 MMOL/L (ref 21–32)
CREAT SERPL-MCNC: 1.1 MG/DL (ref 0.6–1.3)
GFR SERPL CREATININE-BSD FRML MDRD: 73 ML/MIN/1.73SQ M
GLUCOSE SERPL-MCNC: 89 MG/DL (ref 65–140)
POTASSIUM SERPL-SCNC: 3.8 MMOL/L (ref 3.5–5.3)
SODIUM SERPL-SCNC: 142 MMOL/L (ref 136–145)

## 2020-05-19 PROCEDURE — 99239 HOSP IP/OBS DSCHRG MGMT >30: CPT | Performed by: PHYSICIAN ASSISTANT

## 2020-05-19 PROCEDURE — 80048 BASIC METABOLIC PNL TOTAL CA: CPT | Performed by: PHYSICIAN ASSISTANT

## 2020-05-19 PROCEDURE — 94762 N-INVAS EAR/PLS OXIMTRY CONT: CPT

## 2020-05-19 PROCEDURE — 99232 SBSQ HOSP IP/OBS MODERATE 35: CPT | Performed by: INTERNAL MEDICINE

## 2020-05-19 RX ORDER — AMLODIPINE BESYLATE 5 MG/1
5 TABLET ORAL 2 TIMES DAILY
Qty: 60 TABLET | Refills: 0 | Status: SHIPPED | OUTPATIENT
Start: 2020-05-19 | End: 2020-05-22

## 2020-05-19 RX ORDER — ASPIRIN 81 MG/1
81 TABLET ORAL DAILY
Refills: 0
Start: 2020-05-20

## 2020-05-19 RX ORDER — AMLODIPINE BESYLATE 5 MG/1
5 TABLET ORAL 2 TIMES DAILY
Status: DISCONTINUED | OUTPATIENT
Start: 2020-05-19 | End: 2020-05-19 | Stop reason: HOSPADM

## 2020-05-19 RX ORDER — HYDROCHLOROTHIAZIDE 25 MG/1
25 TABLET ORAL DAILY
Qty: 30 TABLET | Refills: 0 | Status: SHIPPED | OUTPATIENT
Start: 2020-05-19 | End: 2020-05-22 | Stop reason: SDUPTHER

## 2020-05-19 RX ORDER — ASPIRIN 81 MG/1
81 TABLET ORAL DAILY
Status: DISCONTINUED | OUTPATIENT
Start: 2020-05-19 | End: 2020-05-19 | Stop reason: HOSPADM

## 2020-05-19 RX ORDER — NEBIVOLOL 10 MG/1
10 TABLET ORAL DAILY
Qty: 30 TABLET | Refills: 0 | Status: SHIPPED | OUTPATIENT
Start: 2020-05-20 | End: 2020-05-22 | Stop reason: SDUPTHER

## 2020-05-19 RX ORDER — DOXYCYCLINE HYCLATE 100 MG/1
100 CAPSULE ORAL EVERY 12 HOURS SCHEDULED
Qty: 11 CAPSULE | Refills: 0 | Status: SHIPPED | OUTPATIENT
Start: 2020-05-19 | End: 2020-05-25

## 2020-05-19 RX ORDER — FLUTICASONE PROPIONATE 50 MCG
2 SPRAY, SUSPENSION (ML) NASAL 2 TIMES DAILY
Qty: 1 BOTTLE | Refills: 0 | Status: SHIPPED | OUTPATIENT
Start: 2020-05-19 | End: 2020-12-28 | Stop reason: SDUPTHER

## 2020-05-19 RX ADMIN — LORATADINE 10 MG: 10 TABLET ORAL at 08:38

## 2020-05-19 RX ADMIN — FUROSEMIDE 20 MG: 20 TABLET ORAL at 08:39

## 2020-05-19 RX ADMIN — POLYETHYLENE GLYCOL 3350 17 G: 17 POWDER, FOR SOLUTION ORAL at 08:39

## 2020-05-19 RX ADMIN — AMLODIPINE BESYLATE 5 MG: 5 TABLET ORAL at 08:39

## 2020-05-19 RX ADMIN — PANTOPRAZOLE SODIUM 40 MG: 40 TABLET, DELAYED RELEASE ORAL at 06:29

## 2020-05-19 RX ADMIN — HEPARIN SODIUM 5000 UNITS: 5000 INJECTION INTRAVENOUS; SUBCUTANEOUS at 06:29

## 2020-05-19 RX ADMIN — DOXYCYCLINE 100 MG: 100 CAPSULE ORAL at 08:39

## 2020-05-19 RX ADMIN — HEPARIN SODIUM 5000 UNITS: 5000 INJECTION INTRAVENOUS; SUBCUTANEOUS at 13:24

## 2020-05-19 RX ADMIN — LEVOTHYROXINE SODIUM 25 MCG: 75 TABLET ORAL at 06:29

## 2020-05-19 RX ADMIN — NEBIVOLOL HYDROCHLORIDE 10 MG: 10 TABLET ORAL at 08:38

## 2020-05-19 RX ADMIN — ASPIRIN 81 MG: 81 TABLET, COATED ORAL at 08:39

## 2020-05-19 RX ADMIN — FLUTICASONE PROPIONATE 2 SPRAY: 50 SPRAY, METERED NASAL at 08:39

## 2020-05-19 RX ADMIN — PREGABALIN 25 MG: 25 CAPSULE ORAL at 08:46

## 2020-05-20 ENCOUNTER — TRANSITIONAL CARE MANAGEMENT (OUTPATIENT)
Dept: FAMILY MEDICINE CLINIC | Facility: CLINIC | Age: 60
End: 2020-05-20

## 2020-05-22 ENCOUNTER — TELEMEDICINE (OUTPATIENT)
Dept: FAMILY MEDICINE CLINIC | Facility: CLINIC | Age: 60
End: 2020-05-22
Payer: COMMERCIAL

## 2020-05-22 VITALS — DIASTOLIC BLOOD PRESSURE: 50 MMHG | SYSTOLIC BLOOD PRESSURE: 105 MMHG | HEART RATE: 90 BPM

## 2020-05-22 DIAGNOSIS — R06.02 SOB (SHORTNESS OF BREATH): Primary | ICD-10-CM

## 2020-05-22 DIAGNOSIS — R94.39 ABNORMAL STRESS TEST: ICD-10-CM

## 2020-05-22 DIAGNOSIS — I10 ESSENTIAL HYPERTENSION: Chronic | ICD-10-CM

## 2020-05-22 DIAGNOSIS — R05.8 ALLERGIC COUGH: ICD-10-CM

## 2020-05-22 DIAGNOSIS — G47.33 OBSTRUCTIVE SLEEP APNEA: Chronic | ICD-10-CM

## 2020-05-22 PROBLEM — R05.9 COUGH: Status: RESOLVED | Noted: 2020-04-20 | Resolved: 2020-05-22

## 2020-05-22 PROBLEM — R07.9 CHEST PAIN: Status: RESOLVED | Noted: 2020-05-13 | Resolved: 2020-05-22

## 2020-05-22 PROBLEM — I51.89 DIASTOLIC DYSFUNCTION: Status: RESOLVED | Noted: 2020-05-17 | Resolved: 2020-05-22

## 2020-05-22 PROCEDURE — 99496 TRANSJ CARE MGMT HIGH F2F 7D: CPT | Performed by: FAMILY MEDICINE

## 2020-05-22 RX ORDER — AMLODIPINE BESYLATE 10 MG/1
5 TABLET ORAL 2 TIMES DAILY
Qty: 90 TABLET | Refills: 1 | Status: SHIPPED | OUTPATIENT
Start: 2020-05-22 | End: 2020-08-17

## 2020-05-22 RX ORDER — AMLODIPINE BESYLATE 10 MG/1
TABLET ORAL
COMMUNITY
Start: 2020-05-19 | End: 2020-05-22 | Stop reason: SDUPTHER

## 2020-05-22 RX ORDER — NEBIVOLOL 10 MG/1
10 TABLET ORAL DAILY
Qty: 90 TABLET | Refills: 1 | Status: SHIPPED | OUTPATIENT
Start: 2020-05-22 | End: 2020-11-25

## 2020-05-22 RX ORDER — HYDROCHLOROTHIAZIDE 25 MG/1
25 TABLET ORAL DAILY
Qty: 90 TABLET | Refills: 1 | Status: SHIPPED | OUTPATIENT
Start: 2020-05-22 | End: 2020-11-25

## 2020-05-26 DIAGNOSIS — R05.8 ALLERGIC COUGH: ICD-10-CM

## 2020-05-26 RX ORDER — ALBUTEROL SULFATE 90 UG/1
AEROSOL, METERED RESPIRATORY (INHALATION)
Qty: 8.5 G | Refills: 1 | Status: SHIPPED | OUTPATIENT
Start: 2020-05-26 | End: 2020-07-17

## 2020-05-28 DIAGNOSIS — R05.8 ALLERGIC COUGH: ICD-10-CM

## 2020-05-28 RX ORDER — MONTELUKAST SODIUM 10 MG/1
10 TABLET ORAL
Qty: 30 TABLET | Refills: 5 | Status: SHIPPED | OUTPATIENT
Start: 2020-05-28 | End: 2020-05-28 | Stop reason: SDUPTHER

## 2020-05-28 RX ORDER — MONTELUKAST SODIUM 10 MG/1
10 TABLET ORAL
Qty: 90 TABLET | Refills: 1 | Status: SHIPPED | OUTPATIENT
Start: 2020-05-28 | End: 2020-11-11

## 2020-05-29 DIAGNOSIS — G47.33 OSA (OBSTRUCTIVE SLEEP APNEA): Primary | ICD-10-CM

## 2020-06-01 ENCOUNTER — TELEPHONE (OUTPATIENT)
Dept: SLEEP CENTER | Facility: CLINIC | Age: 60
End: 2020-06-01

## 2020-06-02 ENCOUNTER — TELEMEDICINE (OUTPATIENT)
Dept: SLEEP CENTER | Facility: CLINIC | Age: 60
End: 2020-06-02
Payer: COMMERCIAL

## 2020-06-02 DIAGNOSIS — G47.33 OSA (OBSTRUCTIVE SLEEP APNEA): Primary | ICD-10-CM

## 2020-06-02 PROCEDURE — 3074F SYST BP LT 130 MM HG: CPT | Performed by: INTERNAL MEDICINE

## 2020-06-02 PROCEDURE — 1111F DSCHRG MED/CURRENT MED MERGE: CPT | Performed by: INTERNAL MEDICINE

## 2020-06-02 PROCEDURE — 1036F TOBACCO NON-USER: CPT | Performed by: INTERNAL MEDICINE

## 2020-06-02 PROCEDURE — 99213 OFFICE O/P EST LOW 20 MIN: CPT | Performed by: INTERNAL MEDICINE

## 2020-06-02 PROCEDURE — 3078F DIAST BP <80 MM HG: CPT | Performed by: INTERNAL MEDICINE

## 2020-06-12 ENCOUNTER — TELEPHONE (OUTPATIENT)
Dept: FAMILY MEDICINE CLINIC | Facility: CLINIC | Age: 60
End: 2020-06-12

## 2020-06-16 ENCOUNTER — OFFICE VISIT (OUTPATIENT)
Dept: FAMILY MEDICINE CLINIC | Facility: CLINIC | Age: 60
End: 2020-06-16
Payer: COMMERCIAL

## 2020-06-16 VITALS
WEIGHT: 296 LBS | BODY MASS INDEX: 37.99 KG/M2 | DIASTOLIC BLOOD PRESSURE: 88 MMHG | RESPIRATION RATE: 18 BRPM | HEIGHT: 74 IN | OXYGEN SATURATION: 98 % | SYSTOLIC BLOOD PRESSURE: 146 MMHG | HEART RATE: 60 BPM | TEMPERATURE: 97.6 F

## 2020-06-16 DIAGNOSIS — M47.816 SPONDYLOSIS OF LUMBAR REGION WITHOUT MYELOPATHY OR RADICULOPATHY: ICD-10-CM

## 2020-06-16 DIAGNOSIS — Z56.6 WORK-RELATED STRESS: ICD-10-CM

## 2020-06-16 DIAGNOSIS — M79.18 MYOFASCIAL PAIN SYNDROME: ICD-10-CM

## 2020-06-16 DIAGNOSIS — F41.1 GENERALIZED ANXIETY DISORDER: Primary | ICD-10-CM
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
## 2020-06-16 PROCEDURE — 3079F DIAST BP 80-89 MM HG: CPT | Performed by: FAMILY MEDICINE

## 2020-06-16 PROCEDURE — 1036F TOBACCO NON-USER: CPT | Performed by: FAMILY MEDICINE

## 2020-06-16 PROCEDURE — 3077F SYST BP >= 140 MM HG: CPT | Performed by: FAMILY MEDICINE

## 2020-06-16 PROCEDURE — 3008F BODY MASS INDEX DOCD: CPT | Performed by: FAMILY MEDICINE

## 2020-06-16 PROCEDURE — 1111F DSCHRG MED/CURRENT MED MERGE: CPT | Performed by: FAMILY MEDICINE

## 2020-06-16 PROCEDURE — 99214 OFFICE O/P EST MOD 30 MIN: CPT | Performed by: FAMILY MEDICINE

## 2020-06-16 RX ORDER — DULOXETIN HYDROCHLORIDE 30 MG/1
30 CAPSULE, DELAYED RELEASE ORAL DAILY
Qty: 30 CAPSULE | Refills: 1 | Status: SHIPPED | OUTPATIENT
Start: 2020-06-16 | End: 2020-06-30 | Stop reason: SDUPTHER

## 2020-06-16 RX ORDER — CLONAZEPAM 0.5 MG/1
0.5 TABLET ORAL
Qty: 30 TABLET | Refills: 1 | Status: SHIPPED | OUTPATIENT
Start: 2020-06-16 | End: 2020-06-30 | Stop reason: SDUPTHER

## 2020-06-16 SDOH — HEALTH STABILITY - MENTAL HEALTH: OTHER PHYSICAL AND MENTAL STRAIN RELATED TO WORK: Z56.6

## 2020-06-19 ENCOUNTER — HOSPITAL ENCOUNTER (OUTPATIENT)
Dept: RADIOLOGY | Facility: HOSPITAL | Age: 60
Discharge: HOME | End: 2020-06-19
Payer: COMMERCIAL

## 2020-06-19 DIAGNOSIS — M96.1 POSTLAMINECTOMY SYNDROME, LUMBAR: ICD-10-CM

## 2020-06-19 DIAGNOSIS — M51.16 LUMBAR DISC HERNIATION WITH RADICULOPATHY: ICD-10-CM

## 2020-06-19 DIAGNOSIS — M51.37 DISC DEGENERATION, LUMBOSACRAL: ICD-10-CM

## 2020-06-19 DIAGNOSIS — M54.16 LUMBAR RADICULOPATHY: ICD-10-CM

## 2020-06-19 PROCEDURE — A9585 GADOBUTROL INJECTION: HCPCS | Performed by: FAMILY MEDICINE

## 2020-06-19 PROCEDURE — 72158 MRI LUMBAR SPINE W/O & W/DYE: CPT

## 2020-06-19 RX ADMIN — GADOBUTROL 13 ML: 604.72 INJECTION INTRAVENOUS at 14:49
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
## 2020-06-30 ENCOUNTER — TELEMEDICINE (OUTPATIENT)
Dept: FAMILY MEDICINE CLINIC | Facility: CLINIC | Age: 60
End: 2020-06-30
Payer: COMMERCIAL

## 2020-06-30 VITALS
DIASTOLIC BLOOD PRESSURE: 85 MMHG | SYSTOLIC BLOOD PRESSURE: 148 MMHG | HEART RATE: 55 BPM | BODY MASS INDEX: 37.99 KG/M2 | HEIGHT: 74 IN | WEIGHT: 296 LBS | TEMPERATURE: 97.3 F

## 2020-06-30 DIAGNOSIS — M54.16 LUMBAR RADICULOPATHY: ICD-10-CM

## 2020-06-30 DIAGNOSIS — F41.1 GENERALIZED ANXIETY DISORDER: ICD-10-CM

## 2020-06-30 DIAGNOSIS — Z56.6 WORK-RELATED STRESS: ICD-10-CM

## 2020-06-30 DIAGNOSIS — I10 ESSENTIAL HYPERTENSION: Primary | Chronic | ICD-10-CM

## 2020-06-30 PROCEDURE — 3079F DIAST BP 80-89 MM HG: CPT | Performed by: FAMILY MEDICINE

## 2020-06-30 PROCEDURE — 3077F SYST BP >= 140 MM HG: CPT | Performed by: FAMILY MEDICINE

## 2020-06-30 PROCEDURE — 99214 OFFICE O/P EST MOD 30 MIN: CPT | Performed by: FAMILY MEDICINE

## 2020-06-30 RX ORDER — CLONAZEPAM 0.5 MG/1
0.5 TABLET ORAL
Qty: 90 TABLET | Refills: 1 | Status: SHIPPED | OUTPATIENT
Start: 2020-06-30 | End: 2020-06-30 | Stop reason: SDUPTHER

## 2020-06-30 RX ORDER — CLONAZEPAM 0.5 MG/1
0.5 TABLET ORAL
Qty: 90 TABLET | Refills: 1 | Status: SHIPPED | OUTPATIENT
Start: 2020-06-30 | End: 2020-07-01 | Stop reason: SDUPTHER

## 2020-06-30 RX ORDER — DULOXETIN HYDROCHLORIDE 30 MG/1
30 CAPSULE, DELAYED RELEASE ORAL DAILY
Qty: 90 CAPSULE | Refills: 1 | Status: SHIPPED | OUTPATIENT
Start: 2020-06-30 | End: 2020-12-18

## 2020-06-30 SDOH — HEALTH STABILITY - MENTAL HEALTH: OTHER PHYSICAL AND MENTAL STRAIN RELATED TO WORK: Z56.6

## 2020-07-01 ENCOUNTER — TELEPHONE (OUTPATIENT)
Dept: FAMILY MEDICINE CLINIC | Facility: CLINIC | Age: 60
End: 2020-07-01

## 2020-07-01 ENCOUNTER — OFFICE VISIT (OUTPATIENT)
Dept: CARDIOLOGY CLINIC | Facility: CLINIC | Age: 60
End: 2020-07-01
Payer: COMMERCIAL

## 2020-07-01 VITALS
DIASTOLIC BLOOD PRESSURE: 80 MMHG | WEIGHT: 298.5 LBS | BODY MASS INDEX: 38.31 KG/M2 | SYSTOLIC BLOOD PRESSURE: 124 MMHG | HEIGHT: 74 IN | TEMPERATURE: 98.2 F | HEART RATE: 60 BPM

## 2020-07-01 DIAGNOSIS — R94.39 ABNORMAL STRESS TEST: ICD-10-CM

## 2020-07-01 DIAGNOSIS — F41.1 GENERALIZED ANXIETY DISORDER: ICD-10-CM

## 2020-07-01 DIAGNOSIS — R06.00 DYSPNEA, UNSPECIFIED TYPE: ICD-10-CM

## 2020-07-01 DIAGNOSIS — Z56.6 WORK-RELATED STRESS: ICD-10-CM

## 2020-07-01 DIAGNOSIS — I10 ESSENTIAL HYPERTENSION: Primary | ICD-10-CM

## 2020-07-01 DIAGNOSIS — E78.2 MIXED HYPERLIPIDEMIA: ICD-10-CM

## 2020-07-01 PROCEDURE — 1111F DSCHRG MED/CURRENT MED MERGE: CPT | Performed by: INTERNAL MEDICINE

## 2020-07-01 PROCEDURE — 3008F BODY MASS INDEX DOCD: CPT | Performed by: INTERNAL MEDICINE

## 2020-07-01 PROCEDURE — 99214 OFFICE O/P EST MOD 30 MIN: CPT | Performed by: INTERNAL MEDICINE

## 2020-07-01 PROCEDURE — 3074F SYST BP LT 130 MM HG: CPT | Performed by: INTERNAL MEDICINE

## 2020-07-01 PROCEDURE — 3079F DIAST BP 80-89 MM HG: CPT | Performed by: INTERNAL MEDICINE

## 2020-07-01 PROCEDURE — 1036F TOBACCO NON-USER: CPT | Performed by: INTERNAL MEDICINE

## 2020-07-01 RX ORDER — CLONAZEPAM 0.5 MG/1
0.5 TABLET ORAL
Qty: 90 TABLET | Refills: 1 | Status: SHIPPED | OUTPATIENT
Start: 2020-07-01 | End: 2021-01-29 | Stop reason: SDUPTHER

## 2020-07-01 SDOH — HEALTH STABILITY - MENTAL HEALTH: OTHER PHYSICAL AND MENTAL STRAIN RELATED TO WORK: Z56.6

## 2020-07-01 NOTE — TELEPHONE ENCOUNTER
Please inform the patient that his mail-order pharmacy rejected Klonopin prescription twice    Klonopin was to [employer] sent

## 2020-07-02 DIAGNOSIS — I10 ESSENTIAL HYPERTENSION: Chronic | ICD-10-CM

## 2020-07-02 RX ORDER — ROSUVASTATIN CALCIUM 5 MG/1
TABLET, COATED ORAL
Qty: 90 TABLET | Refills: 1 | Status: SHIPPED | OUTPATIENT
Start: 2020-07-02 | End: 2020-12-18

## 2020-07-17 DIAGNOSIS — R05.8 ALLERGIC COUGH: ICD-10-CM

## 2020-07-17 RX ORDER — ALBUTEROL SULFATE 90 UG/1
AEROSOL, METERED RESPIRATORY (INHALATION)
Qty: 8.5 G | Refills: 1 | Status: SHIPPED | OUTPATIENT
Start: 2020-07-17 | End: 2020-09-10

## 2020-08-08 DIAGNOSIS — M79.18 MYOFASCIAL PAIN SYNDROME: ICD-10-CM

## 2020-08-09 RX ORDER — CHLORZOXAZONE 500 MG/1
TABLET ORAL
Qty: 90 TABLET | Refills: 5 | OUTPATIENT
Start: 2020-08-09

## 2020-08-09 RX ORDER — PREGABALIN 100 MG/1
CAPSULE ORAL
Qty: 60 CAPSULE | OUTPATIENT
Start: 2020-08-09

## 2020-08-09 RX ORDER — TIZANIDINE 4 MG/1
TABLET ORAL
Qty: 30 TABLET | Refills: 5 | OUTPATIENT
Start: 2020-08-09

## 2020-08-16 DIAGNOSIS — I10 ESSENTIAL HYPERTENSION: Chronic | ICD-10-CM

## 2020-08-17 RX ORDER — AMLODIPINE BESYLATE 10 MG/1
10 TABLET ORAL DAILY
Qty: 90 TABLET | Refills: 1 | Status: SHIPPED | OUTPATIENT
Start: 2020-08-17 | End: 2021-04-12

## 2020-08-27 DIAGNOSIS — G47.33 OSA (OBSTRUCTIVE SLEEP APNEA): Primary | ICD-10-CM

## 2020-08-27 NOTE — PROGRESS NOTES
Although the patient had snoring at lower positive airway pressures, I will decrease his pressure to 14 cm of CPAP to see if that is better tolerated  There may be snoring at that setting, but the pressure should be therapeutic

## 2020-08-28 ENCOUNTER — TELEPHONE (OUTPATIENT)
Dept: SLEEP CENTER | Facility: CLINIC | Age: 60
End: 2020-08-28

## 2020-08-30 DIAGNOSIS — E03.9 HYPOTHYROIDISM, UNSPECIFIED TYPE: Chronic | ICD-10-CM

## 2020-08-31 RX ORDER — LEVOTHYROXINE SODIUM 25 MCG
TABLET ORAL
Qty: 90 TABLET | Refills: 1 | Status: SHIPPED | OUTPATIENT
Start: 2020-08-31 | End: 2021-01-29 | Stop reason: SDUPTHER

## 2020-09-10 DIAGNOSIS — R05.8 ALLERGIC COUGH: ICD-10-CM

## 2020-09-10 RX ORDER — ALBUTEROL SULFATE 90 UG/1
AEROSOL, METERED RESPIRATORY (INHALATION)
Qty: 8.5 G | Refills: 1 | Status: SHIPPED | OUTPATIENT
Start: 2020-09-10 | End: 2020-11-08

## 2020-09-14 ENCOUNTER — TELEPHONE (OUTPATIENT)
Dept: FAMILY MEDICINE CLINIC | Facility: CLINIC | Age: 60
End: 2020-09-14

## 2020-09-14 NOTE — TELEPHONE ENCOUNTER
Patient called regarding his shoulder  Advised me that he had an accident in 2014 and injured his rotator has really had no problems with it however in the last couple of weeks he is not able to pick is arm up and is having trouble typing  Did you wish to see the patient or just refer him to Ortho?

## 2020-09-21 ENCOUNTER — APPOINTMENT (OUTPATIENT)
Dept: RADIOLOGY | Facility: CLINIC | Age: 60
End: 2020-09-21
Payer: COMMERCIAL

## 2020-09-21 ENCOUNTER — OFFICE VISIT (OUTPATIENT)
Dept: FAMILY MEDICINE CLINIC | Facility: CLINIC | Age: 60
End: 2020-09-21
Payer: COMMERCIAL

## 2020-09-21 VITALS
BODY MASS INDEX: 37.73 KG/M2 | RESPIRATION RATE: 16 BRPM | HEART RATE: 68 BPM | TEMPERATURE: 98.4 F | SYSTOLIC BLOOD PRESSURE: 134 MMHG | HEIGHT: 74 IN | OXYGEN SATURATION: 98 % | DIASTOLIC BLOOD PRESSURE: 72 MMHG | WEIGHT: 294 LBS

## 2020-09-21 DIAGNOSIS — M25.511 ACUTE PAIN OF RIGHT SHOULDER: ICD-10-CM

## 2020-09-21 DIAGNOSIS — M25.511 ACUTE PAIN OF RIGHT SHOULDER: Primary | ICD-10-CM

## 2020-09-21 DIAGNOSIS — Z23 FLU VACCINE NEED: ICD-10-CM

## 2020-09-21 PROCEDURE — 20610 DRAIN/INJ JOINT/BURSA W/O US: CPT | Performed by: FAMILY MEDICINE

## 2020-09-21 PROCEDURE — 99213 OFFICE O/P EST LOW 20 MIN: CPT | Performed by: FAMILY MEDICINE

## 2020-09-21 PROCEDURE — 90682 RIV4 VACC RECOMBINANT DNA IM: CPT | Performed by: FAMILY MEDICINE

## 2020-09-21 PROCEDURE — 73030 X-RAY EXAM OF SHOULDER: CPT

## 2020-09-21 PROCEDURE — 90471 IMMUNIZATION ADMIN: CPT | Performed by: FAMILY MEDICINE

## 2020-09-21 RX ORDER — METHYLPREDNISOLONE ACETATE 40 MG/ML
1 INJECTION, SUSPENSION INTRA-ARTICULAR; INTRALESIONAL; INTRAMUSCULAR; SOFT TISSUE
Status: COMPLETED | OUTPATIENT
Start: 2020-09-21 | End: 2020-09-21

## 2020-09-21 RX ORDER — LIDOCAINE HYDROCHLORIDE 10 MG/ML
1 INJECTION, SOLUTION INFILTRATION; PERINEURAL
Status: COMPLETED | OUTPATIENT
Start: 2020-09-21 | End: 2020-09-21

## 2020-09-21 RX ADMIN — METHYLPREDNISOLONE ACETATE 1 ML: 40 INJECTION, SUSPENSION INTRA-ARTICULAR; INTRALESIONAL; INTRAMUSCULAR; SOFT TISSUE at 16:34

## 2020-09-21 RX ADMIN — LIDOCAINE HYDROCHLORIDE 1 ML: 10 INJECTION, SOLUTION INFILTRATION; PERINEURAL at 16:34

## 2020-09-21 NOTE — ASSESSMENT & PLAN NOTE
- patient does have history of degenerative arthritis of multiple joints  He does have prior history of degenerative arthritis of the right shoulder  Patient is unable to take oral NSAIDs and does not wish to take any narcotic pain medications    -intra-articular corticosteroid injection performed in the office  Patient will be sent for x-ray of the right shoulder  Continue to perform home-based physical therapy exercises  No need for advanced imaging at this time
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
## 2020-09-25 DIAGNOSIS — M19.011 PRIMARY OSTEOARTHRITIS OF RIGHT SHOULDER: ICD-10-CM

## 2020-09-25 DIAGNOSIS — M24.011 LOOSE BODY IN RIGHT SHOULDER: Primary | ICD-10-CM

## 2020-10-09 ENCOUNTER — CONSULT (OUTPATIENT)
Dept: OBGYN CLINIC | Facility: CLINIC | Age: 60
End: 2020-10-09
Payer: COMMERCIAL

## 2020-10-09 VITALS
TEMPERATURE: 97.4 F | HEART RATE: 80 BPM | BODY MASS INDEX: 37.22 KG/M2 | SYSTOLIC BLOOD PRESSURE: 138 MMHG | DIASTOLIC BLOOD PRESSURE: 84 MMHG | WEIGHT: 290 LBS | HEIGHT: 74 IN

## 2020-10-09 DIAGNOSIS — M19.011 PRIMARY OSTEOARTHRITIS OF RIGHT SHOULDER: ICD-10-CM

## 2020-10-09 DIAGNOSIS — M24.011 LOOSE BODY IN RIGHT SHOULDER: Primary | ICD-10-CM

## 2020-10-09 PROCEDURE — 1036F TOBACCO NON-USER: CPT | Performed by: ORTHOPAEDIC SURGERY

## 2020-10-09 PROCEDURE — 3079F DIAST BP 80-89 MM HG: CPT | Performed by: ORTHOPAEDIC SURGERY

## 2020-10-09 PROCEDURE — 99213 OFFICE O/P EST LOW 20 MIN: CPT | Performed by: ORTHOPAEDIC SURGERY

## 2020-11-06 ENCOUNTER — HOSPITAL ENCOUNTER (OUTPATIENT)
Dept: RADIOLOGY | Facility: HOSPITAL | Age: 60
Discharge: HOME | End: 2020-11-06
Payer: COMMERCIAL

## 2020-11-06 DIAGNOSIS — M24.011 LOOSE BODY IN RIGHT SHOULDER: ICD-10-CM

## 2020-11-06 DIAGNOSIS — M19.011 PRIMARY OSTEOARTHRITIS OF RIGHT SHOULDER: ICD-10-CM

## 2020-11-06 PROCEDURE — 73221 MRI JOINT UPR EXTREM W/O DYE: CPT

## 2020-11-06 PROCEDURE — G1004 CDSM NDSC: HCPCS

## 2020-11-08 DIAGNOSIS — R05.8 ALLERGIC COUGH: ICD-10-CM

## 2020-11-08 RX ORDER — ALBUTEROL SULFATE 90 UG/1
AEROSOL, METERED RESPIRATORY (INHALATION)
Qty: 8.5 G | Refills: 1 | Status: SHIPPED | OUTPATIENT
Start: 2020-11-08 | End: 2021-01-01

## 2020-11-11 DIAGNOSIS — R05.8 ALLERGIC COUGH: ICD-10-CM

## 2020-11-11 RX ORDER — MONTELUKAST SODIUM 10 MG/1
TABLET ORAL
Qty: 90 TABLET | Refills: 1 | Status: SHIPPED | OUTPATIENT
Start: 2020-11-11 | End: 2021-04-29 | Stop reason: SDUPTHER

## 2020-11-18 ENCOUNTER — OFFICE VISIT (OUTPATIENT)
Dept: OBGYN CLINIC | Facility: CLINIC | Age: 60
End: 2020-11-18
Payer: COMMERCIAL

## 2020-11-18 VITALS — TEMPERATURE: 98.6 F | SYSTOLIC BLOOD PRESSURE: 134 MMHG | DIASTOLIC BLOOD PRESSURE: 82 MMHG

## 2020-11-18 DIAGNOSIS — M19.011 PRIMARY OSTEOARTHRITIS OF RIGHT SHOULDER: Primary | ICD-10-CM

## 2020-11-18 PROCEDURE — 99213 OFFICE O/P EST LOW 20 MIN: CPT | Performed by: ORTHOPAEDIC SURGERY

## 2020-11-18 PROCEDURE — 3075F SYST BP GE 130 - 139MM HG: CPT | Performed by: ORTHOPAEDIC SURGERY

## 2020-11-18 PROCEDURE — 1036F TOBACCO NON-USER: CPT | Performed by: ORTHOPAEDIC SURGERY

## 2020-11-18 PROCEDURE — 3079F DIAST BP 80-89 MM HG: CPT | Performed by: ORTHOPAEDIC SURGERY

## 2020-11-23 ENCOUNTER — TELEPHONE (OUTPATIENT)
Dept: FAMILY MEDICINE CLINIC | Facility: CLINIC | Age: 60
End: 2020-11-23

## 2020-11-25 DIAGNOSIS — I10 ESSENTIAL HYPERTENSION: Chronic | ICD-10-CM

## 2020-11-25 RX ORDER — HYDROCHLOROTHIAZIDE 25 MG/1
TABLET ORAL
Qty: 90 TABLET | Refills: 1 | Status: SHIPPED | OUTPATIENT
Start: 2020-11-25 | End: 2021-01-29

## 2020-11-25 RX ORDER — NEBIVOLOL HYDROCHLORIDE 10 MG/1
TABLET ORAL
Qty: 90 TABLET | Refills: 1 | Status: SHIPPED | OUTPATIENT
Start: 2020-11-25 | End: 2021-06-30

## 2020-12-18 DIAGNOSIS — Z56.6 WORK-RELATED STRESS: ICD-10-CM

## 2020-12-18 DIAGNOSIS — I10 ESSENTIAL HYPERTENSION: Chronic | ICD-10-CM

## 2020-12-18 DIAGNOSIS — F41.1 GENERALIZED ANXIETY DISORDER: ICD-10-CM

## 2020-12-18 RX ORDER — ROSUVASTATIN CALCIUM 5 MG/1
TABLET, COATED ORAL
Qty: 90 TABLET | Refills: 1 | Status: SHIPPED | OUTPATIENT
Start: 2020-12-18 | End: 2021-06-16

## 2020-12-18 RX ORDER — DULOXETIN HYDROCHLORIDE 30 MG/1
CAPSULE, DELAYED RELEASE ORAL
Qty: 90 CAPSULE | Refills: 1 | Status: SHIPPED | OUTPATIENT
Start: 2020-12-18 | End: 2021-04-29 | Stop reason: SDUPTHER

## 2020-12-18 SDOH — HEALTH STABILITY - MENTAL HEALTH: OTHER PHYSICAL AND MENTAL STRAIN RELATED TO WORK: Z56.6

## 2020-12-22 ENCOUNTER — OFFICE VISIT (OUTPATIENT)
Dept: OBGYN CLINIC | Facility: CLINIC | Age: 60
End: 2020-12-22
Payer: COMMERCIAL

## 2020-12-22 VITALS
BODY MASS INDEX: 36.96 KG/M2 | DIASTOLIC BLOOD PRESSURE: 84 MMHG | WEIGHT: 288 LBS | HEART RATE: 60 BPM | HEIGHT: 74 IN | SYSTOLIC BLOOD PRESSURE: 147 MMHG

## 2020-12-22 DIAGNOSIS — M19.011 PRIMARY OSTEOARTHRITIS OF RIGHT SHOULDER: Primary | ICD-10-CM

## 2020-12-22 PROCEDURE — 3077F SYST BP >= 140 MM HG: CPT | Performed by: ORTHOPAEDIC SURGERY

## 2020-12-22 PROCEDURE — 3008F BODY MASS INDEX DOCD: CPT | Performed by: ORTHOPAEDIC SURGERY

## 2020-12-22 PROCEDURE — 1036F TOBACCO NON-USER: CPT | Performed by: ORTHOPAEDIC SURGERY

## 2020-12-22 PROCEDURE — 99213 OFFICE O/P EST LOW 20 MIN: CPT | Performed by: ORTHOPAEDIC SURGERY

## 2020-12-22 PROCEDURE — 3079F DIAST BP 80-89 MM HG: CPT | Performed by: ORTHOPAEDIC SURGERY

## 2020-12-28 DIAGNOSIS — R06.02 SHORTNESS OF BREATH: ICD-10-CM

## 2020-12-28 RX ORDER — FLUTICASONE PROPIONATE 50 MCG
2 SPRAY, SUSPENSION (ML) NASAL 2 TIMES DAILY
Qty: 1 BOTTLE | Refills: 11 | Status: SHIPPED | OUTPATIENT
Start: 2020-12-28 | End: 2021-07-29 | Stop reason: SDUPTHER

## 2020-12-30 DIAGNOSIS — M25.571 PAIN, JOINT, ANKLE AND FOOT, RIGHT: Primary | ICD-10-CM

## 2021-01-01 DIAGNOSIS — R05.8 ALLERGIC COUGH: ICD-10-CM

## 2021-01-01 RX ORDER — ALBUTEROL SULFATE 90 UG/1
AEROSOL, METERED RESPIRATORY (INHALATION)
Qty: 8.5 G | Refills: 1 | Status: SHIPPED | OUTPATIENT
Start: 2021-01-01 | End: 2022-01-20 | Stop reason: SDUPTHER

## 2021-01-15 ENCOUNTER — TELEMEDICINE (OUTPATIENT)
Dept: FAMILY MEDICINE CLINIC | Facility: CLINIC | Age: 61
End: 2021-01-15
Payer: COMMERCIAL

## 2021-01-15 VITALS — WEIGHT: 285 LBS | TEMPERATURE: 97 F | BODY MASS INDEX: 36.57 KG/M2 | HEIGHT: 74 IN

## 2021-01-15 DIAGNOSIS — R61 DIAPHORESIS: ICD-10-CM

## 2021-01-15 DIAGNOSIS — R73.03 PREDIABETES: ICD-10-CM

## 2021-01-15 DIAGNOSIS — Z12.5 PROSTATE CANCER SCREENING: ICD-10-CM

## 2021-01-15 DIAGNOSIS — R60.0 SWELLING OF BOTH PAROTID GLANDS: Primary | ICD-10-CM

## 2021-01-15 DIAGNOSIS — I10 ESSENTIAL HYPERTENSION: ICD-10-CM

## 2021-01-15 DIAGNOSIS — E03.8 OTHER SPECIFIED HYPOTHYROIDISM: ICD-10-CM

## 2021-01-15 DIAGNOSIS — E78.2 MIXED HYPERLIPIDEMIA: ICD-10-CM

## 2021-01-15 PROCEDURE — 99214 OFFICE O/P EST MOD 30 MIN: CPT | Performed by: FAMILY MEDICINE

## 2021-01-15 PROCEDURE — 3725F SCREEN DEPRESSION PERFORMED: CPT | Performed by: FAMILY MEDICINE

## 2021-01-15 NOTE — ASSESSMENT & PLAN NOTE
Patient has been watching dietary intake of carbohydrates  Patient has been working losing weight  Check hemoglobin A1c    Last hemoglobin A1c was at 5 2%
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
## 2021-01-15 NOTE — ASSESSMENT & PLAN NOTE
Patient remains on statin therapy  He has been working on losing weight    Check repeat lipid profile

## 2021-01-22 LAB
ALBUMIN SERPL-MCNC: 4.6 G/DL (ref 3.6–5.1)
ALBUMIN/GLOB SERPL: 1.7 (CALC) (ref 1–2.5)
ALP SERPL-CCNC: 98 U/L (ref 35–144)
ALT SERPL-CCNC: 78 U/L (ref 9–46)
AST SERPL-CCNC: 70 U/L (ref 10–35)
BASOPHILS # BLD AUTO: 64 CELLS/UL (ref 0–200)
BASOPHILS NFR BLD AUTO: 0.6 %
BILIRUB SERPL-MCNC: 1.4 MG/DL (ref 0.2–1.2)
BUN SERPL-MCNC: 16 MG/DL (ref 7–25)
BUN/CREAT SERPL: ABNORMAL (CALC) (ref 6–22)
CALCIUM SERPL-MCNC: 10.2 MG/DL (ref 8.6–10.3)
CHLORIDE SERPL-SCNC: 91 MMOL/L (ref 98–110)
CHOLEST SERPL-MCNC: 180 MG/DL
CHOLEST/HDLC SERPL: 1.8 (CALC)
CO2 SERPL-SCNC: 35 MMOL/L (ref 20–32)
CREAT SERPL-MCNC: 1.06 MG/DL (ref 0.7–1.25)
CRP SERPL-MCNC: 6.4 MG/L
EOSINOPHIL # BLD AUTO: 1346 CELLS/UL (ref 15–500)
EOSINOPHIL NFR BLD AUTO: 12.7 %
ERYTHROCYTE [DISTWIDTH] IN BLOOD BY AUTOMATED COUNT: 15.6 % (ref 11–15)
GLOBULIN SER CALC-MCNC: 2.7 G/DL (CALC) (ref 1.9–3.7)
GLUCOSE SERPL-MCNC: 117 MG/DL (ref 65–99)
HBA1C MFR BLD: 5.1 % OF TOTAL HGB
HCT VFR BLD AUTO: 50.3 % (ref 38.5–50)
HDLC SERPL-MCNC: 99 MG/DL
HGB BLD-MCNC: 17.6 G/DL (ref 13.2–17.1)
LDLC SERPL CALC-MCNC: 65 MG/DL (CALC)
LYMPHOCYTES # BLD AUTO: 1929 CELLS/UL (ref 850–3900)
LYMPHOCYTES NFR BLD AUTO: 18.2 %
MCH RBC QN AUTO: 34.7 PG (ref 27–33)
MCHC RBC AUTO-ENTMCNC: 35 G/DL (ref 32–36)
MCV RBC AUTO: 99.2 FL (ref 80–100)
MONOCYTES # BLD AUTO: 753 CELLS/UL (ref 200–950)
MONOCYTES NFR BLD AUTO: 7.1 %
MUV IGG SER IA-ACNC: <9 AU/ML
MUV IGM TITR SER IF: NORMAL TITER
NEUTROPHILS # BLD AUTO: 6508 CELLS/UL (ref 1500–7800)
NEUTROPHILS NFR BLD AUTO: 61.4 %
NONHDLC SERPL-MCNC: 81 MG/DL (CALC)
PLATELET # BLD AUTO: 146 THOUSAND/UL (ref 140–400)
PMV BLD REES-ECKER: 13.3 FL (ref 7.5–12.5)
POTASSIUM SERPL-SCNC: 2.9 MMOL/L (ref 3.5–5.3)
PROT SERPL-MCNC: 7.3 G/DL (ref 6.1–8.1)
PSA SERPL-MCNC: 0.8 NG/ML
RBC # BLD AUTO: 5.07 MILLION/UL (ref 4.2–5.8)
SL AMB EGFR AFRICAN AMERICAN: 87 ML/MIN/1.73M2
SL AMB EGFR NON AFRICAN AMERICAN: 75 ML/MIN/1.73M2
SODIUM SERPL-SCNC: 139 MMOL/L (ref 135–146)
TRIGL SERPL-MCNC: 82 MG/DL
TSH SERPL-ACNC: 1.31 MIU/L (ref 0.4–4.5)
WBC # BLD AUTO: 10.6 THOUSAND/UL (ref 3.8–10.8)

## 2021-01-29 ENCOUNTER — OFFICE VISIT (OUTPATIENT)
Dept: FAMILY MEDICINE CLINIC | Facility: CLINIC | Age: 61
End: 2021-01-29
Payer: COMMERCIAL

## 2021-01-29 VITALS
WEIGHT: 285 LBS | OXYGEN SATURATION: 98 % | HEIGHT: 74 IN | DIASTOLIC BLOOD PRESSURE: 86 MMHG | HEART RATE: 58 BPM | RESPIRATION RATE: 18 BRPM | BODY MASS INDEX: 36.57 KG/M2 | SYSTOLIC BLOOD PRESSURE: 134 MMHG | TEMPERATURE: 97.9 F

## 2021-01-29 DIAGNOSIS — F41.1 GENERALIZED ANXIETY DISORDER: ICD-10-CM

## 2021-01-29 DIAGNOSIS — E78.2 MIXED HYPERLIPIDEMIA: ICD-10-CM

## 2021-01-29 DIAGNOSIS — I10 ESSENTIAL HYPERTENSION: ICD-10-CM

## 2021-01-29 DIAGNOSIS — E03.9 HYPOTHYROIDISM, UNSPECIFIED TYPE: Chronic | ICD-10-CM

## 2021-01-29 DIAGNOSIS — E87.6 HYPOKALEMIA DUE TO LOSS OF POTASSIUM: ICD-10-CM

## 2021-01-29 DIAGNOSIS — E03.8 OTHER SPECIFIED HYPOTHYROIDISM: Primary | ICD-10-CM

## 2021-01-29 DIAGNOSIS — D72.110 IDIOPATHIC HYPEREOSINOPHILIC SYNDROME: ICD-10-CM

## 2021-01-29 DIAGNOSIS — Z56.6 WORK-RELATED STRESS: ICD-10-CM

## 2021-01-29 DIAGNOSIS — E66.09 CLASS 2 OBESITY DUE TO EXCESS CALORIES WITHOUT SERIOUS COMORBIDITY WITH BODY MASS INDEX (BMI) OF 36.0 TO 36.9 IN ADULT: ICD-10-CM

## 2021-01-29 PROBLEM — R06.02 SOB (SHORTNESS OF BREATH): Status: RESOLVED | Noted: 2020-05-13 | Resolved: 2021-01-29

## 2021-01-29 PROBLEM — R73.03 PREDIABETES: Status: RESOLVED | Noted: 2018-11-13 | Resolved: 2021-01-29

## 2021-01-29 PROBLEM — R94.39 ABNORMAL STRESS TEST: Status: RESOLVED | Noted: 2020-05-14 | Resolved: 2021-01-29

## 2021-01-29 PROCEDURE — 3079F DIAST BP 80-89 MM HG: CPT | Performed by: FAMILY MEDICINE

## 2021-01-29 PROCEDURE — 3075F SYST BP GE 130 - 139MM HG: CPT | Performed by: FAMILY MEDICINE

## 2021-01-29 PROCEDURE — 3008F BODY MASS INDEX DOCD: CPT | Performed by: FAMILY MEDICINE

## 2021-01-29 PROCEDURE — 99215 OFFICE O/P EST HI 40 MIN: CPT | Performed by: FAMILY MEDICINE

## 2021-01-29 RX ORDER — CLONAZEPAM 0.5 MG/1
0.5 TABLET ORAL
Qty: 30 TABLET | Refills: 3 | Status: SHIPPED | OUTPATIENT
Start: 2021-01-29 | End: 2021-06-09

## 2021-01-29 RX ORDER — LEVOTHYROXINE SODIUM 0.03 MG/1
25 TABLET ORAL DAILY
Qty: 90 TABLET | Refills: 1 | Status: SHIPPED | OUTPATIENT
Start: 2021-01-29 | End: 2021-02-16

## 2021-01-29 SDOH — HEALTH STABILITY - MENTAL HEALTH: OTHER PHYSICAL AND MENTAL STRAIN RELATED TO WORK: Z56.6

## 2021-01-29 NOTE — ASSESSMENT & PLAN NOTE
Patient does seem to be doing well on current dose of Cymbalta 30 mg once daily  Continue same  Refill provided of p r n  Klonopin which she usually takes at night or if he is feeling overly anxious during the day  Most is work related stress and some family issues at home    Prior to prescribing the controlled substance, a patient search was performed on the [place] prescription drug monitoring program web site  There was no evidence of diversion or misuse    Prescription provided

## 2021-01-29 NOTE — ASSESSMENT & PLAN NOTE
Encouraged to continue with lifestyle modification including dietary modification and exercise for weight loss  His back is feeling better    Hopefully he is able to exercise more

## 2021-01-29 NOTE — ASSESSMENT & PLAN NOTE
Hypothyroidism is well controlled on current dose of levothyroxine 25 mcg once daily  Continue same    Repeat thyroid function studies in 3 months

## 2021-01-29 NOTE — ASSESSMENT & PLAN NOTE
Possibly related to some type of environmental allergen including chickens that are being raised by next door neighbor  Repeat CBC in 3 months    Advised patient to contact [facility] once again or even find a lower to address the problem with chickens

## 2021-01-29 NOTE — ASSESSMENT & PLAN NOTE
Discontinue hydrochlorothiazide which is likely depleting his potassium    Repeat potassium level ordered

## 2021-01-29 NOTE — ASSESSMENT & PLAN NOTE
Seems to be improved on Cymbalta 30 mg once daily  Work is also become somewhat less stressful  Refill provided of p r n   Klonopin
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
## 2021-01-29 NOTE — ASSESSMENT & PLAN NOTE
Hypertension remains very well controlled on current dose of Bystolic, amlodipine  Discontinue hydrochlorothiazide especially due to hypokalemia    He has no lower extremity edema and his blood pressure is well controlled especially with his weight loss

## 2021-02-16 ENCOUNTER — VBI (OUTPATIENT)
Dept: ADMINISTRATIVE | Facility: OTHER | Age: 61
End: 2021-02-16

## 2021-02-16 DIAGNOSIS — E03.9 HYPOTHYROIDISM, UNSPECIFIED TYPE: Chronic | ICD-10-CM

## 2021-02-16 RX ORDER — LEVOTHYROXINE SODIUM 25 MCG
TABLET ORAL
Qty: 90 TABLET | Refills: 1 | Status: SHIPPED | OUTPATIENT
Start: 2021-02-16 | End: 2021-07-02

## 2021-03-01 ENCOUNTER — TELEMEDICINE (OUTPATIENT)
Dept: FAMILY MEDICINE CLINIC | Facility: CLINIC | Age: 61
End: 2021-03-01
Payer: COMMERCIAL

## 2021-03-01 VITALS — HEIGHT: 74 IN | BODY MASS INDEX: 37.22 KG/M2 | WEIGHT: 290 LBS

## 2021-03-01 DIAGNOSIS — M10.9 ACUTE GOUT INVOLVING TOE OF RIGHT FOOT, UNSPECIFIED CAUSE: Primary | ICD-10-CM

## 2021-03-01 PROCEDURE — 99214 OFFICE O/P EST MOD 30 MIN: CPT | Performed by: NURSE PRACTITIONER

## 2021-03-01 RX ORDER — PREDNISONE 20 MG/1
40 TABLET ORAL DAILY
Qty: 10 TABLET | Refills: 0 | Status: SHIPPED | OUTPATIENT
Start: 2021-03-01 | End: 2021-03-06
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
## 2021-03-12 ENCOUNTER — TELEPHONE (OUTPATIENT)
Dept: FAMILY MEDICINE CLINIC | Facility: CLINIC | Age: 61
End: 2021-03-12

## 2021-03-12 NOTE — TELEPHONE ENCOUNTER
I spoke with Ann Douglas and I advised him that he needs to have the lab work done and he stated that he will try to go tomorrow morning

## 2021-03-12 NOTE — TELEPHONE ENCOUNTER
Pt called regarding his Gout he said he is having a flare up and finished his prednisone  He still not not get his labs done because he can not walk he said  He wants to know if there any other options for him since he is in pain and his foot is swollen?

## 2021-03-12 NOTE — TELEPHONE ENCOUNTER
He can certainly drink copious amounts of water, avoid red meat, shellfish, alcohol  Prednisone usually is able to reduce the acute inflammation for gout but if his uric acid is indeed elevated then he would have to go on to a uricosuric agent such as allopurinol, colchicine, probenecid  Unable to be placed on that without having a uric acid level done  That is probably why Scottie Garcia gave him an order for a uric acid    In January    That is well over a month and half ago  Even if he could not walk he could have crawled to the lab  Get lab done    That is the best device

## 2021-03-15 ENCOUNTER — LAB (OUTPATIENT)
Dept: LAB | Facility: CLINIC | Age: 61
End: 2021-03-15
Payer: COMMERCIAL

## 2021-03-15 DIAGNOSIS — M10.9 ACUTE GOUT INVOLVING TOE OF RIGHT FOOT, UNSPECIFIED CAUSE: ICD-10-CM

## 2021-03-15 LAB — URATE SERPL-MCNC: 5.7 MG/DL (ref 4.2–8)

## 2021-03-15 PROCEDURE — 36415 COLL VENOUS BLD VENIPUNCTURE: CPT

## 2021-03-15 PROCEDURE — 84550 ASSAY OF BLOOD/URIC ACID: CPT

## 2021-03-15 NOTE — RESULT ENCOUNTER NOTE
Please call patient and notify that results are normal   Normal uric acid level at 5 7  If his foot is still swollen and painful then he would need to come in for an evaluation    This is not gout

## 2021-03-16 ENCOUNTER — OFFICE VISIT (OUTPATIENT)
Dept: FAMILY MEDICINE CLINIC | Facility: CLINIC | Age: 61
End: 2021-03-16
Payer: COMMERCIAL

## 2021-03-16 ENCOUNTER — APPOINTMENT (EMERGENCY)
Dept: RADIOLOGY | Facility: HOSPITAL | Age: 61
End: 2021-03-16
Payer: COMMERCIAL

## 2021-03-16 ENCOUNTER — HOSPITAL ENCOUNTER (EMERGENCY)
Facility: HOSPITAL | Age: 61
Discharge: HOME | End: 2021-03-16
Attending: EMERGENCY MEDICINE
Payer: COMMERCIAL

## 2021-03-16 ENCOUNTER — TELEPHONE (OUTPATIENT)
Dept: FAMILY MEDICINE CLINIC | Facility: CLINIC | Age: 61
End: 2021-03-16

## 2021-03-16 VITALS
BODY MASS INDEX: 37.68 KG/M2 | OXYGEN SATURATION: 97 % | WEIGHT: 293.6 LBS | DIASTOLIC BLOOD PRESSURE: 88 MMHG | HEIGHT: 74 IN | RESPIRATION RATE: 20 BRPM | HEART RATE: 82 BPM | SYSTOLIC BLOOD PRESSURE: 158 MMHG

## 2021-03-16 VITALS
TEMPERATURE: 98.6 F | BODY MASS INDEX: 37.23 KG/M2 | WEIGHT: 290 LBS | RESPIRATION RATE: 20 BRPM | HEART RATE: 64 BPM | OXYGEN SATURATION: 96 % | SYSTOLIC BLOOD PRESSURE: 129 MMHG | DIASTOLIC BLOOD PRESSURE: 87 MMHG

## 2021-03-16 DIAGNOSIS — M79.89 SWELLING OF LEFT FOOT: ICD-10-CM

## 2021-03-16 DIAGNOSIS — M10.9 GOUT OF LEFT FOOT: Primary | ICD-10-CM

## 2021-03-16 DIAGNOSIS — M79.672 LEFT FOOT PAIN: Primary | ICD-10-CM

## 2021-03-16 LAB
ALBUMIN SERPL BCP-MCNC: 3 G/DL (ref 3.5–5)
ALP SERPL-CCNC: 95 U/L (ref 46–116)
ALT SERPL W P-5'-P-CCNC: 21 U/L (ref 12–78)
ANION GAP SERPL CALCULATED.3IONS-SCNC: 7 MMOL/L (ref 4–13)
AST SERPL W P-5'-P-CCNC: 26 U/L (ref 5–45)
BASOPHILS # BLD AUTO: 0.05 THOUSANDS/ΜL (ref 0–0.1)
BASOPHILS NFR BLD AUTO: 1 % (ref 0–1)
BILIRUB SERPL-MCNC: 0.51 MG/DL (ref 0.2–1)
BUN SERPL-MCNC: 16 MG/DL (ref 5–25)
CALCIUM ALBUM COR SERPL-MCNC: 10.2 MG/DL (ref 8.3–10.1)
CALCIUM SERPL-MCNC: 9.4 MG/DL (ref 8.3–10.1)
CHLORIDE SERPL-SCNC: 103 MMOL/L (ref 100–108)
CO2 SERPL-SCNC: 28 MMOL/L (ref 21–32)
CREAT SERPL-MCNC: 0.91 MG/DL (ref 0.6–1.3)
CRP SERPL QL: 66.3 MG/L
EOSINOPHIL # BLD AUTO: 1.06 THOUSAND/ΜL (ref 0–0.61)
EOSINOPHIL NFR BLD AUTO: 10 % (ref 0–6)
ERYTHROCYTE [DISTWIDTH] IN BLOOD BY AUTOMATED COUNT: 14.9 % (ref 11.6–15.1)
GFR SERPL CREATININE-BSD FRML MDRD: 91 ML/MIN/1.73SQ M
GLUCOSE SERPL-MCNC: 102 MG/DL (ref 65–140)
HCT VFR BLD AUTO: 42 % (ref 36.5–49.3)
HGB BLD-MCNC: 13.9 G/DL (ref 12–17)
IMM GRANULOCYTES # BLD AUTO: 0.04 THOUSAND/UL (ref 0–0.2)
IMM GRANULOCYTES NFR BLD AUTO: 0 % (ref 0–2)
LYMPHOCYTES # BLD AUTO: 1.42 THOUSANDS/ΜL (ref 0.6–4.47)
LYMPHOCYTES NFR BLD AUTO: 14 % (ref 14–44)
MCH RBC QN AUTO: 33.1 PG (ref 26.8–34.3)
MCHC RBC AUTO-ENTMCNC: 33.1 G/DL (ref 31.4–37.4)
MCV RBC AUTO: 100 FL (ref 82–98)
MONOCYTES # BLD AUTO: 0.91 THOUSAND/ΜL (ref 0.17–1.22)
MONOCYTES NFR BLD AUTO: 9 % (ref 4–12)
NEUTROPHILS # BLD AUTO: 7 THOUSANDS/ΜL (ref 1.85–7.62)
NEUTS SEG NFR BLD AUTO: 66 % (ref 43–75)
NRBC BLD AUTO-RTO: 0 /100 WBCS
PLATELET # BLD AUTO: 141 THOUSANDS/UL (ref 149–390)
PMV BLD AUTO: 13 FL (ref 8.9–12.7)
POTASSIUM SERPL-SCNC: 4.9 MMOL/L (ref 3.5–5.3)
PROT SERPL-MCNC: 7.1 G/DL (ref 6.4–8.2)
RBC # BLD AUTO: 4.2 MILLION/UL (ref 3.88–5.62)
SODIUM SERPL-SCNC: 138 MMOL/L (ref 136–145)
WBC # BLD AUTO: 10.48 THOUSAND/UL (ref 4.31–10.16)

## 2021-03-16 PROCEDURE — 36415 COLL VENOUS BLD VENIPUNCTURE: CPT | Performed by: EMERGENCY MEDICINE

## 2021-03-16 PROCEDURE — 99283 EMERGENCY DEPT VISIT LOW MDM: CPT | Performed by: EMERGENCY MEDICINE

## 2021-03-16 PROCEDURE — 86140 C-REACTIVE PROTEIN: CPT | Performed by: EMERGENCY MEDICINE

## 2021-03-16 PROCEDURE — 3725F SCREEN DEPRESSION PERFORMED: CPT | Performed by: NURSE PRACTITIONER

## 2021-03-16 PROCEDURE — 80053 COMPREHEN METABOLIC PANEL: CPT | Performed by: EMERGENCY MEDICINE

## 2021-03-16 PROCEDURE — 73630 X-RAY EXAM OF FOOT: CPT

## 2021-03-16 PROCEDURE — 3079F DIAST BP 80-89 MM HG: CPT | Performed by: NURSE PRACTITIONER

## 2021-03-16 PROCEDURE — 99213 OFFICE O/P EST LOW 20 MIN: CPT | Performed by: NURSE PRACTITIONER

## 2021-03-16 PROCEDURE — 85025 COMPLETE CBC W/AUTO DIFF WBC: CPT | Performed by: EMERGENCY MEDICINE

## 2021-03-16 PROCEDURE — 3077F SYST BP >= 140 MM HG: CPT | Performed by: NURSE PRACTITIONER

## 2021-03-16 PROCEDURE — 3008F BODY MASS INDEX DOCD: CPT | Performed by: NURSE PRACTITIONER

## 2021-03-16 PROCEDURE — 99284 EMERGENCY DEPT VISIT MOD MDM: CPT

## 2021-03-16 PROCEDURE — 1036F TOBACCO NON-USER: CPT | Performed by: NURSE PRACTITIONER

## 2021-03-16 RX ORDER — PREDNISONE 20 MG/1
60 TABLET ORAL ONCE
Status: COMPLETED | OUTPATIENT
Start: 2021-03-16 | End: 2021-03-16

## 2021-03-16 RX ORDER — PREDNISONE 20 MG/1
40 TABLET ORAL DAILY
Qty: 10 TABLET | Refills: 0 | Status: SHIPPED | OUTPATIENT
Start: 2021-03-16 | End: 2021-03-21

## 2021-03-16 RX ORDER — OXYCODONE HYDROCHLORIDE AND ACETAMINOPHEN 5; 325 MG/1; MG/1
1 TABLET ORAL EVERY 6 HOURS PRN
Qty: 10 TABLET | Refills: 0 | Status: SHIPPED | OUTPATIENT
Start: 2021-03-16 | End: 2021-04-29 | Stop reason: SDUPTHER

## 2021-03-16 RX ADMIN — PREDNISONE 60 MG: 20 TABLET ORAL at 20:21
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
## 2021-03-16 NOTE — TELEPHONE ENCOUNTER
----- Message from Concha Diaz DO sent at 3/15/2021  7:47 PM EDT -----  Please call patient and notify that results are normal   Normal uric acid level at 5 7  If his foot is still swollen and painful then he would need to come in for an evaluation    This is not gout

## 2021-03-16 NOTE — PROGRESS NOTES
Assessment/Plan:      Diagnoses and all orders for this visit:    Left foot pain  -     Ambulatory Referral to Emergency Medicine; Future    Swelling of left foot  -     Ambulatory Referral to Emergency Medicine; Future      Patient reports left foot swelling and redness for the past week  Patient also reports that his left foot is very painful  Left foot is swollen and warm on exam  Erythema noted on the top of the foot  Tenderness noted on palpation of the top of the left foot  Patient referred to the [address] [place] [address] ED for further evaluation and treatment  Subjective:     Patient ID: Ada Yip is a 63 y o  male  Patient reports left foot swelling and redness for the past week  Patient reports that he did not check his temperature at home  Patient reports that his left foot is very painful  Patient rates the pain as a 7 out of 10 on 0-10 scale  Patient reports that the foot is very sensitive to touch  Patient reports that he has been taking tylenol and supplements OTC  Patient reports that his symptoms are not going away  Review of Systems   Constitutional: Negative for appetite change and chills  Respiratory: Negative for cough, chest tightness and shortness of breath  Cardiovascular: Negative for chest pain  Gastrointestinal: Negative for abdominal pain, diarrhea, nausea and vomiting  Musculoskeletal:        As noted in HPI  Skin:        As noted in HPI  Neurological: Negative for dizziness, syncope, light-headedness and headaches  Objective:     Physical Exam  Vitals signs reviewed  Constitutional:       Appearance: He is not diaphoretic  Comments: Patient appears uncomfortable  Cardiovascular:      Rate and Rhythm: Normal rate and regular rhythm  Pulses: Normal pulses  Heart sounds: Normal heart sounds  Pulmonary:      Effort: Pulmonary effort is normal  No respiratory distress  Breath sounds: Normal breath sounds  No wheezing  Musculoskeletal:      Comments: Left foot is swollen and warm  Erythema noted on the top of the foot  Patient reports tenderness on palpation of the top of the foot  Neurological:      Mental Status: He is alert and oriented to person, place, and time     Psychiatric:         Mood and Affect: Mood normal

## 2021-03-24 ENCOUNTER — TELEPHONE (OUTPATIENT)
Dept: OBGYN CLINIC | Facility: CLINIC | Age: 61
End: 2021-03-24

## 2021-04-10 DIAGNOSIS — I10 ESSENTIAL HYPERTENSION: Chronic | ICD-10-CM

## 2021-04-12 RX ORDER — AMLODIPINE BESYLATE 10 MG/1
TABLET ORAL
Qty: 90 TABLET | Refills: 1 | Status: SHIPPED | OUTPATIENT
Start: 2021-04-12 | End: 2021-10-07

## 2021-04-13 ENCOUNTER — OFFICE VISIT (OUTPATIENT)
Dept: OBGYN CLINIC | Facility: CLINIC | Age: 61
End: 2021-04-13
Payer: COMMERCIAL

## 2021-04-13 VITALS
BODY MASS INDEX: 35.94 KG/M2 | SYSTOLIC BLOOD PRESSURE: 184 MMHG | WEIGHT: 280 LBS | HEIGHT: 74 IN | DIASTOLIC BLOOD PRESSURE: 95 MMHG | HEART RATE: 64 BPM

## 2021-04-13 DIAGNOSIS — M19.011 PRIMARY OSTEOARTHRITIS OF RIGHT SHOULDER: Primary | ICD-10-CM

## 2021-04-13 PROCEDURE — 20610 DRAIN/INJ JOINT/BURSA W/O US: CPT | Performed by: ORTHOPAEDIC SURGERY

## 2021-04-13 PROCEDURE — 99214 OFFICE O/P EST MOD 30 MIN: CPT | Performed by: ORTHOPAEDIC SURGERY

## 2021-04-13 RX ORDER — METHYLPREDNISOLONE ACETATE 40 MG/ML
1 INJECTION, SUSPENSION INTRA-ARTICULAR; INTRALESIONAL; INTRAMUSCULAR; SOFT TISSUE
Status: COMPLETED | OUTPATIENT
Start: 2021-04-13 | End: 2021-04-13

## 2021-04-13 RX ORDER — LIDOCAINE HYDROCHLORIDE 10 MG/ML
4 INJECTION, SOLUTION INFILTRATION; PERINEURAL
Status: COMPLETED | OUTPATIENT
Start: 2021-04-13 | End: 2021-04-13

## 2021-04-13 RX ADMIN — METHYLPREDNISOLONE ACETATE 1 ML: 40 INJECTION, SUSPENSION INTRA-ARTICULAR; INTRALESIONAL; INTRAMUSCULAR; SOFT TISSUE at 16:47

## 2021-04-13 RX ADMIN — LIDOCAINE HYDROCHLORIDE 4 ML: 10 INJECTION, SOLUTION INFILTRATION; PERINEURAL at 16:47
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
## 2021-04-21 ENCOUNTER — TELEPHONE (OUTPATIENT)
Dept: FAMILY MEDICINE CLINIC | Facility: CLINIC | Age: 61
End: 2021-04-21

## 2021-04-21 NOTE — TELEPHONE ENCOUNTER
Patient called stated he scheduled a yearly physical on 4/29/2021 and wants to know if he should have labs done before then

## 2021-04-22 NOTE — TELEPHONE ENCOUNTER
Yes, he should complete labs that were ordered 1/29/21 for appt 4/29  Preston Hickey   yep, definitely would like those done which is why I ordered them

## 2021-04-28 LAB
ALBUMIN SERPL-MCNC: 4.1 G/DL (ref 3.6–5.1)
ALBUMIN/GLOB SERPL: 1.8 (CALC) (ref 1–2.5)
ALP SERPL-CCNC: 76 U/L (ref 35–144)
ALT SERPL-CCNC: 17 U/L (ref 9–46)
AST SERPL-CCNC: 19 U/L (ref 10–35)
BASOPHILS # BLD AUTO: 67 CELLS/UL (ref 0–200)
BASOPHILS NFR BLD AUTO: 0.8 %
BILIRUB SERPL-MCNC: 0.4 MG/DL (ref 0.2–1.2)
BUN SERPL-MCNC: 11 MG/DL (ref 7–25)
BUN/CREAT SERPL: ABNORMAL (CALC) (ref 6–22)
CALCIUM SERPL-MCNC: 9.2 MG/DL (ref 8.6–10.3)
CHLORIDE SERPL-SCNC: 104 MMOL/L (ref 98–110)
CHOLEST SERPL-MCNC: 122 MG/DL
CHOLEST/HDLC SERPL: 2.3 (CALC)
CO2 SERPL-SCNC: 33 MMOL/L (ref 20–32)
CREAT SERPL-MCNC: 0.87 MG/DL (ref 0.7–1.25)
EOSINOPHIL # BLD AUTO: 790 CELLS/UL (ref 15–500)
EOSINOPHIL NFR BLD AUTO: 9.4 %
ERYTHROCYTE [DISTWIDTH] IN BLOOD BY AUTOMATED COUNT: 14.1 % (ref 11–15)
GLOBULIN SER CALC-MCNC: 2.3 G/DL (CALC) (ref 1.9–3.7)
GLUCOSE SERPL-MCNC: 114 MG/DL (ref 65–99)
HCT VFR BLD AUTO: 44.8 % (ref 38.5–50)
HDLC SERPL-MCNC: 53 MG/DL
HGB BLD-MCNC: 15 G/DL (ref 13.2–17.1)
LDLC SERPL CALC-MCNC: 55 MG/DL (CALC)
LYMPHOCYTES # BLD AUTO: 1554 CELLS/UL (ref 850–3900)
LYMPHOCYTES NFR BLD AUTO: 18.5 %
MCH RBC QN AUTO: 33.3 PG (ref 27–33)
MCHC RBC AUTO-ENTMCNC: 33.5 G/DL (ref 32–36)
MCV RBC AUTO: 99.3 FL (ref 80–100)
MONOCYTES # BLD AUTO: 731 CELLS/UL (ref 200–950)
MONOCYTES NFR BLD AUTO: 8.7 %
NEUTROPHILS # BLD AUTO: 5258 CELLS/UL (ref 1500–7800)
NEUTROPHILS NFR BLD AUTO: 62.6 %
NONHDLC SERPL-MCNC: 69 MG/DL (CALC)
PLATELET # BLD AUTO: 134 THOUSAND/UL (ref 140–400)
PMV BLD REES-ECKER: 14 FL (ref 7.5–12.5)
POTASSIUM SERPL-SCNC: 4.8 MMOL/L (ref 3.5–5.3)
PROT SERPL-MCNC: 6.4 G/DL (ref 6.1–8.1)
RBC # BLD AUTO: 4.51 MILLION/UL (ref 4.2–5.8)
SL AMB EGFR AFRICAN AMERICAN: 108 ML/MIN/1.73M2
SL AMB EGFR NON AFRICAN AMERICAN: 93 ML/MIN/1.73M2
SODIUM SERPL-SCNC: 142 MMOL/L (ref 135–146)
TRIGL SERPL-MCNC: 68 MG/DL
TSH SERPL-ACNC: 0.81 MIU/L (ref 0.4–4.5)
WBC # BLD AUTO: 8.4 THOUSAND/UL (ref 3.8–10.8)

## 2021-04-29 ENCOUNTER — OFFICE VISIT (OUTPATIENT)
Dept: FAMILY MEDICINE CLINIC | Facility: CLINIC | Age: 61
End: 2021-04-29
Payer: COMMERCIAL

## 2021-04-29 VITALS
HEART RATE: 60 BPM | BODY MASS INDEX: 37.35 KG/M2 | OXYGEN SATURATION: 97 % | WEIGHT: 291 LBS | HEIGHT: 74 IN | SYSTOLIC BLOOD PRESSURE: 128 MMHG | DIASTOLIC BLOOD PRESSURE: 78 MMHG | RESPIRATION RATE: 16 BRPM

## 2021-04-29 DIAGNOSIS — M10.9 GOUT OF LEFT FOOT: ICD-10-CM

## 2021-04-29 DIAGNOSIS — E66.09 CLASS 2 OBESITY DUE TO EXCESS CALORIES WITHOUT SERIOUS COMORBIDITY WITH BODY MASS INDEX (BMI) OF 36.0 TO 36.9 IN ADULT: ICD-10-CM

## 2021-04-29 DIAGNOSIS — E78.2 MIXED HYPERLIPIDEMIA: ICD-10-CM

## 2021-04-29 DIAGNOSIS — R73.01 IMPAIRED FASTING GLUCOSE: ICD-10-CM

## 2021-04-29 DIAGNOSIS — M79.18 MYOFASCIAL PAIN SYNDROME: ICD-10-CM

## 2021-04-29 DIAGNOSIS — M48.061 SPINAL STENOSIS OF LUMBAR REGION WITHOUT NEUROGENIC CLAUDICATION: ICD-10-CM

## 2021-04-29 DIAGNOSIS — R05.8 ALLERGIC COUGH: ICD-10-CM

## 2021-04-29 DIAGNOSIS — E03.8 OTHER SPECIFIED HYPOTHYROIDISM: ICD-10-CM

## 2021-04-29 DIAGNOSIS — Z00.00 PHYSICAL EXAM, ANNUAL: Primary | ICD-10-CM

## 2021-04-29 DIAGNOSIS — I10 ESSENTIAL HYPERTENSION: ICD-10-CM

## 2021-04-29 DIAGNOSIS — F41.1 GENERALIZED ANXIETY DISORDER: ICD-10-CM

## 2021-04-29 DIAGNOSIS — Z56.6 WORK-RELATED STRESS: ICD-10-CM

## 2021-04-29 PROBLEM — R06.02 SHORTNESS OF BREATH: Status: RESOLVED | Noted: 2020-04-20 | Resolved: 2021-04-29

## 2021-04-29 PROBLEM — D72.110 IDIOPATHIC HYPEREOSINOPHILIC SYNDROME: Status: RESOLVED | Noted: 2021-01-29 | Resolved: 2021-04-29

## 2021-04-29 PROCEDURE — 99396 PREV VISIT EST AGE 40-64: CPT | Performed by: FAMILY MEDICINE

## 2021-04-29 PROCEDURE — 99215 OFFICE O/P EST HI 40 MIN: CPT | Performed by: FAMILY MEDICINE

## 2021-04-29 PROCEDURE — 1036F TOBACCO NON-USER: CPT | Performed by: FAMILY MEDICINE

## 2021-04-29 PROCEDURE — 3008F BODY MASS INDEX DOCD: CPT | Performed by: FAMILY MEDICINE

## 2021-04-29 PROCEDURE — 3074F SYST BP LT 130 MM HG: CPT | Performed by: FAMILY MEDICINE

## 2021-04-29 PROCEDURE — 3078F DIAST BP <80 MM HG: CPT | Performed by: FAMILY MEDICINE

## 2021-04-29 RX ORDER — MONTELUKAST SODIUM 10 MG/1
10 TABLET ORAL
Qty: 90 TABLET | Refills: 1 | Status: SHIPPED | OUTPATIENT
Start: 2021-04-29 | End: 2021-08-30 | Stop reason: SDUPTHER

## 2021-04-29 RX ORDER — OXYCODONE HYDROCHLORIDE AND ACETAMINOPHEN 5; 325 MG/1; MG/1
1 TABLET ORAL EVERY 6 HOURS PRN
Qty: 30 TABLET | Refills: 0 | Status: SHIPPED | OUTPATIENT
Start: 2021-04-29 | End: 2021-09-21 | Stop reason: ALTCHOICE

## 2021-04-29 RX ORDER — DULOXETIN HYDROCHLORIDE 30 MG/1
30 CAPSULE, DELAYED RELEASE ORAL DAILY
Qty: 90 CAPSULE | Refills: 1 | Status: SHIPPED | OUTPATIENT
Start: 2021-04-29 | End: 2021-08-30 | Stop reason: SDUPTHER

## 2021-04-29 SDOH — HEALTH STABILITY - MENTAL HEALTH: OTHER PHYSICAL AND MENTAL STRAIN RELATED TO WORK: Z56.6

## 2021-04-29 NOTE — ASSESSMENT & PLAN NOTE
Hypertension remains well controlled on current dose of Bystolic and amlodipine  Even with discontinuation of his hydrochlorothiazide his blood pressure remains well controlled    Re-evaluate in 6 months

## 2021-04-29 NOTE — ASSESSMENT & PLAN NOTE
Cholesterol is considerably improved  Continue Crestor 5 mg once daily    Repeat lipid profile in 6 months

## 2021-04-29 NOTE — ASSESSMENT & PLAN NOTE
Seems to be improved on Cymbalta 30 mg once daily    No longer has work-related stress since he is not presently employed

## 2021-04-29 NOTE — ASSESSMENT & PLAN NOTE
Hypothyroidism remains well controlled on current dose of levothyroxine 25 mcg once daily  Continue same    Repeat thyroid function studies in 6 months

## 2021-04-29 NOTE — ASSESSMENT & PLAN NOTE
Eosinophils decreased  Chickens were likely contributing to his allergic cough    Continue on Singulair 10 mg once daily and fluticasone nasal spray

## 2021-05-04 ENCOUNTER — IMMUNIZATIONS (OUTPATIENT)
Dept: FAMILY MEDICINE CLINIC | Facility: HOSPITAL | Age: 61
End: 2021-05-04

## 2021-05-04 DIAGNOSIS — Z23 ENCOUNTER FOR IMMUNIZATION: Primary | ICD-10-CM

## 2021-05-04 PROCEDURE — 0001A SARS-COV-2 / COVID-19 MRNA VACCINE (PFIZER-BIONTECH) 30 MCG: CPT

## 2021-05-04 PROCEDURE — 91300 SARS-COV-2 / COVID-19 MRNA VACCINE (PFIZER-BIONTECH) 30 MCG: CPT

## 2021-05-27 ENCOUNTER — IMMUNIZATIONS (OUTPATIENT)
Dept: FAMILY MEDICINE CLINIC | Facility: HOSPITAL | Age: 61
End: 2021-05-27

## 2021-05-27 DIAGNOSIS — Z23 ENCOUNTER FOR IMMUNIZATION: Primary | ICD-10-CM

## 2021-05-27 PROCEDURE — 0002A SARS-COV-2 / COVID-19 MRNA VACCINE (PFIZER-BIONTECH) 30 MCG: CPT

## 2021-05-27 PROCEDURE — 91300 SARS-COV-2 / COVID-19 MRNA VACCINE (PFIZER-BIONTECH) 30 MCG: CPT

## 2021-06-08 DIAGNOSIS — F41.1 GENERALIZED ANXIETY DISORDER: ICD-10-CM

## 2021-06-08 DIAGNOSIS — Z56.6 WORK-RELATED STRESS: ICD-10-CM

## 2021-06-08 SDOH — HEALTH STABILITY - MENTAL HEALTH: OTHER PHYSICAL AND MENTAL STRAIN RELATED TO WORK: Z56.6

## 2021-06-09 RX ORDER — CLONAZEPAM 0.5 MG/1
TABLET ORAL
Qty: 30 TABLET | Refills: 1 | Status: SHIPPED | OUTPATIENT
Start: 2021-06-09 | End: 2021-08-17

## 2021-06-15 DIAGNOSIS — I10 ESSENTIAL HYPERTENSION: Chronic | ICD-10-CM

## 2021-06-16 RX ORDER — ROSUVASTATIN CALCIUM 5 MG/1
TABLET, COATED ORAL
Qty: 90 TABLET | Refills: 1 | Status: SHIPPED | OUTPATIENT
Start: 2021-06-16 | End: 2021-12-06

## 2021-06-30 DIAGNOSIS — I10 ESSENTIAL HYPERTENSION: Chronic | ICD-10-CM

## 2021-06-30 RX ORDER — NEBIVOLOL HYDROCHLORIDE 10 MG/1
TABLET ORAL
Qty: 90 TABLET | Refills: 1 | Status: SHIPPED | OUTPATIENT
Start: 2021-06-30 | End: 2021-12-06

## 2021-07-02 DIAGNOSIS — E03.9 HYPOTHYROIDISM, UNSPECIFIED TYPE: Chronic | ICD-10-CM

## 2021-07-02 RX ORDER — LEVOTHYROXINE SODIUM 25 MCG
TABLET ORAL
Qty: 90 TABLET | Refills: 1 | Status: SHIPPED | OUTPATIENT
Start: 2021-07-02 | End: 2022-03-07 | Stop reason: SDUPTHER

## 2021-07-28 ENCOUNTER — TELEPHONE (OUTPATIENT)
Dept: FAMILY MEDICINE CLINIC | Facility: CLINIC | Age: 61
End: 2021-07-28

## 2021-07-28 NOTE — TELEPHONE ENCOUNTER
Pt called and state that the numbness is getting worse in hid feet, he said he did discuss this last time he was here  He wants to know what the next step should be? Does he need an office visit or refer him to a specialist? Please call back

## 2021-07-29 ENCOUNTER — HOSPITAL ENCOUNTER (EMERGENCY)
Facility: HOSPITAL | Age: 61
Discharge: HOME | End: 2021-07-30
Attending: EMERGENCY MEDICINE
Payer: COMMERCIAL

## 2021-07-29 VITALS
RESPIRATION RATE: 18 BRPM | OXYGEN SATURATION: 97 % | HEIGHT: 74 IN | BODY MASS INDEX: 38.42 KG/M2 | WEIGHT: 299.38 LBS | DIASTOLIC BLOOD PRESSURE: 78 MMHG | TEMPERATURE: 98.3 F | HEART RATE: 62 BPM | SYSTOLIC BLOOD PRESSURE: 124 MMHG

## 2021-07-29 DIAGNOSIS — R06.02 SHORTNESS OF BREATH: ICD-10-CM

## 2021-07-29 DIAGNOSIS — T30.0 BURN: Primary | ICD-10-CM

## 2021-07-29 PROCEDURE — 99284 EMERGENCY DEPT VISIT MOD MDM: CPT | Performed by: EMERGENCY MEDICINE

## 2021-07-29 PROCEDURE — 90471 IMMUNIZATION ADMIN: CPT

## 2021-07-29 PROCEDURE — 99283 EMERGENCY DEPT VISIT LOW MDM: CPT

## 2021-07-29 RX ORDER — MORPHINE SULFATE 4 MG/ML
4 INJECTION, SOLUTION INTRAMUSCULAR; INTRAVENOUS ONCE
Status: COMPLETED | OUTPATIENT
Start: 2021-07-30 | End: 2021-07-30

## 2021-07-29 RX ORDER — FLUTICASONE PROPIONATE 50 MCG
SPRAY, SUSPENSION (ML) NASAL
Qty: 18.2 ML | Refills: 1 | Status: SHIPPED | OUTPATIENT
Start: 2021-07-29 | End: 2021-08-01

## 2021-07-29 RX ORDER — BACITRACIN, NEOMYCIN, POLYMYXIN B 400; 3.5; 5 [USP'U]/G; MG/G; [USP'U]/G
1 OINTMENT TOPICAL ONCE
Status: COMPLETED | OUTPATIENT
Start: 2021-07-29 | End: 2021-07-30

## 2021-07-29 RX ORDER — ACETAMINOPHEN 325 MG/1
650 TABLET ORAL ONCE
Status: COMPLETED | OUTPATIENT
Start: 2021-07-30 | End: 2021-07-30

## 2021-07-29 NOTE — TELEPHONE ENCOUNTER
Numbness that he is experiencing is related to the degenerative disc disease in his lumbar spine  It is possible that he he may need adjustment to spinal stimulator from pain management  Not certain when he is scheduled for follow-up with them    Last seen by them, appears in August of 2020

## 2021-07-29 NOTE — TELEPHONE ENCOUNTER
Advised patient; he states he can make adjustments himself    if it doesn't work he will call them he guesses

## 2021-07-30 PROCEDURE — 90715 TDAP VACCINE 7 YRS/> IM: CPT | Performed by: EMERGENCY MEDICINE

## 2021-07-30 PROCEDURE — 96372 THER/PROPH/DIAG INJ SC/IM: CPT

## 2021-07-30 RX ADMIN — MORPHINE SULFATE 4 MG: 4 INJECTION INTRAVENOUS at 00:00

## 2021-07-30 RX ADMIN — ACETAMINOPHEN 650 MG: 325 TABLET, FILM COATED ORAL at 00:00

## 2021-07-30 RX ADMIN — BACITRACIN ZINC NEOMYCIN SULFATE POLYMYXIN B SULFATE 1 LARGE APPLICATION: 400; 3.5; 5 OINTMENT TOPICAL at 00:11

## 2021-07-30 RX ADMIN — TETANUS TOXOID, REDUCED DIPHTHERIA TOXOID AND ACELLULAR PERTUSSIS VACCINE, ADSORBED 0.5 ML: 5; 2.5; 8; 8; 2.5 SUSPENSION INTRAMUSCULAR at 00:01
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
## 2021-08-15 DIAGNOSIS — Z56.6 WORK-RELATED STRESS: ICD-10-CM

## 2021-08-15 DIAGNOSIS — F41.1 GENERALIZED ANXIETY DISORDER: ICD-10-CM

## 2021-08-15 SDOH — HEALTH STABILITY - MENTAL HEALTH: OTHER PHYSICAL AND MENTAL STRAIN RELATED TO WORK: Z56.6

## 2021-08-16 NOTE — TELEPHONE ENCOUNTER
PDMP checked and last fill was 07/18/2021 for #30 written by you and filled at [place]    Next follow up 11/2021

## 2021-08-17 RX ORDER — CLONAZEPAM 0.5 MG/1
TABLET ORAL
Qty: 30 TABLET | Refills: 0 | Status: SHIPPED | OUTPATIENT
Start: 2021-08-17 | End: 2021-09-17

## 2021-08-30 DIAGNOSIS — F41.1 GENERALIZED ANXIETY DISORDER: ICD-10-CM

## 2021-08-30 DIAGNOSIS — R05.8 ALLERGIC COUGH: ICD-10-CM

## 2021-08-30 DIAGNOSIS — Z56.6 WORK-RELATED STRESS: ICD-10-CM

## 2021-08-30 RX ORDER — MONTELUKAST SODIUM 10 MG/1
10 TABLET ORAL
Qty: 90 TABLET | Refills: 1 | Status: SHIPPED | OUTPATIENT
Start: 2021-08-30 | End: 2022-01-20 | Stop reason: SDUPTHER

## 2021-08-30 RX ORDER — DULOXETIN HYDROCHLORIDE 30 MG/1
30 CAPSULE, DELAYED RELEASE ORAL DAILY
Qty: 90 CAPSULE | Refills: 1 | Status: SHIPPED | OUTPATIENT
Start: 2021-08-30 | End: 2022-01-20 | Stop reason: SDUPTHER

## 2021-08-30 SDOH — HEALTH STABILITY - MENTAL HEALTH: OTHER PHYSICAL AND MENTAL STRAIN RELATED TO WORK: Z56.6

## 2021-08-30 NOTE — TELEPHONE ENCOUNTER
----- Message from Venu Flannery sent at 8/29/2021  2:40 PM EDT -----  Regarding: Prescription Question  Contact: [phone]  Dear Doctor Miguelina Olmstead,  Please update my mail-order prescription for  DULOXETINE HCL 30MG DR CAP (90 days)  I had this on auto-fill with [facility], but apparently, when there are no refills, it just stops and, of course, you (I) don't check until I'm running low  Please, also, update any other prescription/refills, as may be required, through [employer]      Thank you,  Rashmi Dowling

## 2021-09-15 ENCOUNTER — TELEPHONE (OUTPATIENT)
Dept: PHYSICAL THERAPY | Facility: OTHER | Age: 61
End: 2021-09-15

## 2021-09-15 NOTE — TELEPHONE ENCOUNTER
Called patient per  left today  Patient states he is looking for information on the spinal cord stimulator he has and perhaps other option for his back pain  Patient was encouraged to call [facility]  office since they are the group that placed the stimulator  Patient appreciative for the call back

## 2021-09-21 ENCOUNTER — OFFICE VISIT (OUTPATIENT)
Dept: NEUROSURGERY | Facility: CLINIC | Age: 61
End: 2021-09-21
Payer: COMMERCIAL

## 2021-09-21 VITALS
HEIGHT: 74 IN | RESPIRATION RATE: 16 BRPM | DIASTOLIC BLOOD PRESSURE: 90 MMHG | WEIGHT: 296 LBS | BODY MASS INDEX: 37.99 KG/M2 | SYSTOLIC BLOOD PRESSURE: 147 MMHG | TEMPERATURE: 98.5 F | HEART RATE: 50 BPM

## 2021-09-21 DIAGNOSIS — M51.26 LUMBAGO DUE TO DISPLACEMENT OF INTERVERTEBRAL DISC: ICD-10-CM

## 2021-09-21 DIAGNOSIS — M51.26 PROTRUSION OF LUMBAR INTERVERTEBRAL DISC: Primary | ICD-10-CM

## 2021-09-21 DIAGNOSIS — R20.2 PARESTHESIA OF BILATERAL LEGS: ICD-10-CM

## 2021-09-21 PROCEDURE — 99204 OFFICE O/P NEW MOD 45 MIN: CPT | Performed by: NURSE PRACTITIONER

## 2021-09-21 PROCEDURE — 3008F BODY MASS INDEX DOCD: CPT | Performed by: NURSE PRACTITIONER

## 2021-09-21 PROCEDURE — 1036F TOBACCO NON-USER: CPT | Performed by: NURSE PRACTITIONER

## 2021-09-21 NOTE — ASSESSMENT & PLAN NOTE
· As addressed in HPI  · no gait instability observed   · Interested in [facility] HF10 ---will ,oss MRI conditionality of [employer] SCS if different company generator used   · Need to get updated MRI Lumbar spine to r/o surgical back, prior to updating SCS generator    · Contact [employer] rep to determine if programing is optimized   · [facility] Rep  Arrived but left prior to meeting with patient     Imagining refer to imagining section for details   MRI Lumbar spine w/wo contrast 6/20/2020 artifact interference, updated imagining needed       PLAN  · MRI Lumbar spine w/o contrast artifact suppression  · Xray lumbar spine --reassess degree of anterolisthesis   · Consider lab testing for markers of inflammation ankylosis spondylosis   · RTO after MRI complete solo Dr Do Leo  · E-mail to [employer] Rep
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
## 2021-09-21 NOTE — Clinical Note
Is it possible to book amadou the surgeyr report for 2008 and associated postoperative note   Thank You

## 2021-09-22 ENCOUNTER — DOCUMENTATION (OUTPATIENT)
Dept: NEUROSURGERY | Facility: CLINIC | Age: 61
End: 2021-09-22

## 2021-09-22 ENCOUNTER — TELEPHONE (OUTPATIENT)
Dept: NEUROSURGERY | Facility: CLINIC | Age: 61
End: 2021-09-22

## 2021-09-22 NOTE — PROGRESS NOTES
I called central scheduling yesterday  dept spoke with lisette harrell they will call pt to arrange 2-3 weeks he knows this he has spincal cord stim  [facility] -pt was told to call us back once they book danelle for his mri we can do f/u with dko solo ba

## 2021-09-22 NOTE — TELEPHONE ENCOUNTER
Marko Kaur saw him on 8/31/21 and said he is getting a little relief but not great  Shes been following him  Kaia Peaks, the MRI Coordinater/ is in daily contact with us for Stim patients  Im sure he will be scheduled very soon  Joselin MAURER [employer]  [phone]      F/u for office visit yesterday need MRI and wanted feedback for [employer] rep regarding programing with patient 2/2 c/o decline in efficacy

## 2021-09-22 NOTE — TELEPHONE ENCOUNTER
MRI as per [employer] Rep scheduled for October 1 2021  @ [phone]     MA notified to contact patient and schedule appointment w/ Dr Ruth Chatterjee

## 2021-09-22 NOTE — PROGRESS NOTES
Email to Wai Keith , please contact MRI to coordinate completion of MRI Lumbar spine need completion soon please

## 2021-10-01 ENCOUNTER — HOSPITAL ENCOUNTER (OUTPATIENT)
Dept: MRI IMAGING | Facility: HOSPITAL | Age: 61
Discharge: HOME | End: 2021-10-01
Payer: COMMERCIAL

## 2021-10-01 DIAGNOSIS — M51.26 LUMBAGO DUE TO DISPLACEMENT OF INTERVERTEBRAL DISC: ICD-10-CM

## 2021-10-01 DIAGNOSIS — R20.2 PARESTHESIA OF BILATERAL LEGS: ICD-10-CM

## 2021-10-01 DIAGNOSIS — M51.26 PROTRUSION OF LUMBAR INTERVERTEBRAL DISC: ICD-10-CM

## 2021-10-01 PROCEDURE — G1004 CDSM NDSC: HCPCS

## 2021-10-01 PROCEDURE — 72148 MRI LUMBAR SPINE W/O DYE: CPT

## 2021-10-05 ENCOUNTER — TELEPHONE (OUTPATIENT)
Dept: NEUROSURGERY | Facility: CLINIC | Age: 61
End: 2021-10-05

## 2021-10-05 PROBLEM — Q89.09: Status: ACTIVE | Noted: 2021-10-05

## 2021-10-07 DIAGNOSIS — I10 ESSENTIAL HYPERTENSION: Chronic | ICD-10-CM

## 2021-10-07 RX ORDER — AMLODIPINE BESYLATE 10 MG/1
TABLET ORAL
Qty: 90 TABLET | Refills: 1 | Status: SHIPPED | OUTPATIENT
Start: 2021-10-07 | End: 2022-01-20 | Stop reason: SDUPTHER

## 2021-10-07 NOTE — ANESTHESIA PREPROCEDURE EVALUATION
Review of Systems/Medical History  Patient summary reviewed  Chart reviewed      Cardiovascular  EKG reviewed,    Pulmonary  Sleep apnea ,        GI/Hepatic    GERD well controlled,             Endo/Other     GYN       Hematology   Musculoskeletal       Neurology   Psychology           Physical Exam    Airway    Mallampati score: II  TM Distance: >3 FB  Neck ROM: full     Dental   No notable dental hx     Cardiovascular  Rhythm: regular, Rate: normal, Cardiovascular exam normal    Pulmonary  Pulmonary exam normal Breath sounds clear to auscultation,     Other Findings        Anesthesia Plan  ASA Score- 3     Anesthesia Type- general with ASA Monitors  Additional Monitors:   Airway Plan: ETT  Comment: Benefits and risks of planned anesthetic discussed with patient, and agrees to proceed  I, Dr Rubén Espinoza, the attending anesthesiologist, have personally seen and evaluated the patient prior to anesthetic care  I have reviewed the preanesthetic record, and other medical records if appropriate to the anesthetic care  If a CRNA is involved in the case, I have reviewed the CRNA assessment, if present, and agree  The patient is in a suitable condition to proceed with my formulated anesthetic plan        Plan Factors-    Induction- intravenous  Postoperative Plan- Plan for postoperative opioid use  Informed Consent- Anesthetic plan and risks discussed with patient  Up to commode at 0400 unable to recover from exertion , saturations in the 70's, was not recovering just staying in the 70's.  Did place on BiPAP for rest of night, tolerating such well.  Family called in for update, aware of slight set-back but encouraged that did not start night on BiPAP and was only needing to be on for those few hours instead of all night.  Continue with plan of care.

## 2021-10-12 ENCOUNTER — HOSPITAL ENCOUNTER (OUTPATIENT)
Dept: RADIOLOGY | Facility: HOSPITAL | Age: 61
Discharge: HOME | End: 2021-10-12
Payer: COMMERCIAL

## 2021-10-12 ENCOUNTER — OFFICE VISIT (OUTPATIENT)
Dept: OBGYN CLINIC | Facility: CLINIC | Age: 61
End: 2021-10-12
Payer: COMMERCIAL

## 2021-10-12 ENCOUNTER — OFFICE VISIT (OUTPATIENT)
Dept: NEUROSURGERY | Facility: CLINIC | Age: 61
End: 2021-10-12
Payer: COMMERCIAL

## 2021-10-12 VITALS
WEIGHT: 296 LBS | DIASTOLIC BLOOD PRESSURE: 70 MMHG | TEMPERATURE: 98.5 F | HEART RATE: 55 BPM | BODY MASS INDEX: 37.99 KG/M2 | HEIGHT: 74 IN | SYSTOLIC BLOOD PRESSURE: 102 MMHG | RESPIRATION RATE: 16 BRPM

## 2021-10-12 VITALS
BODY MASS INDEX: 37.99 KG/M2 | DIASTOLIC BLOOD PRESSURE: 72 MMHG | SYSTOLIC BLOOD PRESSURE: 111 MMHG | HEART RATE: 55 BPM | HEIGHT: 74 IN | WEIGHT: 296 LBS

## 2021-10-12 DIAGNOSIS — M51.26 LUMBAGO DUE TO DISPLACEMENT OF INTERVERTEBRAL DISC: ICD-10-CM

## 2021-10-12 DIAGNOSIS — M51.26 PROTRUSION OF LUMBAR INTERVERTEBRAL DISC: ICD-10-CM

## 2021-10-12 DIAGNOSIS — R20.2 PARESTHESIA OF BILATERAL LEGS: ICD-10-CM

## 2021-10-12 DIAGNOSIS — M54.16 LUMBAR RADICULOPATHY: ICD-10-CM

## 2021-10-12 DIAGNOSIS — G62.9 NEUROPATHY: ICD-10-CM

## 2021-10-12 DIAGNOSIS — M54.50 LOWER BACK PAIN: Primary | ICD-10-CM

## 2021-10-12 DIAGNOSIS — M19.011 PRIMARY OSTEOARTHRITIS OF RIGHT SHOULDER: Primary | ICD-10-CM

## 2021-10-12 DIAGNOSIS — M54.9 MECHANICAL BACK PAIN: ICD-10-CM

## 2021-10-12 LAB
ALBUMIN SERPL-MCNC: 3.9 G/DL (ref 3.6–5.1)
ALBUMIN/GLOB SERPL: 1.7 (CALC) (ref 1–2.5)
ALP SERPL-CCNC: 82 U/L (ref 35–144)
ALT SERPL-CCNC: 15 U/L (ref 9–46)
AST SERPL-CCNC: 24 U/L (ref 10–35)
BASOPHILS # BLD AUTO: 59 CELLS/UL (ref 0–200)
BASOPHILS NFR BLD AUTO: 0.8 %
BILIRUB SERPL-MCNC: 0.7 MG/DL (ref 0.2–1.2)
BUN SERPL-MCNC: 17 MG/DL (ref 7–25)
BUN/CREAT SERPL: ABNORMAL (CALC) (ref 6–22)
CALCIUM SERPL-MCNC: 9 MG/DL (ref 8.6–10.3)
CHLORIDE SERPL-SCNC: 102 MMOL/L (ref 98–110)
CHOLEST SERPL-MCNC: 146 MG/DL
CHOLEST/HDLC SERPL: 2.4 (CALC)
CO2 SERPL-SCNC: 31 MMOL/L (ref 20–32)
CREAT SERPL-MCNC: 1.05 MG/DL (ref 0.7–1.25)
EOSINOPHIL # BLD AUTO: 1724 CELLS/UL (ref 15–500)
EOSINOPHIL NFR BLD AUTO: 23.3 %
ERYTHROCYTE [DISTWIDTH] IN BLOOD BY AUTOMATED COUNT: 13.2 % (ref 11–15)
GLOBULIN SER CALC-MCNC: 2.3 G/DL (CALC) (ref 1.9–3.7)
GLUCOSE SERPL-MCNC: 111 MG/DL (ref 65–99)
HBA1C MFR BLD: 5.2 % OF TOTAL HGB
HCT VFR BLD AUTO: 43.8 % (ref 38.5–50)
HDLC SERPL-MCNC: 61 MG/DL
HGB BLD-MCNC: 14.7 G/DL (ref 13.2–17.1)
LDLC SERPL CALC-MCNC: 66 MG/DL (CALC)
LYMPHOCYTES # BLD AUTO: 1939 CELLS/UL (ref 850–3900)
LYMPHOCYTES NFR BLD AUTO: 26.2 %
MCH RBC QN AUTO: 32 PG (ref 27–33)
MCHC RBC AUTO-ENTMCNC: 33.6 G/DL (ref 32–36)
MCV RBC AUTO: 95.4 FL (ref 80–100)
MONOCYTES # BLD AUTO: 577 CELLS/UL (ref 200–950)
MONOCYTES NFR BLD AUTO: 7.8 %
NEUTROPHILS # BLD AUTO: 3101 CELLS/UL (ref 1500–7800)
NEUTROPHILS NFR BLD AUTO: 41.9 %
NONHDLC SERPL-MCNC: 85 MG/DL (CALC)
PLATELET # BLD AUTO: 95 THOUSAND/UL (ref 140–400)
POTASSIUM SERPL-SCNC: 4.5 MMOL/L (ref 3.5–5.3)
PROT SERPL-MCNC: 6.2 G/DL (ref 6.1–8.1)
RBC # BLD AUTO: 4.59 MILLION/UL (ref 4.2–5.8)
SL AMB EGFR AFRICAN AMERICAN: 88 ML/MIN/1.73M2
SL AMB EGFR NON AFRICAN AMERICAN: 76 ML/MIN/1.73M2
SODIUM SERPL-SCNC: 140 MMOL/L (ref 135–146)
TRIGL SERPL-MCNC: 107 MG/DL
TSH SERPL-ACNC: 2.39 MIU/L (ref 0.4–4.5)
URATE SERPL-MCNC: 7.9 MG/DL (ref 4–8)
WBC # BLD AUTO: 7.4 THOUSAND/UL (ref 3.8–10.8)

## 2021-10-12 PROCEDURE — 99213 OFFICE O/P EST LOW 20 MIN: CPT | Performed by: ORTHOPAEDIC SURGERY

## 2021-10-12 PROCEDURE — 72114 X-RAY EXAM L-S SPINE BENDING: CPT

## 2021-10-12 PROCEDURE — 3008F BODY MASS INDEX DOCD: CPT | Performed by: ORTHOPAEDIC SURGERY

## 2021-10-12 PROCEDURE — 99214 OFFICE O/P EST MOD 30 MIN: CPT | Performed by: NEUROLOGICAL SURGERY

## 2021-10-12 PROCEDURE — 1036F TOBACCO NON-USER: CPT | Performed by: ORTHOPAEDIC SURGERY

## 2021-10-12 PROCEDURE — 20610 DRAIN/INJ JOINT/BURSA W/O US: CPT | Performed by: ORTHOPAEDIC SURGERY

## 2021-10-12 RX ORDER — METHYLPREDNISOLONE ACETATE 40 MG/ML
1 INJECTION, SUSPENSION INTRA-ARTICULAR; INTRALESIONAL; INTRAMUSCULAR; SOFT TISSUE
Status: COMPLETED | OUTPATIENT
Start: 2021-10-12 | End: 2021-10-12

## 2021-10-12 RX ORDER — LIDOCAINE HYDROCHLORIDE 10 MG/ML
4 INJECTION, SOLUTION INFILTRATION; PERINEURAL
Status: COMPLETED | OUTPATIENT
Start: 2021-10-12 | End: 2021-10-12

## 2021-10-12 RX ADMIN — LIDOCAINE HYDROCHLORIDE 4 ML: 10 INJECTION, SOLUTION INFILTRATION; PERINEURAL at 16:21

## 2021-10-12 RX ADMIN — METHYLPREDNISOLONE ACETATE 1 ML: 40 INJECTION, SUSPENSION INTRA-ARTICULAR; INTRALESIONAL; INTRAMUSCULAR; SOFT TISSUE at 16:21

## 2021-10-13 ENCOUNTER — HOSPITAL ENCOUNTER (OUTPATIENT)
Dept: RADIOLOGY | Age: 61
Discharge: HOME | End: 2021-10-13
Payer: COMMERCIAL

## 2021-10-13 DIAGNOSIS — Q89.09: ICD-10-CM

## 2021-10-13 PROCEDURE — 74160 CT ABDOMEN W/CONTRAST: CPT

## 2021-10-13 PROCEDURE — G1004 CDSM NDSC: HCPCS

## 2021-10-13 RX ADMIN — IOHEXOL 100 ML: 350 INJECTION, SOLUTION INTRAVENOUS at 13:24

## 2021-10-20 ENCOUNTER — TELEPHONE (OUTPATIENT)
Dept: SLEEP CENTER | Facility: CLINIC | Age: 61
End: 2021-10-20

## 2021-10-26 ENCOUNTER — EVALUATION (OUTPATIENT)
Dept: PHYSICAL THERAPY | Facility: REHABILITATION | Age: 61
End: 2021-10-26
Payer: COMMERCIAL

## 2021-10-26 DIAGNOSIS — G62.9 NEUROPATHY: ICD-10-CM

## 2021-10-26 DIAGNOSIS — M54.41 CHRONIC LEFT-SIDED LOW BACK PAIN WITH BILATERAL SCIATICA: Primary | ICD-10-CM

## 2021-10-26 DIAGNOSIS — G89.29 CHRONIC LEFT-SIDED LOW BACK PAIN WITH BILATERAL SCIATICA: Primary | ICD-10-CM

## 2021-10-26 DIAGNOSIS — M54.16 LUMBAR RADICULOPATHY: ICD-10-CM

## 2021-10-26 DIAGNOSIS — M54.42 CHRONIC LEFT-SIDED LOW BACK PAIN WITH BILATERAL SCIATICA: Primary | ICD-10-CM

## 2021-10-26 PROCEDURE — 97163 PT EVAL HIGH COMPLEX 45 MIN: CPT

## 2021-10-26 PROCEDURE — 97112 NEUROMUSCULAR REEDUCATION: CPT

## 2021-11-01 ENCOUNTER — OFFICE VISIT (OUTPATIENT)
Dept: PHYSICAL THERAPY | Facility: REHABILITATION | Age: 61
End: 2021-11-01
Payer: COMMERCIAL

## 2021-11-01 DIAGNOSIS — M54.41 CHRONIC LEFT-SIDED LOW BACK PAIN WITH BILATERAL SCIATICA: Primary | ICD-10-CM

## 2021-11-01 DIAGNOSIS — M54.16 LUMBAR RADICULOPATHY: ICD-10-CM

## 2021-11-01 DIAGNOSIS — G62.9 NEUROPATHY: ICD-10-CM

## 2021-11-01 DIAGNOSIS — G89.29 CHRONIC LEFT-SIDED LOW BACK PAIN WITH BILATERAL SCIATICA: Primary | ICD-10-CM

## 2021-11-01 DIAGNOSIS — M54.42 CHRONIC LEFT-SIDED LOW BACK PAIN WITH BILATERAL SCIATICA: Primary | ICD-10-CM

## 2021-11-01 PROCEDURE — 97112 NEUROMUSCULAR REEDUCATION: CPT

## 2021-11-03 ENCOUNTER — APPOINTMENT (OUTPATIENT)
Dept: PHYSICAL THERAPY | Facility: REHABILITATION | Age: 61
End: 2021-11-03
Payer: COMMERCIAL

## 2021-11-04 ENCOUNTER — APPOINTMENT (OUTPATIENT)
Dept: PHYSICAL THERAPY | Facility: REHABILITATION | Age: 61
End: 2021-11-04
Payer: COMMERCIAL

## 2021-11-04 ENCOUNTER — OFFICE VISIT (OUTPATIENT)
Dept: FAMILY MEDICINE CLINIC | Facility: CLINIC | Age: 61
End: 2021-11-04
Payer: COMMERCIAL

## 2021-11-04 VITALS
BODY MASS INDEX: 38.76 KG/M2 | HEART RATE: 58 BPM | OXYGEN SATURATION: 97 % | HEIGHT: 74 IN | SYSTOLIC BLOOD PRESSURE: 136 MMHG | WEIGHT: 302 LBS | DIASTOLIC BLOOD PRESSURE: 82 MMHG | RESPIRATION RATE: 16 BRPM

## 2021-11-04 DIAGNOSIS — E66.09 CLASS 2 OBESITY DUE TO EXCESS CALORIES WITHOUT SERIOUS COMORBIDITY WITH BODY MASS INDEX (BMI) OF 36.0 TO 36.9 IN ADULT: ICD-10-CM

## 2021-11-04 DIAGNOSIS — E03.8 OTHER SPECIFIED HYPOTHYROIDISM: ICD-10-CM

## 2021-11-04 DIAGNOSIS — Q89.09: ICD-10-CM

## 2021-11-04 DIAGNOSIS — G89.4 CHRONIC PAIN SYNDROME: Primary | ICD-10-CM

## 2021-11-04 DIAGNOSIS — M10.071 ACUTE IDIOPATHIC GOUT INVOLVING TOE OF RIGHT FOOT: ICD-10-CM

## 2021-11-04 DIAGNOSIS — E78.2 MIXED HYPERLIPIDEMIA: ICD-10-CM

## 2021-11-04 DIAGNOSIS — Z23 FLU VACCINE NEED: ICD-10-CM

## 2021-11-04 DIAGNOSIS — D69.6 THROMBOCYTOPENIA (HCC): ICD-10-CM

## 2021-11-04 PROBLEM — E87.6 HYPOKALEMIA DUE TO LOSS OF POTASSIUM: Status: RESOLVED | Noted: 2021-01-29 | Resolved: 2021-11-04

## 2021-11-04 PROCEDURE — 3008F BODY MASS INDEX DOCD: CPT | Performed by: FAMILY MEDICINE

## 2021-11-04 PROCEDURE — 3725F SCREEN DEPRESSION PERFORMED: CPT | Performed by: FAMILY MEDICINE

## 2021-11-04 PROCEDURE — 1036F TOBACCO NON-USER: CPT | Performed by: FAMILY MEDICINE

## 2021-11-04 PROCEDURE — 99215 OFFICE O/P EST HI 40 MIN: CPT | Performed by: FAMILY MEDICINE

## 2021-11-04 PROCEDURE — 90682 RIV4 VACC RECOMBINANT DNA IM: CPT | Performed by: FAMILY MEDICINE

## 2021-11-04 PROCEDURE — 90471 IMMUNIZATION ADMIN: CPT | Performed by: FAMILY MEDICINE

## 2021-11-04 RX ORDER — TIZANIDINE 4 MG/1
4 TABLET ORAL EVERY 8 HOURS PRN
Qty: 60 TABLET | Refills: 0 | Status: SHIPPED | OUTPATIENT
Start: 2021-11-04 | End: 2021-11-26

## 2021-11-04 RX ORDER — ALLOPURINOL 100 MG/1
100 TABLET ORAL DAILY
Qty: 30 TABLET | Refills: 5 | Status: SHIPPED | OUTPATIENT
Start: 2021-11-04 | End: 2022-01-21 | Stop reason: SDUPTHER

## 2021-11-04 RX ORDER — TIZANIDINE 4 MG/1
4 TABLET ORAL EVERY 8 HOURS PRN
Qty: 60 TABLET | Refills: 0 | Status: SHIPPED | OUTPATIENT
Start: 2021-11-04 | End: 2021-11-04 | Stop reason: SDUPTHER

## 2021-11-04 RX ORDER — ALLOPURINOL 100 MG/1
100 TABLET ORAL DAILY
Qty: 30 TABLET | Refills: 5 | Status: SHIPPED | OUTPATIENT
Start: 2021-11-04 | End: 2021-11-04 | Stop reason: SDUPTHER

## 2021-11-09 ENCOUNTER — OFFICE VISIT (OUTPATIENT)
Dept: PHYSICAL THERAPY | Facility: REHABILITATION | Age: 61
End: 2021-11-09
Payer: COMMERCIAL

## 2021-11-09 DIAGNOSIS — G62.9 NEUROPATHY: ICD-10-CM

## 2021-11-09 DIAGNOSIS — M54.41 CHRONIC LEFT-SIDED LOW BACK PAIN WITH BILATERAL SCIATICA: Primary | ICD-10-CM

## 2021-11-09 DIAGNOSIS — M54.16 LUMBAR RADICULOPATHY: ICD-10-CM

## 2021-11-09 DIAGNOSIS — M54.42 CHRONIC LEFT-SIDED LOW BACK PAIN WITH BILATERAL SCIATICA: Primary | ICD-10-CM

## 2021-11-09 DIAGNOSIS — G89.29 CHRONIC LEFT-SIDED LOW BACK PAIN WITH BILATERAL SCIATICA: Primary | ICD-10-CM

## 2021-11-09 PROCEDURE — 97112 NEUROMUSCULAR REEDUCATION: CPT

## 2021-11-11 ENCOUNTER — APPOINTMENT (OUTPATIENT)
Dept: PHYSICAL THERAPY | Facility: REHABILITATION | Age: 61
End: 2021-11-11
Payer: COMMERCIAL

## 2021-11-12 ENCOUNTER — ANESTHESIA EVENT (OUTPATIENT)
Dept: GASTROENTEROLOGY | Facility: AMBULATORY SURGERY CENTER | Age: 61
End: 2021-11-12

## 2021-11-12 ENCOUNTER — ANESTHESIA (OUTPATIENT)
Dept: GASTROENTEROLOGY | Facility: AMBULATORY SURGERY CENTER | Age: 61
End: 2021-11-12

## 2021-11-12 ENCOUNTER — HOSPITAL ENCOUNTER (OUTPATIENT)
Dept: GASTROENTEROLOGY | Facility: AMBULATORY SURGERY CENTER | Age: 61
Discharge: HOME | End: 2021-11-12
Payer: COMMERCIAL

## 2021-11-12 VITALS
TEMPERATURE: 96 F | WEIGHT: 300 LBS | HEIGHT: 74 IN | RESPIRATION RATE: 18 BRPM | SYSTOLIC BLOOD PRESSURE: 137 MMHG | OXYGEN SATURATION: 98 % | HEART RATE: 51 BPM | BODY MASS INDEX: 38.5 KG/M2 | DIASTOLIC BLOOD PRESSURE: 89 MMHG

## 2021-11-12 DIAGNOSIS — D13.1 BENIGN NEOPLASM OF STOMACH: ICD-10-CM

## 2021-11-12 DIAGNOSIS — K22.70 BARRETT'S ESOPHAGUS WITHOUT DYSPLASIA: ICD-10-CM

## 2021-11-12 DIAGNOSIS — Z86.010 HX OF COLONIC POLYPS: ICD-10-CM

## 2021-11-12 PROCEDURE — 45385 COLONOSCOPY W/LESION REMOVAL: CPT | Performed by: INTERNAL MEDICINE

## 2021-11-12 PROCEDURE — 00813 ANES UPR LWR GI NDSC PX: CPT | Performed by: NURSE ANESTHETIST, CERTIFIED REGISTERED

## 2021-11-12 PROCEDURE — 88305 TISSUE EXAM BY PATHOLOGIST: CPT | Performed by: PATHOLOGY

## 2021-11-12 PROCEDURE — 43239 EGD BIOPSY SINGLE/MULTIPLE: CPT | Performed by: INTERNAL MEDICINE

## 2021-11-12 RX ORDER — SODIUM CHLORIDE 9 MG/ML
30 INJECTION, SOLUTION INTRAVENOUS CONTINUOUS
Status: DISCONTINUED | OUTPATIENT
Start: 2021-11-12 | End: 2021-11-16 | Stop reason: HOSPADM

## 2021-11-12 RX ORDER — SODIUM CHLORIDE 9 MG/ML
INJECTION, SOLUTION INTRAVENOUS CONTINUOUS PRN
Status: DISCONTINUED | OUTPATIENT
Start: 2021-11-12 | End: 2021-11-12

## 2021-11-12 RX ORDER — LIDOCAINE HYDROCHLORIDE 10 MG/ML
INJECTION, SOLUTION EPIDURAL; INFILTRATION; INTRACAUDAL; PERINEURAL AS NEEDED
Status: DISCONTINUED | OUTPATIENT
Start: 2021-11-12 | End: 2021-11-12

## 2021-11-12 RX ORDER — PROPOFOL 10 MG/ML
INJECTION, EMULSION INTRAVENOUS AS NEEDED
Status: DISCONTINUED | OUTPATIENT
Start: 2021-11-12 | End: 2021-11-12

## 2021-11-12 RX ADMIN — LIDOCAINE HYDROCHLORIDE 50 MG: 10 INJECTION, SOLUTION EPIDURAL; INFILTRATION; INTRACAUDAL; PERINEURAL at 13:15

## 2021-11-12 RX ADMIN — PROPOFOL 200 MG: 10 INJECTION, EMULSION INTRAVENOUS at 13:24

## 2021-11-12 RX ADMIN — PROPOFOL 100 MG: 10 INJECTION, EMULSION INTRAVENOUS at 13:34

## 2021-11-12 RX ADMIN — SODIUM CHLORIDE: 9 INJECTION, SOLUTION INTRAVENOUS at 13:15

## 2021-11-12 RX ADMIN — PROPOFOL 200 MG: 10 INJECTION, EMULSION INTRAVENOUS at 13:15

## 2021-11-14 DIAGNOSIS — Z56.6 WORK-RELATED STRESS: ICD-10-CM

## 2021-11-14 DIAGNOSIS — F41.1 GENERALIZED ANXIETY DISORDER: ICD-10-CM

## 2021-11-14 SDOH — HEALTH STABILITY - MENTAL HEALTH: OTHER PHYSICAL AND MENTAL STRAIN RELATED TO WORK: Z56.6

## 2021-11-15 ENCOUNTER — OFFICE VISIT (OUTPATIENT)
Dept: PHYSICAL THERAPY | Facility: REHABILITATION | Age: 61
End: 2021-11-15
Payer: COMMERCIAL

## 2021-11-15 DIAGNOSIS — M54.42 CHRONIC LEFT-SIDED LOW BACK PAIN WITH BILATERAL SCIATICA: Primary | ICD-10-CM

## 2021-11-15 DIAGNOSIS — G89.29 CHRONIC LEFT-SIDED LOW BACK PAIN WITH BILATERAL SCIATICA: Primary | ICD-10-CM

## 2021-11-15 DIAGNOSIS — M54.16 LUMBAR RADICULOPATHY: ICD-10-CM

## 2021-11-15 DIAGNOSIS — M54.41 CHRONIC LEFT-SIDED LOW BACK PAIN WITH BILATERAL SCIATICA: Primary | ICD-10-CM

## 2021-11-15 DIAGNOSIS — G62.9 NEUROPATHY: ICD-10-CM

## 2021-11-15 PROCEDURE — 97112 NEUROMUSCULAR REEDUCATION: CPT

## 2021-11-15 RX ORDER — CLONAZEPAM 0.5 MG/1
TABLET ORAL
Qty: 30 TABLET | Refills: 2 | Status: SHIPPED | OUTPATIENT
Start: 2021-11-15 | End: 2022-02-21

## 2021-11-18 ENCOUNTER — OFFICE VISIT (OUTPATIENT)
Dept: PHYSICAL THERAPY | Facility: REHABILITATION | Age: 61
End: 2021-11-18
Payer: COMMERCIAL

## 2021-11-18 DIAGNOSIS — G89.29 CHRONIC LEFT-SIDED LOW BACK PAIN WITH BILATERAL SCIATICA: Primary | ICD-10-CM

## 2021-11-18 DIAGNOSIS — M54.41 CHRONIC LEFT-SIDED LOW BACK PAIN WITH BILATERAL SCIATICA: Primary | ICD-10-CM

## 2021-11-18 DIAGNOSIS — M54.16 LUMBAR RADICULOPATHY: ICD-10-CM

## 2021-11-18 DIAGNOSIS — G62.9 NEUROPATHY: ICD-10-CM

## 2021-11-18 DIAGNOSIS — M54.42 CHRONIC LEFT-SIDED LOW BACK PAIN WITH BILATERAL SCIATICA: Primary | ICD-10-CM

## 2021-11-18 PROCEDURE — 97112 NEUROMUSCULAR REEDUCATION: CPT

## 2021-11-23 ENCOUNTER — TELEPHONE (OUTPATIENT)
Dept: RADIOLOGY | Age: 61
End: 2021-11-23

## 2021-11-23 ENCOUNTER — TELEPHONE (OUTPATIENT)
Dept: FAMILY MEDICINE CLINIC | Facility: CLINIC | Age: 61
End: 2021-11-23

## 2021-11-24 ENCOUNTER — OFFICE VISIT (OUTPATIENT)
Dept: PHYSICAL THERAPY | Facility: REHABILITATION | Age: 61
End: 2021-11-24
Payer: COMMERCIAL

## 2021-11-24 DIAGNOSIS — G89.29 CHRONIC LEFT-SIDED LOW BACK PAIN WITH BILATERAL SCIATICA: Primary | ICD-10-CM

## 2021-11-24 DIAGNOSIS — M54.42 CHRONIC LEFT-SIDED LOW BACK PAIN WITH BILATERAL SCIATICA: Primary | ICD-10-CM

## 2021-11-24 DIAGNOSIS — G62.9 NEUROPATHY: ICD-10-CM

## 2021-11-24 DIAGNOSIS — M54.41 CHRONIC LEFT-SIDED LOW BACK PAIN WITH BILATERAL SCIATICA: Primary | ICD-10-CM

## 2021-11-24 DIAGNOSIS — M54.16 LUMBAR RADICULOPATHY: ICD-10-CM

## 2021-11-24 PROCEDURE — 97112 NEUROMUSCULAR REEDUCATION: CPT

## 2021-11-24 PROCEDURE — 97110 THERAPEUTIC EXERCISES: CPT

## 2021-11-25 DIAGNOSIS — G89.4 CHRONIC PAIN SYNDROME: ICD-10-CM

## 2021-11-26 RX ORDER — TIZANIDINE 4 MG/1
TABLET ORAL
Qty: 60 TABLET | Refills: 0 | Status: SHIPPED | OUTPATIENT
Start: 2021-11-26

## 2021-11-29 ENCOUNTER — OFFICE VISIT (OUTPATIENT)
Dept: PHYSICAL THERAPY | Facility: REHABILITATION | Age: 61
End: 2021-11-29
Payer: COMMERCIAL

## 2021-11-29 DIAGNOSIS — M54.42 CHRONIC LEFT-SIDED LOW BACK PAIN WITH BILATERAL SCIATICA: Primary | ICD-10-CM

## 2021-11-29 DIAGNOSIS — G62.9 NEUROPATHY: ICD-10-CM

## 2021-11-29 DIAGNOSIS — G89.29 CHRONIC LEFT-SIDED LOW BACK PAIN WITH BILATERAL SCIATICA: Primary | ICD-10-CM

## 2021-11-29 DIAGNOSIS — M54.16 LUMBAR RADICULOPATHY: ICD-10-CM

## 2021-11-29 DIAGNOSIS — M54.41 CHRONIC LEFT-SIDED LOW BACK PAIN WITH BILATERAL SCIATICA: Primary | ICD-10-CM

## 2021-11-29 PROCEDURE — 97112 NEUROMUSCULAR REEDUCATION: CPT

## 2021-12-01 ENCOUNTER — HOSPITAL ENCOUNTER (OUTPATIENT)
Dept: NEUROLOGY | Facility: CLINIC | Age: 61
Discharge: HOME | End: 2021-12-01
Payer: COMMERCIAL

## 2021-12-01 DIAGNOSIS — M54.50 LOWER BACK PAIN: ICD-10-CM

## 2021-12-01 DIAGNOSIS — G62.9 NEUROPATHY: ICD-10-CM

## 2021-12-01 DIAGNOSIS — M54.16 LUMBAR RADICULOPATHY: ICD-10-CM

## 2021-12-01 PROCEDURE — 95886 MUSC TEST DONE W/N TEST COMP: CPT | Performed by: PSYCHIATRY & NEUROLOGY

## 2021-12-01 PROCEDURE — 95911 NRV CNDJ TEST 9-10 STUDIES: CPT | Performed by: PSYCHIATRY & NEUROLOGY

## 2021-12-02 ENCOUNTER — OFFICE VISIT (OUTPATIENT)
Dept: NEUROSURGERY | Facility: CLINIC | Age: 61
End: 2021-12-02
Payer: COMMERCIAL

## 2021-12-02 VITALS
TEMPERATURE: 97.9 F | BODY MASS INDEX: 38.5 KG/M2 | HEIGHT: 74 IN | RESPIRATION RATE: 16 BRPM | DIASTOLIC BLOOD PRESSURE: 104 MMHG | WEIGHT: 300 LBS | SYSTOLIC BLOOD PRESSURE: 157 MMHG | HEART RATE: 80 BPM

## 2021-12-02 DIAGNOSIS — G62.9 NEUROPATHY: Primary | ICD-10-CM

## 2021-12-02 PROCEDURE — 99213 OFFICE O/P EST LOW 20 MIN: CPT | Performed by: NEUROLOGICAL SURGERY

## 2021-12-04 DIAGNOSIS — I10 ESSENTIAL HYPERTENSION: Chronic | ICD-10-CM

## 2021-12-06 ENCOUNTER — APPOINTMENT (OUTPATIENT)
Dept: PHYSICAL THERAPY | Facility: REHABILITATION | Age: 61
End: 2021-12-06
Payer: COMMERCIAL

## 2021-12-06 RX ORDER — NEBIVOLOL HYDROCHLORIDE 10 MG/1
TABLET ORAL
Qty: 90 TABLET | Refills: 1 | Status: SHIPPED | OUTPATIENT
Start: 2021-12-06 | End: 2022-01-20 | Stop reason: SDUPTHER

## 2021-12-06 RX ORDER — ROSUVASTATIN CALCIUM 5 MG/1
TABLET, COATED ORAL
Qty: 90 TABLET | Refills: 1 | Status: SHIPPED | OUTPATIENT
Start: 2021-12-06 | End: 2022-01-20 | Stop reason: SDUPTHER

## 2021-12-10 ENCOUNTER — OFFICE VISIT (OUTPATIENT)
Dept: PHYSICAL THERAPY | Facility: REHABILITATION | Age: 61
End: 2021-12-10
Payer: COMMERCIAL

## 2021-12-10 DIAGNOSIS — M54.41 CHRONIC LEFT-SIDED LOW BACK PAIN WITH BILATERAL SCIATICA: Primary | ICD-10-CM

## 2021-12-10 DIAGNOSIS — M54.42 CHRONIC LEFT-SIDED LOW BACK PAIN WITH BILATERAL SCIATICA: Primary | ICD-10-CM

## 2021-12-10 DIAGNOSIS — G62.9 NEUROPATHY: ICD-10-CM

## 2021-12-10 DIAGNOSIS — G89.29 CHRONIC LEFT-SIDED LOW BACK PAIN WITH BILATERAL SCIATICA: Primary | ICD-10-CM

## 2021-12-10 DIAGNOSIS — M54.16 LUMBAR RADICULOPATHY: ICD-10-CM

## 2021-12-10 PROCEDURE — 97112 NEUROMUSCULAR REEDUCATION: CPT

## 2021-12-13 ENCOUNTER — OFFICE VISIT (OUTPATIENT)
Dept: PHYSICAL THERAPY | Facility: REHABILITATION | Age: 61
End: 2021-12-13
Payer: COMMERCIAL

## 2021-12-13 DIAGNOSIS — M54.16 LUMBAR RADICULOPATHY: ICD-10-CM

## 2021-12-13 DIAGNOSIS — M54.42 CHRONIC LEFT-SIDED LOW BACK PAIN WITH BILATERAL SCIATICA: Primary | ICD-10-CM

## 2021-12-13 DIAGNOSIS — G89.29 CHRONIC LEFT-SIDED LOW BACK PAIN WITH BILATERAL SCIATICA: Primary | ICD-10-CM

## 2021-12-13 DIAGNOSIS — G62.9 NEUROPATHY: ICD-10-CM

## 2021-12-13 DIAGNOSIS — M54.41 CHRONIC LEFT-SIDED LOW BACK PAIN WITH BILATERAL SCIATICA: Primary | ICD-10-CM

## 2021-12-13 PROCEDURE — 97112 NEUROMUSCULAR REEDUCATION: CPT

## 2021-12-16 ENCOUNTER — OFFICE VISIT (OUTPATIENT)
Dept: PHYSICAL THERAPY | Facility: REHABILITATION | Age: 61
End: 2021-12-16
Payer: COMMERCIAL

## 2021-12-16 DIAGNOSIS — G62.9 NEUROPATHY: ICD-10-CM

## 2021-12-16 DIAGNOSIS — G89.29 CHRONIC LEFT-SIDED LOW BACK PAIN WITH BILATERAL SCIATICA: Primary | ICD-10-CM

## 2021-12-16 DIAGNOSIS — M54.16 LUMBAR RADICULOPATHY: ICD-10-CM

## 2021-12-16 DIAGNOSIS — M54.41 CHRONIC LEFT-SIDED LOW BACK PAIN WITH BILATERAL SCIATICA: Primary | ICD-10-CM

## 2021-12-16 DIAGNOSIS — M54.42 CHRONIC LEFT-SIDED LOW BACK PAIN WITH BILATERAL SCIATICA: Primary | ICD-10-CM

## 2021-12-16 PROCEDURE — 97112 NEUROMUSCULAR REEDUCATION: CPT

## 2021-12-16 PROCEDURE — 97110 THERAPEUTIC EXERCISES: CPT

## 2021-12-20 ENCOUNTER — OFFICE VISIT (OUTPATIENT)
Dept: PHYSICAL THERAPY | Facility: REHABILITATION | Age: 61
End: 2021-12-20
Payer: COMMERCIAL

## 2021-12-20 DIAGNOSIS — G62.9 NEUROPATHY: ICD-10-CM

## 2021-12-20 DIAGNOSIS — M54.41 CHRONIC LEFT-SIDED LOW BACK PAIN WITH BILATERAL SCIATICA: Primary | ICD-10-CM

## 2021-12-20 DIAGNOSIS — M54.16 LUMBAR RADICULOPATHY: ICD-10-CM

## 2021-12-20 DIAGNOSIS — M54.42 CHRONIC LEFT-SIDED LOW BACK PAIN WITH BILATERAL SCIATICA: Primary | ICD-10-CM

## 2021-12-20 DIAGNOSIS — G89.29 CHRONIC LEFT-SIDED LOW BACK PAIN WITH BILATERAL SCIATICA: Primary | ICD-10-CM

## 2021-12-20 PROCEDURE — 97112 NEUROMUSCULAR REEDUCATION: CPT

## 2021-12-23 ENCOUNTER — OFFICE VISIT (OUTPATIENT)
Dept: PAIN MEDICINE | Facility: CLINIC | Age: 61
End: 2021-12-23
Payer: COMMERCIAL

## 2021-12-23 ENCOUNTER — APPOINTMENT (OUTPATIENT)
Dept: PHYSICAL THERAPY | Facility: REHABILITATION | Age: 61
End: 2021-12-23
Payer: COMMERCIAL

## 2021-12-23 VITALS
WEIGHT: 302 LBS | BODY MASS INDEX: 38.76 KG/M2 | RESPIRATION RATE: 18 BRPM | DIASTOLIC BLOOD PRESSURE: 86 MMHG | HEIGHT: 74 IN | HEART RATE: 73 BPM | SYSTOLIC BLOOD PRESSURE: 143 MMHG

## 2021-12-23 DIAGNOSIS — M51.36 DISC DEGENERATION, LUMBAR: ICD-10-CM

## 2021-12-23 DIAGNOSIS — G89.4 CHRONIC PAIN SYNDROME: ICD-10-CM

## 2021-12-23 DIAGNOSIS — G89.29 CHRONIC BILATERAL LOW BACK PAIN, UNSPECIFIED WHETHER SCIATICA PRESENT: ICD-10-CM

## 2021-12-23 DIAGNOSIS — M54.16 LUMBAR RADICULOPATHY: ICD-10-CM

## 2021-12-23 DIAGNOSIS — M54.50 CHRONIC BILATERAL LOW BACK PAIN, UNSPECIFIED WHETHER SCIATICA PRESENT: ICD-10-CM

## 2021-12-23 DIAGNOSIS — M96.1 POSTLAMINECTOMY SYNDROME, LUMBAR: Primary | ICD-10-CM

## 2021-12-23 DIAGNOSIS — M51.16 INTERVERTEBRAL DISC DISORDER WITH RADICULOPATHY OF LUMBAR REGION: ICD-10-CM

## 2021-12-23 PROCEDURE — 1036F TOBACCO NON-USER: CPT | Performed by: NURSE PRACTITIONER

## 2021-12-23 PROCEDURE — 99214 OFFICE O/P EST MOD 30 MIN: CPT | Performed by: NURSE PRACTITIONER

## 2021-12-23 PROCEDURE — 3008F BODY MASS INDEX DOCD: CPT | Performed by: NURSE PRACTITIONER

## 2021-12-27 ENCOUNTER — OFFICE VISIT (OUTPATIENT)
Dept: PHYSICAL THERAPY | Facility: REHABILITATION | Age: 61
End: 2021-12-27
Payer: COMMERCIAL

## 2021-12-27 DIAGNOSIS — G89.29 CHRONIC LEFT-SIDED LOW BACK PAIN WITH BILATERAL SCIATICA: Primary | ICD-10-CM

## 2021-12-27 DIAGNOSIS — G62.9 NEUROPATHY: ICD-10-CM

## 2021-12-27 DIAGNOSIS — M54.16 LUMBAR RADICULOPATHY: ICD-10-CM

## 2021-12-27 DIAGNOSIS — M54.42 CHRONIC LEFT-SIDED LOW BACK PAIN WITH BILATERAL SCIATICA: Primary | ICD-10-CM

## 2021-12-27 DIAGNOSIS — M54.41 CHRONIC LEFT-SIDED LOW BACK PAIN WITH BILATERAL SCIATICA: Primary | ICD-10-CM

## 2021-12-27 PROCEDURE — 97112 NEUROMUSCULAR REEDUCATION: CPT

## 2021-12-27 PROCEDURE — 97140 MANUAL THERAPY 1/> REGIONS: CPT

## 2021-12-29 ENCOUNTER — OFFICE VISIT (OUTPATIENT)
Dept: PHYSICAL THERAPY | Facility: REHABILITATION | Age: 61
End: 2021-12-29
Payer: COMMERCIAL

## 2021-12-29 DIAGNOSIS — G62.9 NEUROPATHY: ICD-10-CM

## 2021-12-29 DIAGNOSIS — M54.16 LUMBAR RADICULOPATHY: ICD-10-CM

## 2021-12-29 DIAGNOSIS — M54.41 CHRONIC LEFT-SIDED LOW BACK PAIN WITH BILATERAL SCIATICA: Primary | ICD-10-CM

## 2021-12-29 DIAGNOSIS — M54.42 CHRONIC LEFT-SIDED LOW BACK PAIN WITH BILATERAL SCIATICA: Primary | ICD-10-CM

## 2021-12-29 DIAGNOSIS — G89.29 CHRONIC LEFT-SIDED LOW BACK PAIN WITH BILATERAL SCIATICA: Primary | ICD-10-CM

## 2021-12-29 PROCEDURE — 97112 NEUROMUSCULAR REEDUCATION: CPT

## 2021-12-29 PROCEDURE — 97140 MANUAL THERAPY 1/> REGIONS: CPT

## 2022-01-03 ENCOUNTER — APPOINTMENT (OUTPATIENT)
Dept: PHYSICAL THERAPY | Facility: REHABILITATION | Age: 62
End: 2022-01-03
Payer: COMMERCIAL

## 2022-01-06 ENCOUNTER — APPOINTMENT (OUTPATIENT)
Dept: PHYSICAL THERAPY | Facility: REHABILITATION | Age: 62
End: 2022-01-06
Payer: COMMERCIAL

## 2022-01-13 ENCOUNTER — APPOINTMENT (OUTPATIENT)
Dept: PHYSICAL THERAPY | Facility: REHABILITATION | Age: 62
End: 2022-01-13
Payer: COMMERCIAL

## 2022-01-17 ENCOUNTER — OFFICE VISIT (OUTPATIENT)
Dept: PHYSICAL THERAPY | Facility: REHABILITATION | Age: 62
End: 2022-01-17
Payer: COMMERCIAL

## 2022-01-17 DIAGNOSIS — G89.29 CHRONIC LEFT-SIDED LOW BACK PAIN WITH BILATERAL SCIATICA: Primary | ICD-10-CM

## 2022-01-17 DIAGNOSIS — M54.42 CHRONIC LEFT-SIDED LOW BACK PAIN WITH BILATERAL SCIATICA: Primary | ICD-10-CM

## 2022-01-17 DIAGNOSIS — M54.41 CHRONIC LEFT-SIDED LOW BACK PAIN WITH BILATERAL SCIATICA: Primary | ICD-10-CM

## 2022-01-17 DIAGNOSIS — G62.9 NEUROPATHY: ICD-10-CM

## 2022-01-17 DIAGNOSIS — M54.16 LUMBAR RADICULOPATHY: ICD-10-CM

## 2022-01-17 PROCEDURE — 97140 MANUAL THERAPY 1/> REGIONS: CPT

## 2022-01-17 PROCEDURE — 97112 NEUROMUSCULAR REEDUCATION: CPT

## 2022-01-17 NOTE — PROGRESS NOTES
Daily Note     Today's date: 2022  Patient name: Joanne Meyers  : 1960  MRN: [id number]  Referring provider: Janusz Dang MD  Dx:   Encounter Diagnosis     ICD-10-CM    1  Chronic left-sided low back pain with bilateral sciatica  M54 41     M54 42     G89 29    2  Lumbar radiculopathy  M54 16    3  Neuropathy  G62 9        Start Time: 1400  Stop Time: 5520  Total time in clinic (min): 45 minutes    Subjective: Pt reports his back has been alright  About the same, especially with cooking  Objective: See treatment diary below    Assessment: Tolerated treatment well  Required rest breaks following rack pulls and during goblet squats due to paraspinal discomfort  Pt displayed compensations and lack of posterior pelvic tilt during exercises  Cued to use glutes to stand up to take tension off of paraspinals  Patient would benefit from continued PT    Plan: Continue per plan of care        Precautions: HTN, hyperlipidemia, hypothyroidism    Manuals 12/10 12/13 12/16  12/20 12/27 12/29 1/17             PA mobilizations    QD   QD L3-4 TPs QD L3-4 TPs QD L3-4 TPs b/l   thoracic mob    QD Grd V    GrV QD T8   Assessment     QD 15' QD 5' QD 5'   Neuro Re-Ed 12/10 11/1 12/116  12/20 12/27 12/29 1/17   TrA activation 1x15x5" 2x20 5" holds 2 x 20x 5"  5"  2x20      Hip add ball squeeze 20x5" 20x5" 20 x 5"  20x5"      Hooklying TrA march          BK  15x3" 5" x 20 ea  5" x 20 ea       Counter plank          Modified crunch          pallof press          xs anti extension  purp 2xF        Red Pball brace alt LE lift 15x5" 15x5" 15 x 5"  20x5" 20x5" 20x5" 20x5"   bridge 25x3" 25x3" 25x 3"  25x3"  1x10  1x10 YHB 2x10 YHB 2x10 YHB   Rack pulls     20x 15lb barbell 20x 35lb barbell 4x6 45lb Lvl 5 on squat rack   Hip abd @ wall with red pball     10x ea 5" hold 10x ea 5" hold    Hip hinge      10x w/TrA and glute squeeze     Quadruped alt lifts 12x ea arm 12x ea arm  12x ea arm      Susy Comings walks Goblet squat with KB       2x5 10lbs   Lateral band walk      2 laps at counter YHB    HEP/education     5' 5'    Ther Ex 12/10 11/1 12/16  12/20 12/27 12/29 1/17   PPUs  3x10  3x10                                                                            Ther Activity                              Gait Training                              Modalities

## 2022-01-19 ENCOUNTER — OFFICE VISIT (OUTPATIENT)
Dept: NEUROSURGERY | Facility: CLINIC | Age: 62
End: 2022-01-19
Payer: COMMERCIAL

## 2022-01-19 VITALS
HEIGHT: 74 IN | DIASTOLIC BLOOD PRESSURE: 78 MMHG | WEIGHT: 290 LBS | TEMPERATURE: 98 F | SYSTOLIC BLOOD PRESSURE: 136 MMHG | BODY MASS INDEX: 37.22 KG/M2 | RESPIRATION RATE: 16 BRPM | HEART RATE: 83 BPM

## 2022-01-19 DIAGNOSIS — M96.1 POSTLAMINECTOMY SYNDROME, LUMBAR: ICD-10-CM

## 2022-01-19 DIAGNOSIS — T85.192A SPINAL CORD STIMULATOR DYSFUNCTION (HCC): Primary | ICD-10-CM

## 2022-01-19 PROCEDURE — 99214 OFFICE O/P EST MOD 30 MIN: CPT | Performed by: STUDENT IN AN ORGANIZED HEALTH CARE EDUCATION/TRAINING PROGRAM

## 2022-01-19 RX ORDER — CHLORHEXIDINE GLUCONATE 0.12 MG/ML
15 RINSE ORAL ONCE
Status: CANCELLED | OUTPATIENT
Start: 2022-01-19 | End: 2022-01-19

## 2022-01-19 RX ORDER — SODIUM CHLORIDE 9 MG/ML
125 INJECTION, SOLUTION INTRAVENOUS CONTINUOUS
Status: CANCELLED | OUTPATIENT
Start: 2022-01-19

## 2022-01-19 RX ORDER — CEFAZOLIN SODIUM 2 G/50ML
2000 SOLUTION INTRAVENOUS ONCE
Status: CANCELLED | OUTPATIENT
Start: 2022-01-19 | End: 2022-01-19
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
## 2022-01-20 ENCOUNTER — TELEPHONE (OUTPATIENT)
Dept: FAMILY MEDICINE CLINIC | Facility: CLINIC | Age: 62
End: 2022-01-20

## 2022-01-20 ENCOUNTER — OFFICE VISIT (OUTPATIENT)
Dept: PHYSICAL THERAPY | Facility: REHABILITATION | Age: 62
End: 2022-01-20
Payer: COMMERCIAL

## 2022-01-20 DIAGNOSIS — M54.42 CHRONIC LEFT-SIDED LOW BACK PAIN WITH BILATERAL SCIATICA: Primary | ICD-10-CM

## 2022-01-20 DIAGNOSIS — Z56.6 WORK-RELATED STRESS: ICD-10-CM

## 2022-01-20 DIAGNOSIS — M54.41 CHRONIC LEFT-SIDED LOW BACK PAIN WITH BILATERAL SCIATICA: Primary | ICD-10-CM

## 2022-01-20 DIAGNOSIS — G62.9 NEUROPATHY: ICD-10-CM

## 2022-01-20 DIAGNOSIS — M10.071 ACUTE IDIOPATHIC GOUT INVOLVING TOE OF RIGHT FOOT: ICD-10-CM

## 2022-01-20 DIAGNOSIS — R05.8 ALLERGIC COUGH: ICD-10-CM

## 2022-01-20 DIAGNOSIS — G89.29 CHRONIC LEFT-SIDED LOW BACK PAIN WITH BILATERAL SCIATICA: Primary | ICD-10-CM

## 2022-01-20 DIAGNOSIS — F41.1 GENERALIZED ANXIETY DISORDER: ICD-10-CM

## 2022-01-20 DIAGNOSIS — I10 ESSENTIAL HYPERTENSION: Chronic | ICD-10-CM

## 2022-01-20 DIAGNOSIS — M54.16 LUMBAR RADICULOPATHY: ICD-10-CM

## 2022-01-20 PROCEDURE — 97112 NEUROMUSCULAR REEDUCATION: CPT

## 2022-01-20 PROCEDURE — 97140 MANUAL THERAPY 1/> REGIONS: CPT

## 2022-01-20 RX ORDER — DULOXETIN HYDROCHLORIDE 30 MG/1
30 CAPSULE, DELAYED RELEASE ORAL DAILY
Qty: 90 CAPSULE | Refills: 1 | Status: SHIPPED | OUTPATIENT
Start: 2022-01-20 | End: 2022-01-21 | Stop reason: SDUPTHER

## 2022-01-20 RX ORDER — ALBUTEROL SULFATE 90 UG/1
2 AEROSOL, METERED RESPIRATORY (INHALATION) EVERY 6 HOURS PRN
Qty: 8.5 G | Refills: 1 | Status: SHIPPED | OUTPATIENT
Start: 2022-01-20 | End: 2022-01-21 | Stop reason: SDUPTHER

## 2022-01-20 RX ORDER — NEBIVOLOL 10 MG/1
10 TABLET ORAL DAILY
Qty: 90 TABLET | Refills: 1 | Status: SHIPPED | OUTPATIENT
Start: 2022-01-20 | End: 2022-01-21 | Stop reason: SDUPTHER

## 2022-01-20 RX ORDER — ROSUVASTATIN CALCIUM 5 MG/1
5 TABLET, COATED ORAL DAILY
Qty: 90 TABLET | Refills: 1 | Status: SHIPPED | OUTPATIENT
Start: 2022-01-20 | End: 2022-01-21 | Stop reason: SDUPTHER

## 2022-01-20 RX ORDER — AMLODIPINE BESYLATE 10 MG/1
10 TABLET ORAL DAILY
Qty: 90 TABLET | Refills: 1 | Status: SHIPPED | OUTPATIENT
Start: 2022-01-20 | End: 2022-01-21 | Stop reason: SDUPTHER

## 2022-01-20 RX ORDER — MONTELUKAST SODIUM 10 MG/1
10 TABLET ORAL
Qty: 90 TABLET | Refills: 1 | Status: SHIPPED | OUTPATIENT
Start: 2022-01-20 | End: 2022-01-21 | Stop reason: SDUPTHER

## 2022-01-20 SDOH — HEALTH STABILITY - MENTAL HEALTH: OTHER PHYSICAL AND MENTAL STRAIN RELATED TO WORK: Z56.6

## 2022-01-20 NOTE — TELEPHONE ENCOUNTER
Received refills request from [address] [place] [address] which would be a new mail order for patient  [facility] and verify if he is requesting refills   Rosuvastatin  nebivolol   Amlodipine   Duloxetine   Montelukast   Levothyroxine Albuteral HFA    Patient is using this mail order   Levothyroxine is not on patients current medication list

## 2022-01-20 NOTE — PROGRESS NOTES
Daily Note     Today's date: 2022  Patient name: Selam Drummond  : 1960  MRN: [id number]  Referring provider: Edgar Butler  Dx:   Encounter Diagnosis     ICD-10-CM    1  Chronic left-sided low back pain with bilateral sciatica  M54 41     M54 42     G89 29    2  Lumbar radiculopathy  M54 16    3  Neuropathy  G62 9        Start Time: 8508  Stop Time: 5031  Total time in clinic (min): 30 minutes    Subjective: Pt reports he is sore as he was just shoveling  Pt arrived 15 minutes late, but was accommodated  Objective: See treatment diary below    Assessment: Tolerated treatment well  Some cramping noted after goblet squats and required a rest break  Will resume full exercise list next visit  Educated on rest periods  Patient would benefit from continued PT    Plan: Continue per plan of care        Precautions: HTN, hyperlipidemia, hypothyroidism    Manuals              PA mobilizations    QD   QD L3-4 TPs QD L3-4 TPs QD L3-4 TPs b/l   thoracic mob    QD Grd V    GrV QD T8   Assessment     QD 15' QD 5' QD 5'   Neuro Re-Ed  12   TrA activation  2x20 5" holds 2 x 20x 5"  5"  2x20      Hip add ball squeeze  20x5" 20 x 5"  20x5"      Hooklying TrA march          BKFO  15x3" 5" x 20 ea  5" x 20 ea       Counter plank          Modified crunch          pallof press          xs anti extension  purp 2xF        Red Pball brace alt LE lift 20x5" 15x5" 15 x 5"  20x5" 20x5" 20x5" 20x5"   bridge 2x10 25x3" 25x 3"  25x3"  1x10  1x10 YHB 2x10 YHB 2x10 YHB   Rack pulls     20x 15lb barbell 20x 35lb barbell 4x6 45lb Lvl 5 on squat rack   Hip abd @ wall with red pball     10x ea 5" hold 10x ea 5" hold    Hip hinge      10x w/TrA and glute squeeze     Quadruped alt lifts  12x ea arm  12x ea arm      Dan walks          Goblet squat with KB 3x5 10lbs      2x5 10lbs   Lateral band walk      2 laps at counter YHB    HEP/education 5'    5' 5'    Ther Ex 1/20 11/1 12/16 12/20 12/27 12/29 1/17   PPUs  3x10  3x10                                                                            Ther Activity                              Gait Training                              Modalities

## 2022-01-21 RX ORDER — MONTELUKAST SODIUM 10 MG/1
10 TABLET ORAL
Qty: 90 TABLET | Refills: 1 | Status: SHIPPED | OUTPATIENT
Start: 2022-01-21 | End: 2022-06-20

## 2022-01-21 RX ORDER — DULOXETIN HYDROCHLORIDE 30 MG/1
30 CAPSULE, DELAYED RELEASE ORAL DAILY
Qty: 90 CAPSULE | Refills: 1 | Status: SHIPPED | OUTPATIENT
Start: 2022-01-21 | End: 2022-06-20

## 2022-01-21 RX ORDER — ROSUVASTATIN CALCIUM 5 MG/1
5 TABLET, COATED ORAL DAILY
Qty: 90 TABLET | Refills: 1 | Status: SHIPPED | OUTPATIENT
Start: 2022-01-21 | End: 2022-06-20

## 2022-01-21 RX ORDER — ALBUTEROL SULFATE 90 UG/1
2 AEROSOL, METERED RESPIRATORY (INHALATION) EVERY 6 HOURS PRN
Qty: 8.5 G | Refills: 1 | Status: SHIPPED | OUTPATIENT
Start: 2022-01-21 | End: 2022-06-20

## 2022-01-21 RX ORDER — NEBIVOLOL 10 MG/1
10 TABLET ORAL DAILY
Qty: 90 TABLET | Refills: 1 | Status: SHIPPED | OUTPATIENT
Start: 2022-01-21 | End: 2022-06-20

## 2022-01-21 RX ORDER — ALLOPURINOL 100 MG/1
100 TABLET ORAL DAILY
Qty: 90 TABLET | Refills: 1 | Status: SHIPPED | OUTPATIENT
Start: 2022-01-21 | End: 2022-07-21

## 2022-01-21 RX ORDER — AMLODIPINE BESYLATE 10 MG/1
10 TABLET ORAL DAILY
Qty: 90 TABLET | Refills: 1 | Status: SHIPPED | OUTPATIENT
Start: 2022-01-21 | End: 2022-06-20

## 2022-01-21 NOTE — TELEPHONE ENCOUNTER
Patient's mail order is also requesting Tizanidine and Clonazepam   Did you want to send these to mail order or keep them local?

## 2022-01-24 ENCOUNTER — OFFICE VISIT (OUTPATIENT)
Dept: PHYSICAL THERAPY | Facility: REHABILITATION | Age: 62
End: 2022-01-24
Payer: COMMERCIAL

## 2022-01-24 DIAGNOSIS — G62.9 NEUROPATHY: ICD-10-CM

## 2022-01-24 DIAGNOSIS — G89.29 CHRONIC LEFT-SIDED LOW BACK PAIN WITH BILATERAL SCIATICA: Primary | ICD-10-CM

## 2022-01-24 DIAGNOSIS — M54.16 LUMBAR RADICULOPATHY: ICD-10-CM

## 2022-01-24 DIAGNOSIS — M54.42 CHRONIC LEFT-SIDED LOW BACK PAIN WITH BILATERAL SCIATICA: Primary | ICD-10-CM

## 2022-01-24 DIAGNOSIS — M54.41 CHRONIC LEFT-SIDED LOW BACK PAIN WITH BILATERAL SCIATICA: Primary | ICD-10-CM

## 2022-01-24 PROCEDURE — 97112 NEUROMUSCULAR REEDUCATION: CPT

## 2022-01-24 PROCEDURE — 97140 MANUAL THERAPY 1/> REGIONS: CPT

## 2022-01-24 NOTE — TELEPHONE ENCOUNTER
Would rather keep controlled medications at local pharmacy as opposed to mail-order  Which pharmacy am I sending to?

## 2022-01-24 NOTE — PROGRESS NOTES
Daily Note     Today's date: 2022  Patient name: Tara Baker  : 1960  MRN: [id number]  Referring provider: Jaclyn De  Dx:   Encounter Diagnosis     ICD-10-CM    1  Chronic left-sided low back pain with bilateral sciatica  M54 41     M54 42     G89 29    2  Lumbar radiculopathy  M54 16    3  Neuropathy  G62 9        Start Time: 1400  Stop Time: 0905  Total time in clinic (min): 45 minutes    Subjective: Pt reports he is doing alright  States he feels he is able to do a bit more before getting tired  Objective: See treatment diary below     Assessment: Patient tolerated treatment well  Displayed good technique with therapeutic exercises and did not report any pain or discomfort during exercise protocol  Challenged by trap bar deadlifts, but was able to complete  Required rest breaks in between sets due to fatigue  Added single leg abduction/extension/flexion to improve hip strength and stability  Pt would benefit from continued skilled physical therapy services to improve overall function while decreasing discomfort to return them to their prior level of function  Plan: Continue per plan of care        Precautions: HTN, hyperlipidemia, hypothyroidism    Manuals              PA mobilizations   QD QD   QD L3-4 TPs QD L3-4 TPs QD L3-4 TPs b/l   thoracic mob   QD QD Grd V    GrV QD T8   Assessment  QD 5'   QD 15' QD 5' QD 5'   Neuro Re-Ed    TrA activation   2 x 20x 5"  5"  2x20      Hip add ball squeeze   20 x 5"  20x5"      Hooklying TrA march          BKFO   5" x 20 ea  5" x 20 ea       Counter plank          Modified crunch          pallof press          xs anti extension          Red Pball brace alt LE lift 20x5" 20x5" 15 x 5"  20x5" 20x5" 20x5" 20x5"   bridge 2x10 2x10 YHB 25x 3"  25x3"  1x10  1x10 YHB 2x10 YHB 2x10 YHB   Rack pulls  Trap bar deadlift 2x6 70lbs   20x 15lb barbell 20x 35lb barbell 4x6 45lb Lvl 5 on squat rack   Hip abd @ wall with red pball     10x ea 5" hold 10x ea 5" hold    Hip hinge      10x w/TrA and glute squeeze     Quadruped alt lifts    12x ea arm      Dan walks  4 laps 15lb KB        Goblet squat with KB 3x5 10lbs      2x5 10lbs   Lateral band walk      2 laps at counter YHB    Single leg abd/flex/ext  2x10 ea YMB        HEP/education 5'    5' 5'    Ther Ex 1/20 1/24 12/16 12/20 12/27 12/29 1/17   PPUs    3x10                                                                            Ther Activity                              Gait Training                              Modalities

## 2022-01-25 LAB
ALBUMIN SERPL-MCNC: 4.3 G/DL (ref 3.6–5.1)
ALBUMIN/GLOB SERPL: 1.8 (CALC) (ref 1–2.5)
ALP SERPL-CCNC: 79 U/L (ref 35–144)
ALT SERPL-CCNC: 13 U/L (ref 9–46)
APPEARANCE UR: CLEAR
APTT PPP: 28 SEC (ref 23–32)
AST SERPL-CCNC: 16 U/L (ref 10–35)
BACTERIA UR QL AUTO: ABNORMAL /HPF
BASOPHILS # BLD AUTO: 104 CELLS/UL (ref 0–200)
BASOPHILS NFR BLD AUTO: 1.3 %
BILIRUB SERPL-MCNC: 0.4 MG/DL (ref 0.2–1.2)
BILIRUB UR QL STRIP: NEGATIVE
BUN SERPL-MCNC: 16 MG/DL (ref 7–25)
BUN/CREAT SERPL: NORMAL (CALC) (ref 6–22)
CALCIUM SERPL-MCNC: 9.4 MG/DL (ref 8.6–10.3)
CHLORIDE SERPL-SCNC: 105 MMOL/L (ref 98–110)
CO2 SERPL-SCNC: 29 MMOL/L (ref 20–32)
COLOR UR: YELLOW
CREAT SERPL-MCNC: 1.12 MG/DL (ref 0.7–1.25)
EOSINOPHIL # BLD AUTO: 1648 CELLS/UL (ref 15–500)
EOSINOPHIL NFR BLD AUTO: 20.6 %
ERYTHROCYTE [DISTWIDTH] IN BLOOD BY AUTOMATED COUNT: 12.9 % (ref 11–15)
EST. AVERAGE GLUCOSE BLD GHB EST-MCNC: 111 MG/DL
EST. AVERAGE GLUCOSE BLD GHB EST-SCNC: 6.2 MMOL/L
GLOBULIN SER CALC-MCNC: 2.4 G/DL (CALC) (ref 1.9–3.7)
GLUCOSE SERPL-MCNC: 96 MG/DL (ref 65–99)
GLUCOSE UR QL STRIP: NEGATIVE
HBA1C MFR BLD: 5.5 % OF TOTAL HGB
HCT VFR BLD AUTO: 46.2 % (ref 38.5–50)
HGB BLD-MCNC: 15.6 G/DL (ref 13.2–17.1)
HGB UR QL STRIP: NEGATIVE
HYALINE CASTS #/AREA URNS LPF: ABNORMAL /LPF
INR PPP: 1
KETONES UR QL STRIP: ABNORMAL
LEUKOCYTE ESTERASE UR QL STRIP: ABNORMAL
LYMPHOCYTES # BLD AUTO: 1736 CELLS/UL (ref 850–3900)
LYMPHOCYTES NFR BLD AUTO: 21.7 %
MCH RBC QN AUTO: 31.6 PG (ref 27–33)
MCHC RBC AUTO-ENTMCNC: 33.8 G/DL (ref 32–36)
MCV RBC AUTO: 93.7 FL (ref 80–100)
MONOCYTES # BLD AUTO: 560 CELLS/UL (ref 200–950)
MONOCYTES NFR BLD AUTO: 7 %
NEUTROPHILS # BLD AUTO: 3952 CELLS/UL (ref 1500–7800)
NEUTROPHILS NFR BLD AUTO: 49.4 %
NITRITE UR QL STRIP: NEGATIVE
PH UR STRIP: 7 [PH] (ref 5–8)
PLATELET # BLD AUTO: 131 THOUSAND/UL (ref 140–400)
PMV BLD REES-ECKER: 14.6 FL (ref 7.5–12.5)
POTASSIUM SERPL-SCNC: 4.4 MMOL/L (ref 3.5–5.3)
PROT SERPL-MCNC: 6.7 G/DL (ref 6.1–8.1)
PROT UR QL STRIP: NEGATIVE
PROTHROMBIN TIME: 10.8 SEC (ref 9–11.5)
RBC # BLD AUTO: 4.93 MILLION/UL (ref 4.2–5.8)
RBC #/AREA URNS HPF: ABNORMAL /HPF
SL AMB EGFR AFRICAN AMERICAN: 81 ML/MIN/1.73M2
SL AMB EGFR NON AFRICAN AMERICAN: 70 ML/MIN/1.73M2
SODIUM SERPL-SCNC: 142 MMOL/L (ref 135–146)
SP GR UR STRIP: 1.02 (ref 1–1.03)
SQUAMOUS #/AREA URNS HPF: ABNORMAL /HPF
WBC # BLD AUTO: 8 THOUSAND/UL (ref 3.8–10.8)
WBC #/AREA URNS HPF: ABNORMAL /HPF

## 2022-01-26 ENCOUNTER — OFFICE VISIT (OUTPATIENT)
Dept: LAB | Facility: CLINIC | Age: 62
End: 2022-01-26
Payer: COMMERCIAL

## 2022-01-26 ENCOUNTER — OFFICE VISIT (OUTPATIENT)
Dept: FAMILY MEDICINE CLINIC | Facility: CLINIC | Age: 62
End: 2022-01-26
Payer: COMMERCIAL

## 2022-01-26 VITALS
RESPIRATION RATE: 16 BRPM | WEIGHT: 290 LBS | HEIGHT: 74 IN | SYSTOLIC BLOOD PRESSURE: 126 MMHG | DIASTOLIC BLOOD PRESSURE: 78 MMHG | TEMPERATURE: 96.8 F | BODY MASS INDEX: 37.22 KG/M2 | OXYGEN SATURATION: 97 % | HEART RATE: 82 BPM

## 2022-01-26 DIAGNOSIS — Z01.818 PREOPERATIVE CLEARANCE: Primary | ICD-10-CM

## 2022-01-26 DIAGNOSIS — M96.1 POSTLAMINECTOMY SYNDROME, LUMBAR: ICD-10-CM

## 2022-01-26 DIAGNOSIS — E66.9 OBESITY (BMI 30-39.9): ICD-10-CM

## 2022-01-26 DIAGNOSIS — E78.2 MIXED HYPERLIPIDEMIA: ICD-10-CM

## 2022-01-26 DIAGNOSIS — M54.16 LUMBAR RADICULOPATHY: ICD-10-CM

## 2022-01-26 DIAGNOSIS — I10 ESSENTIAL HYPERTENSION: ICD-10-CM

## 2022-01-26 DIAGNOSIS — G47.33 OBSTRUCTIVE SLEEP APNEA: Chronic | ICD-10-CM

## 2022-01-26 DIAGNOSIS — T85.192A SPINAL CORD STIMULATOR DYSFUNCTION (HCC): ICD-10-CM

## 2022-01-26 PROCEDURE — 3008F BODY MASS INDEX DOCD: CPT | Performed by: NURSE PRACTITIONER

## 2022-01-26 PROCEDURE — 3074F SYST BP LT 130 MM HG: CPT | Performed by: NURSE PRACTITIONER

## 2022-01-26 PROCEDURE — 1036F TOBACCO NON-USER: CPT | Performed by: NURSE PRACTITIONER

## 2022-01-26 PROCEDURE — 3078F DIAST BP <80 MM HG: CPT | Performed by: NURSE PRACTITIONER

## 2022-01-26 PROCEDURE — 99214 OFFICE O/P EST MOD 30 MIN: CPT | Performed by: NURSE PRACTITIONER

## 2022-01-26 PROCEDURE — 93005 ELECTROCARDIOGRAM TRACING: CPT

## 2022-01-27 ENCOUNTER — APPOINTMENT (OUTPATIENT)
Dept: PHYSICAL THERAPY | Facility: REHABILITATION | Age: 62
End: 2022-01-27
Payer: COMMERCIAL

## 2022-01-28 LAB
ATRIAL RATE: 78 BPM
QRS AXIS: -56 DEGREES
QRSD INTERVAL: 150 MS
QT INTERVAL: 438 MS
QTC INTERVAL: 451 MS
T WAVE AXIS: -10 DEGREES
VENTRICULAR RATE: 64 BPM

## 2022-01-28 PROCEDURE — 93010 ELECTROCARDIOGRAM REPORT: CPT | Performed by: INTERNAL MEDICINE

## 2022-01-31 ENCOUNTER — OFFICE VISIT (OUTPATIENT)
Dept: PHYSICAL THERAPY | Facility: REHABILITATION | Age: 62
End: 2022-01-31
Payer: COMMERCIAL

## 2022-01-31 DIAGNOSIS — M54.41 CHRONIC LEFT-SIDED LOW BACK PAIN WITH BILATERAL SCIATICA: Primary | ICD-10-CM

## 2022-01-31 DIAGNOSIS — G62.9 NEUROPATHY: ICD-10-CM

## 2022-01-31 DIAGNOSIS — M54.16 LUMBAR RADICULOPATHY: ICD-10-CM

## 2022-01-31 DIAGNOSIS — G89.29 CHRONIC LEFT-SIDED LOW BACK PAIN WITH BILATERAL SCIATICA: Primary | ICD-10-CM

## 2022-01-31 DIAGNOSIS — M54.42 CHRONIC LEFT-SIDED LOW BACK PAIN WITH BILATERAL SCIATICA: Primary | ICD-10-CM

## 2022-01-31 PROCEDURE — 97112 NEUROMUSCULAR REEDUCATION: CPT

## 2022-01-31 PROCEDURE — 97140 MANUAL THERAPY 1/> REGIONS: CPT

## 2022-01-31 NOTE — PROGRESS NOTES
Daily Note     Today's date: 2022  Patient name: Rodolfo Ocampo  : 1960  MRN: [id number]  Referring provider: Francisca Durham  Dx:   Encounter Diagnosis     ICD-10-CM    1  Chronic left-sided low back pain with bilateral sciatica  M54 41     M54 42     G89 29    2  Lumbar radiculopathy  M54 16    3  Neuropathy  G62 9                   Subjective: Pt reports he has seen some improvements with endurance with his daily activities  Objective: See treatment diary below  Increased soreness in R LB with alternating b/l hip abd  Better tolerance to alternating hip flex/abd/ext ea rep, and also alternating legs after each round  Assessment: Tolerated treatment well  Continued with program focus of core and b/l hip stabilization/strengthening in effort to reduce stress on LB  Most challenged with standing TB resisted leg raises today  Trap bar rack lift held this visit, in effort to not aggravate symptoms  Patient would benefit from continued PT  Plan: Continue per plan of care        Precautions: HTN, hyperlipidemia, hypothyroidism    Manuals              PA mobilizations   QD QD  QD L3-4 TPs QD L3-4 TPs QD L3-4 TPs b/l   thoracic mob   QD QD    GrV QD T8   Assessment  QD 5'   QD 15' QD 5' QD 5'   Neuro Re-Ed    TrA activation          Hip add ball squeeze          Hooklying TrA march          BKFO          Counter plank          Modified crunch          pallof press          xs anti extension          Red Pball brace alt LE lift 20x5" 20x5" 20x5"  20x5" 20x5" 20x5"   bridge 2x10 2x10 YHB 2x10 YHB  1x10  1x10 YHB 2x10 YHB 2x10 YHB   Rack pulls  Trap bar deadlift 2x6 70lbs nv  20x 15lb barbell 20x 35lb barbell 4x6 45lb Lvl 5 on squat rack   Hip abd @ wall with red pball     10x ea 5" hold 10x ea 5" hold    Hip hinge      10x w/TrA and glute squeeze     Quadruped alt lifts          Dan walks  4 laps 15lb KB 4 laps 15lb KB       Goblet squat with KB 3x5 10lbs  3x5 10lbs    2x5 10lbs   Lateral band walk      2 laps at counter YHB    Single leg abd/flex/ext  2x10 ea YMB YMB hip abd  2x10 b/l    Alt abd/flex/ext 2x10       HEP/education 5'    5' 5'    Ther Ex 1/20 1/24 1/31 12/27 12/29 1/17   PPUs                                                                                Ther Activity                              Gait Training                              Modalities

## 2022-02-02 ENCOUNTER — OFFICE VISIT (OUTPATIENT)
Dept: CARDIOLOGY CLINIC | Facility: CLINIC | Age: 62
End: 2022-02-02
Payer: COMMERCIAL

## 2022-02-02 VITALS
SYSTOLIC BLOOD PRESSURE: 120 MMHG | HEART RATE: 72 BPM | WEIGHT: 299.9 LBS | BODY MASS INDEX: 38.5 KG/M2 | DIASTOLIC BLOOD PRESSURE: 90 MMHG | OXYGEN SATURATION: 96 %

## 2022-02-02 DIAGNOSIS — Z01.810 PREOPERATIVE CARDIOVASCULAR EXAMINATION: ICD-10-CM

## 2022-02-02 DIAGNOSIS — E78.2 MIXED HYPERLIPIDEMIA: ICD-10-CM

## 2022-02-02 DIAGNOSIS — I10 ESSENTIAL HYPERTENSION: Primary | ICD-10-CM

## 2022-02-02 DIAGNOSIS — I25.10 CORONARY ARTERIOSCLEROSIS: ICD-10-CM

## 2022-02-02 PROCEDURE — 3080F DIAST BP >= 90 MM HG: CPT | Performed by: INTERNAL MEDICINE

## 2022-02-02 PROCEDURE — 99214 OFFICE O/P EST MOD 30 MIN: CPT | Performed by: INTERNAL MEDICINE

## 2022-02-02 PROCEDURE — 3074F SYST BP LT 130 MM HG: CPT | Performed by: INTERNAL MEDICINE

## 2022-02-02 PROCEDURE — 1036F TOBACCO NON-USER: CPT | Performed by: INTERNAL MEDICINE

## 2022-02-02 PROCEDURE — 93000 ELECTROCARDIOGRAM COMPLETE: CPT | Performed by: INTERNAL MEDICINE
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
## 2022-02-02 NOTE — PATIENT INSTRUCTIONS
Will check an echocardiogram, Holter monitor and blood work  He will start Eliquis 5 mg twice a day when cleared by your surgeon  Please call if there is a problem in the interim  I will see you in 2-3 months time

## 2022-02-03 ENCOUNTER — OFFICE VISIT (OUTPATIENT)
Dept: PHYSICAL THERAPY | Facility: REHABILITATION | Age: 62
End: 2022-02-03
Payer: COMMERCIAL

## 2022-02-03 DIAGNOSIS — M54.41 CHRONIC LEFT-SIDED LOW BACK PAIN WITH BILATERAL SCIATICA: Primary | ICD-10-CM

## 2022-02-03 DIAGNOSIS — M54.42 CHRONIC LEFT-SIDED LOW BACK PAIN WITH BILATERAL SCIATICA: Primary | ICD-10-CM

## 2022-02-03 DIAGNOSIS — G89.29 CHRONIC LEFT-SIDED LOW BACK PAIN WITH BILATERAL SCIATICA: Primary | ICD-10-CM

## 2022-02-03 DIAGNOSIS — G62.9 NEUROPATHY: ICD-10-CM

## 2022-02-03 DIAGNOSIS — M54.16 LUMBAR RADICULOPATHY: ICD-10-CM

## 2022-02-03 PROCEDURE — 97112 NEUROMUSCULAR REEDUCATION: CPT

## 2022-02-03 PROCEDURE — 97140 MANUAL THERAPY 1/> REGIONS: CPT

## 2022-02-03 NOTE — PROGRESS NOTES
Daily Note     Today's date: 2/3/2022  Patient name: Lory Samuels  : 1960  MRN: [id number]  Referring provider: Kahlil Metz  Dx:   Encounter Diagnosis     ICD-10-CM    1  Chronic left-sided low back pain with bilateral sciatica  M54 41     M54 42     G89 29    2  Lumbar radiculopathy  M54 16    3  Neuropathy  G62 9                   Subjective: Pt reports his LB is feeling ok today  Objective: See treatment diary below  Mobilizations not performed d/t no therapist being available  Continue NV  Assessment: Tolerated treatment well  Improved tolerance to program compared to LV  Increased tone along lumbar/thoracic paraspinals, L > R, which did begin to resolve slightly with manual STM  Patient would benefit from continued PT to further improve core and BLE strength/stability, in effort to reduce stress on LB  Plan: Continue per plan of care        Precautions: HTN, hyperlipidemia, hypothyroidism    Manuals 1/20 1/24 1/31 2/3  12/29 1/17             PA mobilizations   QD QD lumbar STM  AFB  QD L3-4 TPs QD L3-4 TPs b/l   thoracic mob   QD QD thoracic STM  AFB   GrV QD T8   Assessment  QD 5'    QD 5' QD 5'   Neuro Re-Ed 1/20 1/24 1/31 2/3  12/29 1/17   TrA activation          Hip add ball squeeze          Hooklying TrA march          BKFO          Counter plank          Modified crunch          pallof press          xs anti extension          Red Pball brace alt LE lift 20x5" 20x5" 20x5" 20x5"  20x5" 20x5"   bridge 2x10 2x10 YHB 2x10 YHB 2x10 YHB  2x10 YHB 2x10 YHB   Rack pulls  Trap bar deadlift 2x6 70lbs nv nv  20x 35lb barbell 4x6 45lb Lvl 5 on squat rack   Hip abd @ wall with red pball      10x ea 5" hold    Hip hinge           Quadruped alt lifts          Dan walks  4 laps 15lb KB 4 laps 15lb KB 6 laps 15lb KB      Goblet squat with KB 3x5 10lbs  3x5 10lbs 3x5 10lbs   2x5 10lbs   Lateral band walk      2 laps at counter YHB    Single leg abd/flex/ext  2x10 ea YMB YMB hip abd  2x10 b/l    Alt abd/flex/ext 2x10 Alt abd/flex/ext 2x10   b/l      HEP/education 5'     5'    Ther Ex 1/20 1/24 1/31 2/3  12/29 1/17   PPUs                                                                                Ther Activity                              Gait Training                              Modalities

## 2022-02-14 ENCOUNTER — OFFICE VISIT (OUTPATIENT)
Dept: PHYSICAL THERAPY | Facility: REHABILITATION | Age: 62
End: 2022-02-14
Payer: COMMERCIAL

## 2022-02-14 DIAGNOSIS — M54.16 LUMBAR RADICULOPATHY: ICD-10-CM

## 2022-02-14 DIAGNOSIS — G89.29 CHRONIC LEFT-SIDED LOW BACK PAIN WITH BILATERAL SCIATICA: Primary | ICD-10-CM

## 2022-02-14 DIAGNOSIS — M54.42 CHRONIC LEFT-SIDED LOW BACK PAIN WITH BILATERAL SCIATICA: Primary | ICD-10-CM

## 2022-02-14 DIAGNOSIS — G62.9 NEUROPATHY: ICD-10-CM

## 2022-02-14 DIAGNOSIS — M54.41 CHRONIC LEFT-SIDED LOW BACK PAIN WITH BILATERAL SCIATICA: Primary | ICD-10-CM

## 2022-02-14 PROCEDURE — 97140 MANUAL THERAPY 1/> REGIONS: CPT

## 2022-02-14 PROCEDURE — 97112 NEUROMUSCULAR REEDUCATION: CPT

## 2022-02-14 NOTE — PROGRESS NOTES
Daily Note     Today's date: 2022  Patient name: Harjinder Jones  : 1960  MRN: [id number]  Referring provider: Deysi Chirinos  Dx:   Encounter Diagnosis     ICD-10-CM    1  Chronic left-sided low back pain with bilateral sciatica  M54 41     M54 42     G89 29    2  Lumbar radiculopathy  M54 16    3  Neuropathy  G62 9        Start Time: 1400  Stop Time: 8046  Total time in clinic (min): 44 minutes    Subjective: Pt reports his is not feeling too bad  States he is having some upper back discomfort with deep breaths  Objective: See treatment diary below  Assessment: Tolerated treatment well  Educated to perform some thoracic mobility exercises at home and was educated on lifting and bending techniques when doing dead lifts  Required some rest breaks today  Patient would benefit from continued PT to further improve core and BLE strength/stability, in effort to reduce stress on LB  Plan: Continue per plan of care        Precautions: HTN, hyperlipidemia, hypothyroidism    Manuals /3 2/14 12/29 1/17             PA mobilizations   QD QD lumbar STM  AFB QD QD L3-4 TPs QD L3-4 TPs b/l   thoracic mob   QD QD thoracic STM  AFB QD GrV t8  GrV QD T8   Assessment  QD 5'   QD 5' QD 5' QD 5'   Neuro Re-Ed 1/20 1/24 1/31 2/3 2/14 12/29 1/17   TrA activation          Hip add ball squeeze          Hooklying TrA march          BKFO          Counter plank          Modified crunch          pallof press          xs anti extension          Red Pball brace alt LE lift 20x5" 20x5" 20x5" 20x5" 20x5" 20x5" 20x5"   bridge 2x10 2x10 YHB 2x10 YHB 2x10 YHB 2x10 RHB 2x10 YHB 2x10 YHB   Rack pulls  Trap bar deadlift 2x6 70lbs nv nv  20x 35lb barbell 4x6 45lb Lvl 5 on squat rack   Hip abd @ wall with red pball      10x ea 5" hold    Hip hinge           Quadruped alt lifts          Dan walks  4 laps 15lb KB 4 laps 15lb KB 6 laps 15lb KB 4 laps 15lb KB     Goblet squat with KB 3x5 10lbs  3x5 10lbs 3x5 10lbs 3x10 10lb deadlift  2x5 10lbs   Lateral band walk      2 laps at counter YHB    Single leg abd/flex/ext  2x10 ea YMB YMB hip abd  2x10 b/l    Alt abd/flex/ext 2x10 Alt abd/flex/ext 2x10   b/l      HEP/education 5'    10' thoracic mobility, lifting technique 5'    Ther Ex 1/20 1/24 1/31 2/3 2/14 12/29 1/17   PPUs                                                                                Ther Activity                              Gait Training                              Modalities

## 2022-02-15 ENCOUNTER — HOSPITAL ENCOUNTER (OUTPATIENT)
Dept: RADIOLOGY | Facility: CLINIC | Age: 62
Discharge: HOME | End: 2022-02-15
Attending: ANESTHESIOLOGY | Admitting: ANESTHESIOLOGY
Payer: COMMERCIAL

## 2022-02-15 VITALS
SYSTOLIC BLOOD PRESSURE: 142 MMHG | HEART RATE: 82 BPM | RESPIRATION RATE: 20 BRPM | TEMPERATURE: 97.9 F | OXYGEN SATURATION: 97 % | DIASTOLIC BLOOD PRESSURE: 92 MMHG

## 2022-02-15 DIAGNOSIS — M54.16 LUMBAR RADICULOPATHY: ICD-10-CM

## 2022-02-15 DIAGNOSIS — M96.1 POSTLAMINECTOMY SYNDROME, LUMBAR: ICD-10-CM

## 2022-02-15 PROCEDURE — 62323 NJX INTERLAMINAR LMBR/SAC: CPT | Performed by: ANESTHESIOLOGY

## 2022-02-15 RX ORDER — BUPIVACAINE HCL/PF 2.5 MG/ML
2 VIAL (ML) INJECTION ONCE
Status: COMPLETED | OUTPATIENT
Start: 2022-02-15 | End: 2022-02-15

## 2022-02-15 RX ORDER — METHYLPREDNISOLONE ACETATE 80 MG/ML
80 INJECTION, SUSPENSION INTRA-ARTICULAR; INTRALESIONAL; INTRAMUSCULAR; PARENTERAL; SOFT TISSUE ONCE
Status: COMPLETED | OUTPATIENT
Start: 2022-02-15 | End: 2022-02-15

## 2022-02-15 RX ADMIN — METHYLPREDNISOLONE ACETATE 80 MG: 80 INJECTION, SUSPENSION INTRA-ARTICULAR; INTRALESIONAL; INTRAMUSCULAR; PARENTERAL; SOFT TISSUE at 11:03

## 2022-02-15 RX ADMIN — IOHEXOL 1 ML: 300 INJECTION, SOLUTION INTRAVENOUS at 11:03

## 2022-02-15 RX ADMIN — BUPIVACAINE HYDROCHLORIDE 2 ML: 2.5 INJECTION, SOLUTION EPIDURAL; INFILTRATION; INTRACAUDAL at 11:03
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
## 2022-02-15 NOTE — DISCHARGE INSTR - LAB
Epidural Steroid Injection   WHAT YOU NEED TO KNOW:   An epidural steroid injection (ALEXANDRA) is a procedure to inject steroid medicine into the epidural space  The epidural space is between your spinal cord and vertebrae  Steroids reduce inflammation and fluid buildup in your spine that may be causing pain  You may be given pain medicine along with the steroids  ACTIVITY  Do not drive or operate machinery today  No strenuous activity today - bending, lifting, etc   You may resume normal activites starting tomorrow - start slowly and as tolerated  You may shower today, but no tub baths or hot tubs  You may have numbness for several hours from the local anesthetic  Please use caution and common sense, especially with weight-bearing activities  CARE OF THE INJECTION SITE  If you have soreness or pain, apply ice to the area today (20 minutes on/20 minutes off)  Starting tomorrow, you may use warm, moist heat or ice if needed  You may have an increase or change in your discomfort for 36-48 hours after your treatment  Apply ice and continue with any pain medication you have been prescribed  Notify the [facility] if you have any of the following: redness, drainage, swelling, headache, stiff neck or fever above 100°F     SPECIAL INSTRUCTIONS  Our office will contact you in approximately 7 days for a progress report  MEDICATIONS  Continue to take all routine medications  Our office may have instructed you to hold some medications  As no general anesthesia was used in today's procedure, you should not experience any side effects related to anesthesia  If you have a problem specifically related to your procedure, please call our office at [phone]  Problems not related to your procedure should be directed to your primary care physician

## 2022-02-17 ENCOUNTER — OFFICE VISIT (OUTPATIENT)
Dept: PHYSICAL THERAPY | Facility: REHABILITATION | Age: 62
End: 2022-02-17
Payer: COMMERCIAL

## 2022-02-17 DIAGNOSIS — G89.29 CHRONIC LEFT-SIDED LOW BACK PAIN WITH BILATERAL SCIATICA: Primary | ICD-10-CM

## 2022-02-17 DIAGNOSIS — M54.16 LUMBAR RADICULOPATHY: ICD-10-CM

## 2022-02-17 DIAGNOSIS — G62.9 NEUROPATHY: ICD-10-CM

## 2022-02-17 DIAGNOSIS — M54.41 CHRONIC LEFT-SIDED LOW BACK PAIN WITH BILATERAL SCIATICA: Primary | ICD-10-CM

## 2022-02-17 DIAGNOSIS — M54.42 CHRONIC LEFT-SIDED LOW BACK PAIN WITH BILATERAL SCIATICA: Primary | ICD-10-CM

## 2022-02-17 PROCEDURE — 97112 NEUROMUSCULAR REEDUCATION: CPT

## 2022-02-17 PROCEDURE — 97140 MANUAL THERAPY 1/> REGIONS: CPT

## 2022-02-17 NOTE — PROGRESS NOTES
Daily Note     Today's date: 2022  Patient name: Jennifer Warren  : 1960  MRN: [id number]  Referring provider: Kyle Astorga  Dx:   Encounter Diagnosis     ICD-10-CM    1  Chronic left-sided low back pain with bilateral sciatica  M54 41     M54 42     G89 29    2  Lumbar radiculopathy  M54 16    3  Neuropathy  G62 9        Start Time: 6041  Stop Time: 727  Total time in clinic (min): 38 minutes    Subjective: Pt reports he was sore following last treatment session  Had an injection on Tuesday and is still sore from the injection  Objective: See treatment diary below  Assessment: Tolerated treatment well  Hold KB pickups today due to discomfort in his low back  Patient would benefit from continued PT to further improve core and BLE strength/stability, in effort to reduce stress on LB  Plan: Continue per plan of care        Precautions: HTN, hyperlipidemia, hypothyroidism    Manuals  2/3 2/14 2/17 1/17             PA mobilizations   QD QD lumbar STM  AFB QD QD QD L3-4 TPs b/l   thoracic mob   QD QD thoracic STM  AFB QD GrV t8 QD GrV QD T8   Assessment  QD 5'   QD 5'  QD 5'   Neuro Re-Ed 1/20 1/24 1/31 2/3 2/14 2/17 1/17   TrA activation          Hip add ball squeeze          Hooklying TrA march          BKFO          Counter plank          Modified crunch          pallof press          xs anti extension          Red Pball brace alt LE lift 20x5" 20x5" 20x5" 20x5" 20x5" 20x5" 20x5"   bridge 2x10 2x10 YHB 2x10 YHB 2x10 YHB 2x10 RHB 2x10 RHB 2x10 YHB   Rack pulls  Trap bar deadlift 2x6 70lbs nv nv   4x6 45lb Lvl 5 on squat rack   Hip abd @ wall with red pball          Hip hinge           Quadruped alt lifts          Dan walks  4 laps 15lb KB 4 laps 15lb KB 6 laps 15lb KB 4 laps 15lb KB 4 laps 15KB    Goblet squat with KB 3x5 10lbs  3x5 10lbs 3x5 10lbs 3x10 10lb deadlift 1x10 HOLD 2x5 10lbs   Lateral band walk          Single leg abd/flex/ext  2x10 ea YMB YMB hip abd  2x10 b/l    Alt abd/flex/ext 2x10 Alt abd/flex/ext 2x10   b/l      HEP/education 5'    10' thoracic mobility, lifting technique     Ther Ex 1/20 1/24 1/31 2/3 2/14 2/17 1/17   PPUs                                                                                Ther Activity                              Gait Training                              Modalities

## 2022-02-21 ENCOUNTER — OFFICE VISIT (OUTPATIENT)
Dept: PHYSICAL THERAPY | Facility: REHABILITATION | Age: 62
End: 2022-02-21
Payer: COMMERCIAL

## 2022-02-21 DIAGNOSIS — M54.41 CHRONIC LEFT-SIDED LOW BACK PAIN WITH BILATERAL SCIATICA: Primary | ICD-10-CM

## 2022-02-21 DIAGNOSIS — G89.29 CHRONIC LEFT-SIDED LOW BACK PAIN WITH BILATERAL SCIATICA: Primary | ICD-10-CM

## 2022-02-21 DIAGNOSIS — M54.16 LUMBAR RADICULOPATHY: ICD-10-CM

## 2022-02-21 DIAGNOSIS — G62.9 NEUROPATHY: ICD-10-CM

## 2022-02-21 DIAGNOSIS — M54.42 CHRONIC LEFT-SIDED LOW BACK PAIN WITH BILATERAL SCIATICA: Primary | ICD-10-CM

## 2022-02-21 PROCEDURE — 97112 NEUROMUSCULAR REEDUCATION: CPT

## 2022-02-21 NOTE — PROGRESS NOTES
Daily Note     Today's date: 2022  Patient name: Leopoldo Slice  : 1960  MRN: [id number]  Referring provider: Estell Frankel  Dx:   Encounter Diagnosis     ICD-10-CM    1  Chronic left-sided low back pain with bilateral sciatica  M54 41     M54 42     G89 29    2  Lumbar radiculopathy  M54 16    3  Neuropathy  G62 9                   Subjective: States the past few days since his injection, his tolerance to standing activities has improved slightly, noting he can be standing for slightly longer durations of time  Objective: See treatment diary below      Assessment: Tolerated treatment fair - well  Focus of program was primarily on continued core strength and stability  Progressed with TrA/lumbar rotation with weight resistance at counter, in effort to simulate cooking and working at [employer] at home  Pt was very challenged with his intervention, experiencing increased pain in LB during last few reps; symptoms began to resolve with rest in supine  Manuals held in effort to monitor patient's response to exercises only this visit  Patient would benefit from continued PT  Plan: Continue per plan of care         Precautions: HTN, hyperlipidemia, hypothyroidism    Manuals 1/20 1/24 1/31 2/3 2/14 2/17 2/21             PA mobilizations   QD QD lumbar STM  AFB QD QD np   thoracic mob   QD QD thoracic STM  AFB QD GrV t8 QD np   Assessment  QD 5'   QD 5'     Neuro Re-Ed 1/20 1/24 1/31 2/3 2/14 2/17 2/21   TrA activation          Hip add ball squeeze          Hooklying TrA march          BKFO          Counter plank          Modified crunch          pallof press          xs anti extension          Red Pball brace alt LE lift 20x5" 20x5" 20x5" 20x5" 20x5" 20x5" 20x5"   bridge 2x10 2x10 YHB 2x10 YHB 2x10 YHB 2x10 RHB 2x10 RHB 2x10 RHB   Rack pulls  Trap bar deadlift 2x6 70lbs nv nv      Hip abd @ wall with red pball          Hip hinge           Quadruped alt lifts Dan walks  4 laps 15lb KB 4 laps 15lb KB 6 laps 15lb KB 4 laps 15lb KB 4 laps 15KB 4 laps 15KB   Goblet squat with KB 3x5 10lbs  3x5 10lbs 3x5 10lbs 3x10 10lb deadlift 1x10 HOLD 1x10 10lb kb  10" box   Lateral band walk          Single leg abd/flex/ext  2x10 ea YMB YMB hip abd  2x10 b/l    Alt abd/flex/ext 2x10 Alt abd/flex/ext 2x10   b/l   Alt abd/flex/ext 3x10   b/l   TrA brace/lumbar rotation counter Med ball        10# med ball  1x8   HEP/education 5'    10' thoracic mobility, lifting technique     Ther Ex 1/20 1/24 1/31 2/3 2/14 2/17 2/21   PPUs                                                                                Ther Activity                              Gait Training                              Modalities

## 2022-02-22 ENCOUNTER — TELEPHONE (OUTPATIENT)
Dept: PAIN MEDICINE | Facility: CLINIC | Age: 62
End: 2022-02-22

## 2022-02-24 ENCOUNTER — OFFICE VISIT (OUTPATIENT)
Dept: PHYSICAL THERAPY | Facility: REHABILITATION | Age: 62
End: 2022-02-24
Payer: COMMERCIAL

## 2022-02-24 DIAGNOSIS — M54.42 CHRONIC LEFT-SIDED LOW BACK PAIN WITH BILATERAL SCIATICA: Primary | ICD-10-CM

## 2022-02-24 DIAGNOSIS — G62.9 NEUROPATHY: ICD-10-CM

## 2022-02-24 DIAGNOSIS — M54.16 LUMBAR RADICULOPATHY: ICD-10-CM

## 2022-02-24 DIAGNOSIS — G89.29 CHRONIC LEFT-SIDED LOW BACK PAIN WITH BILATERAL SCIATICA: Primary | ICD-10-CM

## 2022-02-24 DIAGNOSIS — M54.41 CHRONIC LEFT-SIDED LOW BACK PAIN WITH BILATERAL SCIATICA: Primary | ICD-10-CM

## 2022-02-24 PROCEDURE — 97112 NEUROMUSCULAR REEDUCATION: CPT

## 2022-02-24 PROCEDURE — 97140 MANUAL THERAPY 1/> REGIONS: CPT

## 2022-02-25 ENCOUNTER — TELEPHONE (OUTPATIENT)
Dept: CARDIOLOGY CLINIC | Facility: CLINIC | Age: 62
End: 2022-02-25

## 2022-02-25 NOTE — TELEPHONE ENCOUNTER
Jackson Daily called to notify you he will start Eliquis today  The neuro sx procedure was cancelled for insurance reasons, and he received the epidural injection over a week ago

## 2022-02-28 ENCOUNTER — OFFICE VISIT (OUTPATIENT)
Dept: PHYSICAL THERAPY | Facility: REHABILITATION | Age: 62
End: 2022-02-28
Payer: COMMERCIAL

## 2022-02-28 DIAGNOSIS — M54.42 CHRONIC LEFT-SIDED LOW BACK PAIN WITH BILATERAL SCIATICA: Primary | ICD-10-CM

## 2022-02-28 DIAGNOSIS — G89.29 CHRONIC LEFT-SIDED LOW BACK PAIN WITH BILATERAL SCIATICA: Primary | ICD-10-CM

## 2022-02-28 DIAGNOSIS — M54.41 CHRONIC LEFT-SIDED LOW BACK PAIN WITH BILATERAL SCIATICA: Primary | ICD-10-CM

## 2022-02-28 DIAGNOSIS — G62.9 NEUROPATHY: ICD-10-CM

## 2022-02-28 DIAGNOSIS — M54.16 LUMBAR RADICULOPATHY: ICD-10-CM

## 2022-02-28 PROCEDURE — 97112 NEUROMUSCULAR REEDUCATION: CPT

## 2022-02-28 NOTE — PROGRESS NOTES
Daily Note     Today's date: 2022  Patient name: Anel Min  : 1960  MRN: [id number]  Referring provider: Julio C Flores  Dx:   Encounter Diagnosis     ICD-10-CM    1  Chronic left-sided low back pain with bilateral sciatica  M54 41     M54 42     G89 29    2  Lumbar radiculopathy  M54 16    3  Neuropathy  G62 9                   Subjective: Pt reports after his LV, he went and ran a short errand  By the time he got home he had significant increase in pain/discomfort  Pain resolved with rest lying down at home  Objective: See treatment diary below      Assessment: Tolerated treatment well  Program modified slightly today d/t subjective comments made at start of treatment  Slight soreness in LB during last several reps of deadlift from box and reshma sidestepping  Patient would benefit from continued PT in effort to further improve tolerance to longer durations of activity, with reduced frequency of symptoms  Plan: Continue per plan of care        Precautions: HTN, hyperlipidemia, hypothyroidism    Manuals 2/24 2/28  2/3 2/14 2/17 2/21             PA mobilizations  QD np  lumbar STM  AFB QD QD np   thoracic mob  QD GrV T8 np  thoracic STM  AFB QD GrV t8 QD np   Assessment     QD 5'     Neuro Re-Ed 2/24 2/28  2/3 2/14 2/17 2/21   TrA activation          Hip add ball squeeze          Hooklying TrA march          BKFO          Counter plank          Modified crunch          pallof press 10x ea sidestep 6lb 10x ea sidestep 6lb        xs anti extension          Red Pball brace alt LE lift 20x5" 20x5"  20x5" 20x5" 20x5" 20x5"   bridge 2x10 YHB 2x10 YHB  2x10 YHB 2x10 RHB 2x10 RHB 2x10 RHB   Rack pulls    nv      Hip abd @ wall with red pball          Hip hinge           Quadruped alt lifts          Dan walks    6 laps 15lb KB 4 laps 15lb KB 4 laps 15KB 4 laps 15KB   Goblet squat with KB 3x8 10lb 8in box 2x8 10lb 8in box  3x5 10lbs 3x10 10lb deadlift 1x10 HOLD 1x10 10lb kb  10" box   Lateral band walk          Single leg abd/flex/ext 12x ea YMB 2x8 ea YMB  Alt abd/flex/ext 2x10   b/l   Alt abd/flex/ext 3x10   b/l   TrA brace/lumbar rotation counter Med ball        10# med ball  1x8   HEP/education     10' thoracic mobility, lifting technique     Ther Ex 2/24 2/28 1/31 2/3 2/14 2/17 2/21   PPUs                                                                                Ther Activity                              Gait Training                              Modalities

## 2022-03-02 ENCOUNTER — TELEPHONE (OUTPATIENT)
Dept: RADIOLOGY | Facility: CLINIC | Age: 62
End: 2022-03-02

## 2022-03-02 DIAGNOSIS — M54.16 LUMBAR RADICULOPATHY: ICD-10-CM

## 2022-03-02 DIAGNOSIS — M96.1 POSTLAMINECTOMY SYNDROME, LUMBAR: Primary | ICD-10-CM

## 2022-03-02 NOTE — TELEPHONE ENCOUNTER
Can try repeating the injection in doing a bilateral L5 transforaminal epidural steroid injection if he would like for further efficacy

## 2022-03-02 NOTE — TELEPHONE ENCOUNTER
S/w pt, advised of the same. Pt verbalized understanding and agreeable to repeat inj. Please place order, thank you.

## 2022-03-03 ENCOUNTER — HOSPITAL ENCOUNTER (OUTPATIENT)
Dept: NON INVASIVE DIAGNOSTICS | Facility: CLINIC | Age: 62
Discharge: HOME | End: 2022-03-03
Payer: COMMERCIAL

## 2022-03-03 VITALS
HEART RATE: 80 BPM | DIASTOLIC BLOOD PRESSURE: 92 MMHG | SYSTOLIC BLOOD PRESSURE: 142 MMHG | BODY MASS INDEX: 38.37 KG/M2 | WEIGHT: 299 LBS | HEIGHT: 74 IN

## 2022-03-03 DIAGNOSIS — I25.10 CORONARY ARTERIOSCLEROSIS: ICD-10-CM

## 2022-03-03 DIAGNOSIS — Z01.810 PREOPERATIVE CARDIOVASCULAR EXAMINATION: ICD-10-CM

## 2022-03-03 DIAGNOSIS — E78.2 MIXED HYPERLIPIDEMIA: ICD-10-CM

## 2022-03-03 DIAGNOSIS — I10 ESSENTIAL HYPERTENSION: ICD-10-CM

## 2022-03-03 LAB
AORTIC ROOT: 3.6 CM
APICAL FOUR CHAMBER EJECTION FRACTION: 61 %
FRACTIONAL SHORTENING: 33 % (ref 28–44)
INTERVENTRICULAR SEPTUM IN DIASTOLE (PARASTERNAL SHORT AXIS VIEW): 1.5 CM
INTERVENTRICULAR SEPTUM: 1.5 CM (ref 0.6–1.13)
LEFT ATRIUM AREA SYSTOLE SINGLE PLANE A4C: 22 CM2
LEFT ATRIUM SIZE: 5.5 CM
LEFT INTERNAL DIMENSION IN SYSTOLE: 3.6 CM (ref 12.46–18.9)
LEFT VENTRICULAR INTERNAL DIMENSION IN DIASTOLE: 5.4 CM (ref 21.7–32.36)
LEFT VENTRICULAR POSTERIOR WALL IN END DIASTOLE: 1.5 CM (ref 0.59–1.11)
LEFT VENTRICULAR STROKE VOLUME: 86 ML
LVSV (TEICH): 86 ML
MV E'TISSUE VEL-SEP: 10 CM/S
RIGHT ATRIUM AREA SYSTOLE A4C: 20.7 CM2
RIGHT VENTRICLE ID DIMENSION: 4.4 CM
SL CV LV EF: 55
SL CV PED ECHO LEFT VENTRICLE DIASTOLIC VOLUME (MOD BIPLANE) 2D: 139 ML
SL CV PED ECHO LEFT VENTRICLE SYSTOLIC VOLUME (MOD BIPLANE) 2D: 53 ML
TR MAX PG: 16 MMHG
TR PEAK VELOCITY: 2 M/S
TRICUSPID VALVE PEAK REGURGITATION VELOCITY: 1.99 M/S
Z-SCORE OF INTERVENTRICULAR SEPTUM IN END DIASTOLE: 4.74
Z-SCORE OF LEFT VENTRICULAR DIMENSION IN END DIASTOLE: -15.91
Z-SCORE OF LEFT VENTRICULAR DIMENSION IN END SYSTOLE: -11.46
Z-SCORE OF LEFT VENTRICULAR POSTERIOR WALL IN END DIASTOLE: 4.85

## 2022-03-03 PROCEDURE — 93306 TTE W/DOPPLER COMPLETE: CPT | Performed by: INTERNAL MEDICINE

## 2022-03-03 PROCEDURE — 93226 XTRNL ECG REC<48 HR SCAN A/R: CPT

## 2022-03-03 PROCEDURE — 93225 XTRNL ECG REC<48 HRS REC: CPT

## 2022-03-03 PROCEDURE — 93306 TTE W/DOPPLER COMPLETE: CPT

## 2022-03-03 NOTE — TELEPHONE ENCOUNTER
This patient is being considered for a neuraxial injection for the management of their pain. However, the patient is currently on an anticoagulant per your orders. The [employer] Society [employer] Guideline on neuraxial procedures and anti-coagulation indicates that medication should be held as follows: Eliquis 3 days prior to injection.   Please advise if you ok the hold of this medication.  Thanks   Elena

## 2022-03-04 ENCOUNTER — OFFICE VISIT (OUTPATIENT)
Dept: PHYSICAL THERAPY | Facility: REHABILITATION | Age: 62
End: 2022-03-04
Payer: COMMERCIAL

## 2022-03-04 DIAGNOSIS — M54.41 CHRONIC LEFT-SIDED LOW BACK PAIN WITH BILATERAL SCIATICA: Primary | ICD-10-CM

## 2022-03-04 DIAGNOSIS — G89.29 CHRONIC LEFT-SIDED LOW BACK PAIN WITH BILATERAL SCIATICA: Primary | ICD-10-CM

## 2022-03-04 DIAGNOSIS — M54.42 CHRONIC LEFT-SIDED LOW BACK PAIN WITH BILATERAL SCIATICA: Primary | ICD-10-CM

## 2022-03-04 DIAGNOSIS — M54.16 LUMBAR RADICULOPATHY: ICD-10-CM

## 2022-03-04 DIAGNOSIS — G62.9 NEUROPATHY: ICD-10-CM

## 2022-03-04 PROCEDURE — 97112 NEUROMUSCULAR REEDUCATION: CPT

## 2022-03-04 PROCEDURE — 97140 MANUAL THERAPY 1/> REGIONS: CPT

## 2022-03-04 NOTE — PROGRESS NOTES
Daily Note     Today's date: 3/4/2022  Patient name: Randell Abdalla  : 1960  MRN: [id number]  Referring provider: Rory Caldwell  Dx:   Encounter Diagnosis     ICD-10-CM    1  Chronic left-sided low back pain with bilateral sciatica  M54 41     M54 42     G89 29    2  Lumbar radiculopathy  M54 16    3  Neuropathy  G62 9      Patient is being discharged today due to being inactive for >30 days  Start Time: 4495  Stop Time: 1315  Total time in clinic (min): 40 minutes    Subjective: Pt reports he had a lot of discomfort waiting for his appointment yesterday  States he was sitting for an hour and got strong back pain  Presents wearing heart monitor as he was diagnosed with Afib  Objective: See treatment diary below    Assessment: Tolerated treatment well  Challenged by box lifts today but able to complete 3 sets with good technique  Patient would benefit from continued PT in effort to further improve tolerance to longer durations of activity, with reduced frequency of symptoms  Plan: Continue per plan of care        Precautions: HTN, hyperlipidemia, hypothyroidism    Manuals  3/4 2/3 2/14 2/17 2/21             PA mobilizations  QD np QD lumbar STM  AFB QD QD np   thoracic mob  QD GrV T8 np QD GrV thoracic STM  AFB QD GrV t8 QD np   Assessment   QD  QD 5'     Neuro Re-Ed  3/4 2/3 2/14 2/17 2/21   TrA activation          Hip add ball squeeze          Hooklying TrA march          BKFO          Counter plank          Modified crunch          pallof press 10x ea sidestep 6lb 10x ea sidestep 6lb 15x ea 5lb       xs anti extension          Red Pball brace alt LE lift 20x5" 20x5" 20x5" 20x5" 20x5" 20x5" 20x5"   bridge 2x10 YHB 2x10 YHB 2x10 YHB 2x10 YHB 2x10 RHB 2x10 RHB 2x10 RHB   Rack pulls    nv      Hip abd @ wall with red pball          Hip hinge           Quadruped alt lifts          Dan walks    6 laps 15lb KB 4 laps 15lb KB 4 laps 15KB 4 laps 15KB   Goblet squat with KB 3x8 10lb 8in box 2x8 10lb 8in box Box lift 3x8 3x5 10lbs 3x10 10lb deadlift 1x10 HOLD 1x10 10lb kb  10" box   Lateral band walk          Single leg abd/flex/ext 12x ea YMB 2x8 ea YMB  Alt abd/flex/ext 2x10   b/l   Alt abd/flex/ext 3x10   b/l   TrA brace/lumbar rotation counter Med ball        10# med ball  1x8   HEP/education     10' thoracic mobility, lifting technique     Ther Ex 2/24 2/28 3/4 2/3 2/14 2/17 2/21   PPUs                                                                                Ther Activity                              Gait Training                              Modalities

## 2022-03-07 ENCOUNTER — OFFICE VISIT (OUTPATIENT)
Dept: FAMILY MEDICINE CLINIC | Facility: CLINIC | Age: 62
End: 2022-03-07
Payer: COMMERCIAL

## 2022-03-07 VITALS
HEIGHT: 74 IN | DIASTOLIC BLOOD PRESSURE: 84 MMHG | RESPIRATION RATE: 18 BRPM | HEART RATE: 82 BPM | OXYGEN SATURATION: 95 % | BODY MASS INDEX: 38.63 KG/M2 | WEIGHT: 301 LBS | SYSTOLIC BLOOD PRESSURE: 138 MMHG

## 2022-03-07 DIAGNOSIS — Z23 NEED FOR PNEUMOCOCCAL VACCINE: ICD-10-CM

## 2022-03-07 DIAGNOSIS — E03.9 HYPOTHYROIDISM, UNSPECIFIED TYPE: Chronic | ICD-10-CM

## 2022-03-07 DIAGNOSIS — G47.33 OBSTRUCTIVE SLEEP APNEA: Chronic | ICD-10-CM

## 2022-03-07 DIAGNOSIS — Z12.5 PROSTATE CANCER SCREENING: ICD-10-CM

## 2022-03-07 DIAGNOSIS — I10 ESSENTIAL HYPERTENSION: ICD-10-CM

## 2022-03-07 DIAGNOSIS — E79.0 HYPERURICEMIA: ICD-10-CM

## 2022-03-07 DIAGNOSIS — E66.09 CLASS 2 OBESITY DUE TO EXCESS CALORIES WITHOUT SERIOUS COMORBIDITY WITH BODY MASS INDEX (BMI) OF 38.0 TO 38.9 IN ADULT: ICD-10-CM

## 2022-03-07 DIAGNOSIS — D69.6 THROMBOCYTOPENIA (HCC): ICD-10-CM

## 2022-03-07 DIAGNOSIS — F51.01 PRIMARY INSOMNIA: Primary | ICD-10-CM

## 2022-03-07 DIAGNOSIS — F41.1 GENERALIZED ANXIETY DISORDER: ICD-10-CM

## 2022-03-07 DIAGNOSIS — E03.8 OTHER SPECIFIED HYPOTHYROIDISM: Primary | ICD-10-CM

## 2022-03-07 DIAGNOSIS — M51.16 LUMBAR DISC HERNIATION WITH RADICULOPATHY: ICD-10-CM

## 2022-03-07 DIAGNOSIS — E78.2 MIXED HYPERLIPIDEMIA: ICD-10-CM

## 2022-03-07 PROBLEM — R05.3 CHRONIC COUGH: Status: RESOLVED | Noted: 2020-05-13 | Resolved: 2022-03-07

## 2022-03-07 PROBLEM — R61 DIAPHORESIS: Status: RESOLVED | Noted: 2020-05-13 | Resolved: 2022-03-07

## 2022-03-07 PROBLEM — R60.0 SWELLING OF BOTH PAROTID GLANDS: Status: RESOLVED | Noted: 2021-01-15 | Resolved: 2022-03-07

## 2022-03-07 PROBLEM — E66.9 OBESITY (BMI 30-39.9): Status: RESOLVED | Noted: 2022-01-26 | Resolved: 2022-03-07

## 2022-03-07 PROCEDURE — 3008F BODY MASS INDEX DOCD: CPT | Performed by: FAMILY MEDICINE

## 2022-03-07 PROCEDURE — 90471 IMMUNIZATION ADMIN: CPT | Performed by: FAMILY MEDICINE

## 2022-03-07 PROCEDURE — 99215 OFFICE O/P EST HI 40 MIN: CPT | Performed by: FAMILY MEDICINE

## 2022-03-07 PROCEDURE — 90670 PCV13 VACCINE IM: CPT | Performed by: FAMILY MEDICINE

## 2022-03-07 PROCEDURE — 1036F TOBACCO NON-USER: CPT | Performed by: FAMILY MEDICINE

## 2022-03-07 PROCEDURE — 3725F SCREEN DEPRESSION PERFORMED: CPT | Performed by: FAMILY MEDICINE

## 2022-03-07 PROCEDURE — 3079F DIAST BP 80-89 MM HG: CPT | Performed by: FAMILY MEDICINE

## 2022-03-07 PROCEDURE — 3075F SYST BP GE 130 - 139MM HG: CPT | Performed by: FAMILY MEDICINE

## 2022-03-07 RX ORDER — LEVOTHYROXINE SODIUM 25 MCG
25 TABLET ORAL DAILY
Qty: 90 TABLET | Refills: 1 | Status: SHIPPED | OUTPATIENT
Start: 2022-03-07 | End: 2022-03-14 | Stop reason: SDUPTHER

## 2022-03-07 NOTE — ASSESSMENT & PLAN NOTE
Patient needs to be more compliant with using BiPAP    I did explain to the patient that untreated obstructive sleep apnea is a significant risk factor for atrial fibrillation and so is obesity
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
## 2022-03-07 NOTE — ASSESSMENT & PLAN NOTE
Hypothyroidism remains well controlled on current dose Synthroid 25 mcg once daily  Refill provided    Repeat thyroid function studies in 6 months

## 2022-03-07 NOTE — ASSESSMENT & PLAN NOTE
Patient has undergone bariatric surgery  Difficulty exercising due to lower back pain    Continue to reduce dietary intake of calories

## 2022-03-07 NOTE — ASSESSMENT & PLAN NOTE
Patient follows with pain management as well as neuro surgery  I declined to provide refill of Zanaflex    I would like for the patient to have his pain medications and antispasmodics managed by pain management physician

## 2022-03-07 NOTE — ASSESSMENT & PLAN NOTE
Patient remains on Cymbalta 30 mg once daily  Patient still is unemployed  That took him some time to get use to  Feels that he is in a better place mentally    Continue same dose of Cymbalta 30 mg once daily

## 2022-03-07 NOTE — ASSESSMENT & PLAN NOTE
No recent gouty flares  Patient remains on allopurinol 100 mg once daily    Repeat uric acid level ordered

## 2022-03-08 RX ORDER — TRAZODONE HYDROCHLORIDE 100 MG/1
TABLET ORAL
Qty: 180 TABLET | Refills: 1 | Status: SHIPPED | OUTPATIENT
Start: 2022-03-08 | End: 2022-03-08 | Stop reason: SDUPTHER

## 2022-03-14 ENCOUNTER — TELEPHONE (OUTPATIENT)
Dept: FAMILY MEDICINE CLINIC | Facility: CLINIC | Age: 62
End: 2022-03-14

## 2022-03-14 NOTE — TELEPHONE ENCOUNTER
Patient called to request to change to the generic of Synthroid  He stated his insurance company will not cover the brand Synthroid as prescribed

## 2022-03-16 PROCEDURE — 93227 XTRNL ECG REC<48 HR R&I: CPT | Performed by: INTERNAL MEDICINE

## 2022-03-25 ENCOUNTER — HOSPITAL ENCOUNTER (OUTPATIENT)
Dept: RADIOLOGY | Facility: CLINIC | Age: 62
Discharge: HOME | End: 2022-03-25
Attending: ANESTHESIOLOGY | Admitting: ANESTHESIOLOGY
Payer: COMMERCIAL

## 2022-03-25 VITALS
SYSTOLIC BLOOD PRESSURE: 157 MMHG | DIASTOLIC BLOOD PRESSURE: 95 MMHG | RESPIRATION RATE: 18 BRPM | HEART RATE: 75 BPM | OXYGEN SATURATION: 95 % | TEMPERATURE: 97.7 F

## 2022-03-25 DIAGNOSIS — M54.16 LUMBAR RADICULOPATHY: ICD-10-CM

## 2022-03-25 DIAGNOSIS — M96.1 POSTLAMINECTOMY SYNDROME, LUMBAR: ICD-10-CM

## 2022-03-25 PROCEDURE — 64483 NJX AA&/STRD TFRM EPI L/S 1: CPT | Performed by: ANESTHESIOLOGY

## 2022-03-25 RX ORDER — BUPIVACAINE HCL/PF 2.5 MG/ML
2 VIAL (ML) INJECTION ONCE
Status: COMPLETED | OUTPATIENT
Start: 2022-03-25 | End: 2022-03-25

## 2022-03-25 RX ORDER — 0.9 % SODIUM CHLORIDE 0.9 %
4 VIAL (ML) INJECTION ONCE
Status: COMPLETED | OUTPATIENT
Start: 2022-03-25 | End: 2022-03-25

## 2022-03-25 RX ORDER — METHYLPREDNISOLONE ACETATE 80 MG/ML
80 INJECTION, SUSPENSION INTRA-ARTICULAR; INTRALESIONAL; INTRAMUSCULAR; PARENTERAL; SOFT TISSUE ONCE
Status: COMPLETED | OUTPATIENT
Start: 2022-03-25 | End: 2022-03-25

## 2022-03-25 RX ADMIN — Medication 4 ML: at 13:14

## 2022-03-25 RX ADMIN — BUPIVACAINE HYDROCHLORIDE 2 ML: 2.5 INJECTION, SOLUTION EPIDURAL; INFILTRATION; INTRACAUDAL at 13:15

## 2022-03-25 RX ADMIN — IOHEXOL 1 ML: 300 INJECTION, SOLUTION INTRAVENOUS at 13:15

## 2022-03-25 RX ADMIN — METHYLPREDNISOLONE ACETATE 80 MG: 80 INJECTION, SUSPENSION INTRA-ARTICULAR; INTRALESIONAL; INTRAMUSCULAR; PARENTERAL; SOFT TISSUE at 13:15

## 2022-03-25 NOTE — DISCHARGE INSTR - LAB
Epidural Steroid Injection   WHAT YOU NEED TO KNOW:   An epidural steroid injection (ALEXANDRA) is a procedure to inject steroid medicine into the epidural space  The epidural space is between your spinal cord and vertebrae  Steroids reduce inflammation and fluid buildup in your spine that may be causing pain  You may be given pain medicine along with the steroids  ACTIVITY  Do not drive or operate machinery today  No strenuous activity today - bending, lifting, etc   You may resume normal activites starting tomorrow - start slowly and as tolerated  You may shower today, but no tub baths or hot tubs  You may have numbness for several hours from the local anesthetic  Please use caution and common sense, especially with weight-bearing activities  CARE OF THE INJECTION SITE  If you have soreness or pain, apply ice to the area today (20 minutes on/20 minutes off)  Starting tomorrow, you may use warm, moist heat or ice if needed  You may have an increase or change in your discomfort for 36-48 hours after your treatment  Apply ice and continue with any pain medication you have been prescribed  Notify the [facility] if you have any of the following: redness, drainage, swelling, headache, stiff neck or fever above 100°F     SPECIAL INSTRUCTIONS  Our office will contact you in approximately 7 days for a progress report  MEDICATIONS  Continue to take all routine medications  Our office may have instructed you to hold some medications  As no general anesthesia was used in today's procedure, you should not experience any side effects related to anesthesia  If you have a problem specifically related to your procedure, please call our office at [phone]  Problems not related to your procedure should be directed to your primary care physician
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
## 2022-04-01 ENCOUNTER — TELEPHONE (OUTPATIENT)
Dept: PAIN MEDICINE | Facility: CLINIC | Age: 62
End: 2022-04-01

## 2022-04-01 ENCOUNTER — TELEPHONE (OUTPATIENT)
Dept: FAMILY MEDICINE CLINIC | Facility: CLINIC | Age: 62
End: 2022-04-01

## 2022-04-01 NOTE — TELEPHONE ENCOUNTER
Patient called to report as requested by  [facility] during an assessment on Tuesday for social security disability that has blood pressure was 160/90  He stated it has been well controlled and he feels fine  He believes it may have just been white coat syndrome

## 2022-04-05 NOTE — TELEPHONE ENCOUNTER
Pt reports slight improvement post inj   Pain level 4/10  Pt aware I will call next week for an update

## 2022-04-23 DIAGNOSIS — F41.1 GENERALIZED ANXIETY DISORDER: ICD-10-CM

## 2022-04-23 DIAGNOSIS — Z56.6 WORK-RELATED STRESS: ICD-10-CM

## 2022-04-23 SDOH — HEALTH STABILITY - MENTAL HEALTH: OTHER PHYSICAL AND MENTAL STRAIN RELATED TO WORK: Z56.6

## 2022-04-25 RX ORDER — CLONAZEPAM 0.5 MG/1
TABLET ORAL
Qty: 30 TABLET | Refills: 0 | Status: SHIPPED | OUTPATIENT
Start: 2022-04-25 | End: 2022-06-07
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
## 2022-05-16 ENCOUNTER — OFFICE VISIT (OUTPATIENT)
Dept: CARDIOLOGY CLINIC | Facility: CLINIC | Age: 62
End: 2022-05-16
Payer: COMMERCIAL

## 2022-05-16 VITALS
BODY MASS INDEX: 37.04 KG/M2 | HEART RATE: 66 BPM | WEIGHT: 288.6 LBS | SYSTOLIC BLOOD PRESSURE: 130 MMHG | HEIGHT: 74 IN | DIASTOLIC BLOOD PRESSURE: 90 MMHG | OXYGEN SATURATION: 94 %

## 2022-05-16 DIAGNOSIS — Z01.810 PREOPERATIVE CARDIOVASCULAR EXAMINATION: ICD-10-CM

## 2022-05-16 DIAGNOSIS — E78.2 MIXED HYPERLIPIDEMIA: ICD-10-CM

## 2022-05-16 DIAGNOSIS — I10 ESSENTIAL HYPERTENSION: Primary | ICD-10-CM

## 2022-05-16 DIAGNOSIS — G47.33 OBSTRUCTIVE SLEEP APNEA: ICD-10-CM

## 2022-05-16 DIAGNOSIS — I25.10 CORONARY ARTERIOSCLEROSIS: ICD-10-CM

## 2022-05-16 PROCEDURE — 99214 OFFICE O/P EST MOD 30 MIN: CPT | Performed by: INTERNAL MEDICINE

## 2022-05-16 PROCEDURE — 3080F DIAST BP >= 90 MM HG: CPT | Performed by: INTERNAL MEDICINE

## 2022-05-16 PROCEDURE — 1036F TOBACCO NON-USER: CPT | Performed by: INTERNAL MEDICINE

## 2022-05-16 PROCEDURE — 3008F BODY MASS INDEX DOCD: CPT | Performed by: INTERNAL MEDICINE

## 2022-05-16 PROCEDURE — 3075F SYST BP GE 130 - 139MM HG: CPT | Performed by: INTERNAL MEDICINE

## 2022-06-01 ENCOUNTER — TELEPHONE (OUTPATIENT)
Dept: PAIN MEDICINE | Facility: CLINIC | Age: 62
End: 2022-06-01

## 2022-06-01 NOTE — TELEPHONE ENCOUNTER
Pt called in to see when can he get another injection  Please be advised thank you    Pt can be reached @ [phone]

## 2022-06-01 NOTE — TELEPHONE ENCOUNTER
S/w the patient and reviewed with the patient that he did receive positive relief of  about 80% relief with the previous L5 TFESI done on 3/25/22  He stated his symptoms started returning a couple of weeks ago and it is now affecting his ADL's  He is requesting another injection  He also continues on Eliquis and a new consent to hold will be faxed to Dr Jay Jay Calderon to review  Reviewed process of obtaining hold and FQ to review and advise accordingly  Patient appreciated the CB

## 2022-06-02 NOTE — TELEPHONE ENCOUNTER
This patient is being considered for a neuraxial injection for the management of their pain  However, the patient is currently on an anticoagulant per your orders  The American Society of Regional Anest[employer] Guideline on neuraxial procedures and anti-coagulation indicates that medication should be held as follows: Eliquis 3 days prior to injection  Please advise if you ok the hold of this medication    Thanks

## 2022-06-03 ENCOUNTER — TELEPHONE (OUTPATIENT)
Dept: CARDIOLOGY CLINIC | Facility: CLINIC | Age: 62
End: 2022-06-03

## 2022-06-03 NOTE — TELEPHONE ENCOUNTER
OK to hold three days prior to injection per Dr Kinjal Cheney, called pt and advised, pt verbally understood,  Faxed [facility] at [phone]

## 2022-06-06 DIAGNOSIS — F41.1 GENERALIZED ANXIETY DISORDER: ICD-10-CM

## 2022-06-06 DIAGNOSIS — Z56.6 WORK-RELATED STRESS: ICD-10-CM

## 2022-06-06 SDOH — HEALTH STABILITY - MENTAL HEALTH: OTHER PHYSICAL AND MENTAL STRAIN RELATED TO WORK: Z56.6

## 2022-06-07 RX ORDER — CLONAZEPAM 0.5 MG/1
TABLET ORAL
Qty: 30 TABLET | Refills: 1 | Status: SHIPPED | OUTPATIENT
Start: 2022-06-07

## 2022-06-16 ENCOUNTER — TELEPHONE (OUTPATIENT)
Dept: FAMILY MEDICINE CLINIC | Facility: CLINIC | Age: 62
End: 2022-06-16

## 2022-06-16 NOTE — TELEPHONE ENCOUNTER
Pt's wife called and stated that Creig Halsted has been dizzy for several hours and his blood pressure is 142/93 and 146/101  He is having some issues walking and just doesn't feel right  I advised her to take him to the ER to be evaluated

## 2022-06-17 ENCOUNTER — HOSPITAL ENCOUNTER (EMERGENCY)
Facility: HOSPITAL | Age: 62
Discharge: HOME | End: 2022-06-17
Attending: EMERGENCY MEDICINE
Payer: COMMERCIAL

## 2022-06-17 VITALS
TEMPERATURE: 97.6 F | SYSTOLIC BLOOD PRESSURE: 167 MMHG | HEART RATE: 64 BPM | DIASTOLIC BLOOD PRESSURE: 100 MMHG | RESPIRATION RATE: 18 BRPM | OXYGEN SATURATION: 98 %

## 2022-06-17 DIAGNOSIS — H81.399 PERIPHERAL POSITIONAL VERTIGO: Primary | ICD-10-CM

## 2022-06-17 LAB
ANION GAP SERPL CALCULATED.3IONS-SCNC: 7 MMOL/L (ref 4–13)
ATRIAL RATE: 441 BPM
BASOPHILS # BLD AUTO: 0.08 THOUSANDS/ΜL (ref 0–0.1)
BASOPHILS NFR BLD AUTO: 1 % (ref 0–1)
BUN SERPL-MCNC: 14 MG/DL (ref 5–25)
CALCIUM SERPL-MCNC: 9.6 MG/DL (ref 8.4–10.2)
CHLORIDE SERPL-SCNC: 103 MMOL/L (ref 96–108)
CO2 SERPL-SCNC: 29 MMOL/L (ref 21–32)
CREAT SERPL-MCNC: 1.07 MG/DL (ref 0.6–1.3)
EOSINOPHIL # BLD AUTO: 2.28 THOUSAND/ΜL (ref 0–0.61)
EOSINOPHIL NFR BLD AUTO: 23 % (ref 0–6)
ERYTHROCYTE [DISTWIDTH] IN BLOOD BY AUTOMATED COUNT: 14.1 % (ref 11.6–15.1)
GFR SERPL CREATININE-BSD FRML MDRD: 73 ML/MIN/1.73SQ M
GLUCOSE SERPL-MCNC: 86 MG/DL (ref 65–140)
HCT VFR BLD AUTO: 48.2 % (ref 36.5–49.3)
HGB BLD-MCNC: 16 G/DL (ref 12–17)
IMM GRANULOCYTES # BLD AUTO: 0.02 THOUSAND/UL (ref 0–0.2)
IMM GRANULOCYTES NFR BLD AUTO: 0 % (ref 0–2)
LYMPHOCYTES # BLD AUTO: 2.3 THOUSANDS/ΜL (ref 0.6–4.47)
LYMPHOCYTES NFR BLD AUTO: 24 % (ref 14–44)
MAGNESIUM SERPL-MCNC: 2.1 MG/DL (ref 1.9–2.7)
MCH RBC QN AUTO: 32.7 PG (ref 26.8–34.3)
MCHC RBC AUTO-ENTMCNC: 33.2 G/DL (ref 31.4–37.4)
MCV RBC AUTO: 99 FL (ref 82–98)
MONOCYTES # BLD AUTO: 0.85 THOUSAND/ΜL (ref 0.17–1.22)
MONOCYTES NFR BLD AUTO: 9 % (ref 4–12)
NEUTROPHILS # BLD AUTO: 4.23 THOUSANDS/ΜL (ref 1.85–7.62)
NEUTS SEG NFR BLD AUTO: 43 % (ref 43–75)
NRBC BLD AUTO-RTO: 0 /100 WBCS
PLATELET # BLD AUTO: 111 THOUSANDS/UL (ref 149–390)
PMV BLD AUTO: 14.1 FL (ref 8.9–12.7)
POTASSIUM SERPL-SCNC: 4.2 MMOL/L (ref 3.5–5.3)
QRS AXIS: -56 DEGREES
QRSD INTERVAL: 156 MS
QT INTERVAL: 440 MS
QTC INTERVAL: 450 MS
RBC # BLD AUTO: 4.89 MILLION/UL (ref 3.88–5.62)
SODIUM SERPL-SCNC: 139 MMOL/L (ref 135–147)
T WAVE AXIS: 17 DEGREES
VENTRICULAR RATE: 63 BPM
WBC # BLD AUTO: 9.76 THOUSAND/UL (ref 4.31–10.16)

## 2022-06-17 PROCEDURE — 93005 ELECTROCARDIOGRAM TRACING: CPT

## 2022-06-17 PROCEDURE — 85025 COMPLETE CBC W/AUTO DIFF WBC: CPT | Performed by: EMERGENCY MEDICINE

## 2022-06-17 PROCEDURE — 83735 ASSAY OF MAGNESIUM: CPT | Performed by: EMERGENCY MEDICINE

## 2022-06-17 PROCEDURE — 99285 EMERGENCY DEPT VISIT HI MDM: CPT | Performed by: EMERGENCY MEDICINE

## 2022-06-17 PROCEDURE — 36415 COLL VENOUS BLD VENIPUNCTURE: CPT | Performed by: EMERGENCY MEDICINE

## 2022-06-17 PROCEDURE — 80048 BASIC METABOLIC PNL TOTAL CA: CPT | Performed by: EMERGENCY MEDICINE

## 2022-06-17 PROCEDURE — 99284 EMERGENCY DEPT VISIT MOD MDM: CPT

## 2022-06-17 PROCEDURE — 93010 ELECTROCARDIOGRAM REPORT: CPT | Performed by: INTERNAL MEDICINE

## 2022-06-17 RX ORDER — PREDNISONE 20 MG/1
60 TABLET ORAL ONCE
Status: COMPLETED | OUTPATIENT
Start: 2022-06-17 | End: 2022-06-17

## 2022-06-17 RX ORDER — PREDNISONE 20 MG/1
TABLET ORAL
Qty: 12 TABLET | Refills: 0 | Status: SHIPPED | OUTPATIENT
Start: 2022-06-17 | End: 2022-07-05 | Stop reason: ALTCHOICE

## 2022-06-17 RX ORDER — MECLIZINE HCL 12.5 MG/1
25 TABLET ORAL ONCE
Status: COMPLETED | OUTPATIENT
Start: 2022-06-17 | End: 2022-06-17

## 2022-06-17 RX ADMIN — MECLIZINE 25 MG: 12.5 TABLET ORAL at 16:38

## 2022-06-17 RX ADMIN — PREDNISONE 60 MG: 20 TABLET ORAL at 16:38

## 2022-06-17 NOTE — TELEPHONE ENCOUNTER
Patient called back and he did not go to the ER as advised yesterday  He stated that he is still having dizziness, does not feel right, incontinence  He checked his BP and the reading was 189/139  I advised him that he needs to go to the ER to be evaluated  He stated that he would go
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
## 2022-06-19 DIAGNOSIS — R05.8 ALLERGIC COUGH: ICD-10-CM

## 2022-06-20 RX ORDER — ALBUTEROL SULFATE 90 UG/1
AEROSOL, METERED RESPIRATORY (INHALATION)
Qty: 26.8 G | Refills: 3 | Status: SHIPPED | OUTPATIENT
Start: 2022-06-20 | End: 2022-06-22

## 2022-06-22 DIAGNOSIS — R05.8 ALLERGIC COUGH: ICD-10-CM

## 2022-06-22 RX ORDER — ALBUTEROL SULFATE 90 UG/1
AEROSOL, METERED RESPIRATORY (INHALATION)
Qty: 26.8 G | Refills: 3 | Status: SHIPPED | OUTPATIENT
Start: 2022-06-22

## 2022-07-05 ENCOUNTER — HOSPITAL ENCOUNTER (OUTPATIENT)
Dept: RADIOLOGY | Facility: CLINIC | Age: 62
Discharge: HOME | End: 2022-07-05
Admitting: ANESTHESIOLOGY
Payer: COMMERCIAL

## 2022-07-05 VITALS
TEMPERATURE: 97.2 F | HEART RATE: 80 BPM | OXYGEN SATURATION: 97 % | SYSTOLIC BLOOD PRESSURE: 135 MMHG | RESPIRATION RATE: 20 BRPM | DIASTOLIC BLOOD PRESSURE: 92 MMHG

## 2022-07-05 DIAGNOSIS — M51.16 LUMBAR DISC DISEASE WITH RADICULOPATHY: ICD-10-CM

## 2022-07-05 PROCEDURE — A9585 GADOBUTROL INJECTION: HCPCS | Performed by: ANESTHESIOLOGY

## 2022-07-05 PROCEDURE — 64483 NJX AA&/STRD TFRM EPI L/S 1: CPT | Performed by: ANESTHESIOLOGY

## 2022-07-05 PROCEDURE — 64484 NJX AA&/STRD TFRM EPI L/S EA: CPT | Performed by: ANESTHESIOLOGY

## 2022-07-05 RX ORDER — METHYLPREDNISOLONE ACETATE 80 MG/ML
80 INJECTION, SUSPENSION INTRA-ARTICULAR; INTRALESIONAL; INTRAMUSCULAR; PARENTERAL; SOFT TISSUE ONCE
Status: COMPLETED | OUTPATIENT
Start: 2022-07-05 | End: 2022-07-05

## 2022-07-05 RX ORDER — BUPIVACAINE HCL/PF 2.5 MG/ML
2 VIAL (ML) INJECTION ONCE
Status: COMPLETED | OUTPATIENT
Start: 2022-07-05 | End: 2022-07-05

## 2022-07-05 RX ORDER — 0.9 % SODIUM CHLORIDE 0.9 %
4 VIAL (ML) INJECTION ONCE
Status: COMPLETED | OUTPATIENT
Start: 2022-07-05 | End: 2022-07-05

## 2022-07-05 RX ADMIN — BUPIVACAINE HYDROCHLORIDE 2 ML: 2.5 INJECTION, SOLUTION EPIDURAL; INFILTRATION; INTRACAUDAL at 13:58

## 2022-07-05 RX ADMIN — GADOBUTROL 1 ML: 604.72 INJECTION INTRAVENOUS at 13:58

## 2022-07-05 RX ADMIN — Medication 4 ML: at 13:56

## 2022-07-05 RX ADMIN — METHYLPREDNISOLONE ACETATE 80 MG: 80 INJECTION, SUSPENSION INTRA-ARTICULAR; INTRALESIONAL; INTRAMUSCULAR; PARENTERAL; SOFT TISSUE at 13:58
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
## 2022-07-05 NOTE — DISCHARGE INSTR - LAB
Epidural Steroid Injection   WHAT YOU NEED TO KNOW:   An epidural steroid injection (ALEXANDRA) is a procedure to inject steroid medicine into the epidural space  The epidural space is between your spinal cord and vertebrae  Steroids reduce inflammation and fluid buildup in your spine that may be causing pain  You may be given pain medicine along with the steroids  ACTIVITY  Do not drive or operate machinery today  No strenuous activity today - bending, lifting, etc   You may resume normal activites starting tomorrow - start slowly and as tolerated  You may shower today, but no tub baths or hot tubs  You may have numbness for several hours from the local anesthetic  Please use caution and common sense, especially with weight-bearing activities  CARE OF THE INJECTION SITE  If you have soreness or pain, apply ice to the area today (20 minutes on/20 minutes off)  Starting tomorrow, you may use warm, moist heat or ice if needed  You may have an increase or change in your discomfort for 36-48 hours after your treatment  Apply ice and continue with any pain medication you have been prescribed  Notify the [facility] if you have any of the following: redness, drainage, swelling, headache, stiff neck or fever above 100°F     SPECIAL INSTRUCTIONS  Our office will contact you in approximately 7 days for a progress report  MEDICATIONS  Continue to take all routine medications  Our office may have instructed you to hold some medications  As no general anesthesia was used in today's procedure, you should not experience any side effects related to anesthesia  If you have a problem specifically related to your procedure, please call our office at [phone]  Problems not related to your procedure should be directed to your primary care physician

## 2022-07-12 ENCOUNTER — TELEPHONE (OUTPATIENT)
Dept: PAIN MEDICINE | Facility: CLINIC | Age: 62
End: 2022-07-12

## 2022-07-15 ENCOUNTER — OFFICE VISIT (OUTPATIENT)
Dept: FAMILY MEDICINE CLINIC | Facility: CLINIC | Age: 62
End: 2022-07-15
Payer: COMMERCIAL

## 2022-07-15 VITALS
HEART RATE: 74 BPM | OXYGEN SATURATION: 96 % | DIASTOLIC BLOOD PRESSURE: 80 MMHG | TEMPERATURE: 98.3 F | HEIGHT: 74 IN | WEIGHT: 295 LBS | RESPIRATION RATE: 16 BRPM | BODY MASS INDEX: 37.86 KG/M2 | SYSTOLIC BLOOD PRESSURE: 124 MMHG

## 2022-07-15 DIAGNOSIS — R59.0 CERVICAL ADENOPATHY: Primary | ICD-10-CM

## 2022-07-15 DIAGNOSIS — H92.02 LEFT EAR PAIN: ICD-10-CM

## 2022-07-15 DIAGNOSIS — H65.02 NON-RECURRENT ACUTE SEROUS OTITIS MEDIA OF LEFT EAR: ICD-10-CM

## 2022-07-15 DIAGNOSIS — H92.01 RIGHT EAR PAIN: ICD-10-CM

## 2022-07-15 PROCEDURE — 99213 OFFICE O/P EST LOW 20 MIN: CPT | Performed by: FAMILY MEDICINE

## 2022-07-15 RX ORDER — AZITHROMYCIN 250 MG/1
TABLET, FILM COATED ORAL
Qty: 6 TABLET | Refills: 0 | Status: SHIPPED | OUTPATIENT
Start: 2022-07-15 | End: 2022-07-19

## 2022-07-15 NOTE — PATIENT INSTRUCTIONS
Start taking the Z-Ashok that was ordered for you today  Two tablets today and 1 tablet for the rest of the following 4 days  If you start to have increasing swelling or if it is not going away after medication has been completed please return to see Dr Desai Leader in the office  You may use some heat or ice to the area  Do not ice for more than 20 minutes at a time and per hour  You may use some Tylenol to help with pain and discomfort
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
## 2022-07-19 ENCOUNTER — TELEPHONE (OUTPATIENT)
Dept: FAMILY MEDICINE CLINIC | Facility: CLINIC | Age: 62
End: 2022-07-19

## 2022-07-19 NOTE — TELEPHONE ENCOUNTER
Patient came in on July 15, 2022 for sinus issue  Started his antibiotics on Saturday morning  Monday morning he noticed his right eye was swollen, red and experiencing pressure  He wanted to know what he should do

## 2022-07-21 ENCOUNTER — TELEPHONE (OUTPATIENT)
Dept: FAMILY MEDICINE CLINIC | Facility: CLINIC | Age: 62
End: 2022-07-21

## 2022-07-21 DIAGNOSIS — M10.071 ACUTE IDIOPATHIC GOUT INVOLVING TOE OF RIGHT FOOT: ICD-10-CM

## 2022-07-21 RX ORDER — ALLOPURINOL 100 MG/1
TABLET ORAL
Qty: 90 TABLET | Refills: 0 | Status: SHIPPED | OUTPATIENT
Start: 2022-07-21 | End: 2022-10-20

## 2022-07-21 NOTE — TELEPHONE ENCOUNTER
Patient is noticed blood while urinating  He said it started this morning and has noticed spotting on his pants throughout the day  The first few drops when he urinates appears to be blood  No pain associated with this

## 2022-07-25 ENCOUNTER — OFFICE VISIT (OUTPATIENT)
Dept: FAMILY MEDICINE CLINIC | Facility: CLINIC | Age: 62
End: 2022-07-25
Payer: COMMERCIAL

## 2022-07-25 ENCOUNTER — TELEPHONE (OUTPATIENT)
Dept: UROLOGY | Facility: AMBULATORY SURGERY CENTER | Age: 62
End: 2022-07-25

## 2022-07-25 VITALS
DIASTOLIC BLOOD PRESSURE: 80 MMHG | BODY MASS INDEX: 37.09 KG/M2 | TEMPERATURE: 97.7 F | WEIGHT: 289 LBS | OXYGEN SATURATION: 98 % | HEART RATE: 81 BPM | HEIGHT: 74 IN | SYSTOLIC BLOOD PRESSURE: 122 MMHG

## 2022-07-25 DIAGNOSIS — H10.31 ACUTE CONJUNCTIVITIS OF RIGHT EYE, UNSPECIFIED ACUTE CONJUNCTIVITIS TYPE: ICD-10-CM

## 2022-07-25 DIAGNOSIS — R35.0 URINARY FREQUENCY: ICD-10-CM

## 2022-07-25 DIAGNOSIS — H18.891 CORNEAL IRRITATION OF RIGHT EYE: ICD-10-CM

## 2022-07-25 DIAGNOSIS — R31.0 GROSS HEMATURIA: ICD-10-CM

## 2022-07-25 DIAGNOSIS — R39.15 URINARY URGENCY: Primary | ICD-10-CM

## 2022-07-25 DIAGNOSIS — H57.9 EYE PRESSURE: ICD-10-CM

## 2022-07-25 LAB
SL AMB  POCT GLUCOSE, UA: NORMAL
SL AMB LEUKOCYTE ESTERASE,UA: 75
SL AMB POCT BILIRUBIN,UA: ABNORMAL
SL AMB POCT BLOOD,UA: ABNORMAL
SL AMB POCT CLARITY,UA: CLEAR
SL AMB POCT COLOR,UA: YELLOW
SL AMB POCT KETONES,UA: 15
SL AMB POCT NITRITE,UA: ABNORMAL
SL AMB POCT PH,UA: 6
SL AMB POCT SPECIFIC GRAVITY,UA: 1.02
SL AMB POCT URINE PROTEIN: ABNORMAL
SL AMB POCT UROBILINOGEN: 1

## 2022-07-25 PROCEDURE — 99213 OFFICE O/P EST LOW 20 MIN: CPT | Performed by: FAMILY MEDICINE

## 2022-07-25 PROCEDURE — 81003 URINALYSIS AUTO W/O SCOPE: CPT | Performed by: FAMILY MEDICINE

## 2022-07-25 PROCEDURE — 3079F DIAST BP 80-89 MM HG: CPT | Performed by: FAMILY MEDICINE

## 2022-07-25 PROCEDURE — 3074F SYST BP LT 130 MM HG: CPT | Performed by: FAMILY MEDICINE

## 2022-07-25 RX ORDER — PSEUDOEPHEDRINE HCL 120 MG/1
120 TABLET, FILM COATED, EXTENDED RELEASE ORAL 2 TIMES DAILY
COMMUNITY

## 2022-07-25 RX ORDER — LEVOFLOXACIN 500 MG/1
500 TABLET, FILM COATED ORAL EVERY 24 HOURS
Qty: 7 TABLET | Refills: 0 | Status: SHIPPED | OUTPATIENT
Start: 2022-07-25 | End: 2022-08-01

## 2022-07-25 NOTE — TELEPHONE ENCOUNTER
New Patient    What is the reason for the patients appointment? Patient has been having blood coming out of the tip of his penis for a week and sensitive after urinating   Patient has been having increased with urgency and frequency  What office location does the patient prefer? [facility]    Imaging/Lab Results: Urine dip and culture pending     Do we accept the patient's insurance or is the patient Self-Pay? Yes, [employer] Provider:  Plan Type/Number:  Member ID#: Has the patient had any previous Urologist(s)? No    Have patient records been requested? If not are records showing in Epic:     Has the patient had any outside testing done? Just urine dip and culture    Does the patient have a personal history of cancer? No    Please review appropriate time frame since there are no upcoming appointments      Patient can be reached at [phone]
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
## 2022-07-31 PROBLEM — R30.0 DYSURIA: Status: ACTIVE | Noted: 2022-07-31

## 2022-08-01 NOTE — TELEPHONE ENCOUNTER
Patient calling to reschedule today's appointment with Dr Addy Lema in [place]  Patient stated his wife tested positive for covid  Patient last saw blood coming out of the tip of his penis on Friday evening  Please review appropriate reschedule time frame since there are no upcoming appts      Patient can be reached at [phone]

## 2022-08-01 NOTE — TELEPHONE ENCOUNTER
CALLED PT LM TO CALL BACK AND SCHEDULE  PER NURSES HE MUST WAIT 10 DAYS TO SCHEDULE IN CASE HE GETS THE COVID FROM HIS WIFE

## 2022-08-09 ENCOUNTER — OFFICE VISIT (OUTPATIENT)
Dept: UROLOGY | Facility: CLINIC | Age: 62
End: 2022-08-09
Payer: COMMERCIAL

## 2022-08-09 VITALS
DIASTOLIC BLOOD PRESSURE: 64 MMHG | BODY MASS INDEX: 38.04 KG/M2 | WEIGHT: 296.4 LBS | HEIGHT: 74 IN | SYSTOLIC BLOOD PRESSURE: 132 MMHG | HEART RATE: 68 BPM

## 2022-08-09 DIAGNOSIS — R31.0 GROSS HEMATURIA: Primary | ICD-10-CM

## 2022-08-09 DIAGNOSIS — R35.0 URINARY FREQUENCY: ICD-10-CM

## 2022-08-09 DIAGNOSIS — R39.15 URINARY URGENCY: ICD-10-CM

## 2022-08-09 LAB
POST-VOID RESIDUAL VOLUME, ML POC: 17 ML
SL AMB  POCT GLUCOSE, UA: NORMAL
SL AMB LEUKOCYTE ESTERASE,UA: NORMAL
SL AMB POCT BILIRUBIN,UA: NORMAL
SL AMB POCT BLOOD,UA: NORMAL
SL AMB POCT CLARITY,UA: YELLOW
SL AMB POCT COLOR,UA: CLEAR
SL AMB POCT KETONES,UA: NORMAL
SL AMB POCT NITRITE,UA: NORMAL
SL AMB POCT PH,UA: 5
SL AMB POCT SPECIFIC GRAVITY,UA: 1.02
SL AMB POCT URINE PROTEIN: NORMAL
SL AMB POCT UROBILINOGEN: 0.2

## 2022-08-09 PROCEDURE — 3078F DIAST BP <80 MM HG: CPT | Performed by: PHYSICIAN ASSISTANT

## 2022-08-09 PROCEDURE — 81002 URINALYSIS NONAUTO W/O SCOPE: CPT | Performed by: PHYSICIAN ASSISTANT

## 2022-08-09 PROCEDURE — 99203 OFFICE O/P NEW LOW 30 MIN: CPT | Performed by: PHYSICIAN ASSISTANT

## 2022-08-09 PROCEDURE — 3075F SYST BP GE 130 - 139MM HG: CPT | Performed by: PHYSICIAN ASSISTANT

## 2022-08-09 PROCEDURE — 51798 US URINE CAPACITY MEASURE: CPT | Performed by: PHYSICIAN ASSISTANT

## 2022-08-09 RX ORDER — ALFUZOSIN HYDROCHLORIDE 10 MG/1
10 TABLET, EXTENDED RELEASE ORAL DAILY
Qty: 90 TABLET | Refills: 3 | Status: SHIPPED | OUTPATIENT
Start: 2022-08-09
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
## 2022-08-13 DIAGNOSIS — F51.01 PRIMARY INSOMNIA: ICD-10-CM

## 2022-08-13 RX ORDER — TRAZODONE HYDROCHLORIDE 100 MG/1
TABLET ORAL
Qty: 180 TABLET | Refills: 3 | Status: SHIPPED | OUTPATIENT
Start: 2022-08-13

## 2022-10-12 PROBLEM — Z12.5 PROSTATE CANCER SCREENING: Status: RESOLVED | Noted: 2021-01-15 | Resolved: 2022-10-12

## 2023-01-03 DIAGNOSIS — Z56.6 WORK-RELATED STRESS: ICD-10-CM

## 2023-01-03 DIAGNOSIS — F41.1 GENERALIZED ANXIETY DISORDER: ICD-10-CM

## 2023-01-03 DIAGNOSIS — E03.9 HYPOTHYROIDISM, UNSPECIFIED TYPE: Chronic | ICD-10-CM

## 2023-01-03 DIAGNOSIS — I10 ESSENTIAL HYPERTENSION: Chronic | ICD-10-CM

## 2023-01-03 DIAGNOSIS — R05.8 ALLERGIC COUGH: ICD-10-CM

## 2023-01-03 SDOH — HEALTH STABILITY - MENTAL HEALTH: OTHER PHYSICAL AND MENTAL STRAIN RELATED TO WORK: Z56.6

## 2023-01-03 NOTE — TELEPHONE ENCOUNTER
Patient had and insurance change and will be scheduling an appointment for a follow up  He hasn't been seen by you since March and will be out of meds        PDMP checked for clonazepam and last fill was 07/18/2022 at [facility]

## 2023-01-04 RX ORDER — LEVOTHYROXINE SODIUM 0.03 MG/1
25 TABLET ORAL DAILY
Qty: 90 TABLET | Refills: 1 | Status: SHIPPED | OUTPATIENT
Start: 2023-01-04

## 2023-01-04 RX ORDER — MONTELUKAST SODIUM 10 MG/1
10 TABLET ORAL
Qty: 90 TABLET | Refills: 3 | Status: SHIPPED | OUTPATIENT
Start: 2023-01-04

## 2023-01-04 RX ORDER — CLONAZEPAM 0.5 MG/1
0.5 TABLET ORAL
Qty: 30 TABLET | Refills: 1 | Status: SHIPPED | OUTPATIENT
Start: 2023-01-04

## 2023-01-04 RX ORDER — NEBIVOLOL 10 MG/1
10 TABLET ORAL DAILY
Qty: 90 TABLET | Refills: 3 | Status: SHIPPED | OUTPATIENT
Start: 2023-01-04

## 2023-01-04 RX ORDER — ALBUTEROL SULFATE 90 UG/1
AEROSOL, METERED RESPIRATORY (INHALATION)
Qty: 26.8 G | Refills: 3 | OUTPATIENT
Start: 2023-01-04

## 2023-01-04 RX ORDER — ROSUVASTATIN CALCIUM 5 MG/1
5 TABLET, COATED ORAL DAILY
Qty: 90 TABLET | Refills: 1 | Status: SHIPPED | OUTPATIENT
Start: 2023-01-04

## 2023-01-04 RX ORDER — DULOXETIN HYDROCHLORIDE 30 MG/1
30 CAPSULE, DELAYED RELEASE ORAL DAILY
Qty: 90 CAPSULE | Refills: 1 | Status: SHIPPED | OUTPATIENT
Start: 2023-01-04

## 2023-02-04 LAB
ALBUMIN SERPL-MCNC: 4.3 G/DL (ref 3.6–5.1)
ALBUMIN/GLOB SERPL: 1.9 (CALC) (ref 1–2.5)
ALP SERPL-CCNC: 87 U/L (ref 35–144)
ALT SERPL-CCNC: 11 U/L (ref 9–46)
AST SERPL-CCNC: 15 U/L (ref 10–35)
BASOPHILS # BLD AUTO: 41 CELLS/UL (ref 0–200)
BASOPHILS NFR BLD AUTO: 0.5 %
BILIRUB SERPL-MCNC: 0.5 MG/DL (ref 0.2–1.2)
BUN SERPL-MCNC: 13 MG/DL (ref 7–25)
BUN/CREAT SERPL: ABNORMAL (CALC) (ref 6–22)
CALCIUM SERPL-MCNC: 9.2 MG/DL (ref 8.6–10.3)
CHLORIDE SERPL-SCNC: 103 MMOL/L (ref 98–110)
CHOLEST SERPL-MCNC: 139 MG/DL
CHOLEST/HDLC SERPL: 2.2 (CALC)
CO2 SERPL-SCNC: 31 MMOL/L (ref 20–32)
CREAT SERPL-MCNC: 1.05 MG/DL (ref 0.7–1.35)
EOSINOPHIL # BLD AUTO: 1717 CELLS/UL (ref 15–500)
EOSINOPHIL NFR BLD AUTO: 21.2 %
ERYTHROCYTE [DISTWIDTH] IN BLOOD BY AUTOMATED COUNT: 12.2 % (ref 11–15)
GFR/BSA.PRED SERPLBLD CYS-BASED-ARV: 80 ML/MIN/1.73M2
GLOBULIN SER CALC-MCNC: 2.3 G/DL (CALC) (ref 1.9–3.7)
GLUCOSE SERPL-MCNC: 125 MG/DL (ref 65–99)
HBA1C MFR BLD: 5.2 % OF TOTAL HGB
HCT VFR BLD AUTO: 45.3 % (ref 38.5–50)
HDLC SERPL-MCNC: 64 MG/DL
HGB BLD-MCNC: 15.7 G/DL (ref 13.2–17.1)
LDLC SERPL CALC-MCNC: 59 MG/DL (CALC)
LYMPHOCYTES # BLD AUTO: 1733 CELLS/UL (ref 850–3900)
LYMPHOCYTES NFR BLD AUTO: 21.4 %
MCH RBC QN AUTO: 32.8 PG (ref 27–33)
MCHC RBC AUTO-ENTMCNC: 34.7 G/DL (ref 32–36)
MCV RBC AUTO: 94.6 FL (ref 80–100)
MONOCYTES # BLD AUTO: 648 CELLS/UL (ref 200–950)
MONOCYTES NFR BLD AUTO: 8 %
NEUTROPHILS # BLD AUTO: 3961 CELLS/UL (ref 1500–7800)
NEUTROPHILS NFR BLD AUTO: 48.9 %
NONHDLC SERPL-MCNC: 75 MG/DL (CALC)
PLATELET # BLD AUTO: 117 THOUSAND/UL (ref 140–400)
PMV BLD REES-ECKER: 14.3 FL (ref 7.5–12.5)
POTASSIUM SERPL-SCNC: 4.2 MMOL/L (ref 3.5–5.3)
PROT SERPL-MCNC: 6.6 G/DL (ref 6.1–8.1)
PSA SERPL-MCNC: 0.51 NG/ML
RBC # BLD AUTO: 4.79 MILLION/UL (ref 4.2–5.8)
SODIUM SERPL-SCNC: 141 MMOL/L (ref 135–146)
TRIGL SERPL-MCNC: 80 MG/DL
TSH SERPL-ACNC: 3.35 MIU/L (ref 0.4–4.5)
URATE SERPL-MCNC: 6.9 MG/DL (ref 4–8)
WBC # BLD AUTO: 8.1 THOUSAND/UL (ref 3.8–10.8)

## 2023-02-06 ENCOUNTER — TELEPHONE (OUTPATIENT)
Dept: FAMILY MEDICINE CLINIC | Facility: CLINIC | Age: 63
End: 2023-02-06

## 2023-02-06 DIAGNOSIS — I25.10 CORONARY ARTERIOSCLEROSIS: ICD-10-CM

## 2023-02-06 DIAGNOSIS — Z01.810 PREOPERATIVE CARDIOVASCULAR EXAMINATION: ICD-10-CM

## 2023-02-06 DIAGNOSIS — E78.2 MIXED HYPERLIPIDEMIA: ICD-10-CM

## 2023-02-06 DIAGNOSIS — I10 ESSENTIAL HYPERTENSION: ICD-10-CM

## 2023-02-06 NOTE — TELEPHONE ENCOUNTER
----- Message from Jaya Vidal DO sent at 2/6/2023  7:55 AM EST -----  Please call patient to schedule follow-up appointment to review lab results  Due for annual physical examination  Long office visit  No urgency

## 2023-02-20 ENCOUNTER — OFFICE VISIT (OUTPATIENT)
Dept: FAMILY MEDICINE CLINIC | Facility: CLINIC | Age: 63
End: 2023-02-20

## 2023-02-20 VITALS
OXYGEN SATURATION: 96 % | RESPIRATION RATE: 16 BRPM | SYSTOLIC BLOOD PRESSURE: 128 MMHG | DIASTOLIC BLOOD PRESSURE: 82 MMHG | BODY MASS INDEX: 40.43 KG/M2 | HEART RATE: 72 BPM | HEIGHT: 74 IN | WEIGHT: 315 LBS

## 2023-02-20 DIAGNOSIS — E03.8 OTHER SPECIFIED HYPOTHYROIDISM: ICD-10-CM

## 2023-02-20 DIAGNOSIS — Z56.6 WORK-RELATED STRESS: ICD-10-CM

## 2023-02-20 DIAGNOSIS — R06.02 SHORTNESS OF BREATH: ICD-10-CM

## 2023-02-20 DIAGNOSIS — R05.8 ALLERGIC COUGH: ICD-10-CM

## 2023-02-20 DIAGNOSIS — E03.9 HYPOTHYROIDISM, UNSPECIFIED TYPE: Chronic | ICD-10-CM

## 2023-02-20 DIAGNOSIS — E79.0 HYPERURICEMIA: ICD-10-CM

## 2023-02-20 DIAGNOSIS — Z00.00 PHYSICAL EXAM, ANNUAL: Primary | ICD-10-CM

## 2023-02-20 DIAGNOSIS — E66.09 CLASS 2 OBESITY DUE TO EXCESS CALORIES WITHOUT SERIOUS COMORBIDITY WITH BODY MASS INDEX (BMI) OF 38.0 TO 38.9 IN ADULT: ICD-10-CM

## 2023-02-20 DIAGNOSIS — I10 ESSENTIAL HYPERTENSION: ICD-10-CM

## 2023-02-20 DIAGNOSIS — M48.062 SPINAL STENOSIS OF LUMBAR REGION WITH NEUROGENIC CLAUDICATION: ICD-10-CM

## 2023-02-20 DIAGNOSIS — M10.071 ACUTE IDIOPATHIC GOUT INVOLVING TOE OF RIGHT FOOT: ICD-10-CM

## 2023-02-20 DIAGNOSIS — E78.2 MIXED HYPERLIPIDEMIA: ICD-10-CM

## 2023-02-20 DIAGNOSIS — F41.1 GENERALIZED ANXIETY DISORDER: ICD-10-CM

## 2023-02-20 DIAGNOSIS — D69.6 THROMBOCYTOPENIA (HCC): ICD-10-CM

## 2023-02-20 PROBLEM — M54.6 ACUTE LEFT-SIDED THORACIC BACK PAIN: Status: RESOLVED | Noted: 2019-11-15 | Resolved: 2023-02-20

## 2023-02-20 PROBLEM — R06.09 DOE (DYSPNEA ON EXERTION): Status: RESOLVED | Noted: 2020-05-13 | Resolved: 2023-02-20

## 2023-02-20 RX ORDER — CLONAZEPAM 0.5 MG/1
0.5 TABLET ORAL
Qty: 30 TABLET | Refills: 1 | Status: SHIPPED | OUTPATIENT
Start: 2023-02-20

## 2023-02-20 RX ORDER — AMLODIPINE BESYLATE 10 MG/1
10 TABLET ORAL DAILY
Qty: 90 TABLET | Refills: 1 | Status: SHIPPED | OUTPATIENT
Start: 2023-02-20

## 2023-02-20 RX ORDER — LEVOTHYROXINE SODIUM 0.03 MG/1
25 TABLET ORAL DAILY
Qty: 90 TABLET | Refills: 1 | Status: SHIPPED | OUTPATIENT
Start: 2023-02-20

## 2023-02-20 RX ORDER — DULOXETIN HYDROCHLORIDE 30 MG/1
30 CAPSULE, DELAYED RELEASE ORAL DAILY
Qty: 90 CAPSULE | Refills: 1 | Status: SHIPPED | OUTPATIENT
Start: 2023-02-20

## 2023-02-20 RX ORDER — FLUTICASONE PROPIONATE 50 MCG
2 SPRAY, SUSPENSION (ML) NASAL DAILY
Qty: 18.2 ML | Refills: 5 | Status: SHIPPED | OUTPATIENT
Start: 2023-02-20

## 2023-02-20 RX ORDER — NEBIVOLOL 10 MG/1
10 TABLET ORAL DAILY
Qty: 90 TABLET | Refills: 3 | Status: SHIPPED | OUTPATIENT
Start: 2023-02-20

## 2023-02-20 RX ORDER — MONTELUKAST SODIUM 10 MG/1
10 TABLET ORAL
Qty: 90 TABLET | Refills: 3 | Status: SHIPPED | OUTPATIENT
Start: 2023-02-20

## 2023-02-20 RX ORDER — ROSUVASTATIN CALCIUM 5 MG/1
5 TABLET, COATED ORAL DAILY
Qty: 90 TABLET | Refills: 1 | Status: SHIPPED | OUTPATIENT
Start: 2023-02-20

## 2023-02-20 RX ORDER — ALLOPURINOL 100 MG/1
100 TABLET ORAL DAILY
Qty: 90 TABLET | Refills: 1 | Status: SHIPPED | OUTPATIENT
Start: 2023-02-20

## 2023-02-20 SDOH — HEALTH STABILITY - MENTAL HEALTH: OTHER PHYSICAL AND MENTAL STRAIN RELATED TO WORK: Z56.6
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
## 2023-02-20 NOTE — ASSESSMENT & PLAN NOTE
No recent gouty flares but he has run out of his allopurinol  Last uric acid level 6 9  Goal uric acid level less than 6  Refilled allopurinol    Repeat uric acid level in 6 months

## 2023-02-20 NOTE — ASSESSMENT & PLAN NOTE
Eosinophil still elevated  Continue on Singulair and fluticasone nasal spray    Allergy panel ordered to be done with next set of labs in 6 months

## 2023-03-20 ENCOUNTER — TELEPHONE (OUTPATIENT)
Dept: PAIN MEDICINE | Facility: CLINIC | Age: 63
End: 2023-03-20

## 2023-03-20 NOTE — TELEPHONE ENCOUNTER
Caller: Nolan Solano PT    Doctor: Dr Ledy Manzo     Reason for call: Schedule a procedure     Call back#: [phone]

## 2023-03-20 NOTE — TELEPHONE ENCOUNTER
Pt asking for an injection, he said the last injection wasn't as effective as the injection prior to that one  He has B/L LB pain that radiates down both legs and he has B/L neuropathy in his feet  The left side is worse than the right side, he has more muscle spasms on the left  Pt confirmed that he still takes Eliquis prescribed by Dr Adamaris Torres his [facility] cardiologist    Last inj was left L5-S1 TFESI on 7/5/22, injection prior was 3/2022 B/L L5 TFESI (this one was more effective per pt)  Told pt I will forward his request to  for injection and we will have to obtain Eliquis hold first from Dr Adamaris Torres

## 2023-03-21 ENCOUNTER — TELEPHONE (OUTPATIENT)
Dept: PAIN MEDICINE | Facility: CLINIC | Age: 63
End: 2023-03-21

## 2023-03-21 DIAGNOSIS — M54.16 LUMBAR RADICULOPATHY: ICD-10-CM

## 2023-03-21 DIAGNOSIS — M96.1 POSTLAMINECTOMY SYNDROME OF LUMBAR REGION: Primary | ICD-10-CM

## 2023-03-22 NOTE — TELEPHONE ENCOUNTER
Caller: patient    Doctor: Miracle Mccain    Reason for call: returning a call to schedule procedure    Call back#:

## 2023-03-22 NOTE — TELEPHONE ENCOUNTER
Caller: patient     Doctor: Val Montalvo    Reason for call: pt requesting to schedule procedure    Call back#: [phone]

## 2023-03-23 ENCOUNTER — TELEPHONE (OUTPATIENT)
Dept: PAIN MEDICINE | Facility: CLINIC | Age: 63
End: 2023-03-23

## 2023-03-23 ENCOUNTER — TELEPHONE (OUTPATIENT)
Dept: FAMILY MEDICINE CLINIC | Facility: CLINIC | Age: 63
End: 2023-03-23

## 2023-03-23 NOTE — TELEPHONE ENCOUNTER
This patient is being considered for a neuraxial injection for the management of their pain  However, the patient is currently on an anticoagulant per your orders  The [employer] Guideline on neuraxial procedures and anti-coagulation indicates that medication should be held as follows: Eliquis 3 days prior to injection  Please advise if you ok the hold of this medication      Thank you,    Supa Lin  Procedure   [facility]

## 2023-03-23 NOTE — TELEPHONE ENCOUNTER
Need to have a reason for order for psychiatry  Anxiety, depression, bipolar disorder, schizophrenia, suicidality, hangnail, paper cut or just for the heck of it?

## 2023-03-23 NOTE — TELEPHONE ENCOUNTER
Patient called and is requesting an order from the doctor for a psychiatrist   Please advise when order is ready

## 2023-03-23 NOTE — TELEPHONE ENCOUNTER
Caller: Marylene Cha    Doctor/Office: Dr Winifred Betancur    Call regarding :      Call was transferred to:

## 2023-03-24 ENCOUNTER — OFFICE VISIT (OUTPATIENT)
Dept: OBGYN CLINIC | Facility: CLINIC | Age: 63
End: 2023-03-24

## 2023-03-24 ENCOUNTER — APPOINTMENT (OUTPATIENT)
Dept: RADIOLOGY | Facility: AMBULARY SURGERY CENTER | Age: 63
End: 2023-03-24
Attending: STUDENT IN AN ORGANIZED HEALTH CARE EDUCATION/TRAINING PROGRAM

## 2023-03-24 VITALS
HEIGHT: 74 IN | BODY MASS INDEX: 40.43 KG/M2 | WEIGHT: 315 LBS | SYSTOLIC BLOOD PRESSURE: 162 MMHG | DIASTOLIC BLOOD PRESSURE: 89 MMHG | HEART RATE: 79 BPM

## 2023-03-24 DIAGNOSIS — M25.511 RIGHT SHOULDER PAIN, UNSPECIFIED CHRONICITY: ICD-10-CM

## 2023-03-24 DIAGNOSIS — M25.512 LEFT SHOULDER PAIN, UNSPECIFIED CHRONICITY: ICD-10-CM

## 2023-03-24 DIAGNOSIS — M19.012 PRIMARY OSTEOARTHRITIS OF SHOULDERS, BILATERAL: Primary | ICD-10-CM

## 2023-03-24 DIAGNOSIS — M19.011 PRIMARY OSTEOARTHRITIS OF SHOULDERS, BILATERAL: Primary | ICD-10-CM

## 2023-03-24 DIAGNOSIS — F33.0 MILD EPISODE OF RECURRENT MAJOR DEPRESSIVE DISORDER (HCC): Primary | ICD-10-CM

## 2023-03-24 PROBLEM — F33.9 EPISODE OF RECURRENT MAJOR DEPRESSIVE DISORDER (HCC): Status: ACTIVE | Noted: 2023-03-24

## 2023-03-24 RX ORDER — BUPIVACAINE HYDROCHLORIDE 5 MG/ML
3.5 INJECTION, SOLUTION PERINEURAL
Status: COMPLETED | OUTPATIENT
Start: 2023-03-24 | End: 2023-03-24

## 2023-03-24 RX ORDER — TRIAMCINOLONE ACETONIDE 40 MG/ML
40 INJECTION, SUSPENSION INTRA-ARTICULAR; INTRAMUSCULAR
Status: COMPLETED | OUTPATIENT
Start: 2023-03-24 | End: 2023-03-24

## 2023-03-24 RX ADMIN — BUPIVACAINE HYDROCHLORIDE 3.5 ML: 5 INJECTION, SOLUTION PERINEURAL at 14:09

## 2023-03-24 RX ADMIN — TRIAMCINOLONE ACETONIDE 40 MG: 40 INJECTION, SUSPENSION INTRA-ARTICULAR; INTRAMUSCULAR at 14:09
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
## 2023-03-30 ENCOUNTER — TELEPHONE (OUTPATIENT)
Dept: PSYCHIATRY | Facility: CLINIC | Age: 63
End: 2023-03-30

## 2023-03-30 NOTE — TELEPHONE ENCOUNTER
Reached out to pt in regards to referral for psychiatry, pt is looking for med mgmt   Pt is being added to the med mgmt waitlist

## 2023-04-20 ENCOUNTER — HOSPITAL ENCOUNTER (OUTPATIENT)
Dept: RADIOLOGY | Facility: CLINIC | Age: 63
Discharge: HOME | End: 2023-04-20

## 2023-04-20 VITALS
DIASTOLIC BLOOD PRESSURE: 97 MMHG | RESPIRATION RATE: 18 BRPM | HEART RATE: 77 BPM | TEMPERATURE: 97.1 F | OXYGEN SATURATION: 90 % | SYSTOLIC BLOOD PRESSURE: 148 MMHG

## 2023-04-20 DIAGNOSIS — M54.16 LUMBAR RADICULOPATHY: ICD-10-CM

## 2023-04-20 DIAGNOSIS — M96.1 POSTLAMINECTOMY SYNDROME OF LUMBAR REGION: ICD-10-CM

## 2023-04-20 RX ORDER — BUPIVACAINE HCL/PF 2.5 MG/ML
2 VIAL (ML) INJECTION ONCE
Status: COMPLETED | OUTPATIENT
Start: 2023-04-20 | End: 2023-04-20

## 2023-04-20 RX ORDER — 0.9 % SODIUM CHLORIDE 0.9 %
4 VIAL (ML) INJECTION ONCE
Status: COMPLETED | OUTPATIENT
Start: 2023-04-20 | End: 2023-04-20

## 2023-04-20 RX ORDER — METHYLPREDNISOLONE ACETATE 80 MG/ML
80 INJECTION, SUSPENSION INTRA-ARTICULAR; INTRALESIONAL; INTRAMUSCULAR; PARENTERAL; SOFT TISSUE ONCE
Status: COMPLETED | OUTPATIENT
Start: 2023-04-20 | End: 2023-04-20

## 2023-04-20 RX ADMIN — IOHEXOL 1 ML: 300 INJECTION, SOLUTION INTRAVENOUS at 14:36

## 2023-04-20 RX ADMIN — Medication 4 ML: at 14:36

## 2023-04-20 RX ADMIN — BUPIVACAINE HYDROCHLORIDE 2 ML: 2.5 INJECTION, SOLUTION EPIDURAL; INFILTRATION; INTRACAUDAL at 14:35

## 2023-04-20 RX ADMIN — METHYLPREDNISOLONE ACETATE 80 MG: 80 INJECTION, SUSPENSION INTRA-ARTICULAR; INTRALESIONAL; INTRAMUSCULAR; PARENTERAL; SOFT TISSUE at 14:36

## 2023-04-20 NOTE — DISCHARGE INSTR - LAB
Epidural Steroid Injection   WHAT YOU NEED TO KNOW:   An epidural steroid injection (ALEXANDRA) is a procedure to inject steroid medicine into the epidural space  The epidural space is between your spinal cord and vertebrae  Steroids reduce inflammation and fluid buildup in your spine that may be causing pain  You may be given pain medicine along with the steroids  ACTIVITY  Do not drive or operate machinery today  No strenuous activity today - bending, lifting, etc   You may resume normal activites starting tomorrow - start slowly and as tolerated  You may shower today, but no tub baths or hot tubs  You may have numbness for several hours from the local anesthetic  Please use caution and common sense, especially with weight-bearing activities  CARE OF THE INJECTION SITE  If you have soreness or pain, apply ice to the area today (20 minutes on/20 minutes off)  Starting tomorrow, you may use warm, moist heat or ice if needed  You may have an increase or change in your discomfort for 36-48 hours after your treatment  Apply ice and continue with any pain medication you have been prescribed  Notify the [facility] if you have any of the following: redness, drainage, swelling, headache, stiff neck or fever above 100°F     SPECIAL INSTRUCTIONS  Our office will contact you in approximately 7 days for a progress report  MEDICATIONS  Continue to take all routine medications  Our office may have instructed you to hold some medications  As no general anesthesia was used in today's procedure, you should not experience any side effects related to anesthesia  If you are diabetic, the steroids used in today's injection may temporarily increase your blood sugar levels after the first few days after your injection  Please keep a close eye on your sugars and alert the doctor who manages your diabetes if your sugars are significantly high from your baseline or you are symptomatic       If you have a problem specifically related to your procedure, please call our office at [phone]  Problems not related to your procedure should be directed to your primary care physician
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
## 2023-04-27 ENCOUNTER — TELEPHONE (OUTPATIENT)
Dept: RADIOLOGY | Facility: MEDICAL CENTER | Age: 63
End: 2023-04-27

## 2023-04-27 NOTE — TELEPHONE ENCOUNTER
"S/w the patient to inquire  He stated he seems to be having good days and then worse days post injection  Reviewed the postoperative instructions again with him stating that ultimately he hs to give it up too two weeks to reach the full effect  Inquired as too how much relief he has received and he stated maybe 30 % so far  Indicated and reviewed that he should give it another week and modify his activity accordingly  Good days vs \"bad\" days  He has no ovs scheduled  When would you like him to follow up? Please advise  Thanks!   "

## 2023-04-27 NOTE — TELEPHONE ENCOUNTER
Patient Reports     States that he is unable to rate at this point  He has some good days and some bad days  Patient would like a call back from a nurse  He is having some good days and some days where he feels he needs to stay in bed

## 2023-06-15 NOTE — ASSESSMENT & PLAN NOTE
Patient was switched to Cymbalta from Lyrica  Remains on Cymbalta  Has helped with his anxiety symptoms  Neuropathic pain is about the same  Refilled limited supply of p r n  Percocet  Prior to prescribing the controlled substance, a patient search was performed on the [facility] prescription drug monitoring program web site  There was no evidence of diversion or misuse    Prescription provided FAMILY HISTORY:  Sibling  Still living? Yes, Estimated age: Age Unknown  FHx: throat cancer, Age at diagnosis: Age Unknown

## 2023-06-26 DIAGNOSIS — Z56.6 WORK-RELATED STRESS: ICD-10-CM

## 2023-06-26 DIAGNOSIS — F41.1 GENERALIZED ANXIETY DISORDER: ICD-10-CM

## 2023-06-26 SDOH — HEALTH STABILITY - MENTAL HEALTH: OTHER PHYSICAL AND MENTAL STRAIN RELATED TO WORK: Z56.6

## 2023-06-27 RX ORDER — CLONAZEPAM 0.5 MG/1
0.5 TABLET ORAL
Qty: 30 TABLET | Refills: 1 | Status: SHIPPED | OUTPATIENT
Start: 2023-06-27

## 2023-06-27 NOTE — TELEPHONE ENCOUNTER
Please inform the patient that he has labs due on or after August 20 and needs to make his follow-up appointment scheduled    Refill provided
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
## 2023-07-21 ENCOUNTER — TELEPHONE (OUTPATIENT)
Dept: FAMILY MEDICINE CLINIC | Facility: CLINIC | Age: 63
End: 2023-07-21

## 2023-07-21 DIAGNOSIS — H81.10 BENIGN PAROXYSMAL POSITIONAL VERTIGO, UNSPECIFIED LATERALITY: Primary | ICD-10-CM

## 2023-07-21 RX ORDER — PREDNISONE 20 MG/1
TABLET ORAL
Qty: 15 TABLET | Refills: 0 | Status: SHIPPED | OUTPATIENT
Start: 2023-07-21 | End: 2023-07-31

## 2023-07-21 NOTE — TELEPHONE ENCOUNTER
Patient and wife called and stated he's been throwing up, has vertigo symptoms, nausea and can't move too quickly. Stated he had a prior episode last June and was given Prednisone. He was asking if he could get that again because he can't get out to an appointment or the ER and with his broken foot it makes it harder.     Please advise

## 2023-07-21 NOTE — TELEPHONE ENCOUNTER
Tapering course of prednisone will be sent to pharmacy.   If this is not making any improvement then he should be evaluated somewhere over the weekend

## 2023-07-26 NOTE — ASSESSMENT & PLAN NOTE
1) Continue w/ regular diet; pureed diet w/ mod thick liquids as per SLP recommendations  2) Add ensure + HP shake BID (350kcal, 20g protein) mod thick as per SLP  3) Total assistance/supervision during meal times; suggest meal encouragement to optimize po intake 2/2 malnutrition   4) Monitor bowel movements, if no BM for >3 days, consider implementing bowel regimen.  5) Encourage protein-rich foods, maximize food preferences  6) Monitor lytes/ min and replete prn - at HIGH RISK for refeeding   7) MVI w/ minerals daily to ensure 100% RDA met  8) Consider adding thiamine 100 mg daily 2/2 poor PO intake/ malnutrition  9) Consider adding appetite stimulant such as Remeron or Marinol 2/2 chronically poor appetite/ PO intake  10) GOC confirmed; is DNR/DNI with NFT. Nutrition support is not recommended due to overall declining medical status which evidenced based studies indicate EN is not effective in prolonging survival and improving quality of life. It can also increase risk of aspiration pneumonia as well as other related issues.  RD will continue to monitor PO intake, labs, hydration, and wt prn. I do not necessarily appreciate any significant adenopathy however this was a virtual video visit so I am unable to palpate in the areas  The places that the patient indicates feels swollen would be at his parotid glands  Parotitis would be a possibility however without actually physically seeing the patient is difficult to assess    Order provided for CBC, C-reactive protein, lumps antibodies since the patient is complaining of parotid swelling bilaterally    Very doubtful and highly unlikely    - patient will need to come into the office for evaluation after labs are completed which she will performed tomorrow

## 2023-07-27 DIAGNOSIS — R06.02 SHORTNESS OF BREATH: ICD-10-CM

## 2023-07-27 RX ORDER — FLUTICASONE PROPIONATE 50 MCG
2 SPRAY, SUSPENSION (ML) NASAL DAILY
Qty: 48 ML | Refills: 2 | OUTPATIENT
Start: 2023-07-27

## 2023-08-18 DIAGNOSIS — M10.071 ACUTE IDIOPATHIC GOUT INVOLVING TOE OF RIGHT FOOT: ICD-10-CM

## 2023-08-18 DIAGNOSIS — I10 ESSENTIAL HYPERTENSION: ICD-10-CM

## 2023-08-18 DIAGNOSIS — F41.1 GENERALIZED ANXIETY DISORDER: ICD-10-CM

## 2023-08-18 DIAGNOSIS — Z56.6 WORK-RELATED STRESS: ICD-10-CM

## 2023-08-18 DIAGNOSIS — E03.9 HYPOTHYROIDISM, UNSPECIFIED TYPE: Chronic | ICD-10-CM

## 2023-08-18 RX ORDER — DULOXETIN HYDROCHLORIDE 30 MG/1
30 CAPSULE, DELAYED RELEASE ORAL DAILY
Qty: 90 CAPSULE | Refills: 1 | Status: SHIPPED | OUTPATIENT
Start: 2023-08-18

## 2023-08-18 RX ORDER — LEVOTHYROXINE SODIUM 0.03 MG/1
25 TABLET ORAL DAILY
Qty: 90 TABLET | Refills: 1 | Status: SHIPPED | OUTPATIENT
Start: 2023-08-18

## 2023-08-18 RX ORDER — AMLODIPINE BESYLATE 10 MG/1
10 TABLET ORAL DAILY
Qty: 90 TABLET | Refills: 1 | Status: SHIPPED | OUTPATIENT
Start: 2023-08-18

## 2023-08-18 RX ORDER — ALLOPURINOL 100 MG/1
100 TABLET ORAL DAILY
Qty: 90 TABLET | Refills: 1 | Status: SHIPPED | OUTPATIENT
Start: 2023-08-18

## 2023-08-18 RX ORDER — ROSUVASTATIN CALCIUM 5 MG/1
5 TABLET, COATED ORAL DAILY
Qty: 90 TABLET | Refills: 1 | Status: SHIPPED | OUTPATIENT
Start: 2023-08-18

## 2023-08-18 SDOH — HEALTH STABILITY - MENTAL HEALTH: OTHER PHYSICAL AND MENTAL STRAIN RELATED TO WORK: Z56.6

## 2023-09-07 LAB
ALBUMIN SERPL-MCNC: 3.9 G/DL (ref 3.6–5.1)
ALBUMIN/GLOB SERPL: 1.8 (CALC) (ref 1–2.5)
ALP SERPL-CCNC: 82 U/L (ref 35–144)
ALT SERPL-CCNC: 17 U/L (ref 9–46)
AST SERPL-CCNC: 17 U/L (ref 10–35)
BASOPHILS # BLD AUTO: 103 CELLS/UL (ref 0–200)
BASOPHILS NFR BLD AUTO: 1 %
BILIRUB SERPL-MCNC: 0.6 MG/DL (ref 0.2–1.2)
BUN SERPL-MCNC: 21 MG/DL (ref 7–25)
BUN/CREAT SERPL: ABNORMAL (CALC) (ref 6–22)
CALCIUM SERPL-MCNC: 9 MG/DL (ref 8.6–10.3)
CHLORIDE SERPL-SCNC: 105 MMOL/L (ref 98–110)
CHOLEST SERPL-MCNC: 141 MG/DL
CHOLEST/HDLC SERPL: 2.2 (CALC)
CO2 SERPL-SCNC: 29 MMOL/L (ref 20–32)
CREAT SERPL-MCNC: 0.99 MG/DL (ref 0.7–1.35)
EOSINOPHIL # BLD AUTO: 3008 CELLS/UL (ref 15–500)
EOSINOPHIL NFR BLD AUTO: 29.2 %
ERYTHROCYTE [DISTWIDTH] IN BLOOD BY AUTOMATED COUNT: 13.7 % (ref 11–15)
GFR/BSA.PRED SERPLBLD CYS-BASED-ARV: 86 ML/MIN/1.73M2
GLOBULIN SER CALC-MCNC: 2.2 G/DL (CALC) (ref 1.9–3.7)
GLUCOSE SERPL-MCNC: 102 MG/DL (ref 65–99)
HBA1C MFR BLD: 5.2 % OF TOTAL HGB
HCT VFR BLD AUTO: 44.9 % (ref 38.5–50)
HDLC SERPL-MCNC: 65 MG/DL
HGB BLD-MCNC: 15 G/DL (ref 13.2–17.1)
LDLC SERPL CALC-MCNC: 62 MG/DL (CALC)
LYMPHOCYTES # BLD AUTO: 1401 CELLS/UL (ref 850–3900)
LYMPHOCYTES NFR BLD AUTO: 13.6 %
MCH RBC QN AUTO: 32 PG (ref 27–33)
MCHC RBC AUTO-ENTMCNC: 33.4 G/DL (ref 32–36)
MCV RBC AUTO: 95.7 FL (ref 80–100)
MONOCYTES # BLD AUTO: 752 CELLS/UL (ref 200–950)
MONOCYTES NFR BLD AUTO: 7.3 %
NEUTROPHILS # BLD AUTO: 5037 CELLS/UL (ref 1500–7800)
NEUTROPHILS NFR BLD AUTO: 48.9 %
NONHDLC SERPL-MCNC: 76 MG/DL (CALC)
PLATELET # BLD AUTO: 125 THOUSAND/UL (ref 140–400)
PMV BLD REES-ECKER: 14.1 FL (ref 7.5–12.5)
POTASSIUM SERPL-SCNC: 4.5 MMOL/L (ref 3.5–5.3)
PROT SERPL-MCNC: 6.1 G/DL (ref 6.1–8.1)
RBC # BLD AUTO: 4.69 MILLION/UL (ref 4.2–5.8)
SODIUM SERPL-SCNC: 141 MMOL/L (ref 135–146)
TRIGL SERPL-MCNC: 62 MG/DL
TSH SERPL-ACNC: 1.57 MIU/L (ref 0.4–4.5)
URATE SERPL-MCNC: 4.5 MG/DL (ref 4–8)
WBC # BLD AUTO: 10.3 THOUSAND/UL (ref 3.8–10.8)

## 2023-09-14 ENCOUNTER — OFFICE VISIT (OUTPATIENT)
Dept: FAMILY MEDICINE CLINIC | Facility: CLINIC | Age: 63
End: 2023-09-14
Payer: MEDICARE

## 2023-09-14 VITALS
BODY MASS INDEX: 38.52 KG/M2 | DIASTOLIC BLOOD PRESSURE: 88 MMHG | HEART RATE: 92 BPM | SYSTOLIC BLOOD PRESSURE: 134 MMHG | HEIGHT: 74 IN | OXYGEN SATURATION: 96 % | RESPIRATION RATE: 18 BRPM

## 2023-09-14 DIAGNOSIS — S99.192D CLOSED FRACTURE OF BASE OF FIFTH METATARSAL BONE OF LEFT FOOT AT METAPHYSEAL-DIAPHYSEAL JUNCTION WITH ROUTINE HEALING, SUBSEQUENT ENCOUNTER: ICD-10-CM

## 2023-09-14 DIAGNOSIS — Z56.6 WORK-RELATED STRESS: ICD-10-CM

## 2023-09-14 DIAGNOSIS — M96.1 POSTLAMINECTOMY SYNDROME, LUMBAR: ICD-10-CM

## 2023-09-14 DIAGNOSIS — E66.01 OBESITY, MORBID (HCC): ICD-10-CM

## 2023-09-14 DIAGNOSIS — I48.20 CHRONIC ATRIAL FIBRILLATION (HCC): ICD-10-CM

## 2023-09-14 DIAGNOSIS — R73.01 IMPAIRED FASTING GLUCOSE: ICD-10-CM

## 2023-09-14 DIAGNOSIS — F41.1 GENERALIZED ANXIETY DISORDER: ICD-10-CM

## 2023-09-14 DIAGNOSIS — Z12.5 PROSTATE CANCER SCREENING: ICD-10-CM

## 2023-09-14 DIAGNOSIS — E03.8 OTHER SPECIFIED HYPOTHYROIDISM: Primary | ICD-10-CM

## 2023-09-14 DIAGNOSIS — E79.0 HYPERURICEMIA: ICD-10-CM

## 2023-09-14 DIAGNOSIS — Z23 FLU VACCINE NEED: ICD-10-CM

## 2023-09-14 DIAGNOSIS — F33.1 MODERATE EPISODE OF RECURRENT MAJOR DEPRESSIVE DISORDER (HCC): ICD-10-CM

## 2023-09-14 DIAGNOSIS — D69.6 THROMBOCYTOPENIA (HCC): ICD-10-CM

## 2023-09-14 DIAGNOSIS — I10 ESSENTIAL HYPERTENSION: ICD-10-CM

## 2023-09-14 DIAGNOSIS — E78.2 MIXED HYPERLIPIDEMIA: ICD-10-CM

## 2023-09-14 PROBLEM — M54.9 MECHANICAL BACK PAIN: Status: RESOLVED | Noted: 2021-10-12 | Resolved: 2023-09-14

## 2023-09-14 PROBLEM — S99.192A CLOSED FRACTURE OF BASE OF FIFTH METATARSAL BONE OF LEFT FOOT AT METAPHYSEAL-DIAPHYSEAL JUNCTION: Status: ACTIVE | Noted: 2021-03-16

## 2023-09-14 PROBLEM — R30.0 DYSURIA: Status: RESOLVED | Noted: 2022-07-31 | Resolved: 2023-09-14

## 2023-09-14 PROCEDURE — 99215 OFFICE O/P EST HI 40 MIN: CPT | Performed by: FAMILY MEDICINE

## 2023-09-14 PROCEDURE — G0008 ADMIN INFLUENZA VIRUS VAC: HCPCS

## 2023-09-14 PROCEDURE — 90682 RIV4 VACC RECOMBINANT DNA IM: CPT

## 2023-09-14 RX ORDER — CLONAZEPAM 0.5 MG/1
0.5 TABLET ORAL
Qty: 90 TABLET | Refills: 1 | Status: SHIPPED | OUTPATIENT
Start: 2023-09-14

## 2023-09-14 SDOH — HEALTH STABILITY - MENTAL HEALTH: OTHER PHYSICAL AND MENTAL STRAIN RELATED TO WORK: Z56.6
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
## 2023-09-14 NOTE — ASSESSMENT & PLAN NOTE
Hypothyroidism remains well controlled on current dose of levothyroxine 25 mcg once daily. Continue same.   Repeat thyroid function studies in 6 months

## 2023-09-14 NOTE — ASSESSMENT & PLAN NOTE
Longstanding history of spinal stenosis, DJD of the lumbar spine.   Would also be beneficial.  Referral to physical therapy

## 2023-09-14 NOTE — ASSESSMENT & PLAN NOTE
Remains on Cymbalta.   Refill provided of as needed clonazepam which she is normally taking at bedtime

## 2023-09-14 NOTE — ASSESSMENT & PLAN NOTE
Patient continues to watch dietary intake of carbohydrates. He has lost weight previously. Hemoglobin A1c of 5.2%.   Repeat A1c in February

## 2023-09-14 NOTE — ASSESSMENT & PLAN NOTE
Patient is following up with podiatrist.  Junito Veliz in cam walker.   Should be having repeat x-ray next month to ensure healing

## 2023-10-05 ENCOUNTER — TELEPHONE (OUTPATIENT)
Dept: PAIN MEDICINE | Facility: CLINIC | Age: 63
End: 2023-10-05

## 2023-10-09 ENCOUNTER — EVALUATION (OUTPATIENT)
Dept: PHYSICAL THERAPY | Facility: REHABILITATION | Age: 63
End: 2023-10-09
Payer: MEDICARE

## 2023-10-09 DIAGNOSIS — M96.1 POSTLAMINECTOMY SYNDROME, LUMBAR: ICD-10-CM

## 2023-10-09 DIAGNOSIS — S99.192D CLOSED FRACTURE OF BASE OF FIFTH METATARSAL BONE OF LEFT FOOT AT METAPHYSEAL-DIAPHYSEAL JUNCTION WITH ROUTINE HEALING, SUBSEQUENT ENCOUNTER: ICD-10-CM

## 2023-10-09 PROCEDURE — 97163 PT EVAL HIGH COMPLEX 45 MIN: CPT | Performed by: PHYSICAL THERAPIST

## 2023-10-09 PROCEDURE — 97110 THERAPEUTIC EXERCISES: CPT | Performed by: PHYSICAL THERAPIST

## 2023-10-09 NOTE — PROGRESS NOTES
PT Evaluation     Today's date: 10/9/2023  Patient name: Jeb Primrose  : 1960  MRN: [id number]  Referring provider: Bebeto Beltran DO  Dx:   Encounter Diagnosis     ICD-10-CM    1. Postlaminectomy syndrome, lumbar  M96.1 Ambulatory Referral to Physical Therapy     PT plan of care cert/re-cert      2. Closed fracture of base of fifth metatarsal bone of left foot at metaphyseal-diaphyseal junction with routine healing, subsequent encounter  S99.192D Ambulatory Referral to Physical Therapy     PT plan of care cert/re-cert          Start Time: 1110  Stop Time: 1200  Total time in clinic (min): 50 minutes  PT session performed by Kari Mccain, SPT - supervised by Reymundo French PT, DPT and case discussed in detail throughout session. Assessment  Assessment details: Pt presents w/ signs and symptoms consistent w/ LBP secondary to poor core stability based on subjective complaints, objective to be assessed NV. Pain seems nociceptive in nature and patient presents with MSI consistent with LS derangement and preliminary DP for extension. Current deficits are strength, pain, and activity tolerance. This is currently affecting the pt's QOL and ability to perform ADLs. Pt would benefit from skilled PT to address the above impairments. Positive prognostic indicators are the pt's age and motivation. Poor prognostic indicators are the chronicity of his symptoms. Impairments: abnormal or restricted ROM, abnormal movement, activity intolerance, impaired physical strength, lacks appropriate home exercise program, pain with function, poor posture  and poor body mechanics    Symptom irritability: moderateUnderstanding of Dx/Px/POC: good   Prognosis: good    Goals  STG 1-3 weeks  1. Pt will have an improved FOTO score. 2. Pt will not have flare ups greater than 4/10 pain. 3. Pt will be able to perform ADLs w/o pain greater than 4/10 pain. 4. Pt will be independent w/ HEP.   5. Pt will be able to drive w/o pain greater than 2/10. LTG 3-6 weeks  1. Pt will be able to perform ADLs w/ no limitations. 2. Pt will be independent w/ HEP. 3. Pt will have no symptom flare up greater than 2/10 pain. 4. Pt will be able to negotiate stairs w/o HR. 5. Pt will be able to sit upright at dinner w/o pain greater than 2/10. Plan  Plan details: 2x/6-8 weeks  Patient would benefit from: skilled physical therapy  Planned therapy interventions: abdominal trunk stabilization, activity modification, balance, balance/weight bearing training, flexibility, functional ROM exercises, home exercise program, therapeutic training, therapeutic exercise, therapeutic activities, stretching, strengthening, postural training, patient education, neuromuscular re-education, nerve gliding, manual therapy, joint mobilization and IASTM  Frequency: 2x week  Duration in weeks: 8  Treatment plan discussed with: patient        Subjective Evaluation    History of Present Illness  Mechanism of injury: Pt is a 59 y/o male presenting w/ chief complaints of chronic/insidious LBP that has been ongoing since 2009 following a lumbar spine fusion. Pt reports that he has tried opiates and epidurals to relieve his pain. He has since stopped opiates and responds well to epidurals which provide a few months of symptom relief. Pt complains of numbness going down both of his legs and into his toes after 30 min of sitting. Pt states that driving also makes it worse, particularly when making a left hand turn. Pt presents wearing a CAM boot on his L LE due to a Gorman' fracture. He states that his LBP has gotten worse following his CAM boot due to compensations of his gait. He also reports that static positions such as standing and especially sitting, exacerbate his pain. Walking typically improves his symptoms but he is currently limited due to his fracture.            Recurrent probem    Quality of life: good    Patient Goals  Patient goals for therapy: decreased pain, increased strength, independence with ADLs/IADLs and return to sport/leisure activities  Patient goal: Sitting up right. Pain  Current pain ratin  At best pain ratin  At worst pain ratin  Location: Low back. Bilateral.   Quality: dull ache and radiating  Relieving factors: rest, change in position, relaxation and medications  Aggravating factors: sitting, standing and stair climbing (Driving. Working on a countertop.)  Progression: worsening    Treatments  Previous treatment: injection treatment and medication  Current treatment: injection treatment        Objective  Posture: Flat LS and kyphosis of TS  Myotomes (L/R): L1-5 In tact bilaterally. S1-2 forgone due to WB status. Dermatome: L1-S2 In tact bilaterally     Reflexes:  (L/R) L3-4: 2+ b/l       S1: 0 b/l       GAIT:    Lumbar  % of normal   Flex. 75%   Extn. 75%   SG Left 75%   SG Right 75%   Repeated Movements  Standing:  extension= decreased, better          MMT         AROM          PROM    Hip       L       R        L           R      L     R   Flex. 4- 3+ WNL WNL     Knee         Flex. 4 4- WNL WNL     Ext. 4 4 WNL WNL     Ankle         DF 5 5 WNL WNL     Great toe 4+ 4+ WNL WNL           Comments: Check neural tension, balance, and hip assessment for NV.           Precautions: Gorman fracture, HTN      Manuals 10/9                                                                Neuro Re-Ed                                                                                                        Ther Ex 10/9            Repeated ext 2x10 against table HEP            Bike             VG             Dan's carry             STS                                                    Ther Activity                                       Gait Training                                       Modalities

## 2023-10-10 ENCOUNTER — TELEPHONE (OUTPATIENT)
Age: 63
End: 2023-10-10

## 2023-10-10 NOTE — TELEPHONE ENCOUNTER
Patient's wife called to say that patient has been on a waiting list for about 2 years now to see a psych doctor. She is inquiring if there are any other suggestions. She tried calling [facility] psych, but unable to see him unless PCP is switched to [facility]. Wife requesting to please f/u with patient with any further suggestions.     Thank You

## 2023-10-11 NOTE — TELEPHONE ENCOUNTER
I see that he is on the medical management wait list for psychiatry still. I did send this to administration. 2 years for a wait list is rather unacceptable. If he is experiencing a an immediate issue then he can be seen throughn ER where they will get him for more immediate psychiatric evaluation. Otherwise she can try calling to other psychiatry offices.   If she calls the mental health number on the back of her insurance card they may be able to give more direction

## 2023-10-13 ENCOUNTER — TELEPHONE (OUTPATIENT)
Dept: PSYCHIATRY | Facility: CLINIC | Age: 63
End: 2023-10-13

## 2023-10-13 NOTE — TELEPHONE ENCOUNTER
Behavioral Health Outpatient Intake Questions    Referred By   : PCP    Please advise interviewee that they need to answer all questions truthfully to allow for best care, and any misrepresentations of information may affect their ability to be seen at this clinic   => Was this discussed? Yes     If Minor Child (under age 25)    Who is/are the legal guardian(s) of the child? Is there a custody agreement? No     If "YES"- Custody orders must be obtained prior to scheduling the first appointment  In addition, Consent to Treatment must be signed by all legal guardians prior to scheduling the first appointment    If "NO"- Consent to Treatment must be signed by all legal guardians prior to scheduling the first appointment      [address] History -     Presenting Problem (in patient's own words): Have pretty sever depression has no one to speak with. Stays in bed for about 2-3 days. Are there any communication barriers for this patient? No                                               If yes, please describe barriers:   If there is a unique situation, please refer to [address] for final determination. Are you taking any psychiatric medications? Yes     If "YES" -What are they Cymbalta, Trazodone     If "YES" -Who prescribes? PCP    Has the Patient previously received outpatient Talk Therapy or Medication Management from [facility]  No        If "YES"- When, Where and with Whom? If "NO" -Has Patient received these services elsewhere? If "YES" -When, Where, and with Whom? Has the Patient abused alcohol or other substances in the last 6 months ? Yes  There are suspicions of alcohol abuse reported by the patient. If "YES" -What substance, How much, How often? More than 10 drinks last week      If illegal substance: [facility]    If Alcohol in excess of 10 drinks per week:  Refer to  (for [facility]) or [facility]    Legal History-     Is this treatment court ordered? No   If "yes "send to : Talk Therapy : Send to [address] for final determination   Med Management: Send to Dr Regla Reyes for final determination     Has the Patient been convicted of a felony? No   If "Yes" send to -When, What? Talk Therapy : Send to [facility] for final determination   Med Management: Send to Dr Regla Reyes for final determination     ACCEPTED as a patient No  If "Yes" Appointment Date:     Referred Elsewhere? Yes  If “Yes” - (Where? Ex: [facility], [facility], [address] [place] [address], etc.) [facility]      Name of Insurance Co:Medicare A/B  [id number] Phone #  If ins is primary or secondary? Primary  Secondary   Commercial Misc #[id number]  If patient is a minor, parents information such as Name, D. O.B of guarantor.

## 2023-10-16 ENCOUNTER — OFFICE VISIT (OUTPATIENT)
Dept: PHYSICAL THERAPY | Facility: REHABILITATION | Age: 63
End: 2023-10-16
Payer: MEDICARE

## 2023-10-16 DIAGNOSIS — S99.192D CLOSED FRACTURE OF BASE OF FIFTH METATARSAL BONE OF LEFT FOOT AT METAPHYSEAL-DIAPHYSEAL JUNCTION WITH ROUTINE HEALING, SUBSEQUENT ENCOUNTER: ICD-10-CM

## 2023-10-16 DIAGNOSIS — M96.1 POSTLAMINECTOMY SYNDROME, LUMBAR: Primary | ICD-10-CM

## 2023-10-16 PROCEDURE — 97140 MANUAL THERAPY 1/> REGIONS: CPT | Performed by: PHYSICAL THERAPIST

## 2023-10-16 PROCEDURE — 97110 THERAPEUTIC EXERCISES: CPT | Performed by: PHYSICAL THERAPIST

## 2023-10-16 PROCEDURE — 97112 NEUROMUSCULAR REEDUCATION: CPT | Performed by: PHYSICAL THERAPIST

## 2023-10-16 NOTE — PROGRESS NOTES
Daily Note     Today's date: 10/16/2023  Patient name: Lily Carter  : 1960  MRN: [id number]  Referring provider: Lul Lynn DO  Dx:   Encounter Diagnosis     ICD-10-CM    1. Postlaminectomy syndrome, lumbar  M96.1       2. Closed fracture of base of fifth metatarsal bone of left foot at metaphyseal-diaphyseal junction with routine healing, subsequent encounter  S99.192D           Start Time: 1650  Stop Time: 1730  Total time in clinic (min): 40 minutes    Subjective: Pt states that he feels the same since his evaluation. Pt mentions having bouts of vertigo that he is being medicated for. Objective: See treatment diary below. Hypomobility at right side ribs 3-10. Negative [place]-Hallpike, supine roll test, VOR. Positive VOR cancellation and saccades. Assessment: Tolerated treatment well. Pt demonstrated hypomobility along his ribs which improved with rib and thoracic spine mobilization. Followed up w/ thoracic rotation to the right. Pt continued to complain of pain when sitting which was reduced following bridges. Investigated complaints of vertigo and pt's symptoms consistent w/ vestibular hypofunction. Was given VORx1 and saccades for HEP. Patient demonstrated fatigue post treatment, exhibited good technique with therapeutic exercises, and would benefit from continued PT. HEP also updated with bridges and seated TS R rot      Plan: Continue per plan of care.       Precautions: Gorman fracture, HTN      Manuals 10/9 10/16           Rib mobilization  Grade 3-4 WM           Thoracic mobilization  Grade 3-4 WM           Assessment  5'                        Neuro Re-Ed             Vertigo Assessment  8'                                                                                         Ther Ex 10/9            Repeated ext 2x10 against table HEP            Bike             VG             Dan's carry             STS             Right thoracic rotation  2x10           Bridges  2x8 3" hold                        Ther Activity                                       Gait Training                                       Modalities

## 2023-10-17 ENCOUNTER — OFFICE VISIT (OUTPATIENT)
Age: 63
End: 2023-10-17

## 2023-10-17 ENCOUNTER — TELEPHONE (OUTPATIENT)
Dept: BEHAVIORAL/MENTAL HEALTH CLINIC | Facility: CLINIC | Age: 63
End: 2023-10-17

## 2023-10-17 VITALS — HEIGHT: 74 IN | WEIGHT: 300 LBS | BODY MASS INDEX: 38.5 KG/M2

## 2023-10-17 DIAGNOSIS — G89.29 CHRONIC BILATERAL LOW BACK PAIN WITH BILATERAL SCIATICA: ICD-10-CM

## 2023-10-17 DIAGNOSIS — M54.41 CHRONIC BILATERAL LOW BACK PAIN WITH BILATERAL SCIATICA: ICD-10-CM

## 2023-10-17 DIAGNOSIS — M54.42 CHRONIC BILATERAL LOW BACK PAIN WITH BILATERAL SCIATICA: ICD-10-CM

## 2023-10-17 DIAGNOSIS — M54.2 NECK PAIN: Primary | ICD-10-CM

## 2023-10-17 DIAGNOSIS — M79.18 MYOFASCIAL PAIN: ICD-10-CM

## 2023-10-17 PROCEDURE — 99203 OFFICE O/P NEW LOW 30 MIN: CPT | Performed by: CHIROPRACTOR

## 2023-10-17 PROCEDURE — 98941 CHIROPRACT MANJ 3-4 REGIONS: CPT | Performed by: CHIROPRACTOR

## 2023-10-17 NOTE — TELEPHONE ENCOUNTER
Attempted to contact pt regarding referral due to alcohol use. No answer. LVM providing [facility] office phone number.

## 2023-10-17 NOTE — TELEPHONE ENCOUNTER
PT contacted [facility] office in regards to referral.     Pt does not wish to establish care with [facility] program.   PT states "what I really need is a psychiatrist I do not have a problem with alcohol. I am severely depressed and I need someone to speak to."    For your review.

## 2023-10-19 ENCOUNTER — TELEPHONE (OUTPATIENT)
Dept: PSYCHIATRY | Facility: CLINIC | Age: 63
End: 2023-10-19

## 2023-10-19 ENCOUNTER — OFFICE VISIT (OUTPATIENT)
Dept: PHYSICAL THERAPY | Facility: REHABILITATION | Age: 63
End: 2023-10-19
Payer: MEDICARE

## 2023-10-19 DIAGNOSIS — S99.192D CLOSED FRACTURE OF BASE OF FIFTH METATARSAL BONE OF LEFT FOOT AT METAPHYSEAL-DIAPHYSEAL JUNCTION WITH ROUTINE HEALING, SUBSEQUENT ENCOUNTER: ICD-10-CM

## 2023-10-19 DIAGNOSIS — M96.1 POSTLAMINECTOMY SYNDROME, LUMBAR: Primary | ICD-10-CM

## 2023-10-19 PROCEDURE — 97112 NEUROMUSCULAR REEDUCATION: CPT | Performed by: PHYSICAL THERAPIST

## 2023-10-19 PROCEDURE — 97140 MANUAL THERAPY 1/> REGIONS: CPT | Performed by: PHYSICAL THERAPIST
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
## 2023-10-19 NOTE — TELEPHONE ENCOUNTER
Received [employer] from [facility] pt doeswish to establish care with [facility]. PT stated "what I really need is a psychiatrist I do not have a problem with alcohol.  I am severely depressed and I need someone to speak to."    Reached out to patient in regards to scheduling, pt scheduled to see Heather Walsh on 10/30/23 at 9:00 am.

## 2023-10-20 ENCOUNTER — TELEPHONE (OUTPATIENT)
Dept: PSYCHIATRY | Facility: CLINIC | Age: 63
End: 2023-10-20

## 2023-10-20 ENCOUNTER — OFFICE VISIT (OUTPATIENT)
Dept: OBGYN CLINIC | Facility: CLINIC | Age: 63
End: 2023-10-20

## 2023-10-20 VITALS
HEIGHT: 74 IN | DIASTOLIC BLOOD PRESSURE: 88 MMHG | WEIGHT: 300 LBS | BODY MASS INDEX: 38.5 KG/M2 | SYSTOLIC BLOOD PRESSURE: 138 MMHG | HEART RATE: 75 BPM | RESPIRATION RATE: 19 BRPM

## 2023-10-20 DIAGNOSIS — M19.011 PRIMARY OSTEOARTHRITIS OF SHOULDERS, BILATERAL: Primary | ICD-10-CM

## 2023-10-20 DIAGNOSIS — M18.11 ARTHRITIS OF CARPOMETACARPAL (CMC) JOINT OF RIGHT THUMB: ICD-10-CM

## 2023-10-20 DIAGNOSIS — M19.012 PRIMARY OSTEOARTHRITIS OF SHOULDERS, BILATERAL: Primary | ICD-10-CM

## 2023-10-20 RX ORDER — TRIAMCINOLONE ACETONIDE 40 MG/ML
40 INJECTION, SUSPENSION INTRA-ARTICULAR; INTRAMUSCULAR
Status: COMPLETED | OUTPATIENT
Start: 2023-10-20 | End: 2023-10-20

## 2023-10-20 RX ORDER — BUPIVACAINE HYDROCHLORIDE 2.5 MG/ML
4 INJECTION, SOLUTION INFILTRATION; PERINEURAL
Status: COMPLETED | OUTPATIENT
Start: 2023-10-20 | End: 2023-10-20

## 2023-10-20 RX ADMIN — TRIAMCINOLONE ACETONIDE 40 MG: 40 INJECTION, SUSPENSION INTRA-ARTICULAR; INTRAMUSCULAR at 11:45

## 2023-10-20 RX ADMIN — BUPIVACAINE HYDROCHLORIDE 4 ML: 2.5 INJECTION, SOLUTION INFILTRATION; PERINEURAL at 11:45
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
## 2023-10-23 ENCOUNTER — OFFICE VISIT (OUTPATIENT)
Dept: PHYSICAL THERAPY | Facility: REHABILITATION | Age: 63
End: 2023-10-23
Payer: MEDICARE

## 2023-10-23 DIAGNOSIS — S99.192D CLOSED FRACTURE OF BASE OF FIFTH METATARSAL BONE OF LEFT FOOT AT METAPHYSEAL-DIAPHYSEAL JUNCTION WITH ROUTINE HEALING, SUBSEQUENT ENCOUNTER: ICD-10-CM

## 2023-10-23 DIAGNOSIS — M96.1 POSTLAMINECTOMY SYNDROME, LUMBAR: Primary | ICD-10-CM

## 2023-10-23 PROCEDURE — 97140 MANUAL THERAPY 1/> REGIONS: CPT | Performed by: PHYSICAL THERAPIST

## 2023-10-23 PROCEDURE — 97110 THERAPEUTIC EXERCISES: CPT | Performed by: PHYSICAL THERAPIST
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
## 2023-10-24 ENCOUNTER — PROCEDURE VISIT (OUTPATIENT)
Age: 63
End: 2023-10-24
Payer: MEDICARE

## 2023-10-24 VITALS
WEIGHT: 300 LBS | BODY MASS INDEX: 38.5 KG/M2 | SYSTOLIC BLOOD PRESSURE: 151 MMHG | DIASTOLIC BLOOD PRESSURE: 88 MMHG | HEART RATE: 91 BPM | HEIGHT: 74 IN

## 2023-10-24 DIAGNOSIS — M79.18 MYOFASCIAL PAIN: ICD-10-CM

## 2023-10-24 DIAGNOSIS — M54.42 CHRONIC BILATERAL LOW BACK PAIN WITH BILATERAL SCIATICA: ICD-10-CM

## 2023-10-24 DIAGNOSIS — G89.29 CHRONIC BILATERAL LOW BACK PAIN WITH BILATERAL SCIATICA: ICD-10-CM

## 2023-10-24 DIAGNOSIS — M54.41 CHRONIC BILATERAL LOW BACK PAIN WITH BILATERAL SCIATICA: ICD-10-CM

## 2023-10-24 DIAGNOSIS — M54.2 NECK PAIN: Primary | ICD-10-CM

## 2023-10-24 PROCEDURE — 98941 CHIROPRACT MANJ 3-4 REGIONS: CPT | Performed by: CHIROPRACTOR

## 2023-10-25 ENCOUNTER — APPOINTMENT (OUTPATIENT)
Dept: PHYSICAL THERAPY | Facility: REHABILITATION | Age: 63
End: 2023-10-25
Payer: MEDICARE
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
## 2023-10-26 ENCOUNTER — HOSPITAL ENCOUNTER (OUTPATIENT)
Dept: RADIOLOGY | Facility: CLINIC | Age: 63
Discharge: HOME | End: 2023-10-26
Payer: MEDICARE

## 2023-10-26 VITALS
HEART RATE: 85 BPM | OXYGEN SATURATION: 93 % | SYSTOLIC BLOOD PRESSURE: 146 MMHG | TEMPERATURE: 97.4 F | DIASTOLIC BLOOD PRESSURE: 91 MMHG | RESPIRATION RATE: 18 BRPM

## 2023-10-26 DIAGNOSIS — M51.16 INTERVERTEBRAL DISC DISORDER WITH RADICULOPATHY OF LUMBAR REGION: ICD-10-CM

## 2023-10-26 PROCEDURE — 64483 NJX AA&/STRD TFRM EPI L/S 1: CPT | Performed by: ANESTHESIOLOGY

## 2023-10-26 RX ORDER — METHYLPREDNISOLONE ACETATE 80 MG/ML
80 INJECTION, SUSPENSION INTRA-ARTICULAR; INTRALESIONAL; INTRAMUSCULAR; PARENTERAL; SOFT TISSUE ONCE
Status: COMPLETED | OUTPATIENT
Start: 2023-10-26 | End: 2023-10-26

## 2023-10-26 RX ORDER — 0.9 % SODIUM CHLORIDE 0.9 %
4 VIAL (ML) INJECTION ONCE
Status: COMPLETED | OUTPATIENT
Start: 2023-10-26 | End: 2023-10-26

## 2023-10-26 RX ORDER — BUPIVACAINE HCL/PF 2.5 MG/ML
2 VIAL (ML) INJECTION ONCE
Status: COMPLETED | OUTPATIENT
Start: 2023-10-26 | End: 2023-10-26

## 2023-10-26 RX ADMIN — Medication 4 ML: at 14:03

## 2023-10-26 RX ADMIN — IOHEXOL 1 ML: 300 INJECTION, SOLUTION INTRAVENOUS at 14:03

## 2023-10-26 RX ADMIN — METHYLPREDNISOLONE ACETATE 80 MG: 80 INJECTION, SUSPENSION INTRA-ARTICULAR; INTRALESIONAL; INTRAMUSCULAR; PARENTERAL; SOFT TISSUE at 14:03

## 2023-10-26 RX ADMIN — Medication 4 ML: at 14:04

## 2023-10-26 RX ADMIN — BUPIVACAINE HYDROCHLORIDE 2 ML: 2.5 INJECTION, SOLUTION EPIDURAL; INFILTRATION; INTRACAUDAL at 14:03

## 2023-10-26 NOTE — DISCHARGE INSTR - LAB
Epidural Steroid Injection   WHAT YOU NEED TO KNOW:   An epidural steroid injection (ALEXANDRA) is a procedure to inject steroid medicine into the epidural space. The epidural space is between your spinal cord and vertebrae. Steroids reduce inflammation and fluid buildup in your spine that may be causing pain. You may be given pain medicine along with the steroids. ACTIVITY  Do not drive or operate machinery today. No strenuous activity today - bending, lifting, etc.  You may resume normal activites starting tomorrow - start slowly and as tolerated. You may shower today, but no tub baths or hot tubs. You may have numbness for several hours from the local anesthetic. Please use caution and common sense, especially with weight-bearing activities. CARE OF THE INJECTION SITE  If you have soreness or pain, apply ice to the area today (20 minutes on/20 minutes off). Starting tomorrow, you may use warm, moist heat or ice if needed. You may have an increase or change in your discomfort for 36-48 hours after your treatment. Apply ice and continue with any pain medication you have been prescribed. Notify the [facility] if you have any of the following: redness, drainage, swelling, headache, stiff neck or fever above 100°F.    SPECIAL INSTRUCTIONS  Our office will contact you in approximately 7 days for a progress report. MEDICATIONS  Continue to take all routine medications. Our office may have instructed you to hold some medications. As no general anesthesia was used in today's procedure, you should not experience any side effects related to anesthesia. If you are diabetic, the steroids used in today's injection may temporarily increase your blood sugar levels after the first few days after your injection. Please keep a close eye on your sugars and alert the doctor who manages your diabetes if your sugars are significantly high from your baseline or you are symptomatic.      If you have a problem specifically related to your procedure, please call our office at [phone]. Problems not related to your procedure should be directed to your primary care physician.
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
## 2023-10-27 ENCOUNTER — TELEPHONE (OUTPATIENT)
Dept: PSYCHIATRY | Facility: CLINIC | Age: 63
End: 2023-10-27

## 2023-10-27 NOTE — TELEPHONE ENCOUNTER
Writer contacted patient to remind him of his upcoming np apt on [email or web address] am. Patient confirmed he will be there and will arrive 15 early.

## 2023-10-30 ENCOUNTER — SOCIAL WORK (OUTPATIENT)
Dept: BEHAVIORAL/MENTAL HEALTH CLINIC | Facility: CLINIC | Age: 63
End: 2023-10-30
Payer: MEDICARE

## 2023-10-30 ENCOUNTER — OFFICE VISIT (OUTPATIENT)
Dept: PHYSICAL THERAPY | Facility: REHABILITATION | Age: 63
End: 2023-10-30
Payer: MEDICARE

## 2023-10-30 DIAGNOSIS — F41.1 GAD (GENERALIZED ANXIETY DISORDER): ICD-10-CM

## 2023-10-30 DIAGNOSIS — S99.192D CLOSED FRACTURE OF BASE OF FIFTH METATARSAL BONE OF LEFT FOOT AT METAPHYSEAL-DIAPHYSEAL JUNCTION WITH ROUTINE HEALING, SUBSEQUENT ENCOUNTER: ICD-10-CM

## 2023-10-30 DIAGNOSIS — M96.1 POSTLAMINECTOMY SYNDROME, LUMBAR: Primary | ICD-10-CM

## 2023-10-30 DIAGNOSIS — F10.90 ALCOHOL USE DISORDER: ICD-10-CM

## 2023-10-30 DIAGNOSIS — F33.2 MAJOR DEPRESSIVE DISORDER, RECURRENT SEVERE WITHOUT PSYCHOTIC FEATURES (HCC): Primary | ICD-10-CM

## 2023-10-30 PROCEDURE — 97110 THERAPEUTIC EXERCISES: CPT | Performed by: PHYSICAL THERAPIST

## 2023-10-30 PROCEDURE — 90791 PSYCH DIAGNOSTIC EVALUATION: CPT | Performed by: SOCIAL WORKER

## 2023-10-30 PROCEDURE — 97140 MANUAL THERAPY 1/> REGIONS: CPT | Performed by: PHYSICAL THERAPIST

## 2023-10-30 PROCEDURE — 97112 NEUROMUSCULAR REEDUCATION: CPT | Performed by: PHYSICAL THERAPIST
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
## 2023-10-30 NOTE — PSYCH
Behavioral Health Psychotherapy Assessment    Date of Initial Psychotherapy Assessment: 10/30/23  Referral Source: self and my PCP  Has a release of information been signed for the referral source? No    Preferred Name: [place]  Preferred Pronouns: He/him  YOB: 1960 Age: 60 y.o. Sex assigned at birth: male   Gender Identity: male  Race:   Preferred Language: English    Emergency Contact:  Full Name: Alexandro Christy  Relationship to Client: wife  Contact information: in phone    Primary Care Physician:  Duyen Mills, DO  [address]  [place]  [phone]  Has a release of information been signed? No    Physical Health History:  Past surgical procedures: see medical chart, L5 fusion and in lots of pain  Do you have a history of any of the following: heart/cardiac issues and thyroid disease  Do you have any mobility issues? Tripped and broke bones in his left foot    Relevant Family History  Mom had cancer,alcoholism and drug abuse on her side. 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  and I was a big deal. They resized and they cut costs. I have been unemployed since . I lived on savings and now disability. I was depressed badly where I couldn't even do paperwork. I was drinking a lot. I was on prescribed opiates for 10 years. After I stopped the opiates I drank more. I had my hip replaced. I am limited to pain relief. Alcohol helps but I know it is not a good solution. I have the medical marijuana card. I have to lay down a lot. My 80year old mother in law with dementia moved in, my daughter is now a son but expects me to understand everything, My wife is focused on my son and mother in law. My siblings dwell on negatives and they are a downer. I feel I only have my dad.      Mental Health Advance Directive:  Do you currently have a Mental Health Advance Directive? no    Diagnosis:  Depression, anxiety, alcohol use disorder    Initial Assessment:     Current Mental Status:    Appearance: casual      Behavior/Manner: cooperative      Affect/Mood:  Blue and sad    Speech:  Normal    Sleep: Insomnia    Oriented to: oriented to self, oriented to place and oriented to time       Clinical Symptoms    Depression: yes      Anxiety: yes      Depression Symptoms: depressed mood, restlessness, serious loss of interest in things, suicidal ideation, excessive crying, social isolation, fatigue, indecision, poor concentration, sleep disturbance, insomnia, decreased libido and irritable      Anxiety Symptoms: excessive worry, fatigues easily, irritable, diaphoresis, nervous/anxious, difficulty controlling worry, restlessness and chills      Have you ever been assaultive to others or the environment: No      Have you ever been self-injurious: No      Counseling History:  Previous Counseling or Treatment  (Mental Health or Drug & Alcohol): No    Have you previously taken psychiatric medications: Yes    Previous Medications Attempted: Thru family doc what is currently on. Suicide Risk Assessment  Have you ever had a suicide attempt: No    Have you had incidents of suicidal ideation: Yes    Are you currently experiencing suicidal thoughts: No    Additional Suicide Risk Information:  N/a    Substance Abuse/Addiction Assessment:  Alcohol: Yes    Age of First Use:  Teens  Age of regular use:  10 years on opiates did not drink after 2017 I had a hip replacementthen after that started the drinking.   Frequency:  Other  Other frequency:  See under amount  Amount:  He claims he will buy a bottle of vodka drinks it in 5 days and then does not use for 3 to 4 weeks  Last use:  3 weeks ago  Have you experienced blackouts as a result of substance use: No    Have you had any periods of abstinence: Yes    Additional Abstinence information:  As mentioned would drink for 5 days then nothing for 3 to 4 weeks  Have you experienced symptoms of withdrawal: No    Have you ever overdosed on any substances?: No    Are you currently using any Medication Assisted Treatment for Substance Use: No      Compulsive Behaviors:  Compulsive Behaviors:  Pornography  Compulsive Behavior Information:  Views porn since no sex with wife. Disordered Eating History:  Do you have a history of disordered eating: No      Social Determinants of Health:    SDOH:  Social isolation, unemployment/underemployment, stress and addiction    Trauma and Abuse History:    Have you ever been abused: No      Legal History:    Have you ever been arrested  or had a DUI: No      Have you been incarcerated: No      Are you currently on parole/probation: No      Any current Children and Youth involvement: No      Any pending legal charges: No      Relationship History:    Current marital status:       Relationship History:  My wife is my support however she is focused on her mom and now my trans male daughter. Also friends have dies and I have no one to call. Employment History    Are you currently employed: No      Currently seeking employment: No      Longest period of employment:  [employer]. . [employer]    Future work goals:   On disability    Sources of income/financial support:  Social Security Disability (SSDI)     History:      Status: no history of [address] duty  Educational History:     Have you ever been diagnosed with a learning disability: No      Highest level of education:  Associates Degree    Have you ever had an IEP or 504-plan: No      Do you need assistance with reading or writing: No      Recommended Treatment:     Psychotherapy:  Individual sessions    Frequency:  2 times    Session frequency:  Monthly    Visit start and stop times:    10/30/23  Start Time: 0900  Stop Time: 1000  Total Visit Time: 60 minutes
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
## 2023-10-30 NOTE — BH TREATMENT PLAN
Outpatient Behavioral Health Psychotherapy Treatment Plan    [facility]  1960     Date of Initial Psychotherapy Assessment: 10/30/2023  Date of Current Treatment Plan: 10/30/23  Treatment Plan Target Date: 04/25/2024  Treatment Plan Expiration Date: 04/25/2024    Diagnosis:   Depression, anxiety and alcohol use disorder      Area(s) of Need: please see below    Long Term Goal 1 (in the client's own words): I need to manage my depression and my anxiety    Stage of Change: Preparation    Target Date for completion: 04/25/2024     Anticipated therapeutic modalities mindfulness and CBT     People identified to complete this goal: self with the help of my therapist      Objective 1: (identify the means of measuring success in meeting the objective): I will be able to maintain my anxiety and my depression at a minimum level. Objective 2: (identify the means of measuring success in meeting the objective): Long Term Goal 2 (in the client's own words): I need to increase my motivation    Stage of Change: Preparation    Target Date for completion: 04/25/2024     Anticipated therapeutic modalities: mindfulness and CBT     People identified to complete this goal: myself with the help of my therapist.      Objective 1: (identify the means of measuring success in meeting the objective): I will be able to get paperwork done      Objective 2: (identify the means of measuring success in meeting the objective): n/a     Long Term Goal 3 (in the client's own words): I need to deal with my mother in law and my trans male son. Stage of Change: Preparation    Target Date for completion: 04/25/2024     Anticipated therapeutic modalities: mindfulness and CBt     People identified to complete this goal: myself with the help of my therapist      Objective 1: (identify the means of measuring success in meeting the objective):  I will learn to cope with all the issues in his house and deal with walking on eggshells with his son. Objective 2: (identify the means of measuring success in meeting the objective): n/a     I am currently under the care of a [facility] psychiatric provider: yes    My [facility] psychiatric provider is: to be assigned. I am currently taking psychiatric medications: Yes, as prescribed per PCP    I feel that I will be ready for discharge from mental health care when I reach the following (measurable goal/objective): when my symptoms are at a minimum. For children and adults who have a legal guardian:   Has there been any change to custody orders and/or guardianship status? NA. If yes, attach updated documentation. I will create my Crisis Plan and have been offered a copy of this plan    [address]: Diagnosis and Treatment Plan explained to Ruth Mcfarlandal acknowledges an understanding of their diagnosis. Jeff Diallo agrees to this treatment plan.     I have been offered a copy of this Treatment Plan. yes

## 2023-10-31 ENCOUNTER — PROCEDURE VISIT (OUTPATIENT)
Age: 63
End: 2023-10-31
Payer: MEDICARE

## 2023-10-31 VITALS — WEIGHT: 300 LBS | BODY MASS INDEX: 38.5 KG/M2 | HEIGHT: 74 IN

## 2023-10-31 DIAGNOSIS — M54.42 CHRONIC BILATERAL LOW BACK PAIN WITH BILATERAL SCIATICA: ICD-10-CM

## 2023-10-31 DIAGNOSIS — G89.29 CHRONIC BILATERAL LOW BACK PAIN WITH BILATERAL SCIATICA: ICD-10-CM

## 2023-10-31 DIAGNOSIS — M79.18 MYOFASCIAL PAIN: ICD-10-CM

## 2023-10-31 DIAGNOSIS — M54.2 NECK PAIN: Primary | ICD-10-CM

## 2023-10-31 DIAGNOSIS — M54.41 CHRONIC BILATERAL LOW BACK PAIN WITH BILATERAL SCIATICA: ICD-10-CM

## 2023-10-31 PROCEDURE — 98941 CHIROPRACT MANJ 3-4 REGIONS: CPT | Performed by: CHIROPRACTOR

## 2023-10-31 NOTE — PROGRESS NOTES
Date of first visit: 10/17/2023      HPI:  John Shaw returns for treatment today for low back pain right-sided predominance as well as neck pain into the right shoulder blade. He has had epidural injection last week with good improvement reported this week. Low back pain is definitely decreased. Still continues have some rather sharp pain neck into the right shoulder blade area. No other changes. The following portions of the patient's history were reviewed and updated as appropriate: allergies, current medications, past family history, past medical history, past social history, past surgical history, and problem list.    Review of Systems    Physical Exam:  Exam reveals a pelvic obliquity elevated left versus right innominate there is a leg length inequality there is biomechanical joint dysfunction present of the right SI and L5-S1 motion unit right-sided erector spine a and quadratus lumborum hypertonicity noted. Myofascial involvement right periscapular region decreased cervical range of motion in extension right lateral bending right rotation. Assessment:   Diagnosis ICD-10-CM Associated Orders   1. Neck pain  M54.2       2. Myofascial pain  M79.18       3. Chronic bilateral low back pain with bilateral sciatica  M54.42     M54.41     G89.29                 Treatment: 86245  Manipulation to the right innominate, sacrum, L5 via Camarena drop maneuver well-tolerated. Pretreat GT neck upper back. Discussion:  He will continue icing 15 minutes per application daily stretching we will see him back for follow-up.

## 2023-11-01 ENCOUNTER — RA CDI HCC (OUTPATIENT)
Dept: OTHER | Facility: HOSPITAL | Age: 63
End: 2023-11-01

## 2023-11-01 ENCOUNTER — OFFICE VISIT (OUTPATIENT)
Dept: PHYSICAL THERAPY | Facility: REHABILITATION | Age: 63
End: 2023-11-01
Payer: MEDICARE

## 2023-11-01 DIAGNOSIS — M96.1 POSTLAMINECTOMY SYNDROME, LUMBAR: Primary | ICD-10-CM

## 2023-11-01 DIAGNOSIS — S99.192D CLOSED FRACTURE OF BASE OF FIFTH METATARSAL BONE OF LEFT FOOT AT METAPHYSEAL-DIAPHYSEAL JUNCTION WITH ROUTINE HEALING, SUBSEQUENT ENCOUNTER: ICD-10-CM

## 2023-11-01 PROCEDURE — 97140 MANUAL THERAPY 1/> REGIONS: CPT | Performed by: PHYSICAL THERAPIST

## 2023-11-01 PROCEDURE — 97110 THERAPEUTIC EXERCISES: CPT | Performed by: PHYSICAL THERAPIST

## 2023-11-01 NOTE — PROGRESS NOTES
Daily Note     Today's date: 2023  Patient name: Abhishek Cerda  : 1960  MRN: [id number]  Referring provider: Elis Mendosa DO  Dx: No diagnosis found. Start Time: 1315  Stop Time: 1400  Total time in clinic (min): 45 minutes    Subjective: Pt reports feeling well. He states his back feels good but his legs are a bit sore following the last session. Objective: See treatment diary below      Assessment: Tolerated treatment well. Pt continues to progress as expected. Continues having improvements w/ mobilizations. Pt tolerated strengthening exercises well but demonstrates deconditioning. Pt terminated suitcase carries due to back pain which was resolved after lying supine. Continue working w/ pt to improve strength, motor control, and conditioning. Continue progressing pt as he can tolerate. Patient demonstrated fatigue post treatment, exhibited good technique with therapeutic exercises, and would benefit from continued PT      Plan: Continue per plan of care.       Precautions: Gorman fracture, HTN      Manuals 10/9 10/16 10/19 10/23 10/30 11/1       Rib mobilization  Grade 3-4 WM G3-4 AB G 3-4 WM         Thoracic mobilization  Grade 3-4 WM G3-4 AB G 3-4 WM         Assessment  5' 5' 6'         Breaking bread     G 3-4 WM G3-4 WM       UL PA L 3-4 L   G3-4 G 3-4 WM G3-4 WM G 3-4 WM       Neuro Re-Ed             Vertigo Assessment  8'                                                                                         Ther Ex 10/9            Repeated ext 2x10 against table HEP            Bike             VG   L7-5' L7 6' L7 6' L7 6'       Farmer's carry             STS             Right thoracic rotation  2x10           Bridges  2x8 3" hold 3x8 3" 2x10 3"  W/ belt push out 2x10 3"       Deadbug     Modified legs only march 2x20 Modified legs only march 2x20       Palloff press   OJB  3"x5  2 rds ea side   OJB 2x10x3" each        Suitcase carry      20 lb kb 2 laps       Ther Activity Gait Training                                       Modalities

## 2023-11-02 ENCOUNTER — TELEPHONE (OUTPATIENT)
Dept: RADIOLOGY | Facility: MEDICAL CENTER | Age: 63
End: 2023-11-02

## 2023-11-03 ENCOUNTER — OFFICE VISIT (OUTPATIENT)
Dept: FAMILY MEDICINE CLINIC | Facility: CLINIC | Age: 63
End: 2023-11-03
Payer: MEDICARE

## 2023-11-03 ENCOUNTER — TELEPHONE (OUTPATIENT)
Dept: PSYCHIATRY | Facility: CLINIC | Age: 63
End: 2023-11-03

## 2023-11-03 VITALS
HEART RATE: 82 BPM | WEIGHT: 300 LBS | OXYGEN SATURATION: 98 % | DIASTOLIC BLOOD PRESSURE: 90 MMHG | BODY MASS INDEX: 38.5 KG/M2 | HEIGHT: 74 IN | SYSTOLIC BLOOD PRESSURE: 160 MMHG

## 2023-11-03 DIAGNOSIS — F41.1 GENERALIZED ANXIETY DISORDER: ICD-10-CM

## 2023-11-03 DIAGNOSIS — M51.16 INTERVERTEBRAL DISC DISORDER WITH RADICULOPATHY OF LUMBAR REGION: ICD-10-CM

## 2023-11-03 DIAGNOSIS — Z56.6 WORK-RELATED STRESS: ICD-10-CM

## 2023-11-03 DIAGNOSIS — I10 ESSENTIAL HYPERTENSION: ICD-10-CM

## 2023-11-03 DIAGNOSIS — Z00.00 WELCOME TO MEDICARE PREVENTIVE VISIT: Primary | ICD-10-CM

## 2023-11-03 DIAGNOSIS — F33.9 EPISODE OF RECURRENT MAJOR DEPRESSIVE DISORDER, UNSPECIFIED DEPRESSION EPISODE SEVERITY (HCC): ICD-10-CM

## 2023-11-03 DIAGNOSIS — N52.1 ERECTILE DISORDER DUE TO MEDICAL CONDITION IN MALE PATIENT: ICD-10-CM

## 2023-11-03 PROBLEM — D69.6 THROMBOCYTOPENIA (HCC): Status: RESOLVED | Noted: 2021-11-04 | Resolved: 2023-11-03

## 2023-11-03 PROCEDURE — G0402 INITIAL PREVENTIVE EXAM: HCPCS | Performed by: FAMILY MEDICINE

## 2023-11-03 RX ORDER — DULOXETINE 40 MG/1
40 CAPSULE, DELAYED RELEASE ORAL DAILY
Qty: 90 CAPSULE | Refills: 1 | Status: SHIPPED | OUTPATIENT
Start: 2023-11-03

## 2023-11-03 RX ORDER — SILDENAFIL 25 MG/1
25 TABLET, FILM COATED ORAL DAILY PRN
Qty: 10 TABLET | Refills: 0 | Status: SHIPPED | OUTPATIENT
Start: 2023-11-03

## 2023-11-03 SDOH — HEALTH STABILITY - MENTAL HEALTH: OTHER PHYSICAL AND MENTAL STRAIN RELATED TO WORK: Z56.6
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
## 2023-11-03 NOTE — ASSESSMENT & PLAN NOTE
Patient recently had successful epidural steroid injection with pain management.   Follow-up with pain management as scheduled

## 2023-11-03 NOTE — ASSESSMENT & PLAN NOTE
Hypertension remains adequately controlled on amlodipine and Bystolic.   Reevaluate at follow-up appointment in 3 months

## 2023-11-03 NOTE — ASSESSMENT & PLAN NOTE
We will try placing the patient on low-dose of Viagra 25 mg to take as needed.   This is not covered by insurance and he was made aware of this

## 2023-11-03 NOTE — ASSESSMENT & PLAN NOTE
Welcome to Medicare preventative physical examination performed    Advance care planning documents discussed and provided    -Patient is current with flu vaccination, pneumococcal pneumonia vaccination and is eligible for shingles vaccination.   Shingles vaccination would need to be done at pharmacy

## 2023-11-03 NOTE — PATIENT INSTRUCTIONS
Medicare Preventive Visit Patient Instructions  Thank you for completing your Welcome to Medicare Visit or Medicare Annual Wellness Visit today. Your next wellness visit will be due in one year (11/3/2024). The screening/preventive services that you may require over the next 5-10 years are detailed below. Some tests may not apply to you based off risk factors and/or age. Screening tests ordered at today's visit but not completed yet may show as past due. Also, please note that scanned in results may not display below. Preventive Screenings:  Service Recommendations Previous Testing/Comments   Colorectal Cancer Screening  Colonoscopy    Fecal Occult Blood Test (FOBT)/Fecal Immunochemical Test (FIT)  Fecal DNA/Cologuard Test  Flexible Sigmoidoscopy Age: 43-73 years old   Colonoscopy: every 10 years (May be performed more frequently if at higher risk)  OR  FOBT/FIT: every 1 year  OR  Cologuard: every 3 years  OR  Sigmoidoscopy: every 5 years  Screening may be recommended earlier than age 39 if at higher risk for colorectal cancer. Also, an individualized decision between you and your healthcare provider will decide whether screening between the ages of 77-80 would be appropriate.  Colonoscopy: 11/12/2021  FOBT/FIT: Not on file  Cologuard: Not on file  Sigmoidoscopy: Not on file    Screening Current     Prostate Cancer Screening Individualized decision between patient and health care provider in men between ages of 53-66   Medicare will cover every 12 months beginning on the day after your 50th birthday PSA: 0.51 ng/mL     Screening Current     Hepatitis C Screening Once for adults born between 1945 and 1965  More frequently in patients at high risk for Hepatitis C Hep C Antibody: 02/21/2020    Screening Current   Diabetes Screening 1-2 times per year if you're at risk for diabetes or have pre-diabetes Fasting glucose: No results in last 5 years (No results in last 5 years)  A1C: 5.2 % of total Hgb (9/6/2023)  Screening Current   Cholesterol Screening Once every 5 years if you don't have a lipid disorder. May order more often based on risk factors. Lipid panel: 09/06/2023  Screening Not Indicated  History Lipid Disorder      Other Preventive Screenings Covered by Medicare:  Abdominal Aortic Aneurysm (AAA) Screening: covered once if your at risk. You're considered to be at risk if you have a family history of AAA or a male between the age of 70-76 who smoking at least 100 cigarettes in your lifetime. Lung Cancer Screening: covers low dose CT scan once per year if you meet all of the following conditions: (1) Age 48-67; (2) No signs or symptoms of lung cancer; (3) Current smoker or have quit smoking within the last 15 years; (4) You have a tobacco smoking history of at least 20 pack years (packs per day x number of years you smoked); (5) You get a written order from a healthcare provider. Glaucoma Screening: covered annually if you're considered high risk: (1) You have diabetes OR (2) Family history of glaucoma OR (3)  aged 48 and older OR (3)  American aged 72 and older  Osteoporosis Screening: covered every 2 years if you meet one of the following conditions: (1) Have a vertebral abnormality; (2) On glucocorticoid therapy for more than 3 months; (3) Have primary hyperparathyroidism; (4) On osteoporosis medications and need to assess response to drug therapy. HIV Screening: covered annually if you're between the age of 14-79. Also covered annually if you are younger than 13 and older than 72 with risk factors for HIV infection. For pregnant patients, it is covered up to 3 times per pregnancy.     Immunizations:  Immunization Recommendations   Influenza Vaccine Annual influenza vaccination during flu season is recommended for all persons aged >= 6 months who do not have contraindications   Pneumococcal Vaccine   * Pneumococcal conjugate vaccine = PCV13 (Prevnar 13), PCV15 (Vaxneuvance), PCV20 (Prevnar 20)  * Pneumococcal polysaccharide vaccine = PPSV23 (Pneumovax) Adults 53-06 yo with certain risk factors or if 69+ yo  If never received any pneumonia vaccine: recommend Prevnar 20 (PCV20)  Give PCV20 if previously received 1 dose of PCV13 or PPSV23   Hepatitis B Vaccine 3 dose series if at intermediate or high risk (ex: diabetes, end stage renal disease, liver disease)   Respiratory syncytial virus (RSV) Vaccine - COVERED BY MEDICARE PART D  * RSVPreF3 (Arexvy) CDC recommends that adults 61years of age and older may receive a single dose of RSV vaccine using shared clinical decision-making (SCDM)   Tetanus (Td) Vaccine - COST NOT COVERED BY MEDICARE PART B Following completion of primary series, a booster dose should be given every 10 years to maintain immunity against tetanus. Td may also be given as tetanus wound prophylaxis. Tdap Vaccine - COST NOT COVERED BY MEDICARE PART B Recommended at least once for all adults. For pregnant patients, recommended with each pregnancy. Shingles Vaccine (Shingrix) - COST NOT COVERED BY MEDICARE PART B  2 shot series recommended in those 23 years and older who have or will have weakened immune systems or those 50 years and older     Health Maintenance Due:      Topic Date Due   • HIV Screening  03/20/2083 (Originally 12/10/1975)   • Colorectal Cancer Screening  11/11/2026   • Hepatitis C Screening  Completed     Immunizations Due:      Topic Date Due   • COVID-19 Vaccine (3 - Pfizer series) 07/22/2021   • Pneumococcal Vaccine: Pediatrics (0 to 5 Years) and At-Risk Patients (6 to 59 Years) (2 - PPSV23 if available, else PCV20) 05/02/2022     Advance Directives   What are advance directives? Advance directives are legal documents that state your wishes and plans for medical care. These plans are made ahead of time in case you lose your ability to make decisions for yourself.  Advance directives can apply to any medical decision, such as the treatments you want, and if you want to donate organs. What are the types of advance directives? There are many types of advance directives, and each state has rules about how to use them. You may choose a combination of any of the following:  Living will: This is a written record of the treatment you want. You can also choose which treatments you do not want, which to limit, and which to stop at a certain time. This includes surgery, medicine, IV fluid, and tube feedings. Durable power of  for healthcare [facility]): This is a written record that states who you want to make healthcare choices for you when you are unable to make them for yourself. This person, called a proxy, is usually a family member or a friend. You may choose more than 1 proxy. Do not resuscitate (DNR) order:  A DNR order is used in case your heart stops beating or you stop breathing. It is a request not to have certain forms of treatment, such as CPR. A DNR order may be included in other types of advance directives. Medical directive: This covers the care that you want if you are in a coma, near death, or unable to make decisions for yourself. You can list the treatments you want for each condition. Treatment may include pain medicine, surgery, blood transfusions, dialysis, IV or tube feedings, and a ventilator (breathing machine). Values history: This document has questions about your views, beliefs, and how you feel and think about life. This information can help others choose the care that you would choose. Why are advance directives important? An advance directive helps you control your care. Although spoken wishes may be used, it is better to have your wishes written down. Spoken wishes can be misunderstood, or not followed. Treatments may be given even if you do not want them. An advance directive may make it easier for your family to make difficult choices about your care.    Weight Management   Why it is important to manage your weight:  Being overweight increases your risk of health conditions such as heart disease, high blood pressure, type 2 diabetes, and certain types of cancer. It can also increase your risk for osteoarthritis, sleep apnea, and other respiratory problems. Aim for a slow, steady weight loss. Even a small amount of weight loss can lower your risk of health problems. How to lose weight safely:  A safe and healthy way to lose weight is to eat fewer calories and get regular exercise. You can lose up about 1 pound a week by decreasing the number of calories you eat by 500 calories each day. Healthy meal plan for weight management:  A healthy meal plan includes a variety of foods, contains fewer calories, and helps you stay healthy. A healthy meal plan includes the following:  Eat whole-grain foods more often. A healthy meal plan should contain fiber. Fiber is the part of grains, fruits, and vegetables that is not broken down by your body. Whole-grain foods are healthy and provide extra fiber in your diet. Some examples of whole-grain foods are whole-wheat breads and pastas, oatmeal, brown rice, and bulgur. Eat a variety of vegetables every day. Include dark, leafy greens such as spinach, kale, zoe greens, and mustard greens. Eat yellow and orange vegetables such as carrots, sweet potatoes, and winter squash. Eat a variety of fruits every day. Choose fresh or canned fruit (canned in its own juice or light syrup) instead of juice. Fruit juice has very little or no fiber. Eat low-fat dairy foods. Drink fat-free (skim) milk or 1% milk. Eat fat-free yogurt and low-fat cottage cheese. Try low-fat cheeses such as mozzarella and other reduced-fat cheeses. Choose meat and other protein foods that are low in fat. Choose beans or other legumes such as split peas or lentils. Choose fish, skinless poultry (chicken or turkey), or lean cuts of red meat (beef or pork).  Before you cook meat or poultry, cut off any visible fat.   Use less fat and oil. Try baking foods instead of frying them. Add less fat, such as margarine, sour cream, regular salad dressing and mayonnaise to foods. Eat fewer high-fat foods. Some examples of high-fat foods include french fries, doughnuts, ice cream, and cakes. Eat fewer sweets. Limit foods and drinks that are high in sugar. This includes candy, cookies, regular soda, and sweetened drinks. Exercise:  Exercise at least 30 minutes per day on most days of the week. Some examples of exercise include walking, biking, dancing, and swimming. You can also fit in more physical activity by taking the stairs instead of the elevator or parking farther away from stores. Ask your healthcare provider about the best exercise plan for you. © Copyright [employer] 2018 Information is for End User's use only and may not be sold, redistributed or otherwise used for commercial purposes.  All illustrations and images included in CareNotes® are the copyrighted property of [employer] Inc[employer] or [address] [place] [address]

## 2023-11-03 NOTE — ASSESSMENT & PLAN NOTE
Patient was finally able to get in with [facility] and did see LCSW on October 30. They did review medications. I cannot see psychiatry notes. Patient does have significant stressors at home including elderly mother-in-law, transgender child who has his own mental health issues, patient has been in chronic pain due to back.   Has been out of work

## 2023-11-06 ENCOUNTER — APPOINTMENT (OUTPATIENT)
Dept: PHYSICAL THERAPY | Facility: REHABILITATION | Age: 63
End: 2023-11-06
Payer: MEDICARE

## 2023-11-07 ENCOUNTER — PROCEDURE VISIT (OUTPATIENT)
Age: 63
End: 2023-11-07
Payer: MEDICARE

## 2023-11-07 VITALS
HEIGHT: 74 IN | BODY MASS INDEX: 38.5 KG/M2 | DIASTOLIC BLOOD PRESSURE: 92 MMHG | WEIGHT: 300 LBS | SYSTOLIC BLOOD PRESSURE: 146 MMHG

## 2023-11-07 DIAGNOSIS — M54.41 CHRONIC BILATERAL LOW BACK PAIN WITH BILATERAL SCIATICA: ICD-10-CM

## 2023-11-07 DIAGNOSIS — G89.29 CHRONIC BILATERAL LOW BACK PAIN WITH BILATERAL SCIATICA: ICD-10-CM

## 2023-11-07 DIAGNOSIS — M54.2 NECK PAIN: Primary | ICD-10-CM

## 2023-11-07 DIAGNOSIS — M79.18 MYOFASCIAL PAIN: ICD-10-CM

## 2023-11-07 DIAGNOSIS — M54.42 CHRONIC BILATERAL LOW BACK PAIN WITH BILATERAL SCIATICA: ICD-10-CM

## 2023-11-07 PROCEDURE — 98941 CHIROPRACT MANJ 3-4 REGIONS: CPT | Performed by: CHIROPRACTOR

## 2023-11-07 NOTE — PROGRESS NOTES
Date of first visit: 10/17/2023      HPI:  Adán Crawford returns for treatment today for low back pain right-sided predominance as well as neck pain into the right shoulder blade. Low back pain is definitely decreased. Neck is feeling better as well. The following portions of the patient's history were reviewed and updated as appropriate: allergies, current medications, past family history, past medical history, past social history, past surgical history, and problem list.    Review of Systems    Physical Exam:  Exam reveals a pelvic obliquity elevated left versus right innominate there is a leg length inequality there is biomechanical joint dysfunction present of the right SI and L5-S1 motion unit right-sided erector spine a and quadratus lumborum hypertonicity noted. Myofascial involvement right periscapular region decreased cervical range of motion in extension right lateral bending right rotation. Assessment:   Diagnosis ICD-10-CM Associated Orders   1. Neck pain  M54.2       2. Myofascial pain  M79.18       3. Chronic bilateral low back pain with bilateral sciatica  M54.42     M54.41     G89.29                 Treatment: 12663  Manipulation to the right innominate, sacrum, L5 via Camarena drop maneuver well-tolerated. Pretreat GT neck upper back. Discussion:  He will continue icing 15 minutes per application daily stretching we will see him back for follow-up.

## 2023-11-08 ENCOUNTER — OFFICE VISIT (OUTPATIENT)
Dept: PHYSICAL THERAPY | Facility: REHABILITATION | Age: 63
End: 2023-11-08
Payer: MEDICARE

## 2023-11-08 DIAGNOSIS — S99.192D CLOSED FRACTURE OF BASE OF FIFTH METATARSAL BONE OF LEFT FOOT AT METAPHYSEAL-DIAPHYSEAL JUNCTION WITH ROUTINE HEALING, SUBSEQUENT ENCOUNTER: ICD-10-CM

## 2023-11-08 DIAGNOSIS — M96.1 POSTLAMINECTOMY SYNDROME, LUMBAR: Primary | ICD-10-CM

## 2023-11-08 PROCEDURE — 97140 MANUAL THERAPY 1/> REGIONS: CPT | Performed by: PHYSICAL THERAPIST

## 2023-11-08 PROCEDURE — 97110 THERAPEUTIC EXERCISES: CPT | Performed by: PHYSICAL THERAPIST

## 2023-11-08 NOTE — PROGRESS NOTES
Daily Note     Today's date: 2023  Patient name: Marjorie Salvador  : 1960  MRN: [id number]  Referring provider: Cathy Starr DO  Dx:   Encounter Diagnosis     ICD-10-CM    1. Postlaminectomy syndrome, lumbar  M96.1       2. Closed fracture of base of fifth metatarsal bone of left foot at metaphyseal-diaphyseal junction with routine healing, subsequent encounter  S99.192D           Start Time: 1315  Stop Time: 1400  Total time in clinic (min): 45 minutes    Subjective: Pt reports feeling well today. He no longer is wearing his boot after seeing podiatry. He reports his foot feels sore. Objective: See treatment diary below      Assessment: Tolerated treatment well. Pt continues to demonstrate deficits w/ strength, endurance, and motor control. Pt complained of buzzing and pain from his neurostimulator but this was resolved w/ rest and a change in position. Pt continues to struggle w/ modified deadbugs complaining of fatigue and cramping. Pt states that all exercises are improved w/o the boot. Continue progressing pt as he can tolerate. Patient demonstrated fatigue post treatment, exhibited good technique with therapeutic exercises, and would benefit from continued PT      Plan: Continue per plan of care.       Precautions: Gorman fracture, HTN      Manuals 10/9 10/16 10/19 10/23 10/30 11/1 11/8      Rib mobilization  Grade 3-4 WM G3-4 AB G 3-4 WM         Thoracic mobilization  Grade 3-4 WM G3-4 AB G 3-4 WM         Assessment  5' 5' 6'         Breaking bread     G 3-4 WM G3-4 WM G 3-4 WM      UL PA L 3-4 L   G3-4 G 3-4 WM G3-4 WM G 3-4 WM G 3-4 WM      Neuro Re-Ed             Vertigo Assessment  8'                                                                                         Ther Ex 10/9            Repeated ext 2x10 against table HEP            Bike             VG   L7-5' L7 6' L7 6' L7 6' L7 6'      Farmer's carry             STS             Right thoracic rotation  2x10 Bridges  2x8 3" hold 3x8 3" 2x10 3"  W/ belt push out 2x10 3" YHB 2x12 3"      Deadbug     Modified legs only march 2x20 Modified legs only march 2x20 Modified legs only march 2x20      Palloff press   OJB  3"x5  2 rds ea side   OJB 2x10x3" each        Suitcase carry      20 lb kb 2 laps 15 lb kb 2 laps      Ther Activity                                       Gait Training                                       Modalities

## 2023-11-12 NOTE — PROGRESS NOTES
HPI:  Cari Valdovinos returns for treatment today ongoing symptoms low back into the right lower extremity neck and upper back as well. VPAS  4-7      The following portions of the patient's history were reviewed and updated as appropriate: allergies, current medications, past family history, past medical history, past social history, past surgical history, and problem list.    Review of Systems    Physical Exam:  Exam reveals pelvic obliquity leg with inequality biomechanically joint dysfunction left SI L5-S1 motion unit. C5-C6 segmental dysfunction left-sided trapezius spasm decreased cervical range of motion. Assessment:   Diagnosis ICD-10-CM Associated Orders   1. Neck pain  M54.2       2. Myofascial pain  M79.18       3. Chronic bilateral low back pain with bilateral sciatica  M54.42     M54.41     G89.29                 Treatment: 25704  Manipulation left innominate, sacrum, L5 via Camarena drop maneuver well-tolerated.   Manipulation C5 via seated stairstepping technique along with pretreat MFR left neck    Discussion:  Continue home stretching see him back for follow-up

## 2023-11-17 ENCOUNTER — TELEPHONE (OUTPATIENT)
Dept: PSYCHIATRY | Facility: CLINIC | Age: 63
End: 2023-11-17

## 2023-11-17 NOTE — TELEPHONE ENCOUNTER
[facility] to offer 11/17 apt at 3pm. Please schedule upon return call should apt still be available.  TY

## 2023-11-21 ENCOUNTER — OFFICE VISIT (OUTPATIENT)
Dept: OBGYN CLINIC | Facility: CLINIC | Age: 63
End: 2023-11-21
Payer: MEDICARE

## 2023-11-21 ENCOUNTER — APPOINTMENT (OUTPATIENT)
Dept: RADIOLOGY | Facility: AMBULARY SURGERY CENTER | Age: 63
End: 2023-11-21
Attending: STUDENT IN AN ORGANIZED HEALTH CARE EDUCATION/TRAINING PROGRAM
Payer: MEDICARE

## 2023-11-21 VITALS — HEIGHT: 74 IN | BODY MASS INDEX: 38.5 KG/M2 | WEIGHT: 300 LBS

## 2023-11-21 DIAGNOSIS — M18.11 ARTHRITIS OF CARPOMETACARPAL (CMC) JOINT OF RIGHT THUMB: Primary | ICD-10-CM

## 2023-11-21 DIAGNOSIS — M18.11 ARTHRITIS OF CARPOMETACARPAL (CMC) JOINT OF RIGHT THUMB: ICD-10-CM

## 2023-11-21 PROCEDURE — 99214 OFFICE O/P EST MOD 30 MIN: CPT | Performed by: STUDENT IN AN ORGANIZED HEALTH CARE EDUCATION/TRAINING PROGRAM

## 2023-11-21 PROCEDURE — 73140 X-RAY EXAM OF FINGER(S): CPT
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
## 2023-11-22 ENCOUNTER — PROCEDURE VISIT (OUTPATIENT)
Age: 63
End: 2023-11-22
Payer: MEDICARE

## 2023-11-22 VITALS — BODY MASS INDEX: 38.5 KG/M2 | HEIGHT: 74 IN | WEIGHT: 300 LBS

## 2023-11-22 DIAGNOSIS — M79.18 MYOFASCIAL PAIN: ICD-10-CM

## 2023-11-22 DIAGNOSIS — M99.03 SEGMENTAL DYSFUNCTION OF LUMBAR REGION: ICD-10-CM

## 2023-11-22 DIAGNOSIS — M99.04 SEGMENTAL DYSFUNCTION OF SACRAL REGION: ICD-10-CM

## 2023-11-22 DIAGNOSIS — M54.2 NECK PAIN: Primary | ICD-10-CM

## 2023-11-22 DIAGNOSIS — M54.41 CHRONIC BILATERAL LOW BACK PAIN WITH BILATERAL SCIATICA: ICD-10-CM

## 2023-11-22 DIAGNOSIS — G89.29 CHRONIC BILATERAL LOW BACK PAIN WITH BILATERAL SCIATICA: ICD-10-CM

## 2023-11-22 DIAGNOSIS — M99.05 SEGMENTAL DYSFUNCTION OF PELVIC REGION: ICD-10-CM

## 2023-11-22 DIAGNOSIS — M54.42 CHRONIC BILATERAL LOW BACK PAIN WITH BILATERAL SCIATICA: ICD-10-CM

## 2023-11-22 PROCEDURE — 98941 CHIROPRACT MANJ 3-4 REGIONS: CPT | Performed by: CHIROPRACTOR

## 2023-11-22 NOTE — PROGRESS NOTES
Date of first visit: 10/17/2023      HPI:  Earnest Dockery returns for treatment today for low back pain right-sided predominance as well as neck pain into the right shoulder blade. Increased low back pain. Susan Dick down the steps last week. The following portions of the patient's history were reviewed and updated as appropriate: allergies, current medications, past family history, past medical history, past social history, past surgical history, and problem list.    Review of Systems    Physical Exam:  Exam reveals a pelvic obliquity elevated left versus right innominate there is a leg length inequality there is biomechanical joint dysfunction present of the right SI and L5-S1 motion unit right-sided erector spine a and quadratus lumborum hypertonicity noted. Myofascial involvement right periscapular region decreased cervical range of motion in extension right lateral bending right rotation. Assessment:   Diagnosis ICD-10-CM Associated Orders   1. Neck pain  M54.2       2. Segmental dysfunction of pelvic region  M99.05       3. Myofascial pain  M79.18       4. Chronic bilateral low back pain with bilateral sciatica  M54.42     M54.41     G89.29       5. Segmental dysfunction of sacral region  M99.04       6. Segmental dysfunction of lumbar region  M99.03                 Treatment: 81337  Manipulation to the right innominate, sacrum, L5 via Camarena drop maneuver well-tolerated. Pretreat GT neck upper back. Discussion:  He will continue icing 15 minutes per application daily stretching we will see him back for follow-up.

## 2023-11-30 ENCOUNTER — TELEPHONE (OUTPATIENT)
Dept: PSYCHIATRY | Facility: CLINIC | Age: 63
End: 2023-11-30

## 2023-11-30 NOTE — PROGRESS NOTES
PT Evaluation     Today's date: 2023  Patient name: Mikhail Marcos  : 1960  MRN: [id number]  Referring provider: Lolis Robles MD  Dx:   Encounter Diagnosis     ICD-10-CM    1. Arthritis of carpometacarpal [place]) joint of right thumb  M18.11 Ambulatory Referral to PT/OT Hand Therapy          Start Time: 1010  Stop Time: 1035  Total time in clinic (min): 25 minutes    Assessment  Assessment details: Mikhail Marcos is a 60 y.o. male presenting to outpatient physical therapy on 23 with referral from MD for  R CMC arthritis. Upon evaluation, Yamile Cary demonstrates impaired FPL and opponens pollicis strength with hypomobility at CMCJ . The listed impairments and functional limitation are effecting Yamile Cary ability to function at prior level. They can continue to benefit from physical therapy services at this times in order to address the above discussed impairments and functional limitation in order to allow for a return to premorbid status. HEP: FPL/opponens re-ed with putty    Impairments: abnormal muscle firing, abnormal muscle tone, abnormal or restricted ROM, abnormal movement, activity intolerance, impaired physical strength and pain with function  Understanding of Dx/Px/POC: good   Prognosis: good    Plan  Patient would benefit from: skilled PT  Planned modality interventions: thermotherapy: hydrocollator packs  Planned therapy interventions: joint mobilization, manual therapy, ADL training, neuromuscular re-education, home exercise program, therapeutic exercise, therapeutic activities, strengthening, patient education, functional ROM exercises and gait training  Frequency: 2x week  Duration in weeks: 6  Treatment plan discussed with: patient        Subjective Evaluation    History of Present Illness  Mechanism of injury: Yamile Cary is a 60 y.o. male presenting to physical therapy on 23 with referral from MD for R CMC arthritis and CC of thenar eminence pain. States that his pain began about 6 months ago. States that use of hand/thumb will increase his pain. Activity such as opening a jar will also increase his pain. Holding a pen to sign will also increase pain. Denies numbness or tingle or pain in forearm. States that he feels a constant pain in his thumb (thenar eminence)           Patient Goals  Patient goals for therapy: decreased pain  Patient goal: return to functional use of RUE without pain or limitation  Pain  Current pain ratin  At worst pain ratin  Location: thenar eminence      Diagnostic Tests  X-ray: abnormal  Treatments  No previous or current treatments    Short Term Goals:   1. Patient will be Independent with hep  2. Patient will improve pain with activity by 50%  3. Patient will report GROC 50% or greater  4. Patient will have 5/5 FPL strength       Long Term Goals:   1. Patient will improve FOTO to greater then goal  2. Patient will improve pain with activity to 2/10 or less  3. Patient will continue with HEP independence to allow for decreased future reoccurrence of pain and loss in function  4. Patient will report GROC 75% or greater  5. Patient will open jar without pain.        Objective    Wrist/Hand  ROM WNL  FPL: 4/5 R, 5/5 L  EPB: 4/5 pfree  Hypomobile CMCJ R  Normal sensation  Thumb opposition WNL       Precautions:       Manuals             laser             CMC dist                                       Neuro Re-Ed             Thumb opposition Y putty  10x                                                                                          Ther Ex                                                                                                                     Ther Activity                                       Gait Training                                       Modalities

## 2023-11-30 NOTE — TELEPHONE ENCOUNTER
Writer contacted patient to inform him his 12/27 appointment has been cancelled due to provider being out of office. Next apt is [email or web address]. Patient has been placed on cancellation list for a sooner apt that his following one on 1/10.

## 2023-12-01 ENCOUNTER — EVALUATION (OUTPATIENT)
Dept: PHYSICAL THERAPY | Facility: REHABILITATION | Age: 63
End: 2023-12-01
Payer: MEDICARE

## 2023-12-01 DIAGNOSIS — M18.11 ARTHRITIS OF CARPOMETACARPAL (CMC) JOINT OF RIGHT THUMB: Primary | ICD-10-CM

## 2023-12-01 PROCEDURE — 97161 PT EVAL LOW COMPLEX 20 MIN: CPT | Performed by: PHYSICAL THERAPIST

## 2023-12-01 PROCEDURE — 97110 THERAPEUTIC EXERCISES: CPT | Performed by: PHYSICAL THERAPIST

## 2023-12-06 ENCOUNTER — PROCEDURE VISIT (OUTPATIENT)
Age: 63
End: 2023-12-06
Payer: MEDICARE

## 2023-12-06 VITALS
SYSTOLIC BLOOD PRESSURE: 143 MMHG | BODY MASS INDEX: 38.5 KG/M2 | HEIGHT: 74 IN | WEIGHT: 300 LBS | DIASTOLIC BLOOD PRESSURE: 91 MMHG

## 2023-12-06 DIAGNOSIS — M54.41 CHRONIC BILATERAL LOW BACK PAIN WITH BILATERAL SCIATICA: ICD-10-CM

## 2023-12-06 DIAGNOSIS — M99.05 SEGMENTAL DYSFUNCTION OF PELVIC REGION: Primary | ICD-10-CM

## 2023-12-06 DIAGNOSIS — M99.04 SEGMENTAL DYSFUNCTION OF SACRAL REGION: ICD-10-CM

## 2023-12-06 DIAGNOSIS — M99.03 SEGMENTAL DYSFUNCTION OF LUMBAR REGION: ICD-10-CM

## 2023-12-06 DIAGNOSIS — M54.42 CHRONIC BILATERAL LOW BACK PAIN WITH BILATERAL SCIATICA: ICD-10-CM

## 2023-12-06 DIAGNOSIS — M54.2 NECK PAIN: ICD-10-CM

## 2023-12-06 DIAGNOSIS — G89.29 CHRONIC BILATERAL LOW BACK PAIN WITH BILATERAL SCIATICA: ICD-10-CM

## 2023-12-06 DIAGNOSIS — M79.18 MYOFASCIAL PAIN: ICD-10-CM

## 2023-12-06 PROCEDURE — 98941 CHIROPRACT MANJ 3-4 REGIONS: CPT | Performed by: CHIROPRACTOR

## 2023-12-06 NOTE — PROGRESS NOTES
Date of first visit: 10/17/2023      HPI:  Mando Franklin returns for treatment today for low back pain right-sided predominance as well as neck pain into the right shoulder blade. Overall improved. Is concentrating harder on his walking as he is now out of the cam walker boot. The following portions of the patient's history were reviewed and updated as appropriate: allergies, current medications, past family history, past medical history, past social history, past surgical history, and problem list.    Review of Systems    Physical Exam:  Exam reveals a pelvic obliquity elevated left versus right innominate there is a leg length inequality there is biomechanical joint dysfunction present of the right SI and L5-S1 motion unit right-sided erector spine a and quadratus lumborum hypertonicity noted. Myofascial involvement right periscapular region decreased cervical range of motion in extension right lateral bending right rotation. Assessment:   Diagnosis ICD-10-CM Associated Orders   1. Segmental dysfunction of pelvic region  M99.05       2. Myofascial pain  M79.18       3. Neck pain  M54.2       4. Chronic bilateral low back pain with bilateral sciatica  M54.42     M54.41     G89.29       5. Segmental dysfunction of sacral region  M99.04       6. Segmental dysfunction of lumbar region  M99.03                 Treatment: 88370  Manipulation to the right innominate, sacrum, L5 via Camarena drop maneuver well-tolerated. Discussion:  He will continue icing 15 minutes per application daily stretching we will see him back for follow-up.

## 2023-12-13 ENCOUNTER — SOCIAL WORK (OUTPATIENT)
Dept: BEHAVIORAL/MENTAL HEALTH CLINIC | Facility: CLINIC | Age: 63
End: 2023-12-13
Payer: MEDICARE

## 2023-12-13 ENCOUNTER — OFFICE VISIT (OUTPATIENT)
Dept: PHYSICAL THERAPY | Facility: REHABILITATION | Age: 63
End: 2023-12-13
Payer: MEDICARE

## 2023-12-13 DIAGNOSIS — F10.90 ALCOHOL USE DISORDER: ICD-10-CM

## 2023-12-13 DIAGNOSIS — M18.11 ARTHRITIS OF CARPOMETACARPAL (CMC) JOINT OF RIGHT THUMB: Primary | ICD-10-CM

## 2023-12-13 DIAGNOSIS — F33.2 MAJOR DEPRESSIVE DISORDER, RECURRENT SEVERE WITHOUT PSYCHOTIC FEATURES (HCC): Primary | ICD-10-CM

## 2023-12-13 DIAGNOSIS — F41.1 GAD (GENERALIZED ANXIETY DISORDER): ICD-10-CM

## 2023-12-13 PROCEDURE — 90834 PSYTX W PT 45 MINUTES: CPT | Performed by: SOCIAL WORKER

## 2023-12-13 PROCEDURE — 97140 MANUAL THERAPY 1/> REGIONS: CPT | Performed by: PHYSICAL THERAPIST

## 2023-12-13 NOTE — PROGRESS NOTES
Daily Note     Today's date: 2023  Patient name: Mikhail Marcos  : 1960  MRN: [id number]  Referring provider: Lolis Robles MD  Dx:   Encounter Diagnosis     ICD-10-CM    1. Arthritis of carpometacarpal (CMC) joint of right thumb  M18.11           Start Time: 1205  Stop Time: 1230  Total time in clinic (min): 25 minutes    Subjective: Patient reports that he continues to have pain with thumb use, more as the day progresses      Objective: See treatment diary below  4+/5 opposition  TTP CMCJ, hypomobile    Assessment: Tolerated treatment well. Patient with [facility] joint mobility improvement, subjective reports of less pain and 5/5 opposition post G5 mob to [facility]      Plan: Continue per plan of care.       Precautions:       Manuals             laser  7' 12 watt           [facility] dist  G5           assessment  5'                        Neuro Re-Ed             Thumb opposition Y putty  10x                                                                                          Ther Ex                                                                                                                     Ther Activity                                       Gait Training                                       Modalities

## 2023-12-13 NOTE — PSYCH
Behavioral Health Psychotherapy Progress Note    Psychotherapy Provided: Individual Psychotherapy         Goals addressed in session: Goal 1, Goal 2, and Goal 3      DATA: Tony Box discussed his trans male son. He is intelligent but has Aspergers. Tony Box stated he and his son to be both procratinate. Tony Box accepts his daughter Mari Pena is now [employer]. He has never even started hormone replacement therapy. He is in love with 4 or 5 different people. However Tony Box is upset that his son gets nothing done and expects to be served. Tony Box procrastinates and has some hoarding issues and is in physical pain. He is dealing with a 80year old demented mother in law. He drinks on occasion he admitted. He admits his motivation is poor. His pain etc all affect his depression and his anxiety. The undersigned believes Tony Box minimizes his past and current drinking but the undersigned can t force him to see it as a problem if he doesn't. We worked on strategies to deal with his depression, his anxiety and his poor motivation and his hoarding. He is most frustrated dealing with a demented mother in law who lives with them. I provided therapy to help him cope with his stressors especially concerning finances. During this session, this clinician used the following therapeutic modalities: Client-centered Therapy, Cognitive Behavioral Therapy, Mindfulness-based Strategies, and Supportive Psychotherapy    Substance Abuse was addressed during this session. If the client is diagnosed with a co-occurring substance use disorder, please indicate any changes in the frequency or amount of use: He still drinks when he wants to. Stage of change for addressing substance use diagnoses: Pre-contemplation    ASSESSMENT:  Lala Fan presents with a Anxious and Depressed mood. his affect is anxious and depressed, which is congruent, with his mood and the content of the session. The client just started made progress on their goals.      Tony Box Santos Baron presents with a none risk of suicide, none risk of self-harm, and none risk of harm to others. For any risk assessment that surpasses a "low" rating, a safety plan must be developed. A safety plan was indicated: no  If yes, describe in detail n/a    PLAN: Between sessions, Gentry Gorman will use mindfulness and CBT. At the next session, the therapist will use Client-centered Therapy, Cognitive Behavioral Therapy, Mindfulness-based Strategies, and Supportive Psychotherapy to address issues and symptoms as they may arise. .    Behavioral Health Treatment Plan and Discharge Planning: Gentry Gorman is aware of and agrees to continue to work on their treatment plan. They have identified and are working toward their discharge goals.  yes    Visit start and stop times:8:10am till 9:00 50 minutes    12/13/23

## 2023-12-13 NOTE — BH CRISIS PLAN
Client Name: Estee Kearns       Client YOB: 1960  : 1960    Treatment Team (include name and contact information):     Psychotherapist: Luis Hi    Psychiatrist: n/a Dr Reina Her his PCP   Release of information completed: no    "    Release of information completed: Other (Specify Role):     Release of information completed: Other (Specify Role):    Release of information completed:     Healthcare Provider  Mary Chow, DO  [address] [facility] [address]  [phone]    Type of Plan   * Child plans (children 15 yo and younger) must be completed and signed by the child's legal guardian   * Plans for all individuals 15 yo and above must be signed by the client. Plan Type: adolescent/adult (15 and over) Initial      My Personal Strengths are (in the client's own words):  "Good cook, 12 aquariums, intelligent"  The stressors and triggers that may put me at risk are:  Finances make me anxious,his mother in law, notices in the mail    Coping skills I can use to keep myself calm and safe: Other (describe) TV and lay down    Coping skills/supports I can use to maintain abstinence from substance use:   I dont buy it    The people that provide me with help and support: (Include name, contact, and how they can help)   Support person #1: Mando Pink    * Phone number: in cell phone    * How can they help me? N/a   Support person #2:    * Phone number:     * How can they help me? Support person #3:     * Phone number:     * How can they help me?      In the past, the following has helped me in times of crisis:    Being in a quiet space, Watching television or a movie, and Other: music      If it is an emergency and you need immediate help, call     If there is a possibility of danger to yourself or others, call the following crisis hotline resources:     Adult Crisis Numbers  Suicide Prevention Hotline - Dial   [facility]: [address]: [address]: [address]: [phone]  [address] [place] [address] Street: [phone]  [facility]: [address]: [address]: [phone] (daytime). [phone] (after hours, weekends, holidays)     Child/Adolescent Crisis Numbers   [facility]: [address]: [phone]   [facility] Callas: [phone]   [address]: [phone]    Please note: Some [facility] do not have a separate number for Child/Adolescent specific crisis. If your county is not listed under Child/Adolescent, please call the adult number for your county     National Talk to Text Line   [place] - [phone]    In the event your feelings become unmanageable, and you cannot reach your support system, you will call 911 immediately or go to the nearest hospital emergency room.

## 2023-12-20 ENCOUNTER — OFFICE VISIT (OUTPATIENT)
Dept: PHYSICAL THERAPY | Facility: REHABILITATION | Age: 63
End: 2023-12-20
Payer: MEDICARE

## 2023-12-20 DIAGNOSIS — M18.11 ARTHRITIS OF CARPOMETACARPAL (CMC) JOINT OF RIGHT THUMB: Primary | ICD-10-CM

## 2023-12-20 PROCEDURE — 97112 NEUROMUSCULAR REEDUCATION: CPT

## 2023-12-20 PROCEDURE — 97140 MANUAL THERAPY 1/> REGIONS: CPT

## 2023-12-20 PROCEDURE — 97110 THERAPEUTIC EXERCISES: CPT

## 2023-12-20 NOTE — PROGRESS NOTES
Daily Note     Today's date: 2023  Patient name: Enoc Roberto  : 1960  MRN: [id number]  Referring provider: Adiel Sorenson MD  Dx:   Encounter Diagnosis     ICD-10-CM    1. Arthritis of carpometacarpal (CMC) joint of right thumb  M18.11             Start Time: 1318  Stop Time: 1356  Total time in clinic (min): 38 minutes    Subjective: Patient reports that his  strength is his biggest deficit, especially when excessive pressure is put on his thumb when trying to  or open a jar.      Objective: See treatment diary below      Assessment: Tolerated treatment well. Focused on R hand gripping exercises to promote improvements in patient's symptoms, mobility, and functional ability with R thumb and wrist. Patient responded favorably to laser treatment at the beginning of session and noted relief afterwards. Patient was moderately challenged with added exercises noted in flowsheet, but did not experience any significant episodes of pain throughout. Muscle fatigue present at the conclusion of session. Patient would benefit from continued PT.    Plan: Continue per plan of care.      Precautions:       Manuals            laser  7' 12 watt 7' 12 watt          CMC dist  G5 STM R CMC          assessment  5'                        Neuro Re-Ed             Thumb opposition Y putty  10x            Digi-gripper   Red 20x          Flexbar sup/pro, flex/ext   Red 20x ea          Finger abd w/ web   Red 10x abd                                                 Ther Ex                                                                                                                     Ther Activity                                       Gait Training                                       Modalities

## 2023-12-21 ENCOUNTER — PROCEDURE VISIT (OUTPATIENT)
Age: 63
End: 2023-12-21
Payer: MEDICARE

## 2023-12-21 VITALS — WEIGHT: 300 LBS | HEIGHT: 74 IN | BODY MASS INDEX: 38.5 KG/M2

## 2023-12-21 DIAGNOSIS — M99.05 SEGMENTAL DYSFUNCTION OF PELVIC REGION: Primary | ICD-10-CM

## 2023-12-21 DIAGNOSIS — M99.03 SEGMENTAL DYSFUNCTION OF LUMBAR REGION: ICD-10-CM

## 2023-12-21 DIAGNOSIS — M79.18 MYOFASCIAL PAIN: ICD-10-CM

## 2023-12-21 DIAGNOSIS — M99.04 SEGMENTAL DYSFUNCTION OF SACRAL REGION: ICD-10-CM

## 2023-12-21 DIAGNOSIS — M54.41 CHRONIC BILATERAL LOW BACK PAIN WITH BILATERAL SCIATICA: ICD-10-CM

## 2023-12-21 DIAGNOSIS — G89.29 CHRONIC BILATERAL LOW BACK PAIN WITH BILATERAL SCIATICA: ICD-10-CM

## 2023-12-21 DIAGNOSIS — M54.42 CHRONIC BILATERAL LOW BACK PAIN WITH BILATERAL SCIATICA: ICD-10-CM

## 2023-12-21 DIAGNOSIS — M54.2 NECK PAIN: ICD-10-CM

## 2023-12-21 PROCEDURE — 98941 CHIROPRACT MANJ 3-4 REGIONS: CPT | Performed by: CHIROPRACTOR

## 2023-12-21 NOTE — PROGRESS NOTES
Date of first visit: 10/17/2023      HPI:  Anshul returns for treatment today for low back pain right-sided predominance as well as neck pain into the right shoulder blade.   Increased symptoms with right shoulder blade being tight low back is stiff as well.    VPAS  3-4      The following portions of the patient's history were reviewed and updated as appropriate: allergies, current medications, past family history, past medical history, past social history, past surgical history, and problem list.    Review of Systems    Physical Exam:  Exam reveals a pelvic obliquity elevated left versus right innominate there is a leg length inequality there is biomechanical joint dysfunction present of the right SI and L5-S1 motion unit right-sided erector spine a and quadratus lumborum hypertonicity noted.    Myofascial involvement right periscapular region decreased cervical range of motion in extension right lateral bending right rotation.    Assessment:   Diagnosis ICD-10-CM Associated Orders   1. Segmental dysfunction of pelvic region  M99.05       2. Myofascial pain  M79.18       3. Neck pain  M54.2       4. Chronic bilateral low back pain with bilateral sciatica  M54.42     M54.41     G89.29       5. Segmental dysfunction of sacral region  M99.04       6. Segmental dysfunction of lumbar region  M99.03                 Treatment: 36408  Manipulation to the right innominate, sacrum, L5 via [place] drop maneuver well-tolerated.      Discussion:  He will continue icing 15 minutes per application daily stretching we will see him back for follow-up.

## 2023-12-27 ENCOUNTER — OFFICE VISIT (OUTPATIENT)
Dept: PHYSICAL THERAPY | Facility: REHABILITATION | Age: 63
End: 2023-12-27
Payer: MEDICARE

## 2023-12-27 DIAGNOSIS — M18.11 ARTHRITIS OF CARPOMETACARPAL (CMC) JOINT OF RIGHT THUMB: Primary | ICD-10-CM

## 2023-12-27 PROCEDURE — 97140 MANUAL THERAPY 1/> REGIONS: CPT | Performed by: PHYSICAL THERAPIST

## 2023-12-27 PROCEDURE — 97110 THERAPEUTIC EXERCISES: CPT | Performed by: PHYSICAL THERAPIST

## 2023-12-27 NOTE — PROGRESS NOTES
Daily Note     Today's date: 2023  Patient name: Enoc Roberto  : 1960  MRN: [id number]  Referring provider: Adiel Sorenson MD  Dx:   Encounter Diagnosis     ICD-10-CM    1. Arthritis of carpometacarpal (CMC) joint of right thumb  M18.11           Start Time: 1315  Stop Time: 1345  Total time in clinic (min): 30 minutes    Subjective: Patient arriving 15 minutes late for appointment. Reports no updates or changes with thumb, still getting pain with gripping, usually opening a jar.       Objective: See treatment diary below  : key = 22# b/l, painful R  + WHAT test  Hypomobile CMCJ (1st)  Weakness APB and EPB 3+/5 with pain    Assessment: Tolerated treatment well. Patient reports less pain in thumb with pinch  post extensor loading. HEP updated with loading to APL and EPB.      Plan: Continue per plan of care.      Precautions:       Manuals           laser  7' 12 watt 7' 12 watt          CMC dist  G5 STM R CMC G5         assessment  5'  5'                      Neuro Re-Ed                          Thumb opposition Y putty  10x            Digi-gripper   Red 20x          Flexbar sup/pro, flex/ext   Red 20x ea          Finger abd w/ web   Red 10x abd          Putty                                       Ther Ex             Putty    Thumb ext and ABD  20x ea         Bridge    10x, 10x on pball                                                                                       Ther Activity                                       Gait Training                                       Modalities

## 2023-12-29 ENCOUNTER — CONSULT (OUTPATIENT)
Dept: BARIATRICS | Facility: CLINIC | Age: 63
End: 2023-12-29
Payer: MEDICARE

## 2023-12-29 VITALS
BODY MASS INDEX: 42.66 KG/M2 | SYSTOLIC BLOOD PRESSURE: 120 MMHG | HEART RATE: 68 BPM | HEIGHT: 72 IN | WEIGHT: 315 LBS | RESPIRATION RATE: 16 BRPM | DIASTOLIC BLOOD PRESSURE: 78 MMHG

## 2023-12-29 DIAGNOSIS — G47.33 OSA (OBSTRUCTIVE SLEEP APNEA): ICD-10-CM

## 2023-12-29 DIAGNOSIS — G47.33 OBSTRUCTIVE SLEEP APNEA: Chronic | ICD-10-CM

## 2023-12-29 DIAGNOSIS — E78.2 MIXED HYPERLIPIDEMIA: ICD-10-CM

## 2023-12-29 DIAGNOSIS — R73.01 ELEVATED FASTING GLUCOSE: ICD-10-CM

## 2023-12-29 DIAGNOSIS — I48.20 CHRONIC ATRIAL FIBRILLATION (HCC): ICD-10-CM

## 2023-12-29 DIAGNOSIS — I10 ESSENTIAL HYPERTENSION: ICD-10-CM

## 2023-12-29 DIAGNOSIS — E66.01 OBESITY, MORBID (HCC): Primary | ICD-10-CM

## 2023-12-29 DIAGNOSIS — K21.9 GASTROESOPHAGEAL REFLUX DISEASE, UNSPECIFIED WHETHER ESOPHAGITIS PRESENT: Chronic | ICD-10-CM

## 2023-12-29 DIAGNOSIS — E03.8 OTHER SPECIFIED HYPOTHYROIDISM: ICD-10-CM

## 2023-12-29 DIAGNOSIS — F33.9 EPISODE OF RECURRENT MAJOR DEPRESSIVE DISORDER, UNSPECIFIED DEPRESSION EPISODE SEVERITY (HCC): ICD-10-CM

## 2023-12-29 PROCEDURE — 99204 OFFICE O/P NEW MOD 45 MIN: CPT | Performed by: NURSE PRACTITIONER

## 2023-12-29 RX ORDER — TOPIRAMATE 25 MG/1
25 TABLET ORAL 2 TIMES DAILY
Qty: 60 TABLET | Refills: 3 | Status: SHIPPED | OUTPATIENT
Start: 2023-12-29

## 2023-12-29 NOTE — PATIENT INSTRUCTIONS
Potential side effects of Topamax (topiramate) include: numbness or tingling, fatigue, depression/anxiety, changes in taste, abdominal upset/heartburn, trouble sleeping, and may reduce sweating - stay well hydrated to avoid overheating.     Suicide Prevention   AMBULATORY CARE:   What you need to know about suicide prevention:  You may see suicide as the only way to escape emotional or physical pain and suffering. Help is available from people who care about you, and from professionals trained in suicide prevention. Prevention includes everything you and others can do to stop you from taking your life.  Warning signs of suicide:  The following can help you and others recognize that you are struggling:  Talking about your plan for attempting suicide, or wanting to read or write about death or suicide    Cutting yourself, burning your skin with cigarettes, or driving recklessly    Drug or alcohol use, not taking your prescribed medicine, or taking too much    Not wanting to spend time with others or doing things you enjoy, feeling bored, or not wanting anyone to praise you    Changes in your appetite, sleep habits, energy levels, or weight    Feeling angry, or lashing out at others    A need to give away or throw away your belongings    Often skipping work    Suddenly not taking medicine for a mental illness without talking to your healthcare provider    Suddenly not going to therapy    The following are always available to help you:    Contact a suicide prevention organization:    For the [facility] Suicide and Crisis Lifeline:     Call or text 8     Send a chat on [email or web address]     Call 1-270.776.7878 (1-800-273-TALK)    For the Suicide Hotline, call 1-486.102.9499 (6-729-QXPXORU)  What to do if you are having thoughts of suicide:  Call your local emergency number (911 in the [place]).  Talk to someone you trust.  Be honest about your thoughts and feelings about suicide. You can call a suicide prevention center  if you do not want to talk to someone you know.    Contact your therapist.  Your therapist will help you create a crisis plan to follow if you have thoughts about hurting yourself. The plan will include the names of people to call during a crisis. Share your plan with friends and family. Ask someone to stay with you if a crisis occurs. Your healthcare provider can give you a list of therapists if you do not have one.    Ask someone to remove items that can be harmful.  Do not keep medicines, weapons, or alcohol in your home.    Call your local emergency number (911 in the ), or ask someone to call if:   You do something on purpose to hurt yourself.    You make a plan to attempt suicide.    Call your doctor or therapist, or have someone close to you call if:   You act out in anger, are reckless, or are abusing alcohol or drugs.    You have serious thoughts of suicide, even with treatment.    You have more thoughts of suicide when you are alone.    You stop eating, or you begin to smoke cigarettes or drink alcohol.    You have questions or concerns about your condition or care.    Treatment  may include any of the following:  Medicines  may be given to prevent mood swings, or to decrease anxiety or depression. You will need to take all medicines as directed.  Do not stop taking your medicine without talking to your healthcare provider. A sudden stop can be harmful. It may take 4 to 6 weeks for the medicine to help you feel better.    Suicide risk assessment  means healthcare providers will ask questions about your suicide thoughts and plans. They will ask how often you think about suicide and if you have tried it before. They will ask if you have begun to hurt yourself, such as with cutting or reckless driving. They may ask if you have access to weapons or drugs.    A safety plan  includes a list of people or groups to contact if you have suicidal feelings again. The list may include friends, family members, a  spiritual leader, and others you trust. You may be asked to make a verbal agreement or sign a contract that you will not try to harm yourself.    A therapist  can help you identify and change negative feelings or beliefs about yourself. This may help change the way you feel and act. A therapist can also help you find ways to cope with things that cannot be changed. A therapist can also help if you use alcohol or drugs and need help to quit. Drugs and alcohol can make suicidal feelings worse and make you more likely to act on them.    Positive things you can do for yourself:   Exercise as directed.  Exercise can lift your mood, give you more energy, and make it easier to sleep.         Eat a variety of healthy foods.  Healthy foods include fruits, vegetables, whole-grain breads, lean meats, fish, low-fat dairy products, and beans. Try to eat regularly even if you do not feel hungry. Depression can increase from a lack of nutrition or if you are hungry for long periods of time.         Create a sleep routine.  Try to go to bed and wake up at the same time every day. Let your healthcare provider know if you are having trouble sleeping.    Follow up with your doctor or therapist as directed:  Write down your questions so you remember to ask them during your visits.  For support and more information:   [facility] Suicide and Crisis Lifeline  [address]  [place] [address]  Phone: 6- 198 - 961  Web Address: http://www.suicidepreventionlifeline.org OR https://[employer].org/chat/  Suicide Awareness [employer] Education  [address]  [place], [place] [address]  Phone: 5- 031 - 599-5775  Web Address: http://www.save.org or https://save.org/find-help/international-resources/    © Copyright [employer] 2023 Information is for End User's use only and may not be sold, redistributed or otherwise used for commercial purposes.  The above information is an  only. It is not intended as medical advice  for individual conditions or treatments. Talk to your doctor, nurse or pharmacist before following any medical regimen to see if it is safe and effective for you.
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
## 2023-12-30 NOTE — ASSESSMENT & PLAN NOTE
- Taking bystolic and norvasc. May improve with weight loss and lifestyle modification. Continue management with prescribing provider.

## 2023-12-30 NOTE — ASSESSMENT & PLAN NOTE
- Taking crestor. May improve with weight loss and lifestyle modification. Continue management with prescribing provider.

## 2023-12-30 NOTE — ASSESSMENT & PLAN NOTE
- Not able to tolerate CPAP/BiPAP. Tried multiple different masks.   - Will likely improve with weight loss.   - Would like to pursue [facility], but needs to lose weight in order to qualify.

## 2023-12-30 NOTE — ASSESSMENT & PLAN NOTE
- Taking nexium. May improve with weight loss and lifestyle modification. Continue management with prescribing provider.

## 2023-12-30 NOTE — ASSESSMENT & PLAN NOTE
- Discussed options of HealthyCORE-Intensive Lifestyle Intervention Program, Very Low Calorie Diet-VLCD, Conservative Program, Edwin-En-Y Gastric Bypass, and Vertical Sleeve Gastrectomy and the role of weight loss medications.  - Would consider weight loss surgery as last resort only.   - Not a candidate for VLCD due to history of afib and gout.  - Explained the importance of making lifestyle changes with anti-obesity medications.  - Patient is interested in pursuing Conservative Program  - Initial weight loss goal of 5-10% weight loss for improved health  - Weight loss can improve patient's co-morbid conditions and/or prevent weight-related complications.  - Interested in weight loss medication to help with weight loss.   - FDA approved weight loss medications reviewed: Wegovy, Saxenda, Zepbound, Qsymia, Contrave, and Phentermine. Wegovy and Saxenda shortage discussed. Off label use of medications discussed.   - Does not have coverage for FDA approved weight loss medications.   - Not a candidate for phentermine due to history of afib.   - Not a candidate for Wellbutrin or Contrave due to taking cymbalta.   - Denies history of kidney stones, gallstones, seizures, or glaucoma.   - Discussed Topamax off label for weight loss and he was agreeable to starting that.   - Start Topamax 25 mg twice a day. Start weight 338.1 lbs. Potential side effects of Topamax (topiramate) include: numbness or tingling, fatigue, depression/anxiety, changes in taste, abdominal upset/heartburn, trouble sleeping, and may reduce sweating - stay well hydrated to avoid overheating.    - Labs reviewed: A1C, TSH, CMP, and lipid 9/6/2023. Fasting glucose somewhat elevated, which will likely improve with weight loss. Remainder of the blood work within acceptable range.   - Check fasting insulin and if elevated, can consider metformin off label, if Topamax not helpful.      Goals:  Do not skip meals.  Food log (ie.)  www.myfitnesspal.com,Websupportople.com,loseit.com,[employer].com,etc. baritastic (use skinnytaste.com, [employer] or smartphone felisha Tallyfy for recipes)  No sugary beverages. At least 64oz of water daily.  Increase physical activity by 10 minutes daily. Gradually increase physical activity to a goal of 5 days per week for 30 minutes of MODERATE intensity PLUS 2 days per week of FULL BODY resistance training (use smartphone apps [employer], Home Workout, etc.)  Start food logging, weighing and measuring food.  6849-6740 calories per day. Sample menu given.   Gradually start changing diet iced tea over to water with goal of at least 64 oz of water daily.   Gradually increase walking as tolerated.

## 2024-01-02 ENCOUNTER — APPOINTMENT (OUTPATIENT)
Dept: PHYSICAL THERAPY | Facility: REHABILITATION | Age: 64
End: 2024-01-02
Payer: MEDICARE

## 2024-01-02 DIAGNOSIS — F41.1 GENERALIZED ANXIETY DISORDER: ICD-10-CM

## 2024-01-02 DIAGNOSIS — M18.11 ARTHRITIS OF CARPOMETACARPAL (CMC) JOINT OF RIGHT THUMB: ICD-10-CM

## 2024-01-02 DIAGNOSIS — Z56.6 WORK-RELATED STRESS: ICD-10-CM

## 2024-01-02 PROBLEM — Z00.00 WELCOME TO MEDICARE PREVENTIVE VISIT: Status: RESOLVED | Noted: 2023-11-03 | Resolved: 2024-01-02

## 2024-01-02 SDOH — HEALTH STABILITY - MENTAL HEALTH: OTHER PHYSICAL AND MENTAL STRAIN RELATED TO WORK: Z56.6

## 2024-01-02 NOTE — PROGRESS NOTES
Daily Note     Today's date: 2024  Patient name: Enoc Roberto  : 1960  MRN: [id number]  Referring provider: Adiel Sorenson MD  Dx: No diagnosis found.               Subjective: ***      Objective: See treatment diary below      Assessment: Tolerated treatment {Tolerated treatment :9866590163}. Patient {assessment:3880701154}      Plan: {PLAN:8887582214}     Precautions:       Manuals           laser  7' 12 watt 7' 12 watt          CMC dist  G5 STM R CMC G5         assessment  5'  5'                      Neuro Re-Ed                          Thumb opposition Y putty  10x            Digi-gripper   Red 20x          Flexbar sup/pro, flex/ext   Red 20x ea          Finger abd w/ web   Red 10x abd          Putty                                       Ther Ex             Putty    Thumb ext and ABD  20x ea         Bridge    10x, 10x on pball                                                                                       Ther Activity                                       Gait Training                                       Modalities

## 2024-01-03 ENCOUNTER — TELEPHONE (OUTPATIENT)
Dept: PSYCHIATRY | Facility: CLINIC | Age: 64
End: 2024-01-03

## 2024-01-03 RX ORDER — CLONAZEPAM 0.5 MG/1
0.5 TABLET ORAL
Qty: 90 TABLET | Refills: 1 | Status: SHIPPED | OUTPATIENT
Start: 2024-01-03

## 2024-01-03 NOTE — TELEPHONE ENCOUNTER
Writer LYNN to reschedule cancelled 1/10 appointment due to provider being out of office (meeting) Offered today, 1.03@ 2 pm or 1/04@ 9 am. Please assist upon return call.TY    Please also offer 1/09@ 11 am

## 2024-01-04 DIAGNOSIS — R06.02 SHORTNESS OF BREATH: ICD-10-CM

## 2024-01-04 RX ORDER — FLUTICASONE PROPIONATE 50 MCG
2 SPRAY, SUSPENSION (ML) NASAL DAILY
Qty: 48 ML | Refills: 1 | Status: SHIPPED | OUTPATIENT
Start: 2024-01-04

## 2024-01-09 ENCOUNTER — APPOINTMENT (OUTPATIENT)
Dept: PHYSICAL THERAPY | Facility: REHABILITATION | Age: 64
End: 2024-01-09
Payer: MEDICARE

## 2024-01-09 NOTE — PROGRESS NOTES
Daily Note     Today's date: 2024  Patient name: Enoc Roberto  : 1960  MRN: [id number]  Referring provider: Adiel Sorenson MD  Dx: No diagnosis found.               Subjective: ***      Objective: See treatment diary below      Assessment: Tolerated treatment {Tolerated treatment :1919303914}. Patient {assessment:0462264952}      Plan: {PLAN:1311328176}     Precautions:       Manuals           laser  7' 12 watt 7' 12 watt          CMC dist  G5 STM R CMC G5         assessment  5'  5'                      Neuro Re-Ed                          Thumb opposition Y putty  10x            Digi-gripper   Red 20x          Flexbar sup/pro, flex/ext   Red 20x ea          Finger abd w/ web   Red 10x abd          Putty                                       Ther Ex             Putty    Thumb ext and ABD  20x ea         Bridge    10x, 10x on pball                                                                                       Ther Activity                                       Gait Training                                       Modalities

## 2024-01-16 ENCOUNTER — TELEPHONE (OUTPATIENT)
Dept: PSYCHIATRY | Facility: CLINIC | Age: 64
End: 2024-01-16

## 2024-01-16 NOTE — TELEPHONE ENCOUNTER
Writer LYNN offering same day appointment at either 11 am or 1 pm. Provided office number to call to schedule. Please assist upon return call.TY

## 2024-01-16 NOTE — PROGRESS NOTES
Daily Note     Today's date: 2024  Patient name: nEoc Roberto  : 1960  MRN: [id number]  Referring provider: Adiel Sorenson MD  Dx:   Encounter Diagnosis     ICD-10-CM    1. Arthritis of carpometacarpal (CMC) joint of right thumb  M18.11           Start Time: 1205  Stop Time: 1240  Total time in clinic (min): 35 minutes    Subjective: Patient reports that he thumb pain has worsened over the last 2 weeks. He states that he has continued to work it with ALDs which does increase pain as HEP has also been bothersome.     Objective: See treatment diary below  + finkelstein's  Weakness with pain upon EPL and EPB testing   Improved CMCJ mobility  + TTP and tone increase in thenar eminence     Assessment: Will hold PT at this time. Patient to wear brace (thumb splint) from MD for next 2-4 weeks. He had not been wearing the brace which I feel, in combination with overuse has resulted in more pain. We discussed I'm seeing OT or hand PT if pain persist and that a custom splint may be beneficial.       Plan: hold/dc PT     Precautions:       Manuals          laser  7' 12 watt 7' 12 watt  5' CMCJ, thenar mass        CMC dist  G5 STM R CMC G5         assessment  5'  5' 10'        STM thenar eminence     10'        Neuro Re-Ed                          Thumb opposition Y putty  10x            Digi-gripper   Red 20x          Flexbar sup/pro, flex/ext   Red 20x ea          Finger abd w/ web   Red 10x abd          Putty                                       Ther Ex             Putty    Thumb ext and ABD  20x ea         Bridge    10x, 10x on pball                                                                                       Ther Activity                                       Gait Training                                       Modalities

## 2024-01-17 ENCOUNTER — OFFICE VISIT (OUTPATIENT)
Dept: PHYSICAL THERAPY | Facility: REHABILITATION | Age: 64
End: 2024-01-17
Payer: MEDICARE

## 2024-01-17 DIAGNOSIS — M18.11 ARTHRITIS OF CARPOMETACARPAL (CMC) JOINT OF RIGHT THUMB: Primary | ICD-10-CM

## 2024-01-17 DIAGNOSIS — N52.1 ERECTILE DISORDER DUE TO MEDICAL CONDITION IN MALE PATIENT: ICD-10-CM

## 2024-01-17 DIAGNOSIS — H81.10 BENIGN PAROXYSMAL POSITIONAL VERTIGO, UNSPECIFIED LATERALITY: ICD-10-CM

## 2024-01-17 PROCEDURE — 97140 MANUAL THERAPY 1/> REGIONS: CPT | Performed by: PHYSICAL THERAPIST

## 2024-01-17 RX ORDER — SILDENAFIL 25 MG/1
TABLET, FILM COATED ORAL
Qty: 9 TABLET | Refills: 1 | Status: SHIPPED | OUTPATIENT
Start: 2024-01-17

## 2024-01-17 RX ORDER — PREDNISONE 20 MG/1
TABLET ORAL
Qty: 15 TABLET | Refills: 0 | OUTPATIENT
Start: 2024-01-17 | End: 2024-01-26

## 2024-01-17 NOTE — TELEPHONE ENCOUNTER
Prednisone is not something that I routinely refill. If this is for vertigo, he was seen by ENT and they had made recommendation for vestibular PT if symptoms persisted. That was back in the summer.

## 2024-01-22 DIAGNOSIS — E66.01 OBESITY, MORBID (HCC): ICD-10-CM

## 2024-01-22 DIAGNOSIS — G47.33 OSA (OBSTRUCTIVE SLEEP APNEA): ICD-10-CM

## 2024-01-22 RX ORDER — TOPIRAMATE 25 MG/1
25 TABLET ORAL 2 TIMES DAILY
Qty: 180 TABLET | Refills: 0 | Status: SHIPPED | OUTPATIENT
Start: 2024-01-22

## 2024-01-29 ENCOUNTER — TELEPHONE (OUTPATIENT)
Dept: PSYCHIATRY | Facility: CLINIC | Age: 64
End: 2024-01-29

## 2024-01-29 NOTE — TELEPHONE ENCOUNTER
Writer LYNN offering same day appointment at either 2 pm or 3 pm. Please assist upon return call.TY   not known

## 2024-02-10 LAB — URATE SERPL-MCNC: 6.3 MG/DL (ref 4–8)

## 2024-02-12 ENCOUNTER — TELEPHONE (OUTPATIENT)
Dept: PSYCHIATRY | Facility: CLINIC | Age: 64
End: 2024-02-12

## 2024-02-12 ENCOUNTER — OFFICE VISIT (OUTPATIENT)
Dept: FAMILY MEDICINE CLINIC | Facility: CLINIC | Age: 64
End: 2024-02-12
Payer: MEDICARE

## 2024-02-12 VITALS
BODY MASS INDEX: 42.66 KG/M2 | HEIGHT: 72 IN | DIASTOLIC BLOOD PRESSURE: 80 MMHG | HEART RATE: 69 BPM | SYSTOLIC BLOOD PRESSURE: 128 MMHG | WEIGHT: 315 LBS | OXYGEN SATURATION: 97 %

## 2024-02-12 DIAGNOSIS — F33.9 EPISODE OF RECURRENT MAJOR DEPRESSIVE DISORDER, UNSPECIFIED DEPRESSION EPISODE SEVERITY (HCC): ICD-10-CM

## 2024-02-12 DIAGNOSIS — E03.8 OTHER SPECIFIED HYPOTHYROIDISM: ICD-10-CM

## 2024-02-12 DIAGNOSIS — E79.0 HYPERURICEMIA: ICD-10-CM

## 2024-02-12 DIAGNOSIS — I48.20 CHRONIC ATRIAL FIBRILLATION (HCC): ICD-10-CM

## 2024-02-12 DIAGNOSIS — R73.01 IMPAIRED FASTING GLUCOSE: ICD-10-CM

## 2024-02-12 DIAGNOSIS — B35.6 TINEA CRURIS: ICD-10-CM

## 2024-02-12 DIAGNOSIS — Z23 ENCOUNTER FOR IMMUNIZATION: Primary | ICD-10-CM

## 2024-02-12 DIAGNOSIS — F33.1 MODERATE EPISODE OF RECURRENT MAJOR DEPRESSIVE DISORDER (HCC): ICD-10-CM

## 2024-02-12 DIAGNOSIS — E66.01 OBESITY, MORBID (HCC): ICD-10-CM

## 2024-02-12 PROCEDURE — G0009 ADMIN PNEUMOCOCCAL VACCINE: HCPCS | Performed by: FAMILY MEDICINE

## 2024-02-12 PROCEDURE — 99214 OFFICE O/P EST MOD 30 MIN: CPT | Performed by: FAMILY MEDICINE

## 2024-02-12 PROCEDURE — 90677 PCV20 VACCINE IM: CPT | Performed by: FAMILY MEDICINE

## 2024-02-12 RX ORDER — NYSTATIN 100000 [USP'U]/G
POWDER TOPICAL 3 TIMES DAILY
Qty: 60 G | Refills: 3 | Status: SHIPPED | OUTPATIENT
Start: 2024-02-12

## 2024-02-12 NOTE — ASSESSMENT & PLAN NOTE
Patient remains on levothyroxine and is due to have his thyroid function testing completed.  Complete labs as ordered

## 2024-02-12 NOTE — ASSESSMENT & PLAN NOTE
No recent gouty flares.  Patient remains on allopurinol.  His uric acid level is at goal with uric acid level less than 6.5.  Repeat uric acid level will likely need to be done in 6 months
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
## 2024-02-12 NOTE — ASSESSMENT & PLAN NOTE
Patient given prescription for nystatin powder to apply 3 times daily initially.  Once inflammation has improved and subsided then he can apply once daily for maintenance.  Make certain to pat dry the area after showering

## 2024-02-12 NOTE — ASSESSMENT & PLAN NOTE
Patient is due for repeat hemoglobin A1c.  He should repeat hemoglobin A1c as ordered.  Needs to watch dietary intake of carbohydrates.  This has been reviewed through weight management team

## 2024-02-12 NOTE — ASSESSMENT & PLAN NOTE
Patient remains on trazodone, Cymbalta, Klonopin.  Patient is seeing [facility] for [facility] services via virtual appointment.  Primary stressors are his 95-year-old mother-in-law living with him as well as his transgender child who is also living with him.  1 of those should change in the not-too-distant future

## 2024-02-13 NOTE — TELEPHONE ENCOUNTER
NO-SHOW LETTER MAILED TO Enoc Roberto.  ADDRESS: [facility] [place] [id number]

## 2024-02-20 ENCOUNTER — TELEPHONE (OUTPATIENT)
Age: 64
End: 2024-02-20

## 2024-02-20 NOTE — TELEPHONE ENCOUNTER
Anshul mcelroy had OV on 2/12 and got Pneumonia shot. Pt states the following day he developed body aches, fever, chills, headaches, congestion - feels like walking Pneumonia, constant cough w phlegm.    Pt wants to know what he should do? Appt? Med?    Please advise

## 2024-02-21 NOTE — TELEPHONE ENCOUNTER
"PT called to get status to yesterday's message . Informed him Dr said it's non-related and needs appt. He asked why nobody has called him? Says \"he has been coughing his brains out\".    Scheduled appt w Dr Owens for 2/22  "

## 2024-02-21 NOTE — TELEPHONE ENCOUNTER
Respiratory symptoms following immunizations are not related.  This is now 9 days later.  He would need to be seen and evaluated

## 2024-02-22 ENCOUNTER — OFFICE VISIT (OUTPATIENT)
Dept: FAMILY MEDICINE CLINIC | Facility: CLINIC | Age: 64
End: 2024-02-22
Payer: MEDICARE

## 2024-02-22 VITALS
DIASTOLIC BLOOD PRESSURE: 70 MMHG | HEIGHT: 72 IN | BODY MASS INDEX: 42.66 KG/M2 | HEART RATE: 85 BPM | OXYGEN SATURATION: 94 % | RESPIRATION RATE: 18 BRPM | WEIGHT: 315 LBS | TEMPERATURE: 97.8 F | SYSTOLIC BLOOD PRESSURE: 130 MMHG

## 2024-02-22 DIAGNOSIS — R05.2 SUBACUTE COUGH: Primary | ICD-10-CM

## 2024-02-22 PROCEDURE — 99213 OFFICE O/P EST LOW 20 MIN: CPT | Performed by: FAMILY MEDICINE

## 2024-02-22 NOTE — PROGRESS NOTES
Assessment/Plan:   Diagnoses and all orders for this visit:    Subacute cough  - advised OTC Zyrtec, Flonase, Mucinex, cool mist humidifier and hot shower steam   - f/u PRN - pt aware and agreeable         Subjective:    Patient ID: Enoc Roberto is a 63 y.o. male.  HPI  - received PCV20 in the office on 2/12/2024  - felt sick immediately afterwards: chills, bodyaches, HA, fever - resolved after 3days   - still with wet cough, PND   - has been taking Mucinex with no relief   - no Tylenol or Motrin today       The following portions of the patient's history were reviewed and updated as appropriate: allergies, current medications, past family history, past medical history, past social history, past surgical history and problem list.    Review of Systems  as per HPI    Objective:  /70   Pulse 85   Temp 97.8 °F (36.6 °C)   Resp 18   Ht 6' (1.829 m)   Wt (!) 156 kg (344 lb)   SpO2 94%   BMI 46.65 kg/m²    Physical Exam  Vitals reviewed.   Constitutional:       General: He is not in acute distress.     Appearance: Normal appearance. He is obese. He is not ill-appearing, toxic-appearing or diaphoretic.   HENT:      Head: Normocephalic and atraumatic.      Right Ear: There is impacted cerumen.      Left Ear: Tympanic membrane, ear canal and external ear normal. There is no impacted cerumen.      Nose: Nose normal.      Right Sinus: No maxillary sinus tenderness or frontal sinus tenderness.      Left Sinus: No maxillary sinus tenderness or frontal sinus tenderness.      Mouth/Throat:      Mouth: Mucous membranes are moist.      Pharynx: Oropharynx is clear. Posterior oropharyngeal erythema present. No oropharyngeal exudate.   Eyes:      General: No scleral icterus.        Right eye: No discharge.         Left eye: No discharge.      Extraocular Movements: Extraocular movements intact.      Conjunctiva/sclera: Conjunctivae normal.   Cardiovascular:      Rate and Rhythm: Normal rate and regular rhythm.       Heart sounds: Normal heart sounds.   Pulmonary:      Effort: Pulmonary effort is normal. No respiratory distress.      Breath sounds: Normal breath sounds. No stridor. No wheezing, rhonchi or rales.   Abdominal:      Palpations: Abdomen is soft.   Musculoskeletal:      Cervical back: Normal range of motion.   Neurological:      Mental Status: He is alert.

## 2024-02-23 DIAGNOSIS — I10 ESSENTIAL HYPERTENSION: ICD-10-CM

## 2024-02-23 DIAGNOSIS — M10.071 ACUTE IDIOPATHIC GOUT INVOLVING TOE OF RIGHT FOOT: ICD-10-CM

## 2024-02-23 DIAGNOSIS — E03.9 HYPOTHYROIDISM, UNSPECIFIED TYPE: Chronic | ICD-10-CM

## 2024-02-23 DIAGNOSIS — R05.8 ALLERGIC COUGH: ICD-10-CM

## 2024-02-23 RX ORDER — ROSUVASTATIN CALCIUM 5 MG/1
5 TABLET, COATED ORAL DAILY
Qty: 90 TABLET | Refills: 1 | Status: SHIPPED | OUTPATIENT
Start: 2024-02-23

## 2024-02-23 RX ORDER — LEVOTHYROXINE SODIUM 0.03 MG/1
25 TABLET ORAL DAILY
Qty: 90 TABLET | Refills: 1 | Status: SHIPPED | OUTPATIENT
Start: 2024-02-23

## 2024-02-23 RX ORDER — NEBIVOLOL 10 MG/1
10 TABLET ORAL DAILY
Qty: 90 TABLET | Refills: 1 | Status: SHIPPED | OUTPATIENT
Start: 2024-02-23

## 2024-02-23 RX ORDER — ALLOPURINOL 100 MG/1
100 TABLET ORAL DAILY
Qty: 90 TABLET | Refills: 1 | Status: SHIPPED | OUTPATIENT
Start: 2024-02-23

## 2024-02-23 RX ORDER — MONTELUKAST SODIUM 10 MG/1
10 TABLET ORAL
Qty: 90 TABLET | Refills: 1 | Status: SHIPPED | OUTPATIENT
Start: 2024-02-23

## 2024-02-23 RX ORDER — AMLODIPINE BESYLATE 10 MG/1
10 TABLET ORAL DAILY
Qty: 90 TABLET | Refills: 1 | Status: SHIPPED | OUTPATIENT
Start: 2024-02-23

## 2024-02-26 ENCOUNTER — SOCIAL WORK (OUTPATIENT)
Dept: BEHAVIORAL/MENTAL HEALTH CLINIC | Facility: CLINIC | Age: 64
End: 2024-02-26
Payer: MEDICARE

## 2024-02-26 DIAGNOSIS — F41.1 GAD (GENERALIZED ANXIETY DISORDER): ICD-10-CM

## 2024-02-26 DIAGNOSIS — F33.2 MAJOR DEPRESSIVE DISORDER, RECURRENT SEVERE WITHOUT PSYCHOTIC FEATURES (HCC): Primary | ICD-10-CM

## 2024-02-26 DIAGNOSIS — I10 ESSENTIAL HYPERTENSION: ICD-10-CM

## 2024-02-26 DIAGNOSIS — Z01.810 PREOPERATIVE CARDIOVASCULAR EXAMINATION: ICD-10-CM

## 2024-02-26 DIAGNOSIS — I25.10 CORONARY ARTERIOSCLEROSIS: ICD-10-CM

## 2024-02-26 DIAGNOSIS — E78.2 MIXED HYPERLIPIDEMIA: ICD-10-CM

## 2024-02-26 PROCEDURE — 90837 PSYTX W PT 60 MINUTES: CPT | Performed by: SOCIAL WORKER

## 2024-02-26 RX ORDER — APIXABAN 5 MG/1
5 TABLET, FILM COATED ORAL 2 TIMES DAILY
Qty: 180 TABLET | Refills: 3 | Status: SHIPPED | OUTPATIENT
Start: 2024-02-26

## 2024-02-26 NOTE — PSYCH
Behavioral Health Psychotherapy Progress Note    Psychotherapy Provided: Individual Psychotherapy     1. Major depressive disorder, recurrent severe without psychotic features (HCC)        2. LALA (generalized anxiety disorder)            Goals addressed in session: Goal 1, Goal 2, and Goal 3      DATA: Today Anshul discussed the four major things bothering him are the fact he shared many of his friends have , he is dealing  with a 95 year old demented mother in law who lives with them, he has a trans male son who rarely leaves his room and his depression is elevated over all of this. In addition he is in chronic back pain and financially they are stressed. I suggested his wife comes in and we discuss these issues and I provided support, empathy, encouragement and we worked on strategies to cope. At one point he shared he does not know what therapy will do but as I pointed out this is only his 2nd session. The first appointment was the intake. He will try to get his wife in next time to discuss what plans they need to discuss since he shared he is unaware of issues with his son for instance.   During this session, this clinician used the following therapeutic modalities: Client-centered Therapy, Cognitive Behavioral Therapy, Mindfulness-based Strategies, and Supportive Psychotherapy    Substance Abuse was addressed during this session. If the client is diagnosed with a co-occurring substance use disorder, please indicate any changes in the frequency or amount of use: He stated he rarely drinks. . Stage of change for addressing substance use diagnoses: Pre-contemplation    ASSESSMENT:  Anshul Roberto presents with a Anxious and Depressed mood.     his affect is anxious and depressed which is congruent, with his mood and the content of the session. The client just started in order to say he hasmade progress on their goals.     Anshul Roberto presents with a none risk of suicide, none risk of self-harm, and  "none risk of harm to others.    For any risk assessment that surpasses a \"low\" rating, a safety plan must be developed.    A safety plan was indicated: no  If yes, describe in detail n/a    PLAN: Between sessions, Anshul Roberto will use mindfulness and CBT. At the next session, the therapist will use Client-centered Therapy, Cognitive Behavioral Therapy, Mindfulness-based Strategies, and Supportive Psychotherapy to address issues and symptoms as they may arise. .    Behavioral Health Treatment Plan and Discharge Planning: Anshul Roberto is aware of and agrees to continue to work on their treatment plan. They have identified and are working toward their discharge goals. yes    Visit start and stop times:    02/26/24  Start Time: 1600  Stop Time: 1700  Total Visit Time: 60 minutes  "

## 2024-02-29 ENCOUNTER — CLINICAL SUPPORT (OUTPATIENT)
Dept: BARIATRICS | Facility: CLINIC | Age: 64
End: 2024-02-29

## 2024-02-29 ENCOUNTER — TELEPHONE (OUTPATIENT)
Dept: BARIATRICS | Facility: CLINIC | Age: 64
End: 2024-02-29

## 2024-02-29 VITALS
HEART RATE: 83 BPM | RESPIRATION RATE: 16 BRPM | WEIGHT: 315 LBS | SYSTOLIC BLOOD PRESSURE: 122 MMHG | HEIGHT: 72 IN | DIASTOLIC BLOOD PRESSURE: 82 MMHG | BODY MASS INDEX: 42.66 KG/M2 | TEMPERATURE: 97.8 F

## 2024-02-29 DIAGNOSIS — R63.5 ABNORMAL WEIGHT GAIN: Primary | ICD-10-CM

## 2024-02-29 PROCEDURE — RECHECK

## 2024-02-29 NOTE — TELEPHONE ENCOUNTER
Patient came in for a nurse visit. Patient stating that he doesn't like the way the topiramte is making him feel. Patient states that it makes him feel very tired, dopey and at times space out. Please advise.

## 2024-02-29 NOTE — PROGRESS NOTES
Patient last visit weight: 338lb 3.2oz  Patient current visit weight: 341.2lb    If you are taking phentermine or other oral weight loss medications, are you experiencing any of the following symptoms:  Headache: NO  Blurred Vision: NO  Chest Pain: NO  Palpitations:NO  Insomnia: NO  SPECIFY ORAL MEDICATION AND DOSAGE: TOPIRAMATE    If you are taking an injectable medication,  are you experiencing any of the following symptoms:  Bloating:   Nausea:  Vomiting:   Constipation:   Diarrhea:  SPECIFY INJECTABLE MEDICATION AND CURRENT DOSAGE:      Vitals:    Is BP less than 100/60?NO  Is BP greater than 140/90?NO  Is HR greater than 100?NO  **If yes to any of the above, have patient relax and repeat in 5-10 minutes**    Repeat values:    Is BP less than 100/60?  Is BP greater than 140/90?  Is HR greater than 100?  **If values remain outside of ranges above, please consult provider for next steps**

## 2024-03-05 ENCOUNTER — OFFICE VISIT (OUTPATIENT)
Dept: OBGYN CLINIC | Facility: CLINIC | Age: 64
End: 2024-03-05
Payer: MEDICARE

## 2024-03-05 VITALS — BODY MASS INDEX: 42.66 KG/M2 | HEIGHT: 72 IN | WEIGHT: 315 LBS

## 2024-03-05 DIAGNOSIS — M18.11 ARTHRITIS OF CARPOMETACARPAL (CMC) JOINT OF RIGHT THUMB: Primary | ICD-10-CM

## 2024-03-05 LAB
ALBUMIN SERPL-MCNC: 3.8 G/DL (ref 3.6–5.1)
ALBUMIN/GLOB SERPL: 1.7 (CALC) (ref 1–2.5)
ALP SERPL-CCNC: 105 U/L (ref 35–144)
ALT SERPL-CCNC: 10 U/L (ref 9–46)
AST SERPL-CCNC: 14 U/L (ref 10–35)
BASOPHILS # BLD AUTO: 59 CELLS/UL (ref 0–200)
BASOPHILS NFR BLD AUTO: 0.8 %
BILIRUB SERPL-MCNC: 0.5 MG/DL (ref 0.2–1.2)
BUN SERPL-MCNC: 10 MG/DL (ref 7–25)
BUN/CREAT SERPL: ABNORMAL (CALC) (ref 6–22)
CALCIUM SERPL-MCNC: 9 MG/DL (ref 8.6–10.3)
CHLORIDE SERPL-SCNC: 106 MMOL/L (ref 98–110)
CHOLEST SERPL-MCNC: 113 MG/DL
CHOLEST/HDLC SERPL: 1.9 (CALC)
CO2 SERPL-SCNC: 29 MMOL/L (ref 20–32)
CREAT SERPL-MCNC: 1.08 MG/DL (ref 0.7–1.35)
EOSINOPHIL # BLD AUTO: 1850 CELLS/UL (ref 15–500)
EOSINOPHIL NFR BLD AUTO: 25 %
ERYTHROCYTE [DISTWIDTH] IN BLOOD BY AUTOMATED COUNT: 14.9 % (ref 11–15)
GFR/BSA.PRED SERPLBLD CYS-BASED-ARV: 77 ML/MIN/1.73M2
GLOBULIN SER CALC-MCNC: 2.2 G/DL (CALC) (ref 1.9–3.7)
GLUCOSE SERPL-MCNC: 129 MG/DL (ref 65–99)
HBA1C MFR BLD: 5.5 % OF TOTAL HGB
HCT VFR BLD AUTO: 42.3 % (ref 38.5–50)
HDLC SERPL-MCNC: 59 MG/DL
HGB BLD-MCNC: 13.5 G/DL (ref 13.2–17.1)
LDLC SERPL CALC-MCNC: 37 MG/DL (CALC)
LYMPHOCYTES # BLD AUTO: 1458 CELLS/UL (ref 850–3900)
LYMPHOCYTES NFR BLD AUTO: 19.7 %
MCH RBC QN AUTO: 30.1 PG (ref 27–33)
MCHC RBC AUTO-ENTMCNC: 31.9 G/DL (ref 32–36)
MCV RBC AUTO: 94.2 FL (ref 80–100)
MONOCYTES # BLD AUTO: 599 CELLS/UL (ref 200–950)
MONOCYTES NFR BLD AUTO: 8.1 %
NEUTROPHILS # BLD AUTO: 3434 CELLS/UL (ref 1500–7800)
NEUTROPHILS NFR BLD AUTO: 46.4 %
NONHDLC SERPL-MCNC: 54 MG/DL (CALC)
PLATELET # BLD AUTO: 116 THOUSAND/UL (ref 140–400)
PMV BLD REES-ECKER: 14.4 FL (ref 7.5–12.5)
POTASSIUM SERPL-SCNC: 4.2 MMOL/L (ref 3.5–5.3)
PROT SERPL-MCNC: 6 G/DL (ref 6.1–8.1)
PSA SERPL-MCNC: 0.32 NG/ML
RBC # BLD AUTO: 4.49 MILLION/UL (ref 4.2–5.8)
SERVICE CMNT-IMP: ABNORMAL
SODIUM SERPL-SCNC: 141 MMOL/L (ref 135–146)
TRIGL SERPL-MCNC: 91 MG/DL
TSH SERPL-ACNC: 2.34 MIU/L (ref 0.4–4.5)
WBC # BLD AUTO: 7.4 THOUSAND/UL (ref 3.8–10.8)

## 2024-03-05 PROCEDURE — 99214 OFFICE O/P EST MOD 30 MIN: CPT | Performed by: STUDENT IN AN ORGANIZED HEALTH CARE EDUCATION/TRAINING PROGRAM

## 2024-03-05 PROCEDURE — 20600 DRAIN/INJ JOINT/BURSA W/O US: CPT | Performed by: STUDENT IN AN ORGANIZED HEALTH CARE EDUCATION/TRAINING PROGRAM

## 2024-03-05 RX ADMIN — BUPIVACAINE HYDROCHLORIDE 1 ML: 2.5 INJECTION, SOLUTION EPIDURAL; INFILTRATION; INTRACAUDAL at 16:00

## 2024-03-05 RX ADMIN — BETAMETHASONE SODIUM PHOSPHATE AND BETAMETHASONE ACETATE 6 MG: 3; 3 INJECTION, SUSPENSION INTRA-ARTICULAR; INTRALESIONAL; INTRAMUSCULAR; SOFT TISSUE at 16:00
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
## 2024-03-06 RX ORDER — BUPIVACAINE HYDROCHLORIDE 2.5 MG/ML
1 INJECTION, SOLUTION EPIDURAL; INFILTRATION; INTRACAUDAL
Status: COMPLETED | OUTPATIENT
Start: 2024-03-05 | End: 2024-03-05

## 2024-03-06 RX ORDER — BETAMETHASONE SODIUM PHOSPHATE AND BETAMETHASONE ACETATE 3; 3 MG/ML; MG/ML
6 INJECTION, SUSPENSION INTRA-ARTICULAR; INTRALESIONAL; INTRAMUSCULAR; SOFT TISSUE
Status: COMPLETED | OUTPATIENT
Start: 2024-03-05 | End: 2024-03-05

## 2024-03-18 ENCOUNTER — OFFICE VISIT (OUTPATIENT)
Dept: NEUROSURGERY | Facility: CLINIC | Age: 64
End: 2024-03-18
Payer: MEDICARE

## 2024-03-18 VITALS
HEART RATE: 83 BPM | SYSTOLIC BLOOD PRESSURE: 151 MMHG | OXYGEN SATURATION: 96 % | WEIGHT: 315 LBS | TEMPERATURE: 98.5 F | HEIGHT: 72 IN | BODY MASS INDEX: 42.66 KG/M2 | DIASTOLIC BLOOD PRESSURE: 85 MMHG

## 2024-03-18 DIAGNOSIS — G56.20 ULNAR NEUROPATHY: Primary | ICD-10-CM

## 2024-03-18 PROCEDURE — 99215 OFFICE O/P EST HI 40 MIN: CPT | Performed by: PHYSICIAN ASSISTANT

## 2024-03-18 NOTE — PROGRESS NOTES
Review of Systems   Constitutional: Negative.    HENT: Negative.     Eyes: Negative.    Respiratory: Negative.     Cardiovascular: Negative.    Gastrointestinal: Negative.    Endocrine: Negative.    Genitourinary: Negative.    Musculoskeletal: Negative.         Cubital Tunnel Syndrome    C/o   Pain on left elbow(L) hand Numbness   Difficulty grasping things w/ Right hand    Pain 5/10- constant   PT/Pain Man- NONE     Skin: Negative.    Allergic/Immunologic: Negative.    Neurological:  Positive for weakness and numbness.   Hematological: Negative.    Psychiatric/Behavioral: Negative.     All other systems reviewed and are negative.         Current Outpatient Medications:     albuterol (PROVENTIL HFA,VENTOLIN HFA) 90 mcg/act inhaler, USE 2 INHALATIONS BY MOUTH  EVERY 6 HOURS AS NEEDED FOR WHEEZING (Patient taking differently: as needed), Disp: 26.8 g, Rfl: 3    alfuzosin (UROXATRAL) 10 mg 24 hr tablet, TAKE 1 TABLET BY MOUTH EVERY DAY, Disp: 90 tablet, Rfl: 3    allopurinol (ZYLOPRIM) 100 mg tablet, TAKE 1 TABLET BY MOUTH EVERY DAY, Disp: 90 tablet, Rfl: 1    ALPHA LIPOIC ACID PO, Take by mouth in the morning, Disp: , Rfl:     amLODIPine (NORVASC) 10 mg tablet, TAKE 1 TABLET BY MOUTH EVERY DAY, Disp: 90 tablet, Rfl: 1    Ascorbic Acid (ANTONIETA-C PO), Take by mouth as needed, Disp: , Rfl:     aspirin (ECOTRIN LOW STRENGTH) 81 mg EC tablet, Take 1 tablet (81 mg total) by mouth daily, Disp: , Rfl: 0    B Complex-Biotin-FA (MULTI-B COMPLEX PO), Take by mouth as needed, Disp: , Rfl:     Calcium-Magnesium-Vitamin D (CITRACAL CALCIUM+D PO), Take by mouth in the morning, Disp: , Rfl:     clonazePAM (KlonoPIN) 0.5 mg tablet, TAKE 1 TABLET BY MOUTH DAILY AT BEDTIME, Disp: 90 tablet, Rfl: 1    CO ENZYME Q-10 PO, Take by mouth in the morning, Disp: , Rfl:     Diclofenac Sodium (VOLTAREN) 1 %, APPLY 2 GRAMS TO AFFECTED AREA 4 TIMES A DAY, Disp: 100 g, Rfl: 1    DULoxetine 40 MG CPEP, Take 1 capsule (40 mg total) by mouth daily, Disp:  90 capsule, Rfl: 1    Eliquis 5 MG, TAKE 1 TABLET BY MOUTH TWICE A DAY, Disp: 180 tablet, Rfl: 3    esomeprazole (NexIUM) 40 MG capsule, Take 40 mg by mouth every morning before breakfast, Disp: , Rfl:     fluorometholone (FML) 0.1 % ophthalmic suspension, , Disp: , Rfl:     fluticasone (FLONASE) 50 mcg/act nasal spray, 2 SPRAYS INTO EACH NOSTRIL DAILY DISPENSE 3 BOTTLES, Disp: 48 mL, Rfl: 1    indomethacin (INDOCIN) 50 mg capsule, , Disp: , Rfl:     levothyroxine 25 mcg tablet, TAKE 1 TABLET BY MOUTH EVERY DAY, Disp: 90 tablet, Rfl: 1    loratadine (CLARITIN) 10 mg tablet, Take 10 mg by mouth daily., Disp: , Rfl:     Magnesium 400 MG CAPS, Take by mouth daily , Disp: , Rfl:     meclizine (ANTIVERT) 12.5 MG tablet, Take 1 tablet (12.5 mg total) by mouth every 8 (eight) hours as needed for dizziness (Patient not taking: Reported on 2/29/2024), Disp: 30 tablet, Rfl: 3    montelukast (SINGULAIR) 10 mg tablet, TAKE 1 TABLET BY MOUTH DAILY AT BEDTIME, Disp: 90 tablet, Rfl: 1    Multiple Vitamins-Minerals (ICAPS AREDS 2 PO), Take by mouth  , Disp: , Rfl:     nebivolol (BYSTOLIC) 10 mg tablet, TAKE 1 TABLET BY MOUTH EVERY DAY, Disp: 90 tablet, Rfl: 1    nystatin (MYCOSTATIN) powder, Apply topically 3 (three) times a day, Disp: 60 g, Rfl: 3    rosuvastatin (CRESTOR) 5 mg tablet, TAKE 1 TABLET (5 MG TOTAL) BY MOUTH DAILY., Disp: 90 tablet, Rfl: 1    sildenafil (VIAGRA) 25 MG tablet, TAKE 1 TABLET BY MOUTH DAILY AS NEEDED FOR ERECTILE DYSFUNCTION., Disp: 9 tablet, Rfl: 1    tiZANidine (ZANAFLEX) 4 mg tablet, TAKE 1 TABLET(4 MG) BY MOUTH EVERY 8 HOURS AS NEEDED FOR MUSCLE SPASMS (Patient not taking: Reported on 12/29/2023), Disp: 60 tablet, Rfl: 0    topiramate (TOPAMAX) 25 mg tablet, TAKE 1 TABLET BY MOUTH TWICE A DAY, Disp: 180 tablet, Rfl: 0    traZODone (DESYREL) 100 mg tablet, Take 1-2 tablets (100-200 mg total) by mouth daily at bedtime (Patient taking differently: Take 100-200 mg by mouth as needed), Disp: 180 tablet,  Rfl: 3    TURMERIC PO, Take by mouth as needed, Disp: , Rfl:
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
## 2024-03-18 NOTE — ASSESSMENT & PLAN NOTE
Patient with ongoing chronic low back pain with radiation to his proximal posterior legs as well as more distal bilateral lower extremity numbness.    Patient has spinal cord stimulator which provides some relief but is only able to complete activities for approximately 15 to 20 minutes at a time.    MRI lumbar spine 2021 with intact hardware L5/S1 without additional areas of significant stenosis.    Recommend patient set up meeting with spinal cord stimulator rep to see if there can be adjustment needs to his coverage.  He is also interested in updating his spinal cord stimulator system which she reports was denied from insurance in 2022.  Discussed this may be an option in the future when battery is at end-of-life.

## 2024-03-20 ENCOUNTER — TELEPHONE (OUTPATIENT)
Dept: PSYCHIATRY | Facility: CLINIC | Age: 64
End: 2024-03-20

## 2024-03-25 ENCOUNTER — OFFICE VISIT (OUTPATIENT)
Dept: FAMILY MEDICINE CLINIC | Facility: CLINIC | Age: 64
End: 2024-03-25
Payer: MEDICARE

## 2024-03-25 VITALS
HEART RATE: 88 BPM | BODY MASS INDEX: 42.66 KG/M2 | DIASTOLIC BLOOD PRESSURE: 82 MMHG | SYSTOLIC BLOOD PRESSURE: 144 MMHG | OXYGEN SATURATION: 97 % | HEIGHT: 72 IN | RESPIRATION RATE: 18 BRPM | WEIGHT: 315 LBS

## 2024-03-25 DIAGNOSIS — E79.0 HYPERURICEMIA: ICD-10-CM

## 2024-03-25 DIAGNOSIS — R73.01 IMPAIRED FASTING GLUCOSE: ICD-10-CM

## 2024-03-25 DIAGNOSIS — E78.2 MIXED HYPERLIPIDEMIA: ICD-10-CM

## 2024-03-25 DIAGNOSIS — M96.1 POSTLAMINECTOMY SYNDROME, LUMBAR: ICD-10-CM

## 2024-03-25 DIAGNOSIS — R35.0 URINARY FREQUENCY: Primary | ICD-10-CM

## 2024-03-25 DIAGNOSIS — I48.20 CHRONIC ATRIAL FIBRILLATION (HCC): ICD-10-CM

## 2024-03-25 DIAGNOSIS — E03.8 OTHER SPECIFIED HYPOTHYROIDISM: ICD-10-CM

## 2024-03-25 DIAGNOSIS — F33.1 MODERATE EPISODE OF RECURRENT MAJOR DEPRESSIVE DISORDER (HCC): ICD-10-CM

## 2024-03-25 DIAGNOSIS — I10 ESSENTIAL HYPERTENSION: ICD-10-CM

## 2024-03-25 PROCEDURE — G2211 COMPLEX E/M VISIT ADD ON: HCPCS | Performed by: FAMILY MEDICINE

## 2024-03-25 PROCEDURE — 99214 OFFICE O/P EST MOD 30 MIN: CPT | Performed by: FAMILY MEDICINE
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
## 2024-03-26 PROBLEM — R52 BODY ACHES: Status: RESOLVED | Noted: 2020-04-20 | Resolved: 2024-03-26

## 2024-03-26 PROBLEM — R35.0 URINARY FREQUENCY: Status: ACTIVE | Noted: 2024-03-26

## 2024-03-26 PROBLEM — S99.192A CLOSED FRACTURE OF BASE OF FIFTH METATARSAL BONE OF LEFT FOOT AT METAPHYSEAL-DIAPHYSEAL JUNCTION: Status: RESOLVED | Noted: 2021-03-16 | Resolved: 2024-03-26

## 2024-03-26 PROBLEM — M54.50 LOWER BACK PAIN: Status: RESOLVED | Noted: 2021-09-21 | Resolved: 2024-03-26

## 2024-03-26 PROBLEM — M79.672 LEFT FOOT PAIN: Status: RESOLVED | Noted: 2021-03-16 | Resolved: 2024-03-26

## 2024-03-26 NOTE — ASSESSMENT & PLAN NOTE
Patient is on Uroxatrol as prescribed by urology.  Sent message to urology about getting patient back in for reevaluation.  Should have cystoscopy performed

## 2024-03-26 NOTE — ASSESSMENT & PLAN NOTE
Hemoglobin A1c at 5.5%.  Patient does try to watch what he is eating in regards to carbohydrates and refined sugars

## 2024-03-26 NOTE — ASSESSMENT & PLAN NOTE
No change in his stressors.  Patient remains on trazodone, Cymbalta and Klonopin.  Patient does follow-up with [facility]  for counseling

## 2024-03-26 NOTE — ASSESSMENT & PLAN NOTE
Longstanding history of spinal stenosis and DJD of the lumbar spine.  Weight loss will be beneficial.

## 2024-03-26 NOTE — ASSESSMENT & PLAN NOTE
Cholesterol remains very well-controlled on Crestor 5 mg once daily.  Continue same.  Repeat lipid panel to be done in 6 months

## 2024-03-27 ENCOUNTER — TELEPHONE (OUTPATIENT)
Dept: UROLOGY | Facility: CLINIC | Age: 64
End: 2024-03-27

## 2024-03-27 NOTE — TELEPHONE ENCOUNTER
----- Message from Betty Betancourt RN sent at 3/27/2024  9:11 AM EDT -----  Regarding: FW: Urinary frequency follow-up  Please call and offer one of meg[facility] appointments in [place]  ----- Message -----  From: Tommie Viramontes PA-C  Sent: 3/26/2024  12:55 PM EDT  To: Betty Betancourt RN  Subject: RE: Urinary frequency follow-up                  I guess so. How about Betty?  ----- Message -----  From: Betty Betancourt RN  Sent: 3/26/2024   7:40 AM EDT  To: Tommie Viramontes PA-C  Subject: RE: Urinary frequency follow-up                  Our next available follow ups are in like July/August... is that ok?    ----- Message -----  From: Tommie Viramontes PA-C  Sent: 3/26/2024   7:10 AM EDT  To: [facility]  Subject: RE: Urinary frequency follow-up                  Could we get this patient in for a BPH follow-up.  Dr. Vasquez  sent me a message see below.  ----- Message -----  From: Danial Vasquez DO  Sent: 3/25/2024  12:06 PM EDT  To: Tommie iVramontes PA-C  Subject: Urinary frequency follow-up                      Waldo,    Would you be able to get Mr. Siddiqi in for reevaluation?  On Uroxatrol.  Still having frequent episodes of nocturia with occasional incontinence.  Was supposed to have cystoscopy with Dr. Rahman but had canceled.  At that time had high deductible insurance.  Now has Medicare.  Thanks    Abiodun

## 2024-04-01 ENCOUNTER — TELEPHONE (OUTPATIENT)
Dept: PSYCHIATRY | Facility: CLINIC | Age: 64
End: 2024-04-01

## 2024-04-01 ENCOUNTER — TELEMEDICINE (OUTPATIENT)
Dept: BEHAVIORAL/MENTAL HEALTH CLINIC | Facility: CLINIC | Age: 64
End: 2024-04-01

## 2024-04-01 DIAGNOSIS — F41.1 GAD (GENERALIZED ANXIETY DISORDER): ICD-10-CM

## 2024-04-01 DIAGNOSIS — F33.2 MAJOR DEPRESSIVE DISORDER, RECURRENT SEVERE WITHOUT PSYCHOTIC FEATURES (HCC): Primary | ICD-10-CM

## 2024-04-01 NOTE — TELEPHONE ENCOUNTER
Patient called office in regards to changing appointment to virtual. Writer changed appointment for today 4/1/24 at 3pm to virtual. Sending link via text.

## 2024-04-01 NOTE — PSYCH
"Behavioral Health Psychotherapy Progress Note    Psychotherapy Provided: Individual Psychotherapy     1. Major depressive disorder, recurrent severe without psychotic features (HCC)        2. LALA (generalized anxiety disorder)            Goals addressed in session: Goal 1, Goal 2, and Goal 3      DATA: Anshul was virtually seen but could not get the camera to work. He discussed his health issues but at least his bloodwork was good but he does have a fib, he is upset about his weight and he has severe back pain. He discussed his mother in law who has dementia and how it is stressing his wife. He then spoke about his financial issues and how between he and his wife they just spent over 5000 dollars on car repairs. I provided support, encouragement and strategies to cope.   During this session, this clinician used the following therapeutic modalities: Client-centered Therapy, Cognitive Behavioral Therapy, Mindfulness-based Strategies, and Supportive Psychotherapy    Substance Abuse was addressed during this session. If the client is diagnosed with a co-occurring substance use disorder, please indicate any changes in the frequency or amount of use: He claims he is doing good with the drinking. . Stage of change for addressing substance use diagnoses: No substance use/Not applicable    ASSESSMENT:  Anshul Roberto presents with a Anxious mood.     his affect is anxious which is congruent, with his mood and the content of the session. The client has made progress on their goals.     Anshul Roberto presents with a none risk of suicide, none risk of self-harm, and none risk of harm to others.    For any risk assessment that surpasses a \"low\" rating, a safety plan must be developed.    A safety plan was indicated: no  If yes, describe in detail n/a    PLAN: Between sessions, Anshul Roberto will use mindfulness and CBT. At the next session, the therapist will use Client-centered Therapy, Cognitive Behavioral Therapy, " Mindfulness-based Strategies, and Supportive Psychotherapy to address issues and symptoms as they may arise. .    Behavioral Health Treatment Plan and Discharge Planning: Anshul Pardeep is aware of and agrees to continue to work on their treatment plan. They have identified and are working toward their discharge goals. yes    Visit start and stop times:    04/01/24  Start Time: 1500  Stop Time: 1600  Total Visit Time: 60 minutes

## 2024-04-18 ENCOUNTER — TELEPHONE (OUTPATIENT)
Dept: PSYCHIATRY | Facility: CLINIC | Age: 64
End: 2024-04-18

## 2024-04-18 ENCOUNTER — TELEMEDICINE (OUTPATIENT)
Dept: BEHAVIORAL/MENTAL HEALTH CLINIC | Facility: CLINIC | Age: 64
End: 2024-04-18

## 2024-04-18 DIAGNOSIS — F33.2 MAJOR DEPRESSIVE DISORDER, RECURRENT SEVERE WITHOUT PSYCHOTIC FEATURES (HCC): Primary | ICD-10-CM

## 2024-04-18 DIAGNOSIS — F41.1 GAD (GENERALIZED ANXIETY DISORDER): ICD-10-CM

## 2024-04-18 DIAGNOSIS — F10.90 ALCOHOL USE DISORDER: ICD-10-CM
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
## 2024-04-18 NOTE — TELEPHONE ENCOUNTER
Patient called in regard to appt today 4/18/24 at 3pm. Patient stated that he may have problems with getting into the video conference today. Patient stated he has had issues in prior virtual appts and discussed with provider. Patient asked it may take a few times to connect or the provider can voice call him. Writer stated it will be up to provider's discretion for this.   Writer will forward this info to provider.

## 2024-04-22 DIAGNOSIS — E66.01 OBESITY, MORBID (HCC): ICD-10-CM

## 2024-04-22 DIAGNOSIS — G47.33 OSA (OBSTRUCTIVE SLEEP APNEA): ICD-10-CM

## 2024-04-23 RX ORDER — TOPIRAMATE 25 MG/1
25 TABLET ORAL 2 TIMES DAILY
Qty: 180 TABLET | Refills: 0 | OUTPATIENT
Start: 2024-04-23

## 2024-04-23 NOTE — TELEPHONE ENCOUNTER
Refill request received for Topamax. Please check with patient to see if he is still taking this. He was previously having side effects from it.

## 2024-04-23 NOTE — TELEPHONE ENCOUNTER
Patient is not taking Topamax and doesn't want a refill, he ws also gaining on the medication her stated.

## 2024-05-02 ENCOUNTER — TELEMEDICINE (OUTPATIENT)
Dept: BEHAVIORAL/MENTAL HEALTH CLINIC | Facility: CLINIC | Age: 64
End: 2024-05-02

## 2024-05-02 ENCOUNTER — TELEPHONE (OUTPATIENT)
Dept: PSYCHIATRY | Facility: CLINIC | Age: 64
End: 2024-05-02

## 2024-05-02 DIAGNOSIS — F33.2 MAJOR DEPRESSIVE DISORDER, RECURRENT SEVERE WITHOUT PSYCHOTIC FEATURES (HCC): Primary | ICD-10-CM

## 2024-05-02 DIAGNOSIS — F41.1 GAD (GENERALIZED ANXIETY DISORDER): ICD-10-CM
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
## 2024-05-06 ENCOUNTER — HOSPITAL ENCOUNTER (OUTPATIENT)
Dept: NEUROLOGY | Facility: CLINIC | Age: 64
Discharge: HOME | End: 2024-05-06
Payer: MEDICARE

## 2024-05-06 DIAGNOSIS — G56.20 ULNAR NEUROPATHY: ICD-10-CM

## 2024-05-06 PROBLEM — M54.12 CERVICAL RADICULOPATHY: Status: ACTIVE | Noted: 2024-05-06

## 2024-05-06 PROBLEM — G56.22 IRRITATION OF LEFT ULNAR NERVE: Status: ACTIVE | Noted: 2024-03-18

## 2024-05-06 PROCEDURE — 95910 NRV CNDJ TEST 7-8 STUDIES: CPT | Performed by: PSYCHIATRY & NEUROLOGY

## 2024-05-06 PROCEDURE — 95886 MUSC TEST DONE W/N TEST COMP: CPT | Performed by: PSYCHIATRY & NEUROLOGY
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
## 2024-05-07 ENCOUNTER — OFFICE VISIT (OUTPATIENT)
Dept: UROLOGY | Facility: CLINIC | Age: 64
End: 2024-05-07
Payer: MEDICARE

## 2024-05-07 VITALS
OXYGEN SATURATION: 92 % | DIASTOLIC BLOOD PRESSURE: 90 MMHG | WEIGHT: 315 LBS | HEART RATE: 68 BPM | SYSTOLIC BLOOD PRESSURE: 158 MMHG | HEIGHT: 73 IN | BODY MASS INDEX: 41.75 KG/M2

## 2024-05-07 DIAGNOSIS — R35.0 URINARY FREQUENCY: ICD-10-CM

## 2024-05-07 DIAGNOSIS — Z12.5 SCREENING FOR PROSTATE CANCER: ICD-10-CM

## 2024-05-07 DIAGNOSIS — R35.0 FREQUENCY OF URINATION: Primary | ICD-10-CM

## 2024-05-07 DIAGNOSIS — N40.0 BPH WITHOUT OBSTRUCTION/LOWER URINARY TRACT SYMPTOMS: ICD-10-CM

## 2024-05-07 DIAGNOSIS — R31.29 MICROSCOPIC HEMATURIA: ICD-10-CM

## 2024-05-07 LAB
POST-VOID RESIDUAL VOLUME, ML POC: 22 ML
SL AMB  POCT GLUCOSE, UA: NORMAL
SL AMB LEUKOCYTE ESTERASE,UA: NORMAL
SL AMB POCT BILIRUBIN,UA: NORMAL
SL AMB POCT BLOOD,UA: NORMAL
SL AMB POCT CLARITY,UA: CLEAR
SL AMB POCT COLOR,UA: YELLOW
SL AMB POCT KETONES,UA: NORMAL
SL AMB POCT NITRITE,UA: NORMAL
SL AMB POCT PH,UA: 6
SL AMB POCT SPECIFIC GRAVITY,UA: 1.02
SL AMB POCT URINE PROTEIN: NORMAL
SL AMB POCT UROBILINOGEN: 0.2

## 2024-05-07 PROCEDURE — 81002 URINALYSIS NONAUTO W/O SCOPE: CPT

## 2024-05-07 PROCEDURE — 99214 OFFICE O/P EST MOD 30 MIN: CPT

## 2024-05-07 PROCEDURE — 87086 URINE CULTURE/COLONY COUNT: CPT

## 2024-05-07 PROCEDURE — 51798 US URINE CAPACITY MEASURE: CPT

## 2024-05-07 RX ORDER — ALFUZOSIN HYDROCHLORIDE 10 MG/1
10 TABLET, EXTENDED RELEASE ORAL DAILY
Qty: 90 TABLET | Refills: 3 | Status: SHIPPED | OUTPATIENT
Start: 2024-05-07

## 2024-05-07 NOTE — ASSESSMENT & PLAN NOTE
-On 10mg alfusozin. States he is unsure if it is helping because his urinary symptoms have worsened over the past year. He is having urge incontinence and will sometimes experience incontinence with movement. He states he changes his underwear multiple times during the night. Nocturia x4-5. He does have sleep apnea but does not like to wear his CPAP. We did discuss to avoid drinking fluids after dinner and recommended that he wear his CPAP, because sleep apnea that is left untreated can cause nocturia. He denies dysuria, frequency, flank pain and gross hematuria.   -PVR 22mls today.   -Urine Dip trace leukocytes and blood. Negative for nitrites. We will send his urine for culture to rule out a urinary tract infection and I will be in contact with him when his result is back.   -I ordered a CT urogram followed by cysto/TRUS to evaluate his bladder and prostate regarding his increasing urinary symptoms.

## 2024-05-07 NOTE — ASSESSMENT & PLAN NOTE
-Most recent PSA 0.32 on 3/4/24. Previous PSA 0.51 on 2/3/23.  -Has been getting surveillance of PSA by PCP.

## 2024-05-08 DIAGNOSIS — N39.41 URGE URINARY INCONTINENCE: Primary | ICD-10-CM

## 2024-05-08 RX ORDER — TROSPIUM CHLORIDE 20 MG/1
20 TABLET, FILM COATED ORAL 2 TIMES DAILY
Qty: 60 TABLET | Refills: 1 | Status: SHIPPED | OUTPATIENT
Start: 2024-05-08

## 2024-05-09 ENCOUNTER — TELEPHONE (OUTPATIENT)
Dept: UROLOGY | Facility: CLINIC | Age: 64
End: 2024-05-09

## 2024-05-09 DIAGNOSIS — R31.9 URINARY TRACT INFECTION WITH HEMATURIA, SITE UNSPECIFIED: Primary | ICD-10-CM

## 2024-05-09 DIAGNOSIS — N39.0 URINARY TRACT INFECTION WITH HEMATURIA, SITE UNSPECIFIED: Primary | ICD-10-CM

## 2024-05-09 LAB
BACTERIA UR CULT: ABNORMAL
BACTERIA UR CULT: ABNORMAL

## 2024-05-09 RX ORDER — CEPHALEXIN 500 MG/1
500 CAPSULE ORAL EVERY 8 HOURS SCHEDULED
Qty: 21 CAPSULE | Refills: 0 | Status: SHIPPED | OUTPATIENT
Start: 2024-05-09 | End: 2024-05-16

## 2024-05-14 ENCOUNTER — TELEPHONE (OUTPATIENT)
Age: 64
End: 2024-05-14

## 2024-05-14 ENCOUNTER — OFFICE VISIT (OUTPATIENT)
Dept: NEUROSURGERY | Facility: CLINIC | Age: 64
End: 2024-05-14
Payer: MEDICARE

## 2024-05-14 ENCOUNTER — TELEPHONE (OUTPATIENT)
Dept: PAIN MEDICINE | Facility: CLINIC | Age: 64
End: 2024-05-14

## 2024-05-14 VITALS
HEIGHT: 73 IN | TEMPERATURE: 97.5 F | SYSTOLIC BLOOD PRESSURE: 136 MMHG | DIASTOLIC BLOOD PRESSURE: 72 MMHG | WEIGHT: 315 LBS | BODY MASS INDEX: 41.75 KG/M2 | OXYGEN SATURATION: 95 % | HEART RATE: 94 BPM | RESPIRATION RATE: 18 BRPM

## 2024-05-14 DIAGNOSIS — M54.12 RADICULOPATHY, CERVICAL: ICD-10-CM

## 2024-05-14 DIAGNOSIS — G56.22 ULNAR NEUROPATHY OF LEFT UPPER EXTREMITY: Primary | ICD-10-CM

## 2024-05-14 DIAGNOSIS — M54.6 THORACIC SPINE PAIN: ICD-10-CM

## 2024-05-14 PROCEDURE — 99214 OFFICE O/P EST MOD 30 MIN: CPT | Performed by: NEUROLOGICAL SURGERY

## 2024-05-14 NOTE — TELEPHONE ENCOUNTER
Caller: Anshul     Doctor: Ben     Reason for call: Patient calling would like to schedule procedure and patient states needs information on stim patient states lost card with ST[facility] information please advise     Call back#: [phone]

## 2024-05-14 NOTE — TELEPHONE ENCOUNTER
This patient is being considered for a neuraxial injection for the management of their pain. However, the patient is currently on an anticoagulant per your orders. The American Society of Regional Anest[employer] Guideline on neuraxial procedures and anti-coagulation indicates that medication should be held as follows: Eliquis 3 days prior to injection.   Please advise if you ok the hold of this medication.    Thank you,    [place] Card  Procedure   [facility]

## 2024-05-14 NOTE — TELEPHONE ENCOUNTER
S/w pt, requesting repeat BL L5 TFESI last performed 10/26/23. Pt reports symptoms are the same as what he had experienced in the past and inj was helpful. Please advise, thank you.    Pt also requested contact info for [employer] [facility] Rep as he needs to have MRI completed. Provided Rhonda's contact info.
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
## 2024-05-14 NOTE — TELEPHONE ENCOUNTER
Caller: Patient     Doctor:      Reason for call: Returning nurse call     Call back#: [phone]

## 2024-05-15 ENCOUNTER — OFFICE VISIT (OUTPATIENT)
Dept: OBGYN CLINIC | Facility: CLINIC | Age: 64
End: 2024-05-15
Payer: MEDICARE

## 2024-05-15 ENCOUNTER — TELEPHONE (OUTPATIENT)
Dept: NEUROSURGERY | Facility: CLINIC | Age: 64
End: 2024-05-15

## 2024-05-15 VITALS
RESPIRATION RATE: 17 BRPM | DIASTOLIC BLOOD PRESSURE: 89 MMHG | HEIGHT: 73 IN | HEART RATE: 88 BPM | WEIGHT: 315 LBS | SYSTOLIC BLOOD PRESSURE: 146 MMHG | BODY MASS INDEX: 41.75 KG/M2

## 2024-05-15 DIAGNOSIS — M19.011 PRIMARY OSTEOARTHRITIS OF SHOULDERS, BILATERAL: Primary | ICD-10-CM

## 2024-05-15 DIAGNOSIS — M19.012 PRIMARY OSTEOARTHRITIS OF SHOULDERS, BILATERAL: Primary | ICD-10-CM

## 2024-05-15 PROCEDURE — 99213 OFFICE O/P EST LOW 20 MIN: CPT | Performed by: STUDENT IN AN ORGANIZED HEALTH CARE EDUCATION/TRAINING PROGRAM

## 2024-05-15 PROCEDURE — 20610 DRAIN/INJ JOINT/BURSA W/O US: CPT | Performed by: STUDENT IN AN ORGANIZED HEALTH CARE EDUCATION/TRAINING PROGRAM

## 2024-05-15 RX ORDER — BUPIVACAINE HYDROCHLORIDE 2.5 MG/ML
4 INJECTION, SOLUTION INFILTRATION; PERINEURAL
Status: COMPLETED | OUTPATIENT
Start: 2024-05-15 | End: 2024-05-15

## 2024-05-15 RX ORDER — TRIAMCINOLONE ACETONIDE 40 MG/ML
40 INJECTION, SUSPENSION INTRA-ARTICULAR; INTRAMUSCULAR
Status: COMPLETED | OUTPATIENT
Start: 2024-05-15 | End: 2024-05-15

## 2024-05-15 RX ADMIN — TRIAMCINOLONE ACETONIDE 40 MG: 40 INJECTION, SUSPENSION INTRA-ARTICULAR; INTRAMUSCULAR at 13:30

## 2024-05-15 RX ADMIN — BUPIVACAINE HYDROCHLORIDE 4 ML: 2.5 INJECTION, SOLUTION INFILTRATION; PERINEURAL at 13:30

## 2024-05-15 NOTE — TELEPHONE ENCOUNTER
5/17/24 Contacted patient left a voice message for a call back regarding when he will schedule his Mri Cervical and US Extremity as we need to do a follow up with Dr. Kincaid after all is complete.      5/14/24 Patient was checked out and did not have information on stimulator he stated he will call to schedule US Extremity and Mri Cervical spine after reviewing chart to see if it has been scheduled contacted patient to let him know he still has not scheduled studies transferred him to central scheduling advised he needs a follow up with neurosurgery Dr. Kincaid he stated he understood.
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
## 2024-05-16 ENCOUNTER — TELEMEDICINE (OUTPATIENT)
Dept: BEHAVIORAL/MENTAL HEALTH CLINIC | Facility: CLINIC | Age: 64
End: 2024-05-16

## 2024-05-16 DIAGNOSIS — F41.1 GAD (GENERALIZED ANXIETY DISORDER): ICD-10-CM

## 2024-05-16 DIAGNOSIS — F33.2 MAJOR DEPRESSIVE DISORDER, RECURRENT SEVERE WITHOUT PSYCHOTIC FEATURES (HCC): Primary | ICD-10-CM

## 2024-05-16 DIAGNOSIS — F10.90 ALCOHOL USE DISORDER: ICD-10-CM
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
## 2024-05-21 ENCOUNTER — HOSPITAL ENCOUNTER (OUTPATIENT)
Dept: RADIOLOGY | Facility: HOSPITAL | Age: 64
Discharge: HOME | End: 2024-05-21
Attending: NEUROLOGICAL SURGERY
Payer: MEDICARE

## 2024-05-21 ENCOUNTER — HOSPITAL ENCOUNTER (OUTPATIENT)
Dept: CT IMAGING | Facility: HOSPITAL | Age: 64
Discharge: HOME | End: 2024-05-21
Payer: MEDICARE

## 2024-05-21 DIAGNOSIS — R31.29 MICROSCOPIC HEMATURIA: ICD-10-CM

## 2024-05-21 DIAGNOSIS — M54.6 THORACIC SPINE PAIN: ICD-10-CM

## 2024-05-21 PROCEDURE — 72070 X-RAY EXAM THORAC SPINE 2VWS: CPT

## 2024-05-21 PROCEDURE — 74178 CT ABD&PLV WO CNTR FLWD CNTR: CPT

## 2024-05-21 RX ADMIN — IOHEXOL 100 ML: 350 INJECTION, SOLUTION INTRAVENOUS at 14:42

## 2024-05-22 DIAGNOSIS — Z56.6 WORK-RELATED STRESS: ICD-10-CM

## 2024-05-22 DIAGNOSIS — F41.1 GENERALIZED ANXIETY DISORDER: ICD-10-CM

## 2024-05-22 RX ORDER — DULOXETINE 40 MG/1
40 CAPSULE, DELAYED RELEASE ORAL DAILY
Qty: 90 CAPSULE | Refills: 1 | Status: SHIPPED | OUTPATIENT
Start: 2024-05-22

## 2024-05-22 SDOH — HEALTH STABILITY - MENTAL HEALTH: OTHER PHYSICAL AND MENTAL STRAIN RELATED TO WORK: Z56.6

## 2024-06-01 DIAGNOSIS — N39.41 URGE URINARY INCONTINENCE: ICD-10-CM

## 2024-06-02 RX ORDER — TROSPIUM CHLORIDE 20 MG/1
20 TABLET, FILM COATED ORAL 2 TIMES DAILY
Qty: 180 TABLET | Refills: 1 | Status: SHIPPED | OUTPATIENT
Start: 2024-06-02

## 2024-06-04 ENCOUNTER — OFFICE VISIT (OUTPATIENT)
Dept: OBGYN CLINIC | Facility: CLINIC | Age: 64
End: 2024-06-04
Payer: MEDICARE

## 2024-06-04 VITALS
DIASTOLIC BLOOD PRESSURE: 98 MMHG | WEIGHT: 315 LBS | HEART RATE: 90 BPM | BODY MASS INDEX: 41.75 KG/M2 | SYSTOLIC BLOOD PRESSURE: 151 MMHG | HEIGHT: 73 IN

## 2024-06-04 DIAGNOSIS — M18.11 ARTHRITIS OF CARPOMETACARPAL (CMC) JOINT OF RIGHT THUMB: Primary | ICD-10-CM

## 2024-06-04 PROCEDURE — 99213 OFFICE O/P EST LOW 20 MIN: CPT | Performed by: STUDENT IN AN ORGANIZED HEALTH CARE EDUCATION/TRAINING PROGRAM
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
## 2024-06-06 PROBLEM — Z12.5 SCREENING FOR PROSTATE CANCER: Status: RESOLVED | Noted: 2023-11-03 | Resolved: 2024-06-06

## 2024-06-11 ENCOUNTER — TELEPHONE (OUTPATIENT)
Dept: PAIN MEDICINE | Facility: CLINIC | Age: 64
End: 2024-06-11

## 2024-06-11 ENCOUNTER — HOSPITAL ENCOUNTER (OUTPATIENT)
Dept: RADIOLOGY | Facility: CLINIC | Age: 64
Discharge: HOME | End: 2024-06-11
Payer: MEDICARE

## 2024-06-11 VITALS
RESPIRATION RATE: 20 BRPM | DIASTOLIC BLOOD PRESSURE: 88 MMHG | SYSTOLIC BLOOD PRESSURE: 139 MMHG | TEMPERATURE: 97.5 F | OXYGEN SATURATION: 95 % | HEART RATE: 55 BPM

## 2024-06-11 DIAGNOSIS — M51.16 INTERVERTEBRAL DISC DISORDER WITH RADICULOPATHY OF LUMBAR REGION: ICD-10-CM

## 2024-06-11 DIAGNOSIS — M79.18 MYOFASCIAL PAIN SYNDROME: Primary | ICD-10-CM

## 2024-06-11 PROCEDURE — 64483 NJX AA&/STRD TFRM EPI L/S 1: CPT | Performed by: ANESTHESIOLOGY

## 2024-06-11 RX ORDER — BUPIVACAINE HCL/PF 2.5 MG/ML
2 VIAL (ML) INJECTION ONCE
Status: COMPLETED | OUTPATIENT
Start: 2024-06-11 | End: 2024-06-11

## 2024-06-11 RX ORDER — 0.9 % SODIUM CHLORIDE 0.9 %
4 VIAL (ML) INJECTION ONCE
Status: COMPLETED | OUTPATIENT
Start: 2024-06-11 | End: 2024-06-11

## 2024-06-11 RX ORDER — TIZANIDINE 4 MG/1
4 TABLET ORAL 2 TIMES DAILY PRN
Qty: 60 TABLET | Refills: 1 | Status: SHIPPED | OUTPATIENT
Start: 2024-06-11

## 2024-06-11 RX ORDER — METHYLPREDNISOLONE ACETATE 80 MG/ML
80 INJECTION, SUSPENSION INTRA-ARTICULAR; INTRALESIONAL; INTRAMUSCULAR; PARENTERAL; SOFT TISSUE ONCE
Status: COMPLETED | OUTPATIENT
Start: 2024-06-11 | End: 2024-06-11

## 2024-06-11 RX ADMIN — METHYLPREDNISOLONE ACETATE 80 MG: 80 INJECTION, SUSPENSION INTRA-ARTICULAR; INTRALESIONAL; INTRAMUSCULAR; PARENTERAL; SOFT TISSUE at 15:26

## 2024-06-11 RX ADMIN — IOHEXOL 1 ML: 300 INJECTION, SOLUTION INTRAVENOUS at 15:26

## 2024-06-11 RX ADMIN — Medication 4 ML: at 15:24

## 2024-06-11 RX ADMIN — BUPIVACAINE HYDROCHLORIDE 2 ML: 2.5 INJECTION, SOLUTION EPIDURAL; INFILTRATION; INTRACAUDAL at 15:26
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
## 2024-06-11 NOTE — DISCHARGE INSTR - LAB
Epidural Steroid Injection   WHAT YOU NEED TO KNOW:   An epidural steroid injection (ALEXANDRA) is a procedure to inject steroid medicine into the epidural space. The epidural space is between your spinal cord and vertebrae. Steroids reduce inflammation and fluid buildup in your spine that may be causing pain. You may be given pain medicine along with the steroids.          ACTIVITY  Do not drive or operate machinery today.  No strenuous activity today - bending, lifting, etc.  You may resume normal activites starting tomorrow - start slowly and as tolerated.  You may shower today, but no tub baths or hot tubs.  You may have numbness for several hours from the local anesthetic. Please use caution and common sense, especially with weight-bearing activities.    CARE OF THE INJECTION SITE  If you have soreness or pain, apply ice to the area today (20 minutes on/20 minutes off).  Starting tomorrow, you may use warm, moist heat or ice if needed.  You may have an increase or change in your discomfort for 36-48 hours after your treatment.  Apply ice and continue with any pain medication you have been prescribed.  Notify the [facility] if you have any of the following: redness, drainage, swelling, headache, stiff neck or fever above 100°F.    SPECIAL INSTRUCTIONS  Our office will contact you in approximately 14 days for a progress report.    MEDICATIONS  Continue to take all routine medications.  Our office may have instructed you to hold some medications.    As no general anesthesia was used in today's procedure, you should not experience any side effects related to anesthesia.     If you are diabetic, the steroids used in today's injection may temporarily increase your blood sugar levels after the first few days after your injection. Please keep a close eye on your sugars and alert the doctor who manages your diabetes if your sugars are significantly high from your baseline or you are symptomatic.     If you have a  problem specifically related to your procedure, please call our office at [phone].  Problems not related to your procedure should be directed to your primary care physician.

## 2024-06-20 ENCOUNTER — OFFICE VISIT (OUTPATIENT)
Dept: BARIATRICS | Facility: CLINIC | Age: 64
End: 2024-06-20
Payer: MEDICARE

## 2024-06-20 ENCOUNTER — TELEPHONE (OUTPATIENT)
Dept: BARIATRICS | Facility: CLINIC | Age: 64
End: 2024-06-20

## 2024-06-20 VITALS
BODY MASS INDEX: 41.75 KG/M2 | TEMPERATURE: 98.3 F | WEIGHT: 315 LBS | HEIGHT: 73 IN | RESPIRATION RATE: 16 BRPM | DIASTOLIC BLOOD PRESSURE: 98 MMHG | SYSTOLIC BLOOD PRESSURE: 143 MMHG | HEART RATE: 84 BPM

## 2024-06-20 DIAGNOSIS — E66.01 OBESITY, MORBID (HCC): Primary | ICD-10-CM

## 2024-06-20 DIAGNOSIS — R73.01 IMPAIRED FASTING GLUCOSE: ICD-10-CM

## 2024-06-20 DIAGNOSIS — G47.33 OBSTRUCTIVE SLEEP APNEA: Chronic | ICD-10-CM

## 2024-06-20 DIAGNOSIS — I10 ESSENTIAL HYPERTENSION: ICD-10-CM

## 2024-06-20 PROCEDURE — 99214 OFFICE O/P EST MOD 30 MIN: CPT | Performed by: PHYSICIAN ASSISTANT

## 2024-06-20 PROCEDURE — G2211 COMPLEX E/M VISIT ADD ON: HCPCS | Performed by: PHYSICIAN ASSISTANT

## 2024-06-20 RX ORDER — METFORMIN HYDROCHLORIDE 750 MG/1
TABLET, EXTENDED RELEASE ORAL
Qty: 60 TABLET | Refills: 2 | Status: SHIPPED | OUTPATIENT
Start: 2024-06-20

## 2024-06-20 NOTE — ASSESSMENT & PLAN NOTE
Patient is pursuing conservative plan.    -  not interested in surgery and not a candidate for VLCD due to history of afib and gout.  - Initial weight loss goal of 5-10% weight loss for improved health  - Weight loss can improve patient's co-morbid conditions and/or prevent weight-related complications.      Initial Weight:338.2  Last Visit:341.2  Current Weight : 328.6  Change: -9.6lb (-12.6 lb from February)      Goals:  Do not skip meals.  Food log 2200 calories  Increase physical activity discussed benefits of walking even short intervals     -stopped topamax due to fatigue.  Will start on metformin.    - Does not have coverage for FDA approved weight loss medications.  Not a candidate for phentermine due to history of afib.

## 2024-06-20 NOTE — PROGRESS NOTES
Assessment/Plan:    Obesity, morbid (HCC)  Patient is pursuing conservative plan.    -  not interested in surgery and not a candidate for VLCD due to history of afib and gout.  - Initial weight loss goal of 5-10% weight loss for improved health  - Weight loss can improve patient's co-morbid conditions and/or prevent weight-related complications.      Initial Weight:338.2  Last Visit:341.2  Current Weight : 328.6  Change: -9.6lb (-12.6 lb from February)      Goals:  Do not skip meals.  Food log 2200 calories  Increase physical activity discussed benefits of walking even short intervals     -stopped topamax due to fatigue.  Will start on metformin.    - Does not have coverage for FDA approved weight loss medications.  Not a candidate for phentermine due to history of afib.       Impaired fasting glucose  To try metformin to see if it helps with weight loss.      Obstructive sleep apnea  - Not able to tolerate CPAP/BiPAP. Tried multiple different masks.   - Will likely improve with weight loss.   - Would like to pursue [facility], but needs to lose weight in order to qualify.     Essential hypertension  - Taking bystolic and norvasc. May improve with weight loss and lifestyle modification. Continue management with prescribing provider.           Return in about 5 months (around 11/20/2024) for 3 month nurse visit.       Diagnoses and all orders for this visit:    Obesity, morbid (HCC)  -     metFORMIN (GLUCOPHAGE-XR) 750 mg 24 hr tablet; Take 1 pill a day for 2 weeks and then take 2 tablets a day    Impaired fasting glucose    Obstructive sleep apnea    Essential hypertension          Subjective:   Chief Complaint   Patient presents with    Follow-up     MWM 5MO F/u; Waist: 53in        Patient ID: Enoc Roberto  is a 63 y.o. male with excess weight/obesity here to pursue weight managment.  Patient is pursuing Conservative Program.     HPI  He was started on topamax at last visit.  He was feeling tired and stopped  the medication He is more active now after his foot fracture healted.  .  He does have chronic pain which limites his activity. His wife is doing keto diet    Wt Readings from Last 10 Encounters:   06/20/24 (!) 149 kg (328 lb 9.6 oz)   06/04/24 (!) 153 kg (337 lb)   05/15/24 (!) 154 kg (339 lb)   05/14/24 (!) 154 kg (339 lb)   05/07/24 (!) 154 kg (339 lb)   03/25/24 (!) 155 kg (342 lb)   03/18/24 (!) 155 kg (341 lb)   03/05/24 (!) 155 kg (341 lb)   02/29/24 (!) 155 kg (341 lb 3.2 oz)   02/22/24 (!) 156 kg (344 lb)       Hydration: 90 oz diet iced tea, 2-3 cans zero geni seltzer, 1-2 cup energy juice V8  Alcohol: 1 drink 2-3 times per week  Smoking: medical marijuana for pain  Exercise: limited due to pain  Occupation: on disability  Sleep: fragmented   STOP bang: Has JULIUS, tried CPAP, but couldn't tolerate it.     Diet Recall (eats irregular)  L (later): nuts, fruits, or granola  D (730-8PM):salad, protein  S: fruit    The following portions of the patient's history were reviewed and updated as appropriate: He  has a past medical history of Acid reflux, Acute renal failure (HCC), Alcohol intoxication, episodic (HCC) (12/17/2010), Analgesic use, Anxiety, Arthritis, Asthma, Avascular necrosis of femoral head, right (HCC), Avascular necrosis of femur head, right (HCC), Avascular necrosis of hip, left (HCC), [place] esophagus, Burn, Cervical disc herniation, Chronic pain, Chronic pain disorder, Chronic sinusitis (11/15/2008), Colon polyp, CPAP (continuous positive airway pressure) dependence, Depression, Disease of thyroid gland, Disorder of male genital organs, Elevated serum creatinine, GERD (gastroesophageal reflux disease), Gynecomastia, High cholesterol, History of colon polyps, Hypertension, Hypogonadism in male, Hypothyroid, 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  He   Patient Active Problem List    Diagnosis Date Noted    BPH without obstruction/lower urinary tract symptoms 05/07/2024    Cervical radiculopathy 05/06/2024    Urinary frequency 03/26/2024    Irritation of left ulnar nerve 03/18/2024    Tinea cruris 02/12/2024    Arthritis of carpometacarpal (CMC) joint of right thumb 11/21/2023    Chronic atrial fibrillation (HCC) 09/14/2023    Impaired fasting glucose 09/14/2023    Moderate episode of recurrent major depressive disorder (HCC) 03/24/2023    Need for pneumococcal vaccine 03/07/2022    Preoperative clearance 01/26/2022    Neuropathy 10/12/2021    Anomaly, 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    Paresthesia of bilateral legs 09/21/2021    Protrusion of lumbar intervertebral disc 09/21/2021    Hyperuricemia 03/01/2021    Loose body in right shoulder 09/25/2020    DJD of right shoulder 09/21/2020    Generalized anxiety disorder 06/16/2020    ST segment depression 05/13/2020    Allergic cough 04/08/2020    Dysesthesia 03/16/2020    Osteoarthritis of left midfoot 09/05/2019    Lumbar disc herniation with radiculopathy 03/25/2019    Chronic pain syndrome 05/16/2018    Cubital tunnel syndrome on left 04/23/2018    Obesity, morbid (Former[facility])     Erectile disorder due to medical condition in male patient 08/11/2017    Status post total replacement of right hip 08/05/2016    Obstructive sleep apnea 08/05/2016    Esophageal reflux 08/05/2016    Other specified hypothyroidism 08/05/2016    Myofascial pain syndrome 10/01/2014    Pain syndrome, chronic 09/27/2013    Mixed hyperlipidemia 08/29/2013    Intervertebral disc disorder with radiculopathy of lumbar region 05/24/2013    Spondylosis of lumbar region without myelopathy or radiculopathy 05/24/2013    Lumbar canal stenosis 09/04/2012    Essential hypertension 09/04/2012    Postlaminectomy syndrome, lumbar 07/06/2012     He   has a past surgical history that includes Colonoscopy; Decompression core hip bilateral; Lumbar fusion (2009); ORIF ankle fracture (Bilateral); Tonsillectomy; [place] tooth extraction; pr open treatment bimalleolar ankle fracture (Right, 12/22/2016); pr arthrp acetblr/prox fem prostc agrft/algrft (Right, 8/5/2016); Ankle ligament reconstruction (Left); Hip surgery (07/21/2016); Joint replacement; Fracture surgery; pr shi impltj nstim eltrds plate/paddle edrl (N/A, 6/19/2018); Upper gastrointestinal endoscopy; and Back surgery.  His family history includes Arthritis in his father; Atrial fibrillation in his father; Cancer in his family, father, and mother; Diabetes type II in his paternal grandmother; Hypertension in his family and father; Other in his family; Thyroid disease in his daughter.  He  reports that he has never smoked. He has never used smokeless tobacco. He reports current alcohol use of about 6.0 standard drinks of alcohol per week. He reports current drug use. Drug: Marijuana.  Current Outpatient Medications   Medication Sig Dispense Refill    albuterol (PROVENTIL HFA,VENTOLIN HFA) 90 mcg/act inhaler USE 2 INHALATIONS BY MOUTH  EVERY 6 HOURS AS NEEDED FOR WHEEZING (Patient taking differently: if needed) 26.8 g 3    alfuzosin (UROXATRAL) 10 mg 24 hr tablet Take 1 tablet (10 mg total) by mouth daily 90 tablet 3    allopurinol (ZYLOPRIM) 100 mg tablet TAKE 1 TABLET BY MOUTH EVERY DAY 90 tablet 1    amLODIPine (NORVASC) 10 mg tablet TAKE 1 TABLET BY MOUTH EVERY DAY 90 tablet 1    Ascorbic Acid (ANTONIETA-C PO) Take by mouth as needed      aspirin (ECOTRIN LOW STRENGTH) 81 mg EC tablet Take 1 tablet (81 mg total) by mouth daily  0    B Complex-Biotin-FA (MULTI-B COMPLEX PO) Take by mouth as needed      Calcium-Magnesium-Vitamin D (CITRACAL CALCIUM+D PO) Take by mouth in the morning      clonazePAM (KlonoPIN) 0.5 mg tablet TAKE 1 TABLET BY MOUTH DAILY AT BEDTIME 90 tablet 1    CO ENZYME Q-10 PO Take by mouth in  the morning      Diclofenac Sodium (VOLTAREN) 1 % APPLY 2 GRAMS TO AFFECTED AREA 4 TIMES A DAY (Patient taking differently: if needed) 100 g 1    DULoxetine HCl 40 MG CPEP TAKE 1 CAPSULE (40 MG TOTAL) BY MOUTH DAILY. 90 capsule 1    Eliquis 5 MG TAKE 1 TABLET BY MOUTH TWICE A  tablet 3    esomeprazole (NexIUM) 40 MG capsule Take 40 mg by mouth every morning before breakfast      fluticasone (FLONASE) 50 mcg/act nasal spray 2 SPRAYS INTO EACH NOSTRIL DAILY DISPENSE 3 BOTTLES 48 mL 1    indomethacin (INDOCIN) 50 mg capsule       levothyroxine 25 mcg tablet TAKE 1 TABLET BY MOUTH EVERY DAY 90 tablet 1    Magnesium 400 MG CAPS Take by mouth daily       meclizine (ANTIVERT) 12.5 MG tablet Take 1 tablet (12.5 mg total) by mouth every 8 (eight) hours as needed for dizziness (Patient taking differently: Take 12.5 mg by mouth if needed for dizziness) 30 tablet 3    metFORMIN (GLUCOPHAGE-XR) 750 mg 24 hr tablet Take 1 pill a day for 2 weeks and then take 2 tablets a day 60 tablet 2    montelukast (SINGULAIR) 10 mg tablet TAKE 1 TABLET BY MOUTH DAILY AT BEDTIME 90 tablet 1    Multiple Vitamins-Minerals (ICAPS AREDS 2 PO) Take by mouth if needed      nebivolol (BYSTOLIC) 10 mg tablet TAKE 1 TABLET BY MOUTH EVERY DAY 90 tablet 1    nystatin (MYCOSTATIN) powder Apply topically 3 (three) times a day 60 g 3    rosuvastatin (CRESTOR) 5 mg tablet TAKE 1 TABLET (5 MG TOTAL) BY MOUTH DAILY. 90 tablet 1    sildenafil (VIAGRA) 25 MG tablet TAKE 1 TABLET BY MOUTH DAILY AS NEEDED FOR ERECTILE DYSFUNCTION. (Patient taking differently: if needed) 9 tablet 1    tiZANidine (ZANAFLEX) 4 mg tablet Take 1 tablet (4 mg total) by mouth 2 (two) times a day as needed for muscle spasms 60 tablet 1    traZODone (DESYREL) 100 mg tablet TAKE 1-2 TABLETS (100-200 MG TOTAL) BY MOUTH DAILY AT BEDTIME 180 tablet 3    trospium chloride (SANCTURA) 20 mg tablet TAKE 1 TABLET BY MOUTH TWICE A  tablet 1    TURMERIC PO Take by mouth as needed       ALPHA LIPOIC ACID PO Take by mouth in the morning (Patient not taking: Reported on 6/20/2024)      loratadine (CLARITIN) 10 mg tablet Take 10 mg by mouth daily. (Patient not taking: Reported on 5/14/2024)       No current facility-administered medications for this visit.     Current Outpatient Medications on File Prior to Visit   Medication Sig    albuterol (PROVENTIL HFA,VENTOLIN HFA) 90 mcg/act inhaler USE 2 INHALATIONS BY MOUTH  EVERY 6 HOURS AS NEEDED FOR WHEEZING (Patient taking differently: if needed)    alfuzosin (UROXATRAL) 10 mg 24 hr tablet Take 1 tablet (10 mg total) by mouth daily    allopurinol (ZYLOPRIM) 100 mg tablet TAKE 1 TABLET BY MOUTH EVERY DAY    amLODIPine (NORVASC) 10 mg tablet TAKE 1 TABLET BY MOUTH EVERY DAY    Ascorbic Acid (ANTONIETA-C PO) Take by mouth as needed    aspirin (ECOTRIN LOW STRENGTH) 81 mg EC tablet Take 1 tablet (81 mg total) by mouth daily    B Complex-Biotin-FA (MULTI-B COMPLEX PO) Take by mouth as needed    Calcium-Magnesium-Vitamin D (CITRACAL CALCIUM+D PO) Take by mouth in the morning    clonazePAM (KlonoPIN) 0.5 mg tablet TAKE 1 TABLET BY MOUTH DAILY AT BEDTIME    CO ENZYME Q-10 PO Take by mouth in the morning    Diclofenac Sodium (VOLTAREN) 1 % APPLY 2 GRAMS TO AFFECTED AREA 4 TIMES A DAY (Patient taking differently: if needed)    DULoxetine HCl 40 MG CPEP TAKE 1 CAPSULE (40 MG TOTAL) BY MOUTH DAILY.    Eliquis 5 MG TAKE 1 TABLET BY MOUTH TWICE A DAY    esomeprazole (NexIUM) 40 MG capsule Take 40 mg by mouth every morning before breakfast    fluticasone (FLONASE) 50 mcg/act nasal spray 2 SPRAYS INTO EACH NOSTRIL DAILY DISPENSE 3 BOTTLES    indomethacin (INDOCIN) 50 mg capsule     levothyroxine 25 mcg tablet TAKE 1 TABLET BY MOUTH EVERY DAY    Magnesium 400 MG CAPS Take by mouth daily     meclizine (ANTIVERT) 12.5 MG tablet Take 1 tablet (12.5 mg total) by mouth every 8 (eight) hours as needed for dizziness (Patient taking differently: Take 12.5 mg by mouth if needed for  "dizziness)    montelukast (SINGULAIR) 10 mg tablet TAKE 1 TABLET BY MOUTH DAILY AT BEDTIME    Multiple Vitamins-Minerals (ICAPS AREDS 2 PO) Take by mouth if needed    nebivolol (BYSTOLIC) 10 mg tablet TAKE 1 TABLET BY MOUTH EVERY DAY    nystatin (MYCOSTATIN) powder Apply topically 3 (three) times a day    rosuvastatin (CRESTOR) 5 mg tablet TAKE 1 TABLET (5 MG TOTAL) BY MOUTH DAILY.    sildenafil (VIAGRA) 25 MG tablet TAKE 1 TABLET BY MOUTH DAILY AS NEEDED FOR ERECTILE DYSFUNCTION. (Patient taking differently: if needed)    tiZANidine (ZANAFLEX) 4 mg tablet Take 1 tablet (4 mg total) by mouth 2 (two) times a day as needed for muscle spasms    traZODone (DESYREL) 100 mg tablet TAKE 1-2 TABLETS (100-200 MG TOTAL) BY MOUTH DAILY AT BEDTIME    trospium chloride (SANCTURA) 20 mg tablet TAKE 1 TABLET BY MOUTH TWICE A DAY    TURMERIC PO Take by mouth as needed    ALPHA LIPOIC ACID PO Take by mouth in the morning (Patient not taking: Reported on 6/20/2024)    loratadine (CLARITIN) 10 mg tablet Take 10 mg by mouth daily. (Patient not taking: Reported on 5/14/2024)     No current facility-administered medications on file prior to visit.     He is allergic to nsaids, other, acetazolamide, oxytetracycline, penicillins, and sulfa antibiotics..    Review of Systems   Constitutional:  Negative for chills and fever.   Eyes:  Negative for redness.   Respiratory:  Negative for cough.    Cardiovascular:  Negative for chest pain and palpitations.   Gastrointestinal:  Negative for abdominal pain and vomiting.   Genitourinary:  Positive for dysuria and frequency.   Musculoskeletal:  Positive for arthralgias and back pain.   Skin:  Negative for color change and rash.   Neurological:  Negative for seizures and syncope.   All other systems reviewed and are negative.      Objective:    /98   Pulse 84   Temp 98.3 °F (36.8 °C)   Resp 16   Ht 6' 0.5\" (1.842 m)   Wt (!) 149 kg (328 lb 9.6 oz)   BMI 43.95 kg/m²      Physical " Exam  Vitals and nursing note reviewed.   Constitutional:       General: He is not in acute distress.     Appearance: He is well-developed. He is obese.   HENT:      Head: Normocephalic and atraumatic.   Eyes:      Conjunctiva/sclera: Conjunctivae normal.   Neck:      Thyroid: No thyromegaly.   Pulmonary:      Effort: Pulmonary effort is normal. No respiratory distress.   Skin:     Findings: No rash (visible).   Neurological:      Mental Status: He is alert and oriented to person, place, and time.   Psychiatric:         Mood and Affect: Mood normal.         Behavior: Behavior normal.

## 2024-06-25 ENCOUNTER — TELEPHONE (OUTPATIENT)
Dept: PAIN MEDICINE | Facility: MEDICAL CENTER | Age: 64
End: 2024-06-25

## 2024-06-25 NOTE — TELEPHONE ENCOUNTER
Patient reports 30% improvement post inj  Pain level 2/10 . Throughout the day, the pain progresses. Patient stated he does have some trouble sleeping at times.

## 2024-06-26 ENCOUNTER — EVALUATION (OUTPATIENT)
Dept: PHYSICAL THERAPY | Facility: REHABILITATION | Age: 64
End: 2024-06-26
Payer: MEDICARE

## 2024-06-26 ENCOUNTER — HOSPITAL ENCOUNTER (OUTPATIENT)
Dept: RADIOLOGY | Facility: HOSPITAL | Age: 64
Discharge: HOME | End: 2024-06-26
Attending: NEUROLOGICAL SURGERY

## 2024-06-26 DIAGNOSIS — M18.11 ARTHRITIS OF CARPOMETACARPAL (CMC) JOINT OF RIGHT THUMB: ICD-10-CM

## 2024-06-26 DIAGNOSIS — Z96.89 SPINAL CORD STIMULATOR STATUS: ICD-10-CM

## 2024-06-26 PROCEDURE — 97161 PT EVAL LOW COMPLEX 20 MIN: CPT | Performed by: PHYSICAL THERAPIST

## 2024-06-26 PROCEDURE — 97140 MANUAL THERAPY 1/> REGIONS: CPT | Performed by: PHYSICAL THERAPIST

## 2024-06-26 NOTE — PROGRESS NOTES
PT Evaluation     Today's date: 2024  Patient name: Eonc Roberto  : 1960  MRN: [id number]  Referring provider: Adiel Sorenson MD  Dx:   Encounter Diagnosis     ICD-10-CM    1. Arthritis of carpometacarpal (CMC) joint of right thumb  M18.11 Ambulatory Referral to PT/OT Hand Therapy          Start Time: 161  Stop Time: 164  Total time in clinic (min): 30 minutes    Assessment  Impairments: abnormal or restricted ROM, activity intolerance, impaired physical strength, lacks appropriate home exercise program, pain with function, weight-bearing intolerance and poor posture     Assessment details: Problem List:  1) R wrist flexor tautness  2) R thenar eminence restrictions    Enoc Roberto is a pleasant 63 y.o. male who presents with R thumb pain that has been bothering him for about 6 months. He has tautness of his wirst and finger flexors, thenar eminence restrictions, and mild CMC hypomobility resulting in the pain he is experiencing.  No further referral appears necessary at this time based upon examination results.  I expect he will improve in 4-5 weeks. Anshul would benefit from skilled physical therapy to address his limitations and allow him to grab and lift things with less discomfort.       Understanding of Dx/Px/POC: good     Prognosis: good    Goals  ST-4 weeks  Patient will be independent with home exercise program.   Patient will be able to manage symptoms independently.  Patient will decrease pain by 25-50%    LTG: by discharge  Patient will improve FOTO to goal  Patient will report minimal (1-2/10) pain with aggravating activities to display improvements in overall functional status  Patient will have decreased pain with gripping and lifting items to improve ability to perform ADLs      Plan  Patient would benefit from: skilled physical therapy  Planned modality interventions: cryotherapy, thermotherapy: hydrocollator packs and unattended electrical  stimulation    Planned therapy interventions: IADL retraining, joint mobilization, manual therapy, massage, ADL training, activity modification, abdominal trunk stabilization, ADL retraining, balance, balance/weight bearing training, neuromuscular re-education, body mechanics training, behavior modification, strengthening, stretching, therapeutic activities, therapeutic exercise, therapeutic training, transfer training, graded exercise, graded motor, home exercise program, graded activity, gait training, functional ROM exercises, patient education, postural training, IASTM, kinesiology taping and flexibility    Frequency: 1x week  Duration in weeks: 6  Plan of Care beginning date: 2024  Plan of Care expiration date: 2024  Treatment plan discussed with: patient  Plan details: 1x/wk then transition to HEP        Subjective Evaluation    History of Present Illness  Mechanism of injury: Enoc is a 63 y.o. male presenting to physical therapy on 24 with referral from MD for R thumb pain that began about 6 months ago. Saw PT previously which provided him a lot of relief and improved motion. Also had an injection which provided some relief. Still gets pain with gripping and lifting activities.         Quality of life: good    Patient Goals  Patient goals for therapy: increased strength, independence with ADLs/IADLs, return to sport/leisure activities, decreased pain and increased motion  Patient goal: no pain with lifting  Pain  Current pain ratin  At best pain ratin  At worst pain ratin  Quality: sharp and dull ache  Relieving factors: relaxation and rest  Aggravating factors: lifting (gripping)    Treatments  Previous treatment: physical therapy and injection treatment        Objective     Palpation     Right   Hypertonic in the extensor pollicis longus, flexor digitorum superficialis and flexor pollicis longus.   Tenderness of the flexor carpi radialis, flexor carpi ulnaris, flexor  digitorum profundus, flexor digitorum superficialis and flexor pollicis longus.     Neurological Testing     Sensation     Wrist/Hand   Left   Intact: light touch    Right   Intact: light touch    Reflexes   Left   Deltoid (C5): normal (2+)  Biceps (C5/C6): normal (2+)  Brachioradialis (C6): normal (2+)  Triceps (C7): normal (2+)    Right   Deltoid (C5): normal (2+)  Biceps (C5/C6): normal (2+)  Brachioradialis (C6): normal (2+)  Triceps (C7): normal (2+)    Tests     Left Wrist/Hand   Negative CMC grind.        Precautions: HTN, Afib    Manuals 6/26            Thenar eminence STM QD            Wrist/finger flexor STM QD                         Assessment QD            Neuro Re-Ed 6/26                                                                                                       Ther Ex                                                                                                                     Ther Activity                                       Gait Training                                       Modalities

## 2024-06-27 ENCOUNTER — HOSPITAL ENCOUNTER (OUTPATIENT)
Dept: MRI IMAGING | Facility: HOSPITAL | Age: 64
Discharge: HOME | End: 2024-06-27
Payer: MEDICARE

## 2024-06-27 ENCOUNTER — HOSPITAL ENCOUNTER (OUTPATIENT)
Dept: ULTRASOUND IMAGING | Facility: HOSPITAL | Age: 64
Discharge: HOME | End: 2024-06-27
Attending: NEUROLOGICAL SURGERY
Payer: MEDICARE

## 2024-06-27 DIAGNOSIS — M54.12 RADICULOPATHY, CERVICAL: ICD-10-CM

## 2024-06-27 DIAGNOSIS — G56.22 ULNAR NEUROPATHY OF LEFT UPPER EXTREMITY: ICD-10-CM

## 2024-06-27 PROCEDURE — 72141 MRI NECK SPINE W/O DYE: CPT

## 2024-06-27 PROCEDURE — 76882 US LMTD JT/FCL EVL NVASC XTR: CPT

## 2024-07-03 ENCOUNTER — APPOINTMENT (OUTPATIENT)
Dept: PHYSICAL THERAPY | Facility: REHABILITATION | Age: 64
End: 2024-07-03
Payer: MEDICARE

## 2024-07-07 DIAGNOSIS — M79.18 MYOFASCIAL PAIN SYNDROME: ICD-10-CM

## 2024-07-08 RX ORDER — TIZANIDINE 4 MG/1
TABLET ORAL
Qty: 180 TABLET | Refills: 1 | Status: SHIPPED | OUTPATIENT
Start: 2024-07-08

## 2024-07-11 ENCOUNTER — OFFICE VISIT (OUTPATIENT)
Dept: PHYSICAL THERAPY | Facility: REHABILITATION | Age: 64
End: 2024-07-11
Payer: MEDICARE

## 2024-07-11 DIAGNOSIS — M18.11 ARTHRITIS OF CARPOMETACARPAL (CMC) JOINT OF RIGHT THUMB: Primary | ICD-10-CM

## 2024-07-11 PROCEDURE — 97110 THERAPEUTIC EXERCISES: CPT | Performed by: PHYSICAL THERAPIST

## 2024-07-11 PROCEDURE — 97140 MANUAL THERAPY 1/> REGIONS: CPT | Performed by: PHYSICAL THERAPIST

## 2024-07-11 NOTE — PROGRESS NOTES
"Daily Note     Today's date: 2024  Patient name: Enoc Roberto  : 1960  MRN: [id number]  Referring provider: Adiel Sorenson MD  Dx:   Encounter Diagnosis     ICD-10-CM    1. Arthritis of carpometacarpal (CMC) joint of right thumb  M18.11           Start Time: 1445  Stop Time: 1515  Total time in clinic (min): 30 minutes    Subjective: Reports he strained his whole arm while changing his water filter at home.       Objective: See treatment diary below      Assessment: Tolerated treatment well. Reported some discomfort over his wrist extensors following KB  rotations. Patient exhibited good technique with therapeutic exercises and would benefit from continued PT      Plan: Continue per plan of care.      Precautions: HTN, Afib    Manuals            Thenar eminence STM QD            Wrist/finger flexor STM QD QD                        Assessment QD QD           Neuro Re-Ed            KB  rotations  20x ea            Wrist flexion  10lb DB 10x           Therabar pron/sup  20x3\" ea                                                               Ther Ex                                                                                                                     Ther Activity                                       Gait Training                                       Modalities                                            "

## 2024-07-12 DIAGNOSIS — E66.01 OBESITY, MORBID (HCC): ICD-10-CM

## 2024-07-15 RX ORDER — METFORMIN HYDROCHLORIDE 750 MG/1
TABLET, EXTENDED RELEASE ORAL
Qty: 180 TABLET | Refills: 1 | Status: SHIPPED | OUTPATIENT
Start: 2024-07-15

## 2024-07-16 ENCOUNTER — APPOINTMENT (OUTPATIENT)
Dept: PHYSICAL THERAPY | Facility: REHABILITATION | Age: 64
End: 2024-07-16
Payer: MEDICARE

## 2024-07-19 DIAGNOSIS — R06.02 SHORTNESS OF BREATH: ICD-10-CM

## 2024-07-19 RX ORDER — FLUTICASONE PROPIONATE 50 MCG
2 SPRAY, SUSPENSION (ML) NASAL DAILY
Qty: 48 ML | Refills: 1 | Status: SHIPPED | OUTPATIENT
Start: 2024-07-19

## 2024-07-24 ENCOUNTER — OFFICE VISIT (OUTPATIENT)
Dept: PHYSICAL THERAPY | Facility: REHABILITATION | Age: 64
End: 2024-07-24
Payer: MEDICARE

## 2024-07-24 DIAGNOSIS — M18.11 ARTHRITIS OF CARPOMETACARPAL (CMC) JOINT OF RIGHT THUMB: Primary | ICD-10-CM

## 2024-07-24 PROCEDURE — 97110 THERAPEUTIC EXERCISES: CPT

## 2024-07-24 PROCEDURE — 97140 MANUAL THERAPY 1/> REGIONS: CPT

## 2024-07-29 ENCOUNTER — TELEPHONE (OUTPATIENT)
Age: 64
End: 2024-07-29

## 2024-07-29 NOTE — TELEPHONE ENCOUNTER
The writer attempted to contact the patient off of the MM wait list to offer a potential appt. The writer LVM for the patient to contact the intake department for assistance with scheduling.

## 2024-07-31 ENCOUNTER — OFFICE VISIT (OUTPATIENT)
Dept: PHYSICAL THERAPY | Facility: REHABILITATION | Age: 64
End: 2024-07-31
Payer: MEDICARE

## 2024-07-31 DIAGNOSIS — F41.1 GENERALIZED ANXIETY DISORDER: ICD-10-CM

## 2024-07-31 DIAGNOSIS — M18.11 ARTHRITIS OF CARPOMETACARPAL (CMC) JOINT OF RIGHT THUMB: Primary | ICD-10-CM

## 2024-07-31 DIAGNOSIS — Z56.6 WORK-RELATED STRESS: ICD-10-CM

## 2024-07-31 DIAGNOSIS — N52.1 ERECTILE DISORDER DUE TO MEDICAL CONDITION IN MALE PATIENT: ICD-10-CM

## 2024-07-31 PROCEDURE — 97140 MANUAL THERAPY 1/> REGIONS: CPT | Performed by: PHYSICAL THERAPIST

## 2024-07-31 PROCEDURE — 97110 THERAPEUTIC EXERCISES: CPT | Performed by: PHYSICAL THERAPIST

## 2024-07-31 RX ORDER — CLONAZEPAM 0.5 MG/1
0.5 TABLET ORAL
Qty: 90 TABLET | Refills: 0 | Status: SHIPPED | OUTPATIENT
Start: 2024-07-31

## 2024-07-31 RX ORDER — SILDENAFIL 25 MG/1
25 TABLET, FILM COATED ORAL AS NEEDED
Qty: 9 TABLET | Refills: 1 | Status: SHIPPED | OUTPATIENT
Start: 2024-07-31

## 2024-07-31 SDOH — HEALTH STABILITY - MENTAL HEALTH: OTHER PHYSICAL AND MENTAL STRAIN RELATED TO WORK: Z56.6

## 2024-07-31 NOTE — PROGRESS NOTES
"Daily Note     Today's date: 2024  Patient name: Enoc Roberto  : 1960  MRN: [id number]  Referring provider: Adiel Sorenson MD  Dx:   Encounter Diagnosis     ICD-10-CM    1. Arthritis of carpometacarpal (CMC) joint of right thumb  M18.11           Start Time: 1409  Stop Time: 1433  Total time in clinic (min): 24 minutes    Subjective: Pt reports he is doing better.      Objective: See treatment diary below      Assessment: Tolerated treatment well. Patient demonstrated fatigue post treatment, exhibited good technique with therapeutic exercises, and would benefit from continued PT      Plan: Continue per plan of care.      Precautions: HTN, Afib    Manuals          Thenar eminence STM QD  KM QD         Wrist/finger flexor STM QD QD KM QD                      Assessment QD QD           Neuro Re-Ed          KB  rotations  20x ea  20x ea 20x ea         Wrist flexion  10lb DB 10x 10lb DB 10x 10lb DB 10x         Therabar pron/sup  20x3\" ea 20x3\" ea 20x3\" ea         DigiGrip   2x10 3\" ea                                                 Ther Ex                                                                                                                     Ther Activity                                       Gait Training                                       Modalities                                              "

## 2024-08-07 NOTE — TELEPHONE ENCOUNTER
The writer attempted to contact the patient off of the MM wait list to offer a potential appt. The writer LVM for the patient to contact the intake department for assistance with scheduling.    2nd attempt   Pt removed off the wait list

## 2024-08-08 ENCOUNTER — OFFICE VISIT (OUTPATIENT)
Dept: PHYSICAL THERAPY | Facility: REHABILITATION | Age: 64
End: 2024-08-08
Payer: MEDICARE

## 2024-08-08 DIAGNOSIS — M18.11 ARTHRITIS OF CARPOMETACARPAL (CMC) JOINT OF RIGHT THUMB: Primary | ICD-10-CM

## 2024-08-08 PROCEDURE — 97140 MANUAL THERAPY 1/> REGIONS: CPT | Performed by: PHYSICAL THERAPIST

## 2024-08-08 PROCEDURE — 97110 THERAPEUTIC EXERCISES: CPT | Performed by: PHYSICAL THERAPIST

## 2024-08-08 NOTE — PROGRESS NOTES
"Daily Note     Today's date: 2024  Patient name: Enoc Roberto  : 1960  MRN: [id number]  Referring provider: Adiel Sorenson MD  Dx:   Encounter Diagnosis     ICD-10-CM    1. Arthritis of carpometacarpal (CMC) joint of right thumb  M18.11           Start Time: 1530  Stop Time: 1600  Total time in clinic (min): 30 minutes    Subjective: Pt reports he is sore today.       Objective: See treatment diary below      Assessment: Tolerated treatment well. Patient demonstrated fatigue post treatment, exhibited good technique with therapeutic exercises, and would benefit from continued PT      Plan: Continue per plan of care.      Precautions: HTN, Afib    Manuals         Thenar eminence STM QD  KM QD QD        Wrist/finger flexor STM QD QD KM QD QD                     Assessment QD QD   QD        Neuro Re-Ed         KB  rotations  20x ea  20x ea 20x ea 20x ea        Wrist flexion  10lb DB 10x 10lb DB 10x 10lb DB 10x 8lb DB 10x ea        Therabar pron/sup  20x3\" ea 20x3\" ea 20x3\" ea 20x3\" ea        DigiGrip   2x10 3\" ea                                                 Ther Ex                                                                                                                     Ther Activity                                       Gait Training                                       Modalities                                              "

## 2024-08-12 DIAGNOSIS — E03.9 HYPOTHYROIDISM, UNSPECIFIED TYPE: Chronic | ICD-10-CM

## 2024-08-12 DIAGNOSIS — I10 ESSENTIAL HYPERTENSION: ICD-10-CM

## 2024-08-12 DIAGNOSIS — R05.8 ALLERGIC COUGH: ICD-10-CM

## 2024-08-12 DIAGNOSIS — M10.071 ACUTE IDIOPATHIC GOUT INVOLVING TOE OF RIGHT FOOT: ICD-10-CM

## 2024-08-13 RX ORDER — AMLODIPINE BESYLATE 10 MG/1
10 TABLET ORAL DAILY
Qty: 90 TABLET | Refills: 1 | Status: SHIPPED | OUTPATIENT
Start: 2024-08-13

## 2024-08-13 RX ORDER — NEBIVOLOL 10 MG/1
10 TABLET ORAL DAILY
Qty: 90 TABLET | Refills: 1 | Status: SHIPPED | OUTPATIENT
Start: 2024-08-13

## 2024-08-13 RX ORDER — LEVOTHYROXINE SODIUM 25 UG/1
25 TABLET ORAL DAILY
Qty: 90 TABLET | Refills: 1 | Status: SHIPPED | OUTPATIENT
Start: 2024-08-13

## 2024-08-13 RX ORDER — ALLOPURINOL 100 MG/1
100 TABLET ORAL DAILY
Qty: 90 TABLET | Refills: 1 | Status: SHIPPED | OUTPATIENT
Start: 2024-08-13

## 2024-08-13 RX ORDER — MONTELUKAST SODIUM 10 MG/1
10 TABLET ORAL
Qty: 90 TABLET | Refills: 1 | Status: SHIPPED | OUTPATIENT
Start: 2024-08-13

## 2024-08-13 RX ORDER — ROSUVASTATIN CALCIUM 5 MG/1
5 TABLET, COATED ORAL DAILY
Qty: 90 TABLET | Refills: 1 | Status: SHIPPED | OUTPATIENT
Start: 2024-08-13

## 2024-08-14 ENCOUNTER — OFFICE VISIT (OUTPATIENT)
Dept: PHYSICAL THERAPY | Facility: REHABILITATION | Age: 64
End: 2024-08-14
Payer: MEDICARE

## 2024-08-14 DIAGNOSIS — M18.11 ARTHRITIS OF CARPOMETACARPAL (CMC) JOINT OF RIGHT THUMB: Primary | ICD-10-CM

## 2024-08-14 PROCEDURE — 97112 NEUROMUSCULAR REEDUCATION: CPT | Performed by: PHYSICAL THERAPIST

## 2024-08-14 PROCEDURE — 97140 MANUAL THERAPY 1/> REGIONS: CPT | Performed by: PHYSICAL THERAPIST

## 2024-08-14 NOTE — PROGRESS NOTES
"Daily Note     Today's date: 2024  Patient name: Enoc Roberto  : 1960  MRN: [id number]  Referring provider: Adiel Sorenson MD  Dx:   Encounter Diagnosis     ICD-10-CM    1. Arthritis of carpometacarpal (CMC) joint of right thumb  M18.11           Start Time: 1400  Stop Time: 1430  Total time in clinic (min): 30 minutes    Subjective: Pt reports he is overall feeling about the same, but does feel better for a bit following PT sessions.       Objective: See treatment diary below      Assessment: Tolerated treatment well. Added finger extension to HEP to improve extensor strength. Patient demonstrated fatigue post treatment, exhibited good technique with therapeutic exercises, and would benefit from continued PT      Plan: Continue per plan of care.      Precautions: HTN, Afib    Manuals    Thenar eminence STM QD  KM QD QD QD   Wrist/finger flexor STM QD QD KM QD QD QD   CMC mobs      QD   Assessment QD QD   QD QD   Neuro Re-Ed    KB  rotations  20x ea  20x ea 20x ea 20x ea 20x 15lb KB   Wrist flexion  10lb DB 10x 10lb DB 10x 10lb DB 10x 8lb DB 10x ea    Therabar pron/sup  20x3\" ea 20x3\" ea 20x3\" ea 20x3\" ea 20x3\" ea   DigiGrip   2x10 3\" ea      Web extension      Yllw 20x3\"                     Ther Ex                                                                                 Ther Activity                           Gait Training                           Modalities                                  "

## 2024-08-22 ENCOUNTER — OFFICE VISIT (OUTPATIENT)
Dept: PHYSICAL THERAPY | Facility: REHABILITATION | Age: 64
End: 2024-08-22
Payer: MEDICARE

## 2024-08-22 DIAGNOSIS — M18.11 ARTHRITIS OF CARPOMETACARPAL (CMC) JOINT OF RIGHT THUMB: Primary | ICD-10-CM

## 2024-08-22 PROCEDURE — 97112 NEUROMUSCULAR REEDUCATION: CPT | Performed by: PHYSICAL THERAPIST

## 2024-08-22 PROCEDURE — 97140 MANUAL THERAPY 1/> REGIONS: CPT | Performed by: PHYSICAL THERAPIST

## 2024-08-22 NOTE — PROGRESS NOTES
"Daily Note     Today's date: 2024  Patient name: Enoc Roberto  : 1960  MRN: [id number]  Referring provider: Adiel Sorenson MD  Dx:   Encounter Diagnosis     ICD-10-CM    1. Arthritis of carpometacarpal (CMC) joint of right thumb  M18.11           Start Time: 1500  Stop Time: 1530  Total time in clinic (min): 30 minutes    Subjective: Pt reports his strength is better. Does report an increase in falls recently. States he gets dizzy and loses his balance and falls backwards.       Objective: See treatment diary below      Assessment: Tolerated treatment well. Educated to reach out to his PCP to make an appointment to discuss his increase in falling with dizziness. Patient demonstrated fatigue post treatment, exhibited good technique with therapeutic exercises, and would benefit from continued PT      Plan: Continue per plan of care.      Precautions: HTN, Afib    Manuals    Thenar eminence STM QD  KM QD QD QD   Wrist/finger flexor STM QD  KM QD QD QD   CMC mobs QD     QD   Assessment QD    QD QD   Neuro Re-Ed    KB  rotations 20x 20lb KB  20x ea 20x ea 20x ea 20x 15lb KB   Wrist flexion   10lb DB 10x 10lb DB 10x 8lb DB 10x ea    Therabar pron/sup 20x3\" ea  20x3\" ea 20x3\" ea 20x3\" ea 20x3\" ea   DigiGrip   2x10 3\" ea      Web extension Yllw 20x3\"      Yllw 20x3\"                     Ther Ex                                                                                 Ther Activity                           Gait Training                           Modalities                                  "

## 2024-08-27 ENCOUNTER — PROCEDURE VISIT (OUTPATIENT)
Dept: UROLOGY | Facility: CLINIC | Age: 64
End: 2024-08-27
Payer: MEDICARE

## 2024-08-27 VITALS
DIASTOLIC BLOOD PRESSURE: 74 MMHG | HEIGHT: 72 IN | SYSTOLIC BLOOD PRESSURE: 114 MMHG | BODY MASS INDEX: 42.66 KG/M2 | OXYGEN SATURATION: 94 % | WEIGHT: 315 LBS | HEART RATE: 101 BPM

## 2024-08-27 DIAGNOSIS — N32.81 OAB (OVERACTIVE BLADDER): ICD-10-CM

## 2024-08-27 DIAGNOSIS — R31.0 GROSS HEMATURIA: ICD-10-CM

## 2024-08-27 DIAGNOSIS — R35.0 URINARY FREQUENCY: Primary | ICD-10-CM

## 2024-08-27 PROBLEM — N40.0 BPH WITHOUT OBSTRUCTION/LOWER URINARY TRACT SYMPTOMS: Status: RESOLVED | Noted: 2024-05-07 | Resolved: 2024-08-27

## 2024-08-27 LAB
SL AMB  POCT GLUCOSE, UA: NORMAL
SL AMB LEUKOCYTE ESTERASE,UA: NORMAL
SL AMB POCT BILIRUBIN,UA: NORMAL
SL AMB POCT BLOOD,UA: NORMAL
SL AMB POCT CLARITY,UA: CLEAR
SL AMB POCT COLOR,UA: YELLOW
SL AMB POCT KETONES,UA: NORMAL
SL AMB POCT NITRITE,UA: NORMAL
SL AMB POCT PH,UA: 5
SL AMB POCT SPECIFIC GRAVITY,UA: 1.01
SL AMB POCT URINE PROTEIN: NORMAL
SL AMB POCT UROBILINOGEN: NORMAL

## 2024-08-27 PROCEDURE — 81002 URINALYSIS NONAUTO W/O SCOPE: CPT | Performed by: UROLOGY

## 2024-08-27 PROCEDURE — 52000 CYSTOURETHROSCOPY: CPT | Performed by: UROLOGY

## 2024-08-27 PROCEDURE — 99213 OFFICE O/P EST LOW 20 MIN: CPT | Performed by: UROLOGY

## 2024-08-27 RX ORDER — TADALAFIL 5 MG/1
5 TABLET ORAL DAILY
Qty: 90 TABLET | Refills: 3 | Status: SHIPPED | OUTPATIENT
Start: 2024-08-27 | End: 2025-08-22

## 2024-08-27 NOTE — ASSESSMENT & PLAN NOTE
The patient has severe overactive bladder symptoms associated urgency frequency and urge incontinence.  He has tried alfuzosin which helped some.  He has tried trospium which he does not think is effective and no longer takes.  Options.  His prostate does not appear very obstructive so unclear how much benefit if any he would get from outlet surgery.  I think he is likely better served by Botox.   We can also consider a different medication such as Gemtesa or daily Cialis or a different anticholinergic.  Unfortunate Gemtesa is very expensive for him.  He is hesitant to consider Botox.  I am hesitant for another anticholinergic since troches him did not work well and also he is on multiple medications with multiple medical issues and these drugs have a higher side effect profile.  He wants to try daily Cialis.  I have written this to [facility] based on his request but I let him know that it is often more expensive there then grocery store pharmacies
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
## 2024-08-27 NOTE — ASSESSMENT & PLAN NOTE
Patient had a distant episode of gross hematuria which has not recurred since.  CT scan was unremarkable.  Cystoscopy today was not concerning for malignancy.  Of note he is on Eliquis which may have potentiated what ever process caused his limited hematuria.  Not think he requires any further workup at this time but if he has persistent microscopic hematuria or gross hematuria in future would recommend repeat work up in 3-5 years

## 2024-08-28 ENCOUNTER — APPOINTMENT (OUTPATIENT)
Dept: PHYSICAL THERAPY | Facility: REHABILITATION | Age: 64
End: 2024-08-28
Payer: MEDICARE

## 2024-09-03 DIAGNOSIS — R35.0 URINARY FREQUENCY: ICD-10-CM

## 2024-09-03 RX ORDER — TADALAFIL 5 MG/1
5 TABLET ORAL DAILY
Qty: 90 TABLET | Refills: 3 | Status: SHIPPED | OUTPATIENT
Start: 2024-09-03 | End: 2025-08-29

## 2024-09-03 NOTE — TELEPHONE ENCOUNTER
Pharmacy never received the prescription from 8/27/24.     Reason for call:   [x] Refill   [] Prior Auth  [] Other:     Office:   [] PCP/Provider -   [x] Specialty/Provider - Urology    Medication:     tadalafil (CIALIS) 5 MG tablet       Dose/Frequency: 5 mg, Oral, Daily     Quantity: 90    Pharmacy: [facility]/pharmacy #[id number] - [place], [place] - [address] [place] [address]     Does the patient have enough for 3 days?   [] Yes   [x] No - Send as HP to POD

## 2024-09-06 ENCOUNTER — HOSPITAL ENCOUNTER (INPATIENT)
Facility: HOSPITAL | Age: 64
LOS: 5 days | Discharge: HOME HEALTH | DRG: 074 | End: 2024-09-11
Attending: EMERGENCY MEDICINE | Admitting: SURGERY
Payer: MEDICARE

## 2024-09-06 ENCOUNTER — APPOINTMENT (EMERGENCY)
Dept: CT IMAGING | Facility: HOSPITAL | Age: 64
DRG: 074 | End: 2024-09-06
Payer: MEDICARE

## 2024-09-06 ENCOUNTER — NURSE TRIAGE (OUTPATIENT)
Dept: OTHER | Facility: OTHER | Age: 64
End: 2024-09-06

## 2024-09-06 ENCOUNTER — APPOINTMENT (EMERGENCY)
Dept: RADIOLOGY | Facility: HOSPITAL | Age: 64
DRG: 074 | End: 2024-09-06
Payer: MEDICARE

## 2024-09-06 DIAGNOSIS — M48.062 SPINAL STENOSIS OF LUMBAR REGION WITH NEUROGENIC CLAUDICATION: ICD-10-CM
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
## 2024-09-06 DIAGNOSIS — E51.9 THIAMINE DEFICIENCY: ICD-10-CM

## 2024-09-06 DIAGNOSIS — G62.9 PERIPHERAL POLYNEUROPATHY: ICD-10-CM

## 2024-09-06 DIAGNOSIS — R26.2 IMPAIRED AMBULATION: ICD-10-CM

## 2024-09-06 DIAGNOSIS — K59.00 CONSTIPATION: ICD-10-CM

## 2024-09-06 DIAGNOSIS — E53.8 FOLATE DEFICIENCY: ICD-10-CM

## 2024-09-06 DIAGNOSIS — R26.2 AMBULATORY DYSFUNCTION: ICD-10-CM

## 2024-09-06 DIAGNOSIS — I10 ESSENTIAL HYPERTENSION: ICD-10-CM

## 2024-09-06 DIAGNOSIS — E53.8 B12 DEFICIENCY: ICD-10-CM

## 2024-09-06 DIAGNOSIS — G62.9 NEUROPATHY: ICD-10-CM

## 2024-09-06 LAB
2HR DELTA HS TROPONIN: -1 NG/L
4HR DELTA HS TROPONIN: -1 NG/L
ABO GROUP BLD: NORMAL
ALBUMIN SERPL BCG-MCNC: 3.5 G/DL (ref 3.5–5)
ALP SERPL-CCNC: 90 U/L (ref 34–104)
ALT SERPL W P-5'-P-CCNC: 10 U/L (ref 7–52)
ANION GAP SERPL CALCULATED.3IONS-SCNC: 7 MMOL/L (ref 4–13)
ANISOCYTOSIS BLD QL SMEAR: PRESENT
AST SERPL W P-5'-P-CCNC: 17 U/L (ref 13–39)
ATRIAL RATE: 74 BPM
BACTERIA UR QL AUTO: ABNORMAL /HPF
BASE EXCESS BLDA CALC-SCNC: 5 MMOL/L (ref -2–3)
BASOPHILS # BLD MANUAL: 0 THOUSAND/UL (ref 0–0.1)
BASOPHILS NFR MAR MANUAL: 0 % (ref 0–1)
BILIRUB SERPL-MCNC: 0.5 MG/DL (ref 0.2–1)
BILIRUB UR QL STRIP: NEGATIVE
BLD GP AB SCN SERPL QL: NEGATIVE
BUN SERPL-MCNC: 12 MG/DL (ref 5–25)
CA-I BLD-SCNC: 1.11 MMOL/L (ref 1.12–1.32)
CALCIUM SERPL-MCNC: 8.9 MG/DL (ref 8.4–10.2)
CARDIAC TROPONIN I PNL SERPL HS: 8 NG/L
CARDIAC TROPONIN I PNL SERPL HS: 8 NG/L
CARDIAC TROPONIN I PNL SERPL HS: 9 NG/L
CHLORIDE SERPL-SCNC: 104 MMOL/L (ref 96–108)
CLARITY UR: CLEAR
CO2 SERPL-SCNC: 29 MMOL/L (ref 21–32)
COLOR UR: ABNORMAL
CREAT SERPL-MCNC: 0.99 MG/DL (ref 0.6–1.3)
EOSINOPHIL # BLD MANUAL: 2.53 THOUSAND/UL (ref 0–0.4)
EOSINOPHIL NFR BLD MANUAL: 23 % (ref 0–6)
ERYTHROCYTE [DISTWIDTH] IN BLOOD BY AUTOMATED COUNT: 19.3 % (ref 11.6–15.1)
GFR SERPL CREATININE-BSD FRML MDRD: 80 ML/MIN/1.73SQ M
GLUCOSE SERPL-MCNC: 102 MG/DL (ref 65–140)
GLUCOSE SERPL-MCNC: 119 MG/DL (ref 65–140)
GLUCOSE UR STRIP-MCNC: NEGATIVE MG/DL
HCO3 BLDA-SCNC: 30.1 MMOL/L (ref 24–30)
HCT VFR BLD AUTO: 39.9 % (ref 36.5–49.3)
HCT VFR BLD CALC: 40 % (ref 36.5–49.3)
HGB BLD-MCNC: 12.6 G/DL (ref 12–17)
HGB BLDA-MCNC: 13.6 G/DL (ref 12–17)
HGB UR QL STRIP.AUTO: NEGATIVE
HOLD SPECIMEN: NORMAL
HYALINE CASTS #/AREA URNS LPF: ABNORMAL /LPF
KETONES UR STRIP-MCNC: NEGATIVE MG/DL
LEUKOCYTE ESTERASE UR QL STRIP: NEGATIVE
LG PLATELETS BLD QL SMEAR: PRESENT
LYMPHOCYTES # BLD AUTO: 1.65 THOUSAND/UL (ref 0.6–4.47)
LYMPHOCYTES # BLD AUTO: 15 % (ref 14–44)
MAGNESIUM SERPL-MCNC: 1.9 MG/DL (ref 1.9–2.7)
MCH RBC QN AUTO: 28.9 PG (ref 26.8–34.3)
MCHC RBC AUTO-ENTMCNC: 31.6 G/DL (ref 31.4–37.4)
MCV RBC AUTO: 92 FL (ref 82–98)
MONOCYTES # BLD AUTO: 0.88 THOUSAND/UL (ref 0–1.22)
MONOCYTES NFR BLD: 8 % (ref 4–12)
MUCOUS THREADS UR QL AUTO: ABNORMAL
NEUTROPHILS # BLD MANUAL: 5.94 THOUSAND/UL (ref 1.85–7.62)
NEUTS SEG NFR BLD AUTO: 54 % (ref 43–75)
NITRITE UR QL STRIP: NEGATIVE
NON-SQ EPI CELLS URNS QL MICRO: ABNORMAL /HPF
PATHOLOGY REVIEW: YES
PCO2 BLD: 31 MMOL/L (ref 21–32)
PCO2 BLD: 44 MM HG (ref 42–50)
PH BLD: 7.44 [PH] (ref 7.3–7.4)
PH UR STRIP.AUTO: 7 [PH]
PHOSPHATE SERPL-MCNC: 2.9 MG/DL (ref 2.3–4.1)
PLATELET # BLD AUTO: 166 THOUSANDS/UL (ref 149–390)
PLATELET BLD QL SMEAR: ADEQUATE
PO2 BLD: 29 MM HG (ref 35–45)
POLYCHROMASIA BLD QL SMEAR: PRESENT
POTASSIUM BLD-SCNC: 5.1 MMOL/L (ref 3.5–5.3)
POTASSIUM SERPL-SCNC: 3.9 MMOL/L (ref 3.5–5.3)
PROT SERPL-MCNC: 6 G/DL (ref 6.4–8.4)
PROT UR STRIP-MCNC: ABNORMAL MG/DL
QRS AXIS: -46 DEGREES
QRSD INTERVAL: 152 MS
QT INTERVAL: 438 MS
QTC INTERVAL: 492 MS
RBC # BLD AUTO: 4.36 MILLION/UL (ref 3.88–5.62)
RBC #/AREA URNS AUTO: ABNORMAL /HPF
RBC MORPH BLD: PRESENT
RH BLD: POSITIVE
SAO2 % BLD FROM PO2: 57 % (ref 60–85)
SODIUM BLD-SCNC: 140 MMOL/L (ref 136–145)
SODIUM SERPL-SCNC: 140 MMOL/L (ref 135–147)
SP GR UR STRIP.AUTO: >=1.05 (ref 1–1.03)
SPECIMEN EXPIRATION DATE: NORMAL
SPECIMEN SOURCE: ABNORMAL
T WAVE AXIS: -25 DEGREES
TSH SERPL DL<=0.05 MIU/L-ACNC: 1.27 UIU/ML (ref 0.45–4.5)
UROBILINOGEN UR STRIP-ACNC: <2 MG/DL
VENTRICULAR RATE: 76 BPM
WBC # BLD AUTO: 11 THOUSAND/UL (ref 4.31–10.16)
WBC #/AREA URNS AUTO: ABNORMAL /HPF

## 2024-09-06 PROCEDURE — 86901 BLOOD TYPING SEROLOGIC RH(D): CPT

## 2024-09-06 PROCEDURE — 85007 BL SMEAR W/DIFF WBC COUNT: CPT

## 2024-09-06 PROCEDURE — 73090 X-RAY EXAM OF FOREARM: CPT

## 2024-09-06 PROCEDURE — 83735 ASSAY OF MAGNESIUM: CPT

## 2024-09-06 PROCEDURE — 70450 CT HEAD/BRAIN W/O DYE: CPT

## 2024-09-06 PROCEDURE — 82947 ASSAY GLUCOSE BLOOD QUANT: CPT

## 2024-09-06 PROCEDURE — 84484 ASSAY OF TROPONIN QUANT: CPT

## 2024-09-06 PROCEDURE — 99223 1ST HOSP IP/OBS HIGH 75: CPT | Performed by: STUDENT IN AN ORGANIZED HEALTH CARE EDUCATION/TRAINING PROGRAM

## 2024-09-06 PROCEDURE — 84165 PROTEIN E-PHORESIS SERUM: CPT

## 2024-09-06 PROCEDURE — 76705 ECHO EXAM OF ABDOMEN: CPT | Performed by: SURGERY

## 2024-09-06 PROCEDURE — 82803 BLOOD GASES ANY COMBINATION: CPT

## 2024-09-06 PROCEDURE — 82607 VITAMIN B-12: CPT | Performed by: SURGERY

## 2024-09-06 PROCEDURE — 86038 ANTINUCLEAR ANTIBODIES: CPT

## 2024-09-06 PROCEDURE — 84166 PROTEIN E-PHORESIS/URINE/CSF: CPT

## 2024-09-06 PROCEDURE — 84132 ASSAY OF SERUM POTASSIUM: CPT

## 2024-09-06 PROCEDURE — EDAIR PR ED AIR: Performed by: EMERGENCY MEDICINE

## 2024-09-06 PROCEDURE — 84100 ASSAY OF PHOSPHORUS: CPT

## 2024-09-06 PROCEDURE — 85027 COMPLETE CBC AUTOMATED: CPT

## 2024-09-06 PROCEDURE — 82746 ASSAY OF FOLIC ACID SERUM: CPT | Performed by: SURGERY

## 2024-09-06 PROCEDURE — 99285 EMERGENCY DEPT VISIT HI MDM: CPT | Performed by: SURGERY

## 2024-09-06 PROCEDURE — 82330 ASSAY OF CALCIUM: CPT

## 2024-09-06 PROCEDURE — 93308 TTE F-UP OR LMTD: CPT | Performed by: SURGERY

## 2024-09-06 PROCEDURE — 81001 URINALYSIS AUTO W/SCOPE: CPT

## 2024-09-06 PROCEDURE — 83036 HEMOGLOBIN GLYCOSYLATED A1C: CPT | Performed by: SURGERY

## 2024-09-06 PROCEDURE — 84443 ASSAY THYROID STIM HORMONE: CPT | Performed by: SURGERY

## 2024-09-06 PROCEDURE — 84295 ASSAY OF SERUM SODIUM: CPT

## 2024-09-06 PROCEDURE — 71260 CT THORAX DX C+: CPT

## 2024-09-06 PROCEDURE — 85014 HEMATOCRIT: CPT

## 2024-09-06 PROCEDURE — 84207 ASSAY OF VITAMIN B-6: CPT | Performed by: SURGERY

## 2024-09-06 PROCEDURE — 86430 RHEUMATOID FACTOR TEST QUAL: CPT

## 2024-09-06 PROCEDURE — 74177 CT ABD & PELVIS W/CONTRAST: CPT

## 2024-09-06 PROCEDURE — 73590 X-RAY EXAM OF LOWER LEG: CPT

## 2024-09-06 PROCEDURE — 80053 COMPREHEN METABOLIC PANEL: CPT

## 2024-09-06 PROCEDURE — 72125 CT NECK SPINE W/O DYE: CPT

## 2024-09-06 PROCEDURE — 93010 ELECTROCARDIOGRAM REPORT: CPT | Performed by: INTERNAL MEDICINE

## 2024-09-06 PROCEDURE — 93005 ELECTROCARDIOGRAM TRACING: CPT

## 2024-09-06 PROCEDURE — 99285 EMERGENCY DEPT VISIT HI MDM: CPT

## 2024-09-06 PROCEDURE — 84425 ASSAY OF VITAMIN B-1: CPT | Performed by: SURGERY

## 2024-09-06 PROCEDURE — 86900 BLOOD TYPING SEROLOGIC ABO: CPT

## 2024-09-06 PROCEDURE — 86850 RBC ANTIBODY SCREEN: CPT

## 2024-09-06 PROCEDURE — 71045 X-RAY EXAM CHEST 1 VIEW: CPT

## 2024-09-06 PROCEDURE — 36415 COLL VENOUS BLD VENIPUNCTURE: CPT

## 2024-09-06 RX ORDER — OXYCODONE HYDROCHLORIDE 5 MG/1
5 TABLET ORAL EVERY 4 HOURS PRN
Status: DISCONTINUED | OUTPATIENT
Start: 2024-09-06 | End: 2024-09-11 | Stop reason: HOSPADM

## 2024-09-06 RX ORDER — LANOLIN ALCOHOL/MO/W.PET/CERES
400 CREAM (GRAM) TOPICAL DAILY
Status: DISCONTINUED | OUTPATIENT
Start: 2024-09-06 | End: 2024-09-11 | Stop reason: HOSPADM

## 2024-09-06 RX ORDER — METHOCARBAMOL 750 MG/1
750 TABLET, FILM COATED ORAL EVERY 8 HOURS PRN
Status: DISCONTINUED | OUTPATIENT
Start: 2024-09-06 | End: 2024-09-09

## 2024-09-06 RX ORDER — FOLIC ACID 1 MG/1
1 TABLET ORAL DAILY
Status: DISCONTINUED | OUTPATIENT
Start: 2024-09-06 | End: 2024-09-11 | Stop reason: HOSPADM

## 2024-09-06 RX ORDER — CLONAZEPAM 0.5 MG/1
0.5 TABLET ORAL
Status: DISCONTINUED | OUTPATIENT
Start: 2024-09-06 | End: 2024-09-11 | Stop reason: HOSPADM

## 2024-09-06 RX ORDER — LEVOTHYROXINE SODIUM 25 UG/1
25 TABLET ORAL
Status: DISCONTINUED | OUTPATIENT
Start: 2024-09-07 | End: 2024-09-11 | Stop reason: HOSPADM

## 2024-09-06 RX ORDER — MONTELUKAST SODIUM 10 MG/1
10 TABLET ORAL
Status: DISCONTINUED | OUTPATIENT
Start: 2024-09-06 | End: 2024-09-11 | Stop reason: HOSPADM

## 2024-09-06 RX ORDER — LANOLIN ALCOHOL/MO/W.PET/CERES
100 CREAM (GRAM) TOPICAL DAILY
Status: DISCONTINUED | OUTPATIENT
Start: 2024-09-06 | End: 2024-09-11 | Stop reason: HOSPADM

## 2024-09-06 RX ORDER — TRAZODONE HYDROCHLORIDE 100 MG/1
100 TABLET ORAL
Status: DISCONTINUED | OUTPATIENT
Start: 2024-09-06 | End: 2024-09-07

## 2024-09-06 RX ORDER — AMLODIPINE BESYLATE 10 MG/1
10 TABLET ORAL DAILY
Status: DISCONTINUED | OUTPATIENT
Start: 2024-09-07 | End: 2024-09-11 | Stop reason: HOSPADM

## 2024-09-06 RX ORDER — DIPHENHYDRAMINE HCL 25 MG
25 TABLET ORAL EVERY 8 HOURS PRN
Status: DISCONTINUED | OUTPATIENT
Start: 2024-09-06 | End: 2024-09-09

## 2024-09-06 RX ORDER — ENOXAPARIN SODIUM 100 MG/ML
40 INJECTION SUBCUTANEOUS EVERY 12 HOURS SCHEDULED
Status: DISCONTINUED | OUTPATIENT
Start: 2024-09-06 | End: 2024-09-07

## 2024-09-06 RX ORDER — ALLOPURINOL 100 MG/1
100 TABLET ORAL DAILY
Status: DISCONTINUED | OUTPATIENT
Start: 2024-09-07 | End: 2024-09-11 | Stop reason: HOSPADM

## 2024-09-06 RX ORDER — PANTOPRAZOLE SODIUM 40 MG/1
40 TABLET, DELAYED RELEASE ORAL DAILY
Status: DISCONTINUED | OUTPATIENT
Start: 2024-09-06 | End: 2024-09-11 | Stop reason: HOSPADM

## 2024-09-06 RX ORDER — NEBIVOLOL 10 MG/1
10 TABLET ORAL DAILY
Status: DISCONTINUED | OUTPATIENT
Start: 2024-09-07 | End: 2024-09-08

## 2024-09-06 RX ORDER — DULOXETIN HYDROCHLORIDE 20 MG/1
40 CAPSULE, DELAYED RELEASE ORAL DAILY
Status: DISCONTINUED | OUTPATIENT
Start: 2024-09-07 | End: 2024-09-09

## 2024-09-06 RX ORDER — PRAVASTATIN SODIUM 40 MG
40 TABLET ORAL
Status: DISCONTINUED | OUTPATIENT
Start: 2024-09-06 | End: 2024-09-11 | Stop reason: HOSPADM

## 2024-09-06 RX ADMIN — Medication 100 MG: at 17:47

## 2024-09-06 RX ADMIN — FOLIC ACID 1 MG: 1 TABLET ORAL at 17:47

## 2024-09-06 RX ADMIN — CLONAZEPAM 0.5 MG: 0.5 TABLET ORAL at 21:48

## 2024-09-06 RX ADMIN — PRAVASTATIN SODIUM 40 MG: 40 TABLET ORAL at 17:47

## 2024-09-06 RX ADMIN — IOHEXOL 100 ML: 350 INJECTION, SOLUTION INTRAVENOUS at 13:31

## 2024-09-06 RX ADMIN — MONTELUKAST 10 MG: 10 TABLET, FILM COATED ORAL at 21:48

## 2024-09-06 RX ADMIN — MULTIPLE VITAMINS W/ MINERALS TAB 1 TABLET: TAB ORAL at 17:47

## 2024-09-06 RX ADMIN — Medication 400 MG: at 17:47

## 2024-09-06 RX ADMIN — PANTOPRAZOLE SODIUM 40 MG: 40 TABLET, DELAYED RELEASE ORAL at 17:47

## 2024-09-06 RX ADMIN — ENOXAPARIN SODIUM 40 MG: 40 INJECTION SUBCUTANEOUS at 21:48

## 2024-09-06 NOTE — ASSESSMENT & PLAN NOTE
Assessment:  Patient states that he has been having multiple falls for the last week. The patient states that he has had about 6 falls in the last 2 days. He states he is unable to correct his gait when his starts to fall. Patient states that he has had this neuropathy since 2007 when he had his surgery done. He states that his neuropathy seems to be getting worse. He denies any associated symptoms such as dizziness, lightheadedness, speech difficulties, syncope, facial asymmetry, or tremors.    Workup:  MRI Lumbar spine wo contrast 10/1/21: Suboptimal image quality due to spinal cord stimulator settings. Similar multilevel degenerative changes of lumbar spine with transitional lumbosacral anatomy, varying degrees of canal stenosis (mild L2-L3 and L3-L4) and foraminal narrowing (mild bilateral L3-L4), as detailed above. Multiple indeterminate splenic lesions, unchanged since 5/13/2020 but appears worse since 7/19/2017.  Consider follow-up CT abdomen pelvis with contrast given suboptimal image quality of MRI.  EMG 2 limb lower extremity 12/1/21: Abnormal study. These electrodiagnostic findings are most consistent with bilateral, chronic, L5-S1 radiculopathies in the lower extremities, without any ongoing denervation. In addition, there is evidence to suggest a mild, underlying axonal sensorimotor neuropathy.  CTH 9/6/24: No acute intracranial abnormality.   ECHO 9/7/24: Left ventricular cavity size is normal. Wall thickness is moderately increased. There is moderate concentric hypertrophy. The left ventricular ejection fraction is 50%. Systolic function is low normal. Although no diagnostic regional wall motion abnormality was identified, this possibility cannot be completely excluded on the basis of this study. Right Ventricle: Right ventricular cavity size is mildly dilated. Left Atrium: The atrium is mildly dilated.  ECHO 9/7/24: Left ventricular cavity size is normal. Wall thickness is moderately increased. There  is moderate concentric hypertrophy. The left ventricular ejection fraction is 50%. Systolic function is low normal. Although no diagnostic regional wall motion abnormality was identified, this possibility cannot be completely excluded on the basis of this study. Right Ventricle: Right ventricular cavity size is mildly dilated.  MRI Brain wo contrast 9/9/24: No acute infarction, intracranial image or mass effect. Trace, chronic microangiopathy. Bifrontal and anterior temporal cortical volume loss similar to the prior study, greater than expected for the patient's age. No ventriculomegaly. Findings likely on the basis of cortical atrophy. Moderate left mastoid air cell effusion/signal abnormality is increased from 2020.  A1c: 5.6  B1: 143.7  B9: 4.4  B12: 232  TSH: 2.599  JERMAN: Negative  RF: Negative  SPEP: WNL  UPEP: WNL     Impression: Patient is a 63-year-old male who presents to the ED as a result of multiple falls.  Patient's neurological examination is non-focal at this time and consistent with known peripheral neuropathy.  MRI negative for acute ischemia.  Neuropathy workup also unremarkable except for folate and B12.  B6 pending at this time. Patient's symptomatology most likely secondary to deconditioning and worsening neuropathy.  At this time, recommend appropriate medical optimization.    Plan:  Case discussed with Dr. Artis  B6 pending at this time  Recommend repletion of folic acid and B12 as appropriate by primary team  Recommend PT/OT evaluation  Defer MRI C-spine and L-spine wo contrast to outpatient  Recommend Telemetry  Speech consultation also recommended  Rest of care as per primary team, all recommendations were communicated to the primary team  No further inpatient recommendations at this time  Please contact neurology with any questions or concerns  Patient to follow-up with neurology in 6 to 8 weeks.

## 2024-09-06 NOTE — TELEPHONE ENCOUNTER
"Reason for Disposition  • SEVERE weakness (i.e., unable to walk or barely able to walk, requires support) and new-onset or worsening    Answer Assessment - Initial Assessment Questions  1. MECHANISM: \"How did the fall happen?\"      Patient stated that he is losing his balance, patient stated that his legs are \"moving faster than he can responds\". Patient cannot control his movements.   3. ONSET: \"When did the fall happen?\" (e.g., minutes, hours, or days ago)      A week ago, became worse today. Patient fell at least 12 times within last 24 hours.   5. INJURY: \"Did you hurt (injure) yourself when you fell?\" If Yes, ask: \"What did you injure? Tell me more about this?\" (e.g., body area; type of injury; pain severity)\"      No   6. PAIN: \"Is there any pain?\" If Yes, ask: \"How bad is the pain?\" (e.g., Scale 1-10; or mild,   moderate, severe)    - NONE (0): no pain    - MILD (1-3): doesn't interfere with normal activities     - MODERATE (4-7): interferes with normal activities or awakens from sleep     - SEVERE (8-10): excruciating pain, unable to do any normal activities       Patient fell on his back, head, arms. Patient feels sore everywhere.   7. SIZE: For cuts, bruises, or swelling, ask: \"How large is it?\" (e.g., inches or centimeters)       Bruises.    9. OTHER SYMPTOMS: \"Do you have any other symptoms?\" (e.g., dizziness, fever, weakness; new onset or worsening).       Weakness.   10. CAUSE: \"What do you think caused the fall (or falling)?\" (e.g., tripped, dizzy spell)        Possible muscle weakness.    Protocols used: Falls and Falling-ADULT-[place]    "
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
## 2024-09-06 NOTE — PROCEDURES
POC FAST US    Date/Time: 9/6/2024 1:35 PM    Performed by: Cristi Salcedo MD  Authorized by: Cristi Salcedo MD    Patient location:  Trauma  Other Assisting Provider: No    Procedure details:     Exam Type:  Diagnostic    Indications: blunt abdominal trauma and abdominal pain      Assess for:  Intra-abdominal fluid and pericardial effusion    Technique: FAST      Views obtained:  Heart - Pericardial sac, LUQ - Splenorenal space, Suprapubic - Pouch of Marco and RUQ - Ding's Pouch    Image quality: diagnostic      Image availability:  Video obtained  FAST Findings:     RUQ (Hepatorenal) free fluid: absent      LUQ (Splenorenal) free fluid: absent      Suprapubic free fluid: absent      Cardiac wall motion: identified      Pericardial effusion: absent    Interpretation:     Impressions: negative

## 2024-09-06 NOTE — CASE MANAGEMENT
Case Management ED Progress Note    Patient name Enoc Roberto  Location ED-24/ED-24 MRN [id number]  : 1960 Date 2024        OBJECTIVE:    Chief Complaint:   Patient Class: Emergency  Preferred Pharmacy:   [id number] [place] [facility] [address] [address]  Phone: [phone] Fax: [phone]    Primary Care Provider: Danial Vasquez DO    Primary Insurance: MEDICARE  Secondary Insurance: COMMERCIAL MISCELLANEOUS    ED Progress Note:  CM responded to trauma alert. Patient was brought to hospital by family. In trauma bay for eval.     Family present at hospital.     No current identified CM needs. CM will follow and update screening, assessment, and discharge planning as appropriate.
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
## 2024-09-07 ENCOUNTER — APPOINTMENT (INPATIENT)
Dept: NON INVASIVE DIAGNOSTICS | Facility: HOSPITAL | Age: 64
DRG: 074 | End: 2024-09-07
Payer: MEDICARE

## 2024-09-07 LAB
ANA SER QL IA: NEGATIVE
ANION GAP SERPL CALCULATED.3IONS-SCNC: 6 MMOL/L (ref 4–13)
ANISOCYTOSIS BLD QL SMEAR: PRESENT
BASOPHILS # BLD MANUAL: 0 THOUSAND/UL (ref 0–0.1)
BASOPHILS NFR MAR MANUAL: 0 % (ref 0–1)
BUN SERPL-MCNC: 12 MG/DL (ref 5–25)
CALCIUM SERPL-MCNC: 8.5 MG/DL (ref 8.4–10.2)
CHLORIDE SERPL-SCNC: 105 MMOL/L (ref 96–108)
CO2 SERPL-SCNC: 27 MMOL/L (ref 21–32)
CREAT SERPL-MCNC: 0.93 MG/DL (ref 0.6–1.3)
EOSINOPHIL # BLD MANUAL: 1.78 THOUSAND/UL (ref 0–0.4)
EOSINOPHIL NFR BLD MANUAL: 22 % (ref 0–6)
ERYTHROCYTE [DISTWIDTH] IN BLOOD BY AUTOMATED COUNT: 19 % (ref 11.6–15.1)
EST. AVERAGE GLUCOSE BLD GHB EST-MCNC: 114 MG/DL
FOLATE SERPL-MCNC: 4.4 NG/ML
GFR SERPL CREATININE-BSD FRML MDRD: 86 ML/MIN/1.73SQ M
GLUCOSE SERPL-MCNC: 95 MG/DL (ref 65–140)
HBA1C MFR BLD: 5.6 %
HCT VFR BLD AUTO: 37.6 % (ref 36.5–49.3)
HGB BLD-MCNC: 12.2 G/DL (ref 12–17)
HYPERCHROMIA BLD QL SMEAR: PRESENT
LG PLATELETS BLD QL SMEAR: PRESENT
LYMPHOCYTES # BLD AUTO: 1.78 THOUSAND/UL (ref 0.6–4.47)
LYMPHOCYTES # BLD AUTO: 22 % (ref 14–44)
MAGNESIUM SERPL-MCNC: 1.9 MG/DL (ref 1.9–2.7)
MCH RBC QN AUTO: 29.3 PG (ref 26.8–34.3)
MCHC RBC AUTO-ENTMCNC: 32.4 G/DL (ref 31.4–37.4)
MCV RBC AUTO: 90 FL (ref 82–98)
MONOCYTES # BLD AUTO: 0.56 THOUSAND/UL (ref 0–1.22)
MONOCYTES NFR BLD: 7 % (ref 4–12)
NEUTROPHILS # BLD MANUAL: 3.95 THOUSAND/UL (ref 1.85–7.62)
NEUTS SEG NFR BLD AUTO: 49 % (ref 43–75)
PHOSPHATE SERPL-MCNC: 4 MG/DL (ref 2.3–4.1)
PLATELET # BLD AUTO: 126 THOUSANDS/UL (ref 149–390)
PLATELET BLD QL SMEAR: ABNORMAL
PMV BLD AUTO: 13.9 FL (ref 8.9–12.7)
POTASSIUM SERPL-SCNC: 3.6 MMOL/L (ref 3.5–5.3)
RBC # BLD AUTO: 4.16 MILLION/UL (ref 3.88–5.62)
RBC MORPH BLD: PRESENT
RHEUMATOID FACT SER QL LA: NEGATIVE
SODIUM SERPL-SCNC: 138 MMOL/L (ref 135–147)
TSH SERPL DL<=0.05 MIU/L-ACNC: 2.6 UIU/ML (ref 0.45–4.5)
VIT B12 SERPL-MCNC: 232 PG/ML (ref 180–914)
WBC # BLD AUTO: 8.07 THOUSAND/UL (ref 4.31–10.16)

## 2024-09-07 PROCEDURE — 85007 BL SMEAR W/DIFF WBC COUNT: CPT

## 2024-09-07 PROCEDURE — 84443 ASSAY THYROID STIM HORMONE: CPT | Performed by: STUDENT IN AN ORGANIZED HEALTH CARE EDUCATION/TRAINING PROGRAM

## 2024-09-07 PROCEDURE — 99223 1ST HOSP IP/OBS HIGH 75: CPT | Performed by: STUDENT IN AN ORGANIZED HEALTH CARE EDUCATION/TRAINING PROGRAM

## 2024-09-07 PROCEDURE — 99232 SBSQ HOSP IP/OBS MODERATE 35: CPT | Performed by: SURGERY

## 2024-09-07 PROCEDURE — 93306 TTE W/DOPPLER COMPLETE: CPT

## 2024-09-07 PROCEDURE — 80048 BASIC METABOLIC PNL TOTAL CA: CPT

## 2024-09-07 PROCEDURE — 85027 COMPLETE CBC AUTOMATED: CPT

## 2024-09-07 PROCEDURE — 83735 ASSAY OF MAGNESIUM: CPT

## 2024-09-07 PROCEDURE — 93306 TTE W/DOPPLER COMPLETE: CPT | Performed by: INTERNAL MEDICINE

## 2024-09-07 PROCEDURE — 84100 ASSAY OF PHOSPHORUS: CPT

## 2024-09-07 RX ORDER — CYANOCOBALAMIN 1000 UG/ML
1000 INJECTION, SOLUTION INTRAMUSCULAR; SUBCUTANEOUS
Status: DISCONTINUED | OUTPATIENT
Start: 2024-09-07 | End: 2024-09-10

## 2024-09-07 RX ADMIN — OXYCODONE HYDROCHLORIDE 5 MG: 5 TABLET ORAL at 00:14

## 2024-09-07 RX ADMIN — Medication 100 MG: at 09:53

## 2024-09-07 RX ADMIN — MULTIPLE VITAMINS W/ MINERALS TAB 1 TABLET: TAB ORAL at 09:52

## 2024-09-07 RX ADMIN — NEBIVOLOL 10 MG: 10 TABLET ORAL at 09:52

## 2024-09-07 RX ADMIN — MONTELUKAST 10 MG: 10 TABLET, FILM COATED ORAL at 21:18

## 2024-09-07 RX ADMIN — APIXABAN 5 MG: 5 TABLET, FILM COATED ORAL at 17:12

## 2024-09-07 RX ADMIN — AMLODIPINE BESYLATE 10 MG: 10 TABLET ORAL at 09:52

## 2024-09-07 RX ADMIN — APIXABAN 5 MG: 5 TABLET, FILM COATED ORAL at 11:50

## 2024-09-07 RX ADMIN — DULOXETINE HYDROCHLORIDE 40 MG: 20 CAPSULE, DELAYED RELEASE ORAL at 09:52

## 2024-09-07 RX ADMIN — PANTOPRAZOLE SODIUM 40 MG: 40 TABLET, DELAYED RELEASE ORAL at 09:52

## 2024-09-07 RX ADMIN — CYANOCOBALAMIN 1000 MCG: 1000 INJECTION, SOLUTION INTRAMUSCULAR at 11:50

## 2024-09-07 RX ADMIN — LEVOTHYROXINE SODIUM 25 MCG: 25 TABLET ORAL at 06:09

## 2024-09-07 RX ADMIN — ENOXAPARIN SODIUM 40 MG: 40 INJECTION SUBCUTANEOUS at 09:52

## 2024-09-07 RX ADMIN — Medication 400 MG: at 09:52

## 2024-09-07 RX ADMIN — CLONAZEPAM 0.5 MG: 0.5 TABLET ORAL at 21:18

## 2024-09-07 RX ADMIN — FOLIC ACID 1 MG: 1 TABLET ORAL at 09:52

## 2024-09-07 RX ADMIN — PRAVASTATIN SODIUM 40 MG: 40 TABLET ORAL at 17:12

## 2024-09-07 RX ADMIN — ALLOPURINOL 100 MG: 100 TABLET ORAL at 09:52

## 2024-09-07 RX ADMIN — METHOCARBAMOL TABLETS 750 MG: 500 TABLET, COATED ORAL at 00:14

## 2024-09-07 NOTE — ASSESSMENT & PLAN NOTE
- Substitute home rosuvastatin with pravastatin secondary to hospital formulary  - Resume home medications on discharge as indicated  - Outpatient follow-up with PCP

## 2024-09-07 NOTE — ASSESSMENT & PLAN NOTE
- Substitute home Nexium with Protonix secondary to hospital formulary  - Resume home medications on discharge as indicated  - Outpatient follow-up with PCP

## 2024-09-07 NOTE — PLAN OF CARE

## 2024-09-07 NOTE — ASSESSMENT & PLAN NOTE
Continue home Cymbalta and Klonopin  We will discontinue as needed trazodone due to patient being somnolent

## 2024-09-07 NOTE — PROGRESS NOTES
[facility]  Progress Note  Name: Enoc Roberto I  MRN: [id number]  Unit/Bed#: ED-24 I Date of Admission: 9/6/2024   Date of Service: 9/6/2024 I Hospital Day: 0    Assessment & Plan   Fall  Assessment & Plan  - Status post multiple falls while on Eliquis.  - Fall precautions.  - Geriatric Medicine consultation for evaluation, medication review and recommendations.  - PT and OT evaluation and treatment as indicated.  - Case Management consultation for disposition planning.      Chronic atrial fibrillation (HCC)  Assessment & Plan  - Hold home Eliquis while inpatient.   - Continue other home medications.   - Resume home medications on discharge as indicated.   - Outpatient follow up with PCP.     Generalized anxiety disorder  Assessment & Plan  - Continue current medication regimen.  - Outpatient follow-up with PCP.      Mixed hyperlipidemia  Assessment & Plan  - Substitute home rosuvastatin with pravastatin secondary to hospital formulary  - Resume home medications on discharge as indicated  - Outpatient follow-up with PCP    Essential hypertension  Assessment & Plan  - Continue current medication regimen.  - Outpatient follow-up with PCP.      Esophageal reflux  Assessment & Plan  - Substitute home Nexium with Protonix secondary to hospital formulary  - Resume home medications on discharge as indicated  - Outpatient follow-up with PCP    * Ambulatory dysfunction  Assessment & Plan  - Status post multiple falls over the past 3 days.  - Admitted to trauma service for ambulatory dysfunction.   - Neurology consult, appreciate recommendations  - Will obtain MRI brain and L spine for further workup.   - Echo pending.   - Fall precautions.  - Geriatric Medicine consultation for evaluation, medication review and recommendations.  - PT and OT evaluation and treatment as indicated.  - Case Management consultation for disposition planning.               TRAUMA TERTIARY SURVEY NOTE    VTE  Prophylaxis:Enoxaparin (Lovenox)     Disposition: Continue current level of care. Pending MRI brain and lumbar spine as well as Echo. Appreciate neurology and internal medicine evaluations and recommendations.     Code status:  Level 1 - Full Code    Consultants: IP CONSULT TO INTERNAL MEDICINE  IP CONSULT TO NEUROLOGY  IP CONSULT TO GERONTOLOGY    Subjective   Transfer from: N/A    Mechanism of Injury:Fall     Chief Complaint: No new complaints    HPI/Last 24 hour events: Patient admitted to the trauma service after multiple falls over the last 3 days, now with ambulatory dysfunction, pending rehab placement.  Patient doing well this morning, offers no complaints at this time.  Per nursing, no acute events overnight.     Objective   Vitals:   Temp:  [97.5 °F (36.4 °C)-97.7 °F (36.5 °C)] 97.7 °F (36.5 °C)  HR:  [76-88] 78  Resp:  [18-22] 18  BP: (123-192)/() 168/82    I/O       None             Physical Exam:   GENERAL APPEARANCE: Patient in no acute distress.  HEENT: NCAT; PERRL, EOMs intact; Mucous membranes moist  NECK / BACK: ROM normal  CV: Regular rate and rhythm; no murmur/gallops/rubs appreciated.  CHEST / LUNGS: Clear to auscultation; no wheezes/rales/rhonci.  ABD: NABS; soft; non-distended; non-tender.  : Voiding  EXT: +2 pulses bilaterally upper & lower extremities; no edema.  NEURO: GCS 15; no focal neurologic deficits; neurovascularly intact.  SKIN: Warm, dry and well perfused; no rash; no jaundice.      Invasive Devices       Peripheral Intravenous Line  Duration             Peripheral IV 09/06/24 Left;Proximal;Ventral (anterior) Forearm <1 day                            Lab Results: Results: I have personally reviewed all pertinent laboratory/tests results, BMP/CMP:   Lab Results   Component Value Date    SODIUM 140 09/06/2024    K 3.9 09/06/2024     09/06/2024    CO2 29 09/06/2024    CO2 31 09/06/2024    BUN 12 09/06/2024    CREATININE 0.99 09/06/2024    GLUCOSE 119 09/06/2024     CALCIUM 8.9 09/06/2024    AST 17 09/06/2024    ALT 10 09/06/2024    ALKPHOS 90 09/06/2024    EGFR 80 09/06/2024   , and CBC:   Lab Results   Component Value Date    WBC 11.00 (H) 09/06/2024    HGB 12.6 09/06/2024    HCT 39.9 09/06/2024    MCV 92 09/06/2024     09/06/2024    RBC 4.36 09/06/2024    MCH 28.9 09/06/2024    MCHC 31.6 09/06/2024    RDW 19.3 (H) 09/06/2024       Imaging Results: I have personally reviewed pertinent reports.    Chest Xray(s): negative for acute findings   FAST exam(s): negative for acute findings   CT Scan(s): negative for acute findings   Additional Xray(s): negative for acute findings     Other Studies: MRI brain and lumbar spine pending
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
## 2024-09-07 NOTE — ASSESSMENT & PLAN NOTE
- Hold home Eliquis while inpatient.   - Continue other home medications.   - Resume home medications on discharge as indicated.   - Outpatient follow up with PCP.

## 2024-09-07 NOTE — ASSESSMENT & PLAN NOTE
- Status post multiple falls while on Eliquis.  - Fall precautions.  - Geriatric Medicine consultation for evaluation, medication review and recommendations.  - PT and OT evaluation and treatment as indicated.  - Case Management consultation for disposition planning.

## 2024-09-07 NOTE — ASSESSMENT & PLAN NOTE
- Status post multiple falls over the past 3 days.  - Admitted to trauma service for ambulatory dysfunction.   - Neurology consult, appreciate recommendations  - Will obtain MRI brain and L spine for further workup.   - Echo pending.   - Fall precautions.  - Geriatric Medicine consultation for evaluation, medication review and recommendations.  - PT and OT evaluation and treatment as indicated.  - Case Management consultation for disposition planning.

## 2024-09-07 NOTE — ASSESSMENT & PLAN NOTE
Patient presented to the ED as a trauma alert due to multiple falls over the last 3 days  Admitted to trauma service for ambulatory dysfunction  Neurology was consulted  MRI brain and lumbar spine pending  Echo pending  Fall precautions  Geriatrics consulted for medication reconciliation  PT OT  Obtain orthostatic vitals  Will check TSH  Patient drowsy this morning and falling back asleep.  Suspect he has severe obstructive sleep apnea.  He denies use of CPAP  Will obtain nocturnal pulse oximetry and a.m. ABG  Will need outpatient referral to sleep medicine  Patient cleared by trauma service being transferred to [facility] for remainder of   Alcohol abuse also contributing  CIWA  Will give cyanocobalamin injection for borderline low b12 continue oral folate

## 2024-09-08 LAB
ALBUMIN SERPL BCG-MCNC: 3.2 G/DL (ref 3.5–5)
AMMONIA PLAS-SCNC: 17 UMOL/L (ref 18–72)
ANION GAP SERPL CALCULATED.3IONS-SCNC: 6 MMOL/L (ref 4–13)
AORTIC ROOT: 3.9 CM
APICAL FOUR CHAMBER EJECTION FRACTION: 48 %
ASCENDING AORTA: 3.7 CM
BSA FOR ECHO PROCEDURE: 2.68 M2
BUN SERPL-MCNC: 9 MG/DL (ref 5–25)
CALCIUM ALBUM COR SERPL-MCNC: 9 MG/DL (ref 8.3–10.1)
CALCIUM SERPL-MCNC: 8.4 MG/DL (ref 8.4–10.2)
CHLORIDE SERPL-SCNC: 105 MMOL/L (ref 96–108)
CO2 SERPL-SCNC: 28 MMOL/L (ref 21–32)
CREAT SERPL-MCNC: 0.86 MG/DL (ref 0.6–1.3)
E WAVE DECELERATION TIME: 168 MS
E/A RATIO: 2.66
ERYTHROCYTE [DISTWIDTH] IN BLOOD BY AUTOMATED COUNT: 18.6 % (ref 11.6–15.1)
FRACTIONAL SHORTENING: 28 (ref 28–44)
GFR SERPL CREATININE-BSD FRML MDRD: 91 ML/MIN/1.73SQ M
GLUCOSE SERPL-MCNC: 92 MG/DL (ref 65–140)
HCT VFR BLD AUTO: 39.5 % (ref 36.5–49.3)
HGB BLD-MCNC: 12.3 G/DL (ref 12–17)
HOLD SPECIMEN: NORMAL
INTERVENTRICULAR SEPTUM IN DIASTOLE (PARASTERNAL SHORT AXIS VIEW): 1.7 CM
INTERVENTRICULAR SEPTUM: 1.7 CM (ref 0.6–1.1)
LAAS-AP2: 30.7 CM2
LAAS-AP4: 34.2 CM2
LEFT ATRIUM SIZE: 5.2 CM
LEFT ATRIUM VOLUME (MOD BIPLANE): 121 ML
LEFT ATRIUM VOLUME INDEX (MOD BIPLANE): 45.1 ML/M2
LEFT INTERNAL DIMENSION IN SYSTOLE: 3.8 CM (ref 2.1–4)
LEFT VENTRICLE DIASTOLIC VOLUME (MOD BIPLANE): 156 ML
LEFT VENTRICLE DIASTOLIC VOLUME INDEX (MOD BIPLANE): 58.2 ML/M2
LEFT VENTRICLE SYSTOLIC VOLUME (MOD BIPLANE): 82 ML
LEFT VENTRICLE SYSTOLIC VOLUME INDEX (MOD BIPLANE): 30.6 ML/M2
LEFT VENTRICULAR INTERNAL DIMENSION IN DIASTOLE: 5.3 CM (ref 3.5–6)
LEFT VENTRICULAR POSTERIOR WALL IN END DIASTOLE: 1.6 CM
LEFT VENTRICULAR STROKE VOLUME: 71 ML
LV EF: 48 %
LVSV (TEICH): 71 ML
MAGNESIUM SERPL-MCNC: 2 MG/DL (ref 1.9–2.7)
MCH RBC QN AUTO: 28.5 PG (ref 26.8–34.3)
MCHC RBC AUTO-ENTMCNC: 31.1 G/DL (ref 31.4–37.4)
MCV RBC AUTO: 91 FL (ref 82–98)
MV E'TISSUE VEL-SEP: 10 CM/S
MV PEAK A VEL: 0.32 M/S
MV PEAK E VEL: 85 CM/S
MV STENOSIS PRESSURE HALF TIME: 49 MS
MV VALVE AREA P 1/2 METHOD: 4.49
PHOSPHATE SERPL-MCNC: 3.6 MG/DL (ref 2.3–4.1)
PLATELET # BLD AUTO: 130 THOUSANDS/UL (ref 149–390)
PMV BLD AUTO: 13.1 FL (ref 8.9–12.7)
POTASSIUM SERPL-SCNC: 3.6 MMOL/L (ref 3.5–5.3)
RBC # BLD AUTO: 4.32 MILLION/UL (ref 3.88–5.62)
RIGHT ATRIAL 2D VOLUME: 90 ML
RIGHT ATRIUM AREA SYSTOLE A4C: 28.4 CM2
RIGHT VENTRICLE ID DIMENSION: 4.5 CM
SL CV LEFT ATRIUM LENGTH A2C: 6.7 CM
SL CV LV EF: 50
SL CV PED ECHO LEFT VENTRICLE DIASTOLIC VOLUME (MOD BIPLANE) 2D: 135 ML
SL CV PED ECHO LEFT VENTRICLE SYSTOLIC VOLUME (MOD BIPLANE) 2D: 64 ML
SODIUM SERPL-SCNC: 139 MMOL/L (ref 135–147)
TR MAX PG: 22 MMHG
TR PEAK VELOCITY: 2.3 M/S
TRICUSPID ANNULAR PLANE SYSTOLIC EXCURSION: 1.7 CM
TRICUSPID VALVE PEAK REGURGITATION VELOCITY: 2.33 M/S
WBC # BLD AUTO: 8.35 THOUSAND/UL (ref 4.31–10.16)

## 2024-09-08 PROCEDURE — 80069 RENAL FUNCTION PANEL: CPT | Performed by: STUDENT IN AN ORGANIZED HEALTH CARE EDUCATION/TRAINING PROGRAM

## 2024-09-08 PROCEDURE — 99232 SBSQ HOSP IP/OBS MODERATE 35: CPT | Performed by: HOSPITALIST

## 2024-09-08 PROCEDURE — 85027 COMPLETE CBC AUTOMATED: CPT | Performed by: STUDENT IN AN ORGANIZED HEALTH CARE EDUCATION/TRAINING PROGRAM

## 2024-09-08 PROCEDURE — 83735 ASSAY OF MAGNESIUM: CPT | Performed by: STUDENT IN AN ORGANIZED HEALTH CARE EDUCATION/TRAINING PROGRAM

## 2024-09-08 PROCEDURE — 94762 N-INVAS EAR/PLS OXIMTRY CONT: CPT

## 2024-09-08 PROCEDURE — 94760 N-INVAS EAR/PLS OXIMETRY 1: CPT

## 2024-09-08 PROCEDURE — 82140 ASSAY OF AMMONIA: CPT | Performed by: STUDENT IN AN ORGANIZED HEALTH CARE EDUCATION/TRAINING PROGRAM

## 2024-09-08 RX ORDER — AMOXICILLIN 250 MG
1 CAPSULE ORAL
Status: DISCONTINUED | OUTPATIENT
Start: 2024-09-08 | End: 2024-09-09

## 2024-09-08 RX ADMIN — LEVOTHYROXINE SODIUM 25 MCG: 25 TABLET ORAL at 05:20

## 2024-09-08 RX ADMIN — MONTELUKAST 10 MG: 10 TABLET, FILM COATED ORAL at 22:00

## 2024-09-08 RX ADMIN — PANTOPRAZOLE SODIUM 40 MG: 40 TABLET, DELAYED RELEASE ORAL at 09:15

## 2024-09-08 RX ADMIN — APIXABAN 5 MG: 5 TABLET, FILM COATED ORAL at 09:14

## 2024-09-08 RX ADMIN — APIXABAN 5 MG: 5 TABLET, FILM COATED ORAL at 17:13

## 2024-09-08 RX ADMIN — MULTIPLE VITAMINS W/ MINERALS TAB 1 TABLET: TAB ORAL at 09:14

## 2024-09-08 RX ADMIN — CLONAZEPAM 0.5 MG: 0.5 TABLET ORAL at 22:00

## 2024-09-08 RX ADMIN — Medication 100 MG: at 09:14

## 2024-09-08 RX ADMIN — ALLOPURINOL 100 MG: 100 TABLET ORAL at 09:14

## 2024-09-08 RX ADMIN — NEBIVOLOL 10 MG: 10 TABLET ORAL at 09:14

## 2024-09-08 RX ADMIN — FOLIC ACID 1 MG: 1 TABLET ORAL at 09:15

## 2024-09-08 RX ADMIN — AMLODIPINE BESYLATE 10 MG: 10 TABLET ORAL at 09:14

## 2024-09-08 RX ADMIN — DIPHENHYDRAMINE HYDROCHLORIDE 25 MG: 25 TABLET ORAL at 00:46

## 2024-09-08 RX ADMIN — PRAVASTATIN SODIUM 40 MG: 40 TABLET ORAL at 17:13

## 2024-09-08 RX ADMIN — DULOXETINE HYDROCHLORIDE 40 MG: 20 CAPSULE, DELAYED RELEASE ORAL at 09:15

## 2024-09-08 RX ADMIN — Medication 400 MG: at 09:15

## 2024-09-08 NOTE — ASSESSMENT & PLAN NOTE
Noted to have slow afib on tele and EKG  Hold BB; continue telemetry   Pending trend, consider cardiology input   Will resume home Eliquis 5 mg twice daily for anticoagulation

## 2024-09-08 NOTE — ASSESSMENT & PLAN NOTE
Patient presented to the ED as a trauma alert due to multiple falls over the last 3 days  Admitted to trauma service for ambulatory dysfunction  Neurology was consulted  MRI brain and lumbar spine pending  Echo pending  Fall precautions  Geriatrics consulted for medication reconciliation  PT OT

## 2024-09-08 NOTE — PROGRESS NOTES
[facility]  Progress Note  Name: Enoc Roberto I  MRN: [id number]  Unit/Bed#: S MS [id number] I Date of Admission: 9/6/2024   Date of Service: 9/8/2024 I Hospital Day: 2    Assessment & Plan   * Ambulatory dysfunction  Assessment & Plan  Patient presented to the ED as a trauma alert due to multiple falls over the last 3 days  Admitted to trauma service for ambulatory dysfunction  Neurology was consulted  MRI brain and lumbar spine pending  Echo pending  Fall precautions  Geriatrics consulted for medication reconciliation  PT OT      Fall  Assessment & Plan  PT OT see plan under ambulatory dysfunction    Chronic atrial fibrillation (HCC)  Assessment & Plan  Noted to have slow afib on tele and EKG  Hold BB; continue telemetry   Pending trend, consider cardiology input   Will resume home Eliquis 5 mg twice daily for anticoagulation        Esophageal reflux  Assessment & Plan  Continue Protonix 40 mg while inpatient    Generalized anxiety disorder  Assessment & Plan  Continue home Cymbalta and Klonopin  We will discontinue as needed trazodone due to patient being somnolent    Essential hypertension  Assessment & Plan  Continue home amlodipine 10 mg   hold Bystolic 10 mg    Sleep apnea  Assessment & Plan  Will obtain nocturnal pulse oximetry and a.m. ABG  Will need outpatient referral to sleep medicine     Mixed hyperlipidemia  Assessment & Plan  Placed on pravastatin 40 mg formulary substitute for home Crestor 5 mg             VTE Pharmacologic Prophylaxis:   Low Risk (Score 0-2) - Encourage Ambulation.    Mobility:   Basic Mobility Inpatient Raw Score: 21  JH-HLM Goal: 6: Walk 10 steps or more  JH-HLM Achieved: 2: Bed activities/Dependent transfer  JH-HLM Goal NOT achieved. Continue with multidisciplinary rounding and encourage appropriate mobility to improve upon JH-HLM goals.    Patient Centered Rounds: I performed bedside rounds with nursing staff today.   Discussions with Specialists  or Other Care Team Provider:     Education and Discussions with Family / Patient: Patient declined call to .     Total Time Spent on Date of Encounter in care of patient: 30 mins. This time was spent on one or more of the following: performing physical exam; counseling and coordination of care; obtaining or reviewing history; documenting in the medical record; reviewing/ordering tests, medications or procedures; communicating with other healthcare professionals and discussing with patient's family/caregivers.    Current Length of Stay: 2 day(s)  Current Patient Status: Inpatient   Certification Statement: The patient will continue to require additional inpatient hospital stay due to ambulatory dysfunction  Discharge Plan: Anticipate discharge in 48 hrs to home with home services.    Code Status: Level 1 - Full Code    Subjective:     Patient feels okay today.  Denies chest pain.  Had mild headache but nothing unusual.    Objective:     Vitals:   Temp (24hrs), Av.9 °F (36.6 °C), Min:97.4 °F (36.3 °C), Max:98.7 °F (37.1 °C)    Temp:  [97.4 °F (36.3 °C)-98.7 °F (37.1 °C)] 98.7 °F (37.1 °C)  HR:  [65-76] 72  Resp:  [16-18] 16  BP: (113-149)/(69-99) 139/94  SpO2:  [84 %-98 %] 84 %  Body mass index is 46.28 kg/m².     Input and Output Summary (last 24 hours):     Intake/Output Summary (Last 24 hours) at 2024 1349  Last data filed at 2024 0447  Gross per 24 hour   Intake --   Output 1500 ml   Net -1500 ml       Physical Exam:   Physical Exam  Constitutional:       General: He is not in acute distress.     Appearance: Normal appearance. He is not toxic-appearing.   Cardiovascular:      Rate and Rhythm: Bradycardia present. Rhythm irregular.      Heart sounds: No murmur heard.     No gallop.   Pulmonary:      Effort: Pulmonary effort is normal. No respiratory distress.      Breath sounds: Normal breath sounds. No wheezing.   Abdominal:      General: Abdomen is flat.      Palpations: Abdomen is soft.    Musculoskeletal:      Right lower leg: No edema.      Left lower leg: No edema.   Neurological:      General: No focal deficit present.      Mental Status: He is alert and oriented to person, place, and time.          Additional Data:     Labs:  Results from last 7 days   Lab Units 09/08/24  0515 09/07/24  0613   WBC Thousand/uL 8.35 8.07   HEMOGLOBIN g/dL 12.3 12.2   HEMATOCRIT % 39.5 37.6   PLATELETS Thousands/uL 130* 126*   LYMPHO PCT %  --  22   MONO PCT %  --  7   EOS PCT %  --  22*     Results from last 7 days   Lab Units 09/08/24  0515 09/07/24  0613 09/06/24  1546   SODIUM mmol/L 139   < > 140   POTASSIUM mmol/L 3.6   < > 3.9   CHLORIDE mmol/L 105   < > 104   CO2 mmol/L 28   < > 29   BUN mg/dL 9   < > 12   CREATININE mg/dL 0.86   < > 0.99   ANION GAP mmol/L 6   < > 7   CALCIUM mg/dL 8.4   < > 8.9   ALBUMIN g/dL 3.2*  --  3.5   TOTAL BILIRUBIN mg/dL  --   --  0.50   ALK PHOS U/L  --   --  90   ALT U/L  --   --  10   AST U/L  --   --  17   GLUCOSE RANDOM mg/dL 92   < > 102    < > = values in this interval not displayed.             Results from last 7 days   Lab Units 09/06/24  1321   HEMOGLOBIN A1C % 5.6           Lines/Drains:  Invasive Devices       Peripheral Intravenous Line  Duration             Peripheral IV 09/06/24 Left;Proximal;Ventral (anterior) Forearm 1 day                      Telemetry:  Telemetry Orders (From admission, onward)               24 Hour Telemetry Monitoring  Continuous x 24 Hours (Telem)        Question:  Reason for 24 Hour Telemetry  Answer:  Syncope suspected to be cardiac in origin                     Telemetry Reviewed: Atrial fibrillation. HR averaging 40-50s  Indication for Continued Telemetry Use: Arrthymias requiring medical therapy             Imaging: No pertinent imaging reviewed.    Recent Cultures (last 7 days):         Last 24 Hours Medication List:   Current Facility-Administered Medications   Medication Dose Route Frequency Provider Last Rate    allopurinol  100  mg Oral Daily Cristi Salcedo MD      amLODIPine  10 mg Oral Daily Cristi Salcedo MD      apixaban  5 mg Oral BID Shiloh Carter DO      clonazePAM  0.5 mg Oral HS Cristi Salcedo MD      cyanocobalamin  1,000 mcg Intramuscular Q30 Days Shiloh Carter DO      diphenhydrAMINE  25 mg Oral Q8H PRN Cristi Salcedo MD      DULoxetine  40 mg Oral Daily Cristi Salcedo MD      folic acid  1 mg Oral Daily Cristi Salcedo MD      levothyroxine  25 mcg Oral Early Morning Cristi Salcedo MD      magnesium Oxide  400 mg Oral Daily Cristi Salcedo MD      methocarbamol  750 mg Oral Q8H PRN Cristi Salcedo MD      montelukast  10 mg Oral HS Cristi Salcedo MD      multivitamin-minerals  1 tablet Oral Daily Cristi Salcedo MD      oxyCODONE  5 mg Oral Q4H PRN Tianna Santana PA-C      Or    oxyCODONE  2.5 mg Oral Q4H PRN Tianna Santana PA-C      pantoprazole  40 mg Oral Daily Cristi Salcedo MD      pravastatin  40 mg Oral Daily With Dinner Cristi Salcedo MD      thiamine  100 mg Oral Daily Cristi Salcedo MD          Today, Patient Was Seen By: Homar Ron MD    **Please Note: This note may have been constructed using a voice recognition system.**

## 2024-09-08 NOTE — ASSESSMENT & PLAN NOTE
Will obtain nocturnal pulse oximetry and a.m. ABG  Will need outpatient referral to sleep medicine

## 2024-09-09 ENCOUNTER — APPOINTMENT (INPATIENT)
Dept: MRI IMAGING | Facility: HOSPITAL | Age: 64
DRG: 074 | End: 2024-09-09
Payer: MEDICARE

## 2024-09-09 ENCOUNTER — RA CDI HCC (OUTPATIENT)
Dept: OTHER | Facility: HOSPITAL | Age: 64
End: 2024-09-09

## 2024-09-09 PROBLEM — R41.89 COGNITIVE IMPAIRMENT: Status: ACTIVE | Noted: 2024-09-09

## 2024-09-09 PROBLEM — K59.00 CONSTIPATION: Status: ACTIVE | Noted: 2024-09-09

## 2024-09-09 PROBLEM — F10.90 ALCOHOL USE: Status: ACTIVE | Noted: 2024-09-09

## 2024-09-09 PROBLEM — I95.1 ORTHOSTATIC HYPOTENSION: Status: ACTIVE | Noted: 2024-09-09

## 2024-09-09 PROBLEM — G47.00 INSOMNIA: Status: ACTIVE | Noted: 2024-09-09

## 2024-09-09 PROBLEM — Z78.9 ALCOHOL USE: Status: ACTIVE | Noted: 2024-09-09

## 2024-09-09 LAB
ALBUMIN SERPL BCG-MCNC: 3.3 G/DL (ref 3.5–5)
ALBUMIN SERPL ELPH-MCNC: 2.94 G/DL (ref 3.2–5.1)
ALBUMIN SERPL ELPH-MCNC: 54.5 % (ref 48–70)
ALBUMIN UR ELPH-MCNC: 100 %
ALPHA1 GLOB MFR UR ELPH: 0 %
ALPHA1 GLOB SERPL ELPH-MCNC: 0.36 G/DL (ref 0.15–0.47)
ALPHA1 GLOB SERPL ELPH-MCNC: 6.6 % (ref 1.8–7)
ALPHA2 GLOB MFR UR ELPH: 0 %
ALPHA2 GLOB SERPL ELPH-MCNC: 0.69 G/DL (ref 0.42–1.04)
ALPHA2 GLOB SERPL ELPH-MCNC: 12.7 % (ref 5.9–14.9)
ANION GAP SERPL CALCULATED.3IONS-SCNC: 7 MMOL/L (ref 4–13)
B-GLOBULIN MFR UR ELPH: 0 %
BETA GLOB ABNORMAL SERPL ELPH-MCNC: 0.41 G/DL (ref 0.31–0.57)
BETA1 GLOB SERPL ELPH-MCNC: 7.5 % (ref 4.7–7.7)
BETA2 GLOB SERPL ELPH-MCNC: 6.6 % (ref 3.1–7.9)
BETA2+GAMMA GLOB SERPL ELPH-MCNC: 0.36 G/DL (ref 0.2–0.58)
BUN SERPL-MCNC: 11 MG/DL (ref 5–25)
CALCIUM ALBUM COR SERPL-MCNC: 9.3 MG/DL (ref 8.3–10.1)
CALCIUM SERPL-MCNC: 8.7 MG/DL (ref 8.4–10.2)
CHLORIDE SERPL-SCNC: 105 MMOL/L (ref 96–108)
CO2 SERPL-SCNC: 28 MMOL/L (ref 21–32)
CREAT SERPL-MCNC: 0.87 MG/DL (ref 0.6–1.3)
ERYTHROCYTE [DISTWIDTH] IN BLOOD BY AUTOMATED COUNT: 18.6 % (ref 11.6–15.1)
GAMMA GLOB ABNORMAL SERPL ELPH-MCNC: 0.65 G/DL (ref 0.4–1.66)
GAMMA GLOB MFR UR ELPH: 0 %
GAMMA GLOB SERPL ELPH-MCNC: 12.1 % (ref 6.9–22.3)
GFR SERPL CREATININE-BSD FRML MDRD: 91 ML/MIN/1.73SQ M
GLUCOSE SERPL-MCNC: 95 MG/DL (ref 65–140)
HCT VFR BLD AUTO: 40.2 % (ref 36.5–49.3)
HGB BLD-MCNC: 12.7 G/DL (ref 12–17)
IGG/ALB SER: 1.2 {RATIO} (ref 1.1–1.8)
MAGNESIUM SERPL-MCNC: 2 MG/DL (ref 1.9–2.7)
MCH RBC QN AUTO: 28.2 PG (ref 26.8–34.3)
MCHC RBC AUTO-ENTMCNC: 31.6 G/DL (ref 31.4–37.4)
MCV RBC AUTO: 89 FL (ref 82–98)
PHOSPHATE SERPL-MCNC: 4.1 MG/DL (ref 2.3–4.1)
PLATELET # BLD AUTO: 136 THOUSANDS/UL (ref 149–390)
POTASSIUM SERPL-SCNC: 3.9 MMOL/L (ref 3.5–5.3)
PROT PATTERN SERPL ELPH-IMP: ABNORMAL
PROT PATTERN UR ELPH-IMP: NORMAL
PROT SERPL-MCNC: 5.4 G/DL (ref 6.4–8.2)
PROT UR-MCNC: 15.2 MG/DL
RBC # BLD AUTO: 4.51 MILLION/UL (ref 3.88–5.62)
SODIUM SERPL-SCNC: 140 MMOL/L (ref 135–147)
WBC # BLD AUTO: 8.59 THOUSAND/UL (ref 4.31–10.16)

## 2024-09-09 PROCEDURE — 84166 PROTEIN E-PHORESIS/URINE/CSF: CPT | Performed by: PATHOLOGY

## 2024-09-09 PROCEDURE — 84165 PROTEIN E-PHORESIS SERUM: CPT | Performed by: PATHOLOGY

## 2024-09-09 PROCEDURE — 99232 SBSQ HOSP IP/OBS MODERATE 35: CPT | Performed by: HOSPITALIST

## 2024-09-09 PROCEDURE — 99223 1ST HOSP IP/OBS HIGH 75: CPT | Performed by: FAMILY MEDICINE

## 2024-09-09 PROCEDURE — 85060 BLOOD SMEAR INTERPRETATION: CPT | Performed by: PATHOLOGY

## 2024-09-09 PROCEDURE — 85027 COMPLETE CBC AUTOMATED: CPT | Performed by: STUDENT IN AN ORGANIZED HEALTH CARE EDUCATION/TRAINING PROGRAM

## 2024-09-09 PROCEDURE — 83735 ASSAY OF MAGNESIUM: CPT | Performed by: STUDENT IN AN ORGANIZED HEALTH CARE EDUCATION/TRAINING PROGRAM

## 2024-09-09 PROCEDURE — 80069 RENAL FUNCTION PANEL: CPT | Performed by: STUDENT IN AN ORGANIZED HEALTH CARE EDUCATION/TRAINING PROGRAM

## 2024-09-09 PROCEDURE — 70551 MRI BRAIN STEM W/O DYE: CPT

## 2024-09-09 RX ORDER — LIDOCAINE 50 MG/G
1 PATCH TOPICAL DAILY
Status: DISCONTINUED | OUTPATIENT
Start: 2024-09-09 | End: 2024-09-11 | Stop reason: HOSPADM

## 2024-09-09 RX ORDER — SENNOSIDES 8.6 MG
2 TABLET ORAL 2 TIMES DAILY
Status: DISCONTINUED | OUTPATIENT
Start: 2024-09-09 | End: 2024-09-11 | Stop reason: HOSPADM

## 2024-09-09 RX ORDER — DULOXETIN HYDROCHLORIDE 60 MG/1
60 CAPSULE, DELAYED RELEASE ORAL DAILY
Status: DISCONTINUED | OUTPATIENT
Start: 2024-09-10 | End: 2024-09-11 | Stop reason: HOSPADM

## 2024-09-09 RX ORDER — GABAPENTIN 100 MG/1
100 CAPSULE ORAL
Status: DISCONTINUED | OUTPATIENT
Start: 2024-09-09 | End: 2024-09-11 | Stop reason: HOSPADM

## 2024-09-09 RX ORDER — LANOLIN ALCOHOL/MO/W.PET/CERES
3 CREAM (GRAM) TOPICAL
Status: DISCONTINUED | OUTPATIENT
Start: 2024-09-09 | End: 2024-09-11 | Stop reason: HOSPADM

## 2024-09-09 RX ORDER — ACETAMINOPHEN 325 MG/1
975 TABLET ORAL 3 TIMES DAILY
Status: DISCONTINUED | OUTPATIENT
Start: 2024-09-09 | End: 2024-09-11

## 2024-09-09 RX ADMIN — FOLIC ACID 1 MG: 1 TABLET ORAL at 09:28

## 2024-09-09 RX ADMIN — Medication 3 MG: at 21:18

## 2024-09-09 RX ADMIN — LIDOCAINE 5% 1 PATCH: 700 PATCH TOPICAL at 17:27

## 2024-09-09 RX ADMIN — GABAPENTIN 100 MG: 100 CAPSULE ORAL at 21:18

## 2024-09-09 RX ADMIN — ACETAMINOPHEN 975 MG: 325 TABLET ORAL at 21:18

## 2024-09-09 RX ADMIN — Medication 100 MG: at 09:28

## 2024-09-09 RX ADMIN — SENNOSIDES 17.2 MG: 8.6 TABLET, FILM COATED ORAL at 17:28

## 2024-09-09 RX ADMIN — DULOXETINE HYDROCHLORIDE 40 MG: 20 CAPSULE, DELAYED RELEASE ORAL at 09:29

## 2024-09-09 RX ADMIN — AMLODIPINE BESYLATE 10 MG: 10 TABLET ORAL at 09:28

## 2024-09-09 RX ADMIN — MONTELUKAST 10 MG: 10 TABLET, FILM COATED ORAL at 21:18

## 2024-09-09 RX ADMIN — ACETAMINOPHEN 975 MG: 325 TABLET ORAL at 17:28

## 2024-09-09 RX ADMIN — APIXABAN 5 MG: 5 TABLET, FILM COATED ORAL at 17:28

## 2024-09-09 RX ADMIN — LEVOTHYROXINE SODIUM 25 MCG: 25 TABLET ORAL at 06:16

## 2024-09-09 RX ADMIN — PANTOPRAZOLE SODIUM 40 MG: 40 TABLET, DELAYED RELEASE ORAL at 09:28

## 2024-09-09 RX ADMIN — DIPHENHYDRAMINE HYDROCHLORIDE 25 MG: 25 TABLET ORAL at 00:24

## 2024-09-09 RX ADMIN — SENNOSIDES AND DOCUSATE SODIUM 1 TABLET: 8.6; 5 TABLET ORAL at 00:24

## 2024-09-09 RX ADMIN — ALLOPURINOL 100 MG: 100 TABLET ORAL at 09:28

## 2024-09-09 RX ADMIN — PRAVASTATIN SODIUM 40 MG: 40 TABLET ORAL at 17:28

## 2024-09-09 RX ADMIN — CLONAZEPAM 0.5 MG: 0.5 TABLET ORAL at 21:18

## 2024-09-09 RX ADMIN — MULTIPLE VITAMINS W/ MINERALS TAB 1 TABLET: TAB ORAL at 09:28

## 2024-09-09 RX ADMIN — APIXABAN 5 MG: 5 TABLET, FILM COATED ORAL at 09:28

## 2024-09-09 RX ADMIN — Medication 400 MG: at 09:28

## 2024-09-09 NOTE — ASSESSMENT & PLAN NOTE
Patient remains hypertensive.  Consider switching amlodipine to alternative antihypertensive therapy for decreased effect of orthostatic hypotension.

## 2024-09-09 NOTE — OCCUPATIONAL THERAPY NOTE
09/09/24 1311   Note Type   Note type Cancelled Session   Cancel Reasons Other  (pending imaging)   Additional Comments OT orders received, chart review performed. Pt admitted w/ ambulatory dysfunction; per chart review pt w/ extensive recent fall history (12 w/in past week) and is currently pending MRI of his brain, C spine, and L spine. Will hold OT eval and mobility at this time. OT will continue to follow pt as appropriate and as schedule allows.             Occupational Therapy         Patient Name: Enoc Roberto  Today's Date: 9/9/2024

## 2024-09-09 NOTE — ASSESSMENT & PLAN NOTE
Noted to have slow afib on tele and EKG  After holding bystolic, rates have improved to 60s, still in afib  Continue telemetry today; if HRs increasing could consider trialing alternate BB  eliquis 5 mg twice daily for anticoagulation

## 2024-09-09 NOTE — PHYSICAL THERAPY NOTE
PHYSICAL THERAPY CANCELLATION NOTE          Patient Name: Enoc Roberto  Today's Date: 9/9/2024 09/09/24 1300   Note Type   Note type Cancelled Session   Cancel Reasons Other  (pending imaging)   Additional Comments PT eval orders received, chart review performed. Pt admitted w/ ambulatory dysfunction; per chart review pt w/ extensive recent fall history (12 w/in past week) and is currently pending MRI of his brain, C spine, and L spine. Will hold PT eval and mobility at this time. PT will continue to follow pt as appropriate and as schedule allows.         Paula Bajwa, PT, DPT  09/09/24

## 2024-09-09 NOTE — ASSESSMENT & PLAN NOTE
"Will need outpatient referral to sleep medicine   Overnight pulse ox \"active\" but unable to review at this time  "
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
## 2024-09-09 NOTE — ASSESSMENT & PLAN NOTE
Pt is noncompliant with use of CPAP. He complains of difficulty sleeping which is likely related to untreated sleep apnea.  Recommend use of oxygen via NC at home and working to increase use of CPAP, which will likely improve insomnia.

## 2024-09-09 NOTE — ASSESSMENT & PLAN NOTE
Patient presented to the ED as a trauma alert due to multiple falls over the last 3 days  Admitted to trauma service for ambulatory dysfunction  Neurology was consulted  MRI brain and lumbar spine; has spinal stimulator complicating availability   Echo pending  Fall precautions  Geriatrics consulted for medication reconciliation  PT/OT

## 2024-09-09 NOTE — ASSESSMENT & PLAN NOTE
Patient normally ambulates without a cane at baseline but has been using a cane this past week due to increased falls.    PT/OT as tolerated.

## 2024-09-09 NOTE — ASSESSMENT & PLAN NOTE
Patient continues to report difficulty sleeping over the past few weeks and during his hospital stay.    Trazodone was discontinued due to increased somnolence on admission  Patient takes 0.5 mg clonazepam at bedtime.  Continue to taper off slowly as tolerated  Give gabapentin 100 mg and 3 mg melatonin at bedtime.  Avoid nighttime interruptions  Encourage use of CPAP

## 2024-09-09 NOTE — PROGRESS NOTES
"[facility]  Progress Note  Name: Enoc Roberto I  MRN: [id number]  Unit/Bed#: S -01 I Date of Admission: 9/6/2024   Date of Service: 9/9/2024 I Hospital Day: 3    Assessment & Plan   * Ambulatory dysfunction  Assessment & Plan  Patient presented to the ED as a trauma alert due to multiple falls over the last 3 days  Admitted to trauma service for ambulatory dysfunction  Neurology was consulted  MRI brain and lumbar spine; has spinal stimulator complicating availability   Echo pending  Fall precautions  Geriatrics consulted for medication reconciliation  PT/OT      Fall  Assessment & Plan  PT/OT see plan under ambulatory dysfunction    Chronic atrial fibrillation (HCC)  Assessment & Plan  Noted to have slow afib on tele and EKG  After holding bystolic, rates have improved to 60s, still in afib  Continue telemetry today; if HRs increasing could consider trialing alternate BB  eliquis 5 mg twice daily for anticoagulation        Esophageal reflux  Assessment & Plan  Continue Protonix 40 mg while inpatient    Generalized anxiety disorder  Assessment & Plan  Continue home Cymbalta and Klonopin  We will discontinue as needed trazodone due to patient being somnolent    Essential hypertension  Assessment & Plan  Continue home amlodipine 10 mg   hold Bystolic 10 mg  May need to add alternate agent if pt intolerant to BB.     Sleep apnea  Assessment & Plan  Will need outpatient referral to sleep medicine   Overnight pulse ox \"active\" but unable to review at this time    Mixed hyperlipidemia  Assessment & Plan  Placed on pravastatin 40 mg formulary substitute for home Crestor 5 mg             VTE Pharmacologic Prophylaxis: VTE Score: 2 Moderate Risk (Score 3-4) - Pharmacological DVT Prophylaxis Ordered: apixaban (Eliquis).    Mobility:   Basic Mobility Inpatient Raw Score: 21  JH-HLM Goal: 6: Walk 10 steps or more  JH-HLM Achieved: 2: Bed activities/Dependent transfer  JH-HLM Goal NOT " achieved. Continue with multidisciplinary rounding and encourage appropriate mobility to improve upon -[facility] goals.    Patient Centered Rounds: I performed bedside rounds with nursing staff today.   Discussions with Specialists or Other Care Team Provider:     Education and Discussions with Family / Patient: Patient declined call to .     Total Time Spent on Date of Encounter in care of patient: 30 mins. This time was spent on one or more of the following: performing physical exam; counseling and coordination of care; obtaining or reviewing history; documenting in the medical record; reviewing/ordering tests, medications or procedures; communicating with other healthcare professionals and discussing with patient's family/caregivers.    Current Length of Stay: 3 day(s)  Current Patient Status: Inpatient   Certification Statement: The patient will continue to require additional inpatient hospital stay due to ambulatory dysfunction  Discharge Plan: Anticipate discharge in 24-48 hrs to home with home services.    Code Status: Level 1 - Full Code    Subjective:   Pt continues to have LBP with any upright or standing. None at rest or in bed; has been staying in bed for several days.  No LH or dizziness. No palpitations     Objective:     Vitals:   Temp (24hrs), Av.2 °F (36.8 °C), Min:97.4 °F (36.3 °C), Max:98.7 °F (37.1 °C)    Temp:  [97.4 °F (36.3 °C)-98.7 °F (37.1 °C)] 97.4 °F (36.3 °C)  HR:  [61-80] 67  Resp:  [16-20] 16  BP: (132-145)/(82-94) 143/90  SpO2:  [84 %-97 %] 97 %  Body mass index is 46.28 kg/m².     Input and Output Summary (last 24 hours):     Intake/Output Summary (Last 24 hours) at 2024 1021  Last data filed at 2024 0601  Gross per 24 hour   Intake 720 ml   Output 1700 ml   Net -980 ml       Physical Exam:   Physical Exam  Vitals and nursing note reviewed.   Constitutional:       General: He is not in acute distress.     Appearance: Normal appearance.   Cardiovascular:      Rate  and Rhythm: Normal rate. Rhythm irregular.      Heart sounds: No murmur heard.  Pulmonary:      Effort: Pulmonary effort is normal. No respiratory distress.      Breath sounds: Normal breath sounds. No wheezing or rhonchi.   Abdominal:      Tenderness: There is no abdominal tenderness. There is no guarding.   Musculoskeletal:      Right lower leg: No edema.      Left lower leg: No edema.   Skin:     General: Skin is warm and dry.   Neurological:      Mental Status: He is alert and oriented to person, place, and time. Mental status is at baseline.          Additional Data:     Labs:  Results from last 7 days   Lab Units 09/09/24  0614 09/08/24  0515 09/07/24  0613   WBC Thousand/uL 8.59   < > 8.07   HEMOGLOBIN g/dL 12.7   < > 12.2   HEMATOCRIT % 40.2   < > 37.6   PLATELETS Thousands/uL 136*   < > 126*   LYMPHO PCT %  --   --  22   MONO PCT %  --   --  7   EOS PCT %  --   --  22*    < > = values in this interval not displayed.     Results from last 7 days   Lab Units 09/09/24  0614 09/07/24  0613 09/06/24  1546   SODIUM mmol/L 140   < > 140   POTASSIUM mmol/L 3.9   < > 3.9   CHLORIDE mmol/L 105   < > 104   CO2 mmol/L 28   < > 29   BUN mg/dL 11   < > 12   CREATININE mg/dL 0.87   < > 0.99   ANION GAP mmol/L 7   < > 7   CALCIUM mg/dL 8.7   < > 8.9   ALBUMIN g/dL 3.3*   < > 3.5   TOTAL BILIRUBIN mg/dL  --   --  0.50   ALK PHOS U/L  --   --  90   ALT U/L  --   --  10   AST U/L  --   --  17   GLUCOSE RANDOM mg/dL 95   < > 102    < > = values in this interval not displayed.             Results from last 7 days   Lab Units 09/06/24  1321   HEMOGLOBIN A1C % 5.6           Lines/Drains:  Invasive Devices       Peripheral Intravenous Line  Duration             Peripheral IV 09/06/24 Left;Proximal;Ventral (anterior) Forearm 2 days                      Telemetry:  Telemetry Orders (From admission, onward)               24 Hour Telemetry Monitoring  Continuous x 24 Hours (Telem)        Question:  Reason for 24 Hour Telemetry   Answer:  Arrhythmias requiring acute medical intervention / PPM or ICD malfunction                     Telemetry Reviewed: Normal Sinus Rhythm and Atrial fibrillation. HR averaging 60s  Indication for Continued Telemetry Use: Arrthymias requiring medical therapy             Imaging: No pertinent imaging reviewed.    Recent Cultures (last 7 days):         Last 24 Hours Medication List:   Current Facility-Administered Medications   Medication Dose Route Frequency Provider Last Rate    allopurinol  100 mg Oral Daily Cristi Salcedo MD      amLODIPine  10 mg Oral Daily Cristi Salcedo MD      apixaban  5 mg Oral BID Shiloh Carter DO      clonazePAM  0.5 mg Oral HS Cristi Salcedo MD      cyanocobalamin  1,000 mcg Intramuscular Q30 Days Shiloh Carter DO      diphenhydrAMINE  25 mg Oral Q8H PRN Cristi Salcedo MD      DULoxetine  40 mg Oral Daily Cristi Salcedo MD      folic acid  1 mg Oral Daily Cristi Salcedo MD      levothyroxine  25 mcg Oral Early Morning Cristi Salcedo MD      magnesium Oxide  400 mg Oral Daily Cristi Salcedo MD      methocarbamol  750 mg Oral Q8H PRN Cristi Salcedo MD      montelukast  10 mg Oral HS Cristi Salcedo MD      multivitamin-minerals  1 tablet Oral Daily Cristi Salcedo MD      oxyCODONE  5 mg Oral Q4H PRN Tianna Santana PA-C      Or    oxyCODONE  2.5 mg Oral Q4H PRN Tianna Santana PA-C      pantoprazole  40 mg Oral Daily Cristi Salcedo MD      pravastatin  40 mg Oral Daily With Dinner Cristi Salcedo MD      senna-docusate sodium  1 tablet Oral HS Katey Mills MD      thiamine  100 mg Oral Daily Cristi Salcedo MD          Today, Patient Was Seen By: Homar Ron MD    **Please Note: This note may have been constructed using a voice recognition system.**

## 2024-09-09 NOTE — ASSESSMENT & PLAN NOTE
Patient reports continued constipation.  Senna was increased from 1 to 2 times daily.    Patient can take MiraLAX as needed.

## 2024-09-09 NOTE — ASSESSMENT & PLAN NOTE
Pt is A/O x3 on evaluation. He reports some memory loss over the past several years and has some limitations with completing IADLs. He and his wife manage finances together and he can only drive short distances.   Mini-cog assessment performed today with a score of 4/5.  CT Head shows trace chronic microangiopathy.  TSH level normal.  Low Vitamin B12 at 232. Supplement B12 orally to attain levels in the 400s  Maintain sleep-wake cycle  Provide supportive care  Consider geriatric assessment as outpatient when patient is medically stable

## 2024-09-09 NOTE — ASSESSMENT & PLAN NOTE
Pt smokes marijuana about 4 times per day and drinks alcohol. He notes that he sometimes goes months without drinking, but drinks heavily when social drinking. He notes finishing a bottle of vodka together with others.  Chronic alcohol and marijuana use may contribute to falls and cognitive dysfunction.  Advised tapering off alcohol and marijuana use.

## 2024-09-09 NOTE — ASSESSMENT & PLAN NOTE
Patient reports increased falls over the past week with feeling off balance.  He notes falling about 6 times in the past week, but denies associated dizziness or lightheadedness.  He normally ambulates without assistance but has been using a cane this past week due to feeling off balance.  Continue to monitor orthostatic vitals and taper down anticholinergic meds.  Encourage p.o. hydration  Avoid hypotension and hypoglycemia   Advise alcohol cessation  Optimize pain regimen  Continue PT OT

## 2024-09-09 NOTE — ASSESSMENT & PLAN NOTE
Patient reports chronic back pain which limits his mobility and activities of daily living.  He is unable to stand for extended periods of time and can only drive short distances secondary to the pain.  Avoid use of muscle relaxants  Patient can take 100 mg of gabapentin at bedtime for pain and 975 mg Tylenol 3 times daily.  Can also use a lidocaine patch on the affected area.

## 2024-09-09 NOTE — ASSESSMENT & PLAN NOTE
Continue home amlodipine 10 mg   hold Bystolic 10 mg  May need to add alternate agent if pt intolerant to BB.

## 2024-09-09 NOTE — ASSESSMENT & PLAN NOTE
Pt taking 40mg duloxetine for generalized anxiety. Increase to 60mg for therapeutic dose for LALA.

## 2024-09-09 NOTE — ASSESSMENT & PLAN NOTE
Pt has orthostatic hypotension as recorded in hospital VS. This is likely contributory to his recent increase in falls.  Continue to monitor orthostatic vitals. If pt continues to have orthostatic hypotension, recommend use of compression stockings and consider switching amlodipine to alternate antihypertensive therapy.

## 2024-09-10 ENCOUNTER — TELEPHONE (OUTPATIENT)
Age: 64
End: 2024-09-10

## 2024-09-10 PROBLEM — G89.29 CHRONIC LOWER BACK PAIN: Status: ACTIVE | Noted: 2021-09-21

## 2024-09-10 LAB
ALBUMIN SERPL BCG-MCNC: 3.2 G/DL (ref 3.5–5)
ANION GAP SERPL CALCULATED.3IONS-SCNC: 4 MMOL/L (ref 4–13)
BUN SERPL-MCNC: 12 MG/DL (ref 5–25)
CALCIUM ALBUM COR SERPL-MCNC: 9.3 MG/DL (ref 8.3–10.1)
CALCIUM SERPL-MCNC: 8.7 MG/DL (ref 8.4–10.2)
CHLORIDE SERPL-SCNC: 104 MMOL/L (ref 96–108)
CO2 SERPL-SCNC: 31 MMOL/L (ref 21–32)
CREAT SERPL-MCNC: 0.89 MG/DL (ref 0.6–1.3)
ERYTHROCYTE [DISTWIDTH] IN BLOOD BY AUTOMATED COUNT: 18.3 % (ref 11.6–15.1)
GFR SERPL CREATININE-BSD FRML MDRD: 90 ML/MIN/1.73SQ M
GLUCOSE SERPL-MCNC: 99 MG/DL (ref 65–140)
HCT VFR BLD AUTO: 38.4 % (ref 36.5–49.3)
HGB BLD-MCNC: 12 G/DL (ref 12–17)
MAGNESIUM SERPL-MCNC: 2 MG/DL (ref 1.9–2.7)
MCH RBC QN AUTO: 28.2 PG (ref 26.8–34.3)
MCHC RBC AUTO-ENTMCNC: 31.3 G/DL (ref 31.4–37.4)
MCV RBC AUTO: 90 FL (ref 82–98)
PHOSPHATE SERPL-MCNC: 5 MG/DL (ref 2.3–4.1)
PLATELET # BLD AUTO: 130 THOUSANDS/UL (ref 149–390)
PMV BLD AUTO: 13.5 FL (ref 8.9–12.7)
POTASSIUM SERPL-SCNC: 3.7 MMOL/L (ref 3.5–5.3)
RBC # BLD AUTO: 4.25 MILLION/UL (ref 3.88–5.62)
SODIUM SERPL-SCNC: 139 MMOL/L (ref 135–147)
VIT B1 BLD-SCNC: 143.7 NMOL/L (ref 66.5–200)
VIT B6 SERPL-MCNC: 5.3 UG/L (ref 3.4–65.2)
WBC # BLD AUTO: 7.99 THOUSAND/UL (ref 4.31–10.16)

## 2024-09-10 PROCEDURE — 97163 PT EVAL HIGH COMPLEX 45 MIN: CPT

## 2024-09-10 PROCEDURE — 99232 SBSQ HOSP IP/OBS MODERATE 35: CPT | Performed by: PSYCHIATRY & NEUROLOGY

## 2024-09-10 PROCEDURE — 97535 SELF CARE MNGMENT TRAINING: CPT

## 2024-09-10 PROCEDURE — 97167 OT EVAL HIGH COMPLEX 60 MIN: CPT

## 2024-09-10 PROCEDURE — 83735 ASSAY OF MAGNESIUM: CPT | Performed by: STUDENT IN AN ORGANIZED HEALTH CARE EDUCATION/TRAINING PROGRAM

## 2024-09-10 PROCEDURE — 97116 GAIT TRAINING THERAPY: CPT

## 2024-09-10 PROCEDURE — 99232 SBSQ HOSP IP/OBS MODERATE 35: CPT | Performed by: FAMILY MEDICINE

## 2024-09-10 PROCEDURE — 85027 COMPLETE CBC AUTOMATED: CPT | Performed by: STUDENT IN AN ORGANIZED HEALTH CARE EDUCATION/TRAINING PROGRAM

## 2024-09-10 PROCEDURE — 99232 SBSQ HOSP IP/OBS MODERATE 35: CPT | Performed by: INTERNAL MEDICINE

## 2024-09-10 PROCEDURE — 80069 RENAL FUNCTION PANEL: CPT | Performed by: STUDENT IN AN ORGANIZED HEALTH CARE EDUCATION/TRAINING PROGRAM

## 2024-09-10 RX ORDER — POLYETHYLENE GLYCOL 3350 17 G/17G
17 POWDER, FOR SOLUTION ORAL DAILY PRN
Status: DISCONTINUED | OUTPATIENT
Start: 2024-09-10 | End: 2024-09-11 | Stop reason: HOSPADM

## 2024-09-10 RX ORDER — PREDNISONE 20 MG/1
20 TABLET ORAL DAILY
Status: DISCONTINUED | OUTPATIENT
Start: 2024-09-11 | End: 2024-09-11 | Stop reason: HOSPADM

## 2024-09-10 RX ORDER — FLUTICASONE PROPIONATE 50 MCG
1 SPRAY, SUSPENSION (ML) NASAL DAILY
Status: DISCONTINUED | OUTPATIENT
Start: 2024-09-10 | End: 2024-09-11 | Stop reason: HOSPADM

## 2024-09-10 RX ORDER — CYANOCOBALAMIN 1000 UG/ML
1000 INJECTION, SOLUTION INTRAMUSCULAR; SUBCUTANEOUS
Status: DISCONTINUED | OUTPATIENT
Start: 2024-09-14 | End: 2024-09-11 | Stop reason: HOSPADM

## 2024-09-10 RX ORDER — PREDNISONE 20 MG/1
40 TABLET ORAL ONCE
Status: COMPLETED | OUTPATIENT
Start: 2024-09-10 | End: 2024-09-10

## 2024-09-10 RX ADMIN — APIXABAN 5 MG: 5 TABLET, FILM COATED ORAL at 17:20

## 2024-09-10 RX ADMIN — DULOXETINE 60 MG: 60 CAPSULE, DELAYED RELEASE ORAL at 08:31

## 2024-09-10 RX ADMIN — CLONAZEPAM 0.5 MG: 0.5 TABLET ORAL at 21:37

## 2024-09-10 RX ADMIN — PREDNISONE 40 MG: 20 TABLET ORAL at 13:19

## 2024-09-10 RX ADMIN — Medication 100 MG: at 08:31

## 2024-09-10 RX ADMIN — ALLOPURINOL 100 MG: 100 TABLET ORAL at 08:31

## 2024-09-10 RX ADMIN — LEVOTHYROXINE SODIUM 25 MCG: 25 TABLET ORAL at 06:11

## 2024-09-10 RX ADMIN — LIDOCAINE 5% 1 PATCH: 700 PATCH TOPICAL at 08:31

## 2024-09-10 RX ADMIN — SENNOSIDES 17.2 MG: 8.6 TABLET, FILM COATED ORAL at 08:31

## 2024-09-10 RX ADMIN — MULTIPLE VITAMINS W/ MINERALS TAB 1 TABLET: TAB ORAL at 08:31

## 2024-09-10 RX ADMIN — ACETAMINOPHEN 975 MG: 325 TABLET ORAL at 08:31

## 2024-09-10 RX ADMIN — APIXABAN 5 MG: 5 TABLET, FILM COATED ORAL at 08:31

## 2024-09-10 RX ADMIN — GABAPENTIN 100 MG: 100 CAPSULE ORAL at 21:37

## 2024-09-10 RX ADMIN — PANTOPRAZOLE SODIUM 40 MG: 40 TABLET, DELAYED RELEASE ORAL at 08:31

## 2024-09-10 RX ADMIN — ACETAMINOPHEN 975 MG: 325 TABLET ORAL at 21:36

## 2024-09-10 RX ADMIN — SENNOSIDES 17.2 MG: 8.6 TABLET, FILM COATED ORAL at 17:20

## 2024-09-10 RX ADMIN — Medication 3 MG: at 21:42

## 2024-09-10 RX ADMIN — AMLODIPINE BESYLATE 10 MG: 10 TABLET ORAL at 08:31

## 2024-09-10 RX ADMIN — ACETAMINOPHEN 975 MG: 325 TABLET ORAL at 17:20

## 2024-09-10 RX ADMIN — FLUTICASONE PROPIONATE 1 SPRAY: 50 SPRAY, METERED NASAL at 10:07

## 2024-09-10 RX ADMIN — Medication 400 MG: at 08:31

## 2024-09-10 RX ADMIN — PRAVASTATIN SODIUM 40 MG: 40 TABLET ORAL at 17:20

## 2024-09-10 RX ADMIN — MONTELUKAST 10 MG: 10 TABLET, FILM COATED ORAL at 21:37

## 2024-09-10 RX ADMIN — FOLIC ACID 1 MG: 1 TABLET ORAL at 08:31

## 2024-09-10 NOTE — ASSESSMENT & PLAN NOTE
Patient presented to the ED as a trauma alert due to multiple falls over the last 3 days  Admitted to trauma service for ambulatory dysfunction  MRI brain negative for acute pathology.  Echo shows LVEF 50% with mild MR and TR.  However, not a cause for the patient's fall.  Additional negative testing -JERMAN, RF, urinalysis, TSH, A1c was only 5.6%.  B1 and ammonia.  Follow-up vitamin B6 level.  Fall precautions  Evaluate and treat neuropathy (see below).  Neurology recommends outpatient MRI cervical spine and lumbar spine.  No further inpatient testing recommended by neurology team.  PT/OT evaluations indicate that patient is level 3 minimal resource intensity.  Therefore, would not likely qualify for inpatient rehab.  Will discuss further with patient and  tomorrow.

## 2024-09-10 NOTE — CASE MANAGEMENT
Case Management Assessment & Discharge Planning Note    Patient name Enoc Roberto  Location S /S -01 MRN [id number]  : 1960 Date 9/10/2024       Current Admission Date: 2024  Current Admission Diagnosis:Ambulatory dysfunction   Patient Active Problem List    Diagnosis Date Noted Date Diagnosed    Orthostatic hypotension 2024     Constipation 2024     Insomnia 2024     Cognitive impairment 2024     Alcohol use 2024     Ambulatory dysfunction 2024     Fall 2024     OAB (overactive bladder) 2024     Gross hematuria 2024     Cervical radiculopathy 2024     Irritation of left ulnar nerve 2024     Tinea cruris 2024     Arthritis of carpometacarpal (CMC) joint of right thumb 2023     Chronic atrial fibrillation (HCC) 2023     Impaired fasting glucose 2023     Moderate episode of recurrent major depressive disorder (HCC) 2023     Need for pneumococcal vaccine 2022     Preoperative clearance 2022     Neuropathy 10/12/2021     Back pain 10/12/2021     Anomaly, 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     Paresthesia of bilateral legs 2021     Protrusion of lumbar intervertebral disc 2021     Hyperuricemia 2021     Loose body in right shoulder 2020     DJD of right shoulder 2020     Generalized anxiety disorder 2020     ST segment depression 2020     Allergic cough 2020     Dysesthesia 2020     Osteoarthritis of left midfoot 2019     Lumbar disc herniation with radiculopathy 2019     Chronic pain syndrome 2018     Cubital tunnel syndrome on left 2018     Obesity, morbid (HCC)      Erectile disorder due to medical condition in male patient 2017     Status post total replacement of right hip 2016     Sleep apnea 2016     Esophageal reflux 2016     Other specified hypothyroidism 2016     Myofascial  pain syndrome 10/01/2014     Pain syndrome, chronic 09/27/2013     Mixed hyperlipidemia 08/29/2013     Intervertebral disc disorder with radiculopathy of lumbar region 05/24/2013     Spondylosis of lumbar region without myelopathy or radiculopathy 05/24/2013     Lumbar canal stenosis 09/04/2012     Essential hypertension 09/04/2012     Postlaminectomy syndrome, lumbar 07/06/2012       LOS (days): 4  Geometric Mean LOS (GMLOS) (days): 2.2  Days to GMLOS:-1.9     OBJECTIVE:    Risk of Unplanned Readmission Score: 10.33         Current admission status: Inpatient       Preferred Pharmacy:   [id number] - [place] - [address]  [address]  [facility] [address]  Phone: [phone] Fax: [phone]    Primary Care Provider: Danial Vasquez DO    Primary Insurance: MEDICARE  Secondary Insurance: COMMERCIAL MISCELLANEOUS    ASSESSMENT:  Active Health Care Proxies    There are no active Health Care Proxies on file.    Readmission Root Cause  30 Day Readmission: No    Patient Information  Admitted from:: Home  Mental Status: Other (Comment) (N/A- spoke with family)  During Assessment patient was accompanied by: Other-Comment  Assessment information provided by:: Spouse  Primary Caregiver: Self  Support Systems: Spouse/significant other  County of Residence: [place]  What city do you live in?: [place]  Home entry access options. Select all that apply.: Stairs  Number of steps to enter home.: 3  Type of Current Residence: [address] home  Upon entering residence, is there a bedroom on the main floor (no further steps)?: No  A bedroom is located on the following floor levels of residence (select all that apply):: 2nd Floor  Upon entering residence, is there a bathroom on the main floor (no further steps)?: No  Indicate which floors of current residence have a bathroom (select all the apply):: 2nd Floor  Number of steps to 2nd floor from main floor: One Flight  Living Arrangements: Lives w/  Spouse/significant other    Activities of Daily Living Prior to Admission  Functional Status: Independent  Completes ADLs independently?: Yes  Ambulates independently?: Yes  Does patient use assisted devices?: Yes  Assisted Devices (DME) used: Straight Cane  Does patient currently own DME?: Yes  What DME does the patient currently own?: Straight Cane  Does patient have a history of Outpatient Therapy (PT/OT)?: Yes  Does the patient have a history of Short-Term Rehab?: No  Does patient have a history of HHC?: No  Does patient currently have HHC?: No    Patient Information Continued  Income Source: Unemployed  Does patient have prescription coverage?: Yes  Does patient receive dialysis treatments?: No    Means of Transportation  Means of Transport to Appts:: Drives Self    Social Determinants of Health (SDOH)      Flowsheet Row Most Recent Value   Housing Stability    In the last 12 months, was there a time when you were not able to pay the mortgage or rent on time? N   In the past 12 months, how many times have you moved where you were living? 0   At any time in the past 12 months, were you homeless or living in a shelter (including now)? N   Transportation Needs    In the past 12 months, has lack of transportation kept you from medical appointments or from getting medications? no   In the past 12 months, has lack of transportation kept you from meetings, work, or from getting things needed for daily living? No   Food Insecurity    Within the past 12 months, you worried that your food would run out before you got the money to buy more. Never true   Within the past 12 months, the food you bought just didn't last and you didn't have money to get more. Never true   Utilities    In the past 12 months has the electric, gas, oil, or water company threatened to shut off services in your home? No     DISCHARGE DETAILS:    Discharge planning discussed with:: wife, Dorcas over phone  Freedom of Choice: Yes  Comments - Freedom  of Choice: HHC, DME  CM contacted family/caregiver?: Yes  Were Treatment Team discharge recommendations reviewed with patient/caregiver?: Yes  Did patient/caregiver verbalize understanding of patient care needs?: Yes  Were patient/caregiver advised of the risks associated with not following Treatment Team discharge recommendations?: Yes    Contacts  Patient Contacts: Dorcas  Relationship to Patient:: Family  Contact Method: Phone  Phone Number: [phone]  Reason/Outcome: Emergency Contact, Referral, Discharge Planning, Continuity of Care    Requested Home Health Care         Is the patient interested in HHC at discharge?: Yes  Home Health Discipline requested:: Nursing, Occupational Therapy, Physical Therapy  Home Health Agency Name:: Other (pending referrals)  Home Health Follow-Up Provider:: PCP  Home Health Services Needed:: Strengthening/Theraputic Exercises to Improve Function, Gait/ADL Training, Evaluate Functional Status and Safety  Homebound Criteria Met:: Uses an Assist Device (i.e. cane, walker, etc)  Supporting Clincal Findings:: Limited Endurance    DME Referral Provided  Referral made for DME?: Yes  DME referral completed for the following items:: Walker  DME Supplier Name:: [employer]    Other Referral/Resources/Interventions Provided:  Interventions: HHC, DME  Referral Comments: Patient admitted to [facility] d/t ambulatory dysfunction. CM spoke with wife Dorcas over phone, to complete assessment/ discuss dcp (patient noted to have h/o cog impairment). Patient lives with wife in a 2 story home; 3STE, stairs to bed and bath. Patient is independent at baseline with ADLs and ambulation. Does use and own cane. Patient recommended for walker and HHC, wife agreeable-- referrals placed for both via [facility] and [place]. CM to follow up as able to continue with dcp.    Would you like to participate in our [facility] service program?  : No - Declined    Treatment Team Recommendation: Home with Home Health  Care  Discharge Destination Plan:: Home with Home Health Care  Transport at Discharge : Family

## 2024-09-10 NOTE — PROGRESS NOTES
Progress Note - Geriatric Medicine   Enoc Roberto 63 y.o. male MRN: [id number]  Unit/Bed#: S -01 Encounter: [id number]      Assessment/Plan:  Alcohol use  Assessment & Plan  Pt smokes marijuana about 4 times per day and drinks alcohol. He notes that he sometimes goes months without drinking, but drinks heavily when social drinking. He notes finishing a bottle of vodka together with others.  Chronic alcohol and marijuana use may contribute to falls and cognitive dysfunction.  Advised tapering off alcohol and marijuana use.    Cognitive impairment  Assessment & Plan  Pt is A/O x3 on evaluation. He reports some memory loss over the past several years and has some limitations with completing IADLs. He and his wife manage finances together and he can only drive short distances.   Mini-cog assessment performed today with a score of 4/5.  CT Head shows trace chronic microangiopathy.  TSH level normal.  Low Vitamin B12 at 232. Supplement B12 orally to attain levels in the 400s  Maintain sleep-wake cycle  Provide supportive care  Consider geriatric assessment as outpatient when patient is medically stable      Insomnia  Assessment & Plan  Patient continues to report difficulty sleeping over the past few weeks and during his hospital stay.    Trazodone was discontinued due to increased somnolence on admission  Patient takes 0.5 mg clonazepam at bedtime.  Continue to taper off slowly as tolerated  Give gabapentin 100 mg and 3 mg melatonin at bedtime.  Avoid nighttime interruptions  Encourage use of CPAP    Constipation  Assessment & Plan  Patient reports continued constipation.  Senna was increased from 1 to 2 times daily.    Patient can take MiraLAX as needed.    Orthostatic hypotension  Assessment & Plan  Pt has orthostatic hypotension as recorded in hospital VS. This is likely contributory to his recent increase in falls.  Continue to monitor orthostatic vitals. If pt continues to have orthostatic  hypotension, recommend use of compression stockings and consider switching amlodipine to alternate antihypertensive therapy.    Fall  Assessment & Plan  Patient reports increased falls over the past week with feeling off balance.  He notes falling about 6 times in the past week, but denies associated dizziness or lightheadedness.  He normally ambulates without assistance but has been using a cane this past week due to feeling off balance.  Continue to monitor orthostatic vitals and taper down anticholinergic meds.  Encourage p.o. hydration  Avoid hypotension and hypoglycemia   Advise alcohol cessation  Optimize pain regimen  Continue PT OT    Chronic atrial fibrillation (HCC)  Assessment & Plan  Currently rate controlled  Continue anticoagulation with Eliquis    Back pain  Assessment & Plan  Patient reports chronic back pain which limits his mobility and activities of daily living.  He is unable to stand for extended periods of time and can only drive short distances secondary to the pain.  Avoid use of muscle relaxants  Patient can take 100 mg of gabapentin at bedtime for pain and 975 mg Tylenol 3 times daily.  Can also use a lidocaine patch on the affected area.    Generalized anxiety disorder  Assessment & Plan  Pt taking 40mg duloxetine for generalized anxiety. Increase to 60mg for therapeutic dose for LALA.    Mixed hyperlipidemia  Assessment & Plan  Stable on rosuvastatin 5 mg    Essential hypertension  Assessment & Plan  Patient remains hypertensive.  Consider switching amlodipine to alternative antihypertensive therapy for decreased effect of orthostatic hypotension.    Sleep apnea  Assessment & Plan  Pt is noncompliant with use of CPAP. He complains of difficulty sleeping which is likely related to untreated sleep apnea.  Recommend use of oxygen via NC at home and working to increase use of CPAP, which will likely improve insomnia.    * Ambulatory dysfunction  Assessment & Plan  Patient normally ambulates  "without a cane at baseline but has been using a cane this past week due to increased falls.    PT/OT as tolerated.       Subjective:   63-year-old male seen in follow-up today reports to be doing well.  He slept well last night, but continues to have constipation.  He also continues to have back pain with movement.  Denies fever, chills, abdominal pain, chest pain.    Review of Systems   Constitutional:  Negative for chills and fever.   HENT:  Negative for congestion.    Respiratory:  Negative for shortness of breath.    Cardiovascular:  Negative for chest pain.   Gastrointestinal:  Positive for constipation. Negative for abdominal pain, nausea and vomiting.   Genitourinary:  Negative for dysuria.   Musculoskeletal:  Positive for back pain. Negative for arthralgias.   Neurological:  Positive for weakness. Negative for dizziness and light-headedness.   Psychiatric/Behavioral:  Negative for agitation and confusion.        Objective:     Vitals: Blood pressure 135/95, pulse 72, temperature 97.5 °F (36.4 °C), resp. rate 18, height 6' 0.05\" (1.83 m), weight (!) 155 kg (341 lb 11.4 oz), SpO2 96%.,Body mass index is 46.28 kg/m².      Intake/Output Summary (Last 24 hours) at 9/10/2024 1330  Last data filed at 9/10/2024 0614  Gross per 24 hour   Intake 480 ml   Output 1250 ml   Net -770 ml       Current Medications: Reviewed    Physical Exam:   Physical Exam  Vitals and nursing note reviewed.   Constitutional:       Appearance: He is well-developed. He is obese.   HENT:      Head: Normocephalic and atraumatic.   Eyes:      General:         Right eye: No discharge.         Left eye: No discharge.      Pupils: Pupils are equal, round, and reactive to light.   Cardiovascular:      Rate and Rhythm: Normal rate. Rhythm irregular.      Heart sounds: Normal heart sounds. No murmur heard.     No friction rub. No gallop.   Pulmonary:      Effort: No respiratory distress.      Breath sounds: Normal breath sounds. No wheezing or rales. "   Chest:      Chest wall: No tenderness.   Abdominal:      General: Bowel sounds are normal. There is distension.      Palpations: Abdomen is soft.   Musculoskeletal:         General: No tenderness. Normal range of motion.      Cervical back: Normal range of motion and neck supple.      Right lower leg: Edema present.      Left lower leg: Edema present.      Comments: Trace bilateral LE edema   Skin:     General: Skin is warm and dry.      Findings: No rash.   Neurological:      Mental Status: He is alert and oriented to person, place, and time.      Motor: Weakness present.   Psychiatric:         Mood and Affect: Mood normal.          Invasive Devices       Peripheral Intravenous Line  Duration             Peripheral IV 09/06/24 Left;Proximal;Ventral (anterior) Forearm 3 days                    Lab, Imaging and other studies: Reviewed radiology reports from this admission including: MRI brain.
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
## 2024-09-10 NOTE — PROGRESS NOTES
Progress Note - Hospitalist   Name: Eonc Roberto 63 y.o. male I MRN: [id number]  Unit/Bed#: S -01 I Date of Admission: 9/6/2024   Date of Service: 9/10/2024 I Hospital Day: 4     Assessment & Plan  Ambulatory dysfunction  Patient presented to the ED as a trauma alert due to multiple falls over the last 3 days  Admitted to trauma service for ambulatory dysfunction  MRI brain negative for acute pathology.  Echo shows LVEF 50% with mild MR and TR.  However, not a cause for the patient's fall.  Additional negative testing -JERMAN, RF, urinalysis, TSH, A1c was only 5.6%.  B1 and ammonia.  Follow-up vitamin B6 level.  Fall precautions  Evaluate and treat neuropathy (see below).  Neurology recommends outpatient MRI cervical spine and lumbar spine.  No further inpatient testing recommended by neurology team.  PT/OT evaluations indicate that patient is level 3 minimal resource intensity.  Therefore, would not likely qualify for inpatient rehab.  Will discuss further with patient and  tomorrow.  Peripheral neuropathy  Continues on gabapentin 100 mg at bedtime.  Vitamin B12 supplementation.  Recommended for 1000 mcg every 7 days.  Folic acid supplementation.  1 mg p.o. daily.  Likely surgical related as patient is not diabetic.  May impact patient's ability to ambulate appropriately.  Chronic atrial fibrillation (HCC)  Noted to have slow afib on tele and EKG  After holding bystolic, rates have improved to 60s, still in afib  Continue telemetry today; if HRs increasing could consider trialing alternate BB  eliquis 5 mg twice daily for anticoagulation  Esophageal reflux  Continue Protonix 40 mg while inpatient  Generalized anxiety disorder  Continue home dose of Cymbalta   Klonopin 0.5 mg daily at bedtime.  Trazodone was discontinued due to somnolence.  Morbid obesity with BMI of 45.0-49.9, adult (HCC) with JULIUS  Will need outpatient referral to sleep medicine   Essential hypertension  Continue home  amlodipine 10 mg   hold Bystolic 10 mg  May need to add alternate agent if pt intolerant to BB.   Cognitive impairment  Avoid delirium inducing medications.  Monitor p.o. intake.  Supportive care measures.  Continues on levothyroxine 25 mcg p.o. daily.  Assure adequate sleep hygiene.  Geriatric medicine following.  Chronic lower back pain  Continues on acetaminophen 975 mg 3 times daily.  Lidoderm patch daily.  Patient indicates unable to take NSAIDs.  Prednisone 40 mg once given on 9/10/2020 for and will receive 20 mg daily for 5 days.  Patient would like to avoid opioids if possible.  Has oxycodone available but has not been using.    VTE Pharmacologic Prophylaxis: VTE Score: 2 Moderate Risk (Score 3-4) - Pharmacological DVT Prophylaxis Ordered: apixaban (Eliquis).    Mobility:   Basic Mobility Inpatient Raw Score: 18  JH-HLM Goal: 6: Walk 10 steps or more  JH-HLM Achieved: 6: Walk 10 steps or more  JH-HLM Goal achieved. Continue to encourage appropriate mobility.    Patient Centered Rounds: I performed bedside rounds with nursing staff today.   Discussions with Specialists or Other Care Team Provider: Neurology and case management    Education and Discussions with Family / Patient:  No request for family update made by patient..     Current Length of Stay: 4 day(s)  Current Patient Status: Inpatient   Certification Statement: The patient will continue to require additional inpatient hospital stay due to evaluation for potential rehab options.  Discharge Plan: Anticipate discharge tomorrow to home with home services.    Code Status: Level 1 - Full Code    Subjective   The patient states that he needs some form of therapy to get moving again.  He states that he does have some frustration that he is 63 and not able to get around very well.  He states that he does better when he is moving but that his endurance for moving is limited.  He states though that when he stands or sits for any period of time that he has  more pain in his back.  The patient does agree that his neuropathy may be contributing to his ambulatory dysfunction as he does admit that he can sometimes not tell the proper position of his foot.  For example, he gives the example that if he were to try to do heel-to-toe walking that he would fall right over.    Objective     Vitals:   Temp (24hrs), Av.7 °F (36.5 °C), Min:97.5 °F (36.4 °C), Max:97.9 °F (36.6 °C)    Temp:  [97.5 °F (36.4 °C)-97.9 °F (36.6 °C)] 97.9 °F (36.6 °C)  HR:  [58-75] 75  Resp:  [16-18] 17  BP: (130-140)/(80-95) 140/89  SpO2:  [93 %-97 %] 93 %  Body mass index is 46.28 kg/m².     Input and Output Summary (last 24 hours):     Intake/Output Summary (Last 24 hours) at 9/10/2024 1715  Last data filed at 9/10/2024 0614  Gross per 24 hour   Intake 480 ml   Output 1250 ml   Net -770 ml       Motor strength is 5/5 to all extremities.  Sensation is grossly intact except for the fact that it is decreased in the bilateral lower extremities.  The sensory deficits are more of a stocking glove type pattern as opposed to following a specific dermatome.  Finger-nose is intact.  No dysarthria or aphasia.  No obvious visual field deficits.  The heart is with a regular rate and rhythm with normal S1 and S2 heart sounds.  No obvious murmurs, rubs, or gallops.  Lungs are clear to auscultation bilaterally without any wheezing, rhonchi, or rales.  Patient is obese but abdomen is nondistended, soft, and nontender to palpation.  Bowel sounds are present and normal active.  Extremities are with no edema or calf tenderness.  No varicosities.    Lines/Drains:  Lines/Drains/Airways       Active Status       None                      Telemetry:  Telemetry Orders (From admission, onward)               24 Hour Telemetry Monitoring  Continuous x 24 Hours (Telem)           Question:  Reason for 24 Hour Telemetry  Answer:  Arrhythmias requiring acute medical intervention / PPM or ICD malfunction                      Telemetry Reviewed: Normal Sinus Rhythm  Indication for Continued Telemetry Use: No indication for continued use. Will discontinue.                Lab Results: I have reviewed the following results:    Results from last 7 days   Lab Units 09/10/24  0611 09/08/24  0515 09/07/24  0613   WBC Thousand/uL 7.99   < > 8.07   HEMOGLOBIN g/dL 12.0   < > 12.2   HEMATOCRIT % 38.4   < > 37.6   PLATELETS Thousands/uL 130*   < > 126*   LYMPHO PCT %  --   --  22   MONO PCT %  --   --  7   EOS PCT %  --   --  22*    < > = values in this interval not displayed.     Results from last 7 days   Lab Units 09/10/24  0611 09/07/24  0613 09/06/24  1546   SODIUM mmol/L 139   < > 140   POTASSIUM mmol/L 3.7   < > 3.9   CHLORIDE mmol/L 104   < > 104   CO2 mmol/L 31   < > 29   BUN mg/dL 12   < > 12   CREATININE mg/dL 0.89   < > 0.99   ANION GAP mmol/L 4   < > 7   CALCIUM mg/dL 8.7   < > 8.9   ALBUMIN g/dL 3.2*   < > 3.5   TOTAL BILIRUBIN mg/dL  --   --  0.50   ALK PHOS U/L  --   --  90   ALT U/L  --   --  10   AST U/L  --   --  17   GLUCOSE RANDOM mg/dL 99   < > 102    < > = values in this interval not displayed.             Results from last 7 days   Lab Units 09/06/24  1321   HEMOGLOBIN A1C % 5.6           Recent Cultures (last 7 days):         Imaging Review: Reviewed radiology reports from this admission including: All imaging since admitted..    Last 24 Hours Medication List:     Current Facility-Administered Medications:     acetaminophen (TYLENOL) tablet 975 mg, TID    allopurinol (ZYLOPRIM) tablet 100 mg, Daily    amLODIPine (NORVASC) tablet 10 mg, Daily    apixaban (ELIQUIS) tablet 5 mg, BID    clonazePAM (KlonoPIN) tablet 0.5 mg, HS    [START ON 9/14/2024] cyanocobalamin injection 1,000 mcg, Q7 Days    DULoxetine (CYMBALTA) delayed release capsule 60 mg, Daily    fluticasone (FLONASE) 50 mcg/act nasal spray 1 spray, Daily    folic acid (FOLVITE) tablet 1 mg, Daily    gabapentin (NEURONTIN) capsule 100 mg, HS    levothyroxine  tablet 25 mcg, Early Morning    lidocaine (LIDODERM) 5 % patch 1 patch, Daily    magnesium Oxide (MAG-OX) tablet 400 mg, Daily    melatonin tablet 3 mg, HS    montelukast (SINGULAIR) tablet 10 mg, HS    multivitamin-minerals (CENTRUM) tablet 1 tablet, Daily    oxyCODONE (ROXICODONE) IR tablet 5 mg, Q4H PRN **OR** oxyCODONE (ROXICODONE) split tablet 2.5 mg, Q4H PRN    pantoprazole (PROTONIX) EC tablet 40 mg, Daily    polyethylene glycol (MIRALAX) packet 17 g, Daily PRN    pravastatin (PRAVACHOL) tablet 40 mg, Daily With Dinner    [START ON 9/11/2024] predniSONE tablet 20 mg, Daily    senna (SENOKOT) tablet 17.2 mg, BID    thiamine tablet 100 mg, Daily    Administrative Statements   Today, Patient Was Seen By: Isac Calderon DO      **Please Note: This note may have been constructed using a voice recognition system.**

## 2024-09-10 NOTE — ASSESSMENT & PLAN NOTE
Continues on gabapentin 100 mg at bedtime.  Vitamin B12 supplementation.  Recommended for 1000 mcg every 7 days.  Folic acid supplementation.  1 mg p.o. daily.  Likely surgical related as patient is not diabetic.  May impact patient's ability to ambulate appropriately.

## 2024-09-10 NOTE — ASSESSMENT & PLAN NOTE
Continue home dose of Cymbalta   Klonopin 0.5 mg daily at bedtime.  Trazodone was discontinued due to somnolence.

## 2024-09-10 NOTE — ASSESSMENT & PLAN NOTE
Avoid delirium inducing medications.  Monitor p.o. intake.  Supportive care measures.  Continues on levothyroxine 25 mcg p.o. daily.  Assure adequate sleep hygiene.  Geriatric medicine following.

## 2024-09-10 NOTE — OCCUPATIONAL THERAPY NOTE
Occupational Therapy Evaluation/Treatment       09/10/24 1115   OT Last Visit   OT Visit Date 09/10/24   Note Type   Note type Evaluation   Pain Assessment   Pain Assessment Tool 0-10   Pain Score No Pain   Restrictions/Precautions   Weight Bearing Precautions Per Order No   Other Precautions Bed Alarm;Chair Alarm   Home Living   Type of Home House   Home Layout Two level;Bed/bath upstairs;Stairs to enter with rails  (3 ESTUARDO from garage)   Bathroom Shower/Tub Walk-in shower   Bathroom Toilet Standard   Bathroom Equipment Shower chair;Grab bars around toilet   Home Equipment Walker;Wheelchair-manual   Additional Comments pt reports recently started ambulating with hurrycane at all times in last week; prior to that would use cane occasionally   Prior Function   Level of [place] Independent with ADLs;Independent with functional mobility   Lives With Spouse   Receives Help From Family   IADLs Independent with driving;Independent with medication management;Family/Friend/Other provides meals  (pt reports meal prep dependent on his tolerance on a day-to-day basis)   Falls in the last 6 months 5 to 10  (pt reports 6-7 in past week)   Vocational On disability   General   Additional Pertinent History Pt presents with multiple falls over past week. -LOC. MRI brain negative. As per neuro MRI C-spine and L-spine deferred to outpatient and pt cleared for OT/PT by attending Dr. Calderon.   Subjective   Subjective Pt agreeable to OT evaluation.   ADL   Eating Assistance 7  Independent   Grooming Assistance 5  Supervision/Setup   UB Bathing Assistance 5  Supervision/Setup   LB Bathing Assistance 4  Minimal Assistance   UB Dressing Assistance 5  Supervision/Setup   LB Dressing Assistance 4  Minimal Assistance   Toileting Assistance  5  Supervision/Setup   Bed Mobility   Supine to Sit 5  Supervision   Sit to Supine Unable to assess  (transferred to bedside chair)   Transfers   Sit to Stand 5  Supervision   Additional items Assist  x 1;Verbal cues   Stand to Sit 5  Supervision   Additional items Verbal cues   Toilet transfer 5  Supervision   Additional items Assist x 1;Verbal cues;Standard toilet  (+grab bar)   Functional Mobility   Functional Mobility 5  Supervision   Additional Comments engaged in fxl mobility short household distances using RW and supervision   Additional items Rolling walker   Balance   Static Sitting Good   Dynamic Sitting Fair +   Static Standing Fair +  (with RW)   Dynamic Standing Fair  (with RW)   Activity Tolerance   Activity Tolerance Patient limited by fatigue;Patient limited by pain   RUE Assessment   RUE Assessment WFL  (reports arthritis in B shoulders)   LUE Assessment   LUE Assessment WFL   Vision-Basic Assessment   Current Vision Wears glasses only for reading   Cognition   Overall Cognitive Status WFL   Arousal/Participation Alert;Cooperative   Attention Within functional limits   Orientation Level Oriented X4   Memory Within functional limits   Following Commands Follows all commands and directions without difficulty   Assessment   Limitation Decreased ADL status;Decreased endurance;Decreased self-care trans;Decreased high-level ADLs  (decreased balance and mobility, decreased upright tolerance, pain)   Prognosis Good   Assessment Patient evaluated by Occupational Therapy.  Patient admitted with Ambulatory dysfunction.  The patients occupational profile, medical and therapy history includes a extensive additional review of physical, cognitive, or psychosocial history related to current functional performance.  Comorbidities affecting functional mobility and ADLS include: avascular necrosis of bilateral hips, hypothyroidism, idiopathic hypereosinophilic syndrome, anxiety, sleep apnea, asthma, pretty esophagus, depression.  Prior to admission, patient was independent with functional mobility with cane as needed, independent with ADLS, and independent with IADLS.  The evaluation identifies the following  performance deficits: impaired balance, decreased endurance, increased fall risk, new onset of impairment of functional mobility, decreased ADLS, decreased IADLS, pain, decreased activity tolerance, and decreased safety awareness, that result in activity limitations and/or participation restrictions. This evaluation requires clinical decision making of high complexity, because the patient presents with comorbidites that affect occupational performance and required significant modification of tasks or assistance with consideration of multiple treatment options. The patient's raw score on the AM-PAC Daily Activity Inpatient Short Form is 19. A raw score of greater than or equal to 19 suggests the patient may benefit from discharge to home. Please refer to the recommendation of the Occupational Therapist for safe discharge planning.  Patient will benefit from skilled Occupational Therapy services to address above deficits and facilitate a safe return to prior level of function.   Goals   Patient Goals to get better   LTG Time Frame 10-14   Long Term Goal see goals below   Plan   Treatment Interventions ADL retraining;Functional transfer training;Endurance training;Patient/family training;Equipment evaluation/education;Activityengagement;Compensatory technique education;Neuromuscular reeducation   Goal Expiration Date 09/24/24   OT Frequency 3-5x/wk   Discharge Recommendation   Rehab Resource Intensity Level, OT III (Minimum Resource Intensity)   AM-PAC Daily Activity Inpatient   Lower Body Dressing 3   Bathing 3   Toileting 3   Upper Body Dressing 3   Grooming 3   Eating 4   Daily Activity Raw Score 19   Daily Activity Standardized Score (Calc for Raw Score >=11) 40.22   AM-PAC Applied Cognition Inpatient   Following a Speech/Presentation 4   Understanding Ordinary Conversation 4   Taking Medications 4   Remembering Where Things Are Placed or Put Away 4   Remembering List of 4-5 Errands 4   Taking Care of Complicated  "Tasks 4   Applied Cognition Raw Score 24   Applied Cognition Standardized Score 62.21   Additional Treatment Session   Start Time 1145   End Time 1202   Treatment Assessment S: \"I feel better walking than I do when I'm standing still.\" O: Pt performing sit<>stand from recliner using RW and supervision with verbal cues for safe hand placement. Additional fxl mobility using RW and supervision. Standard toilet transfer with supervision, able to move bowels with incr time. Clothing mgmt and hygiene completed with supervision. Standing at sink for hand hygiene and brushing teeth mostly with supervision except one instance LOB due to sharp pain in low back after bending forward while brushing teeth requiring CGA to correct. Pt requesting return to bed at end of session due to incr pain and fatigue. Able to complete sit>supine with supervision. A: Pt tolerated OT treatment fairly well. Pt limited by pain and decr upright tolerance impacting fxl performance. P: Pt woud benefit from cont OT services to maximize safety and fxl performance of ADLs. Recommend level III minimum resource intensity upon d/c.     GOALS:  Pt will achieve the following goals within 14 days.     -Patient will perform grooming tasks standing at sink with mod I in order to increase (I) with ADLs.     -Patient will be independent with UB dressing using AE and AD as needed in order to increase (I) with ADLs.     -Patient will be independent with UB bathing using AE and AD as needed in order to increase (I) with ADLs.    -Patient will be Mod I with LB dressing with use of AE and AD as needed in order to increase (I) with ADLs.     -Patient will be Mod I with LB bathing with use of AE and AD as needed in order to increase (I) with ADLs.     -Patient will complete toileting w/ mod I w/ G hygiene/thoroughness using DME PRN in order to reduce caregiver burden.     -Patient will demonstrate mod I with bed mobility for ability to manage own comfort and initiate " OOB tasks.     -Patient will perform functional transfers with mod I to/from all surfaces using DME as needed in order to increase (I) with functional tasks.    -Patient will improve functional mobility during ADL/IADL/leisure tasks to mod I using DME as needed w/ G balance/safety.    -Patient will demonstrate standing for 7-10 min in order to increase active participation in functional activities.     -Patient will increase static/dynamic sitting balance by 1 grade to improve the ability to sit at edge of bed or on a chair for ADLS.       -Patient will increase static/dynamic standing balance by 1 grade to improve postural stability and decrease fall risk during standing ADLS and transfers.        -Patient will verbalize 3 safety awareness/ principles to prevent falls in the home setting.     -Patient will tolerate therapeutic activities for greater than 30 min, in order to increase tolerance for functional activities.     -Patient will increase OOB/sitting tolerance to 2-4 hours per day to increase activity tolerance and engagement in self-care and meaningful activities.     -Patient will demonstrate proper body mechanics and fall prevention strategies during 100% of tx sessions for increased safety awareness during ADL/IADLs.      This session, pt required and most appropriately benefited from partial or full skilled PT/OT co-eval due to regression from baseline level of mobility, decreased activity tolerance, and unpredictable medical and/or functional status. PT and OT goals were addressed separately as seen in documentation.       Lucy Castaneda OTR/L   [place] License # [id number]  [place] License # [id number]

## 2024-09-10 NOTE — TELEPHONE ENCOUNTER
STILL ADMITTED:9/6/2024 - present (4 days)  [facility]    HFU/ SL SAMANTHA/ Ambulatory dysfunction     DC-       ----- Message from Vicente Hogan DO sent at 9/10/2024 11:16 AM EDT -----  Bijanrita LopezPardeep will need follow-up in  6-8 weeks  with general neurology attending for Other in 60 minute appointment. They will not require outpatient neurological testing.   independent

## 2024-09-10 NOTE — ASSESSMENT & PLAN NOTE
Continues on acetaminophen 975 mg 3 times daily.  Lidoderm patch daily.  Patient indicates unable to take NSAIDs.  Prednisone 40 mg once given on 9/10/2020 for and will receive 20 mg daily for 5 days.  Patient would like to avoid opioids if possible.  Has oxycodone available but has not been using.

## 2024-09-10 NOTE — PROGRESS NOTES
NEUROLOGY RESIDENCY PROGRESS NOTE     Name: Enoc Roberto   Age & Sex: 63 y.o. male   MRN: [id number]  Unit/Bed#: S -01   Encounter: [id number]     Enoc Roberto will need follow-up in  6-8 weeks  with general neurology attending for Other in 60 minute appointment. They will not require outpatient neurological testing.     Pending for discharge: Clinical Improvement    ASSESSMENT & PLAN     * Ambulatory dysfunction  Assessment & Plan  Assessment:  Patient states that he has been having multiple falls for the last week. The patient states that he has had about 6 falls in the last 2 days. He states he is unable to correct his gait when his starts to fall. Patient states that he has had this neuropathy since 2007 when he had his surgery done. He states that his neuropathy seems to be getting worse. He denies any associated symptoms such as dizziness, lightheadedness, speech difficulties, syncope, facial asymmetry, or tremors.    Workup:  MRI Lumbar spine wo contrast 10/1/21: Suboptimal image quality due to spinal cord stimulator settings. Similar multilevel degenerative changes of lumbar spine with transitional lumbosacral anatomy, varying degrees of canal stenosis (mild L2-L3 and L3-L4) and foraminal narrowing (mild bilateral L3-L4), as detailed above. Multiple indeterminate splenic lesions, unchanged since 5/13/2020 but appears worse since 7/19/2017.  Consider follow-up CT abdomen pelvis with contrast given suboptimal image quality of MRI.  EMG 2 limb lower extremity 12/1/21: Abnormal study. These electrodiagnostic findings are most consistent with bilateral, chronic, L5-S1 radiculopathies in the lower extremities, without any ongoing denervation. In addition, there is evidence to suggest a mild, underlying axonal sensorimotor neuropathy.  CTH 9/6/24: No acute intracranial abnormality.   ECHO 9/7/24: Left ventricular cavity size is normal. Wall thickness is moderately increased. There is  moderate concentric hypertrophy. The left ventricular ejection fraction is 50%. Systolic function is low normal. Although no diagnostic regional wall motion abnormality was identified, this possibility cannot be completely excluded on the basis of this study. Right Ventricle: Right ventricular cavity size is mildly dilated. Left Atrium: The atrium is mildly dilated.  ECHO 9/7/24: Left ventricular cavity size is normal. Wall thickness is moderately increased. There is moderate concentric hypertrophy. The left ventricular ejection fraction is 50%. Systolic function is low normal. Although no diagnostic regional wall motion abnormality was identified, this possibility cannot be completely excluded on the basis of this study. Right Ventricle: Right ventricular cavity size is mildly dilated.  MRI Brain wo contrast 9/9/24: No acute infarction, intracranial image or mass effect. Trace, chronic microangiopathy. Bifrontal and anterior temporal cortical volume loss similar to the prior study, greater than expected for the patient's age. No ventriculomegaly. Findings likely on the basis of cortical atrophy. Moderate left mastoid air cell effusion/signal abnormality is increased from 2020.  A1c: 5.6  B1: 143.7  B9: 4.4  B12: 232  TSH: 2.599  JERMAN: Negative  RF: Negative  SPEP: WNL  UPEP: WNL     Impression: Patient is a 63-year-old male who presents to the ED as a result of multiple falls.  Patient's neurological examination is non-focal at this time and consistent with known peripheral neuropathy.  MRI negative for acute ischemia.  Neuropathy workup also unremarkable except for folate and B12.  B6 pending at this time. Patient's symptomatology most likely secondary to deconditioning and worsening neuropathy.  At this time, recommend appropriate medical optimization.    Plan:  Case discussed with Dr. Artis  B6 pending at this time  Recommend repletion of folic acid and B12 as appropriate by primary team  Recommend PT/OT  evaluation  Defer MRI C-spine and L-spine wo contrast to outpatient  Recommend Telemetry  Speech consultation also recommended  Rest of care as per primary team, all recommendations were communicated to the primary team  No further inpatient recommendations at this time  Please contact neurology with any questions or concerns  Patient to follow-up with neurology in 6 to 8 weeks.        SUBJECTIVE     Patient is a 63-year-old male who presented to the ED as result of multiple falls.  At this time, patient states that not gotten out of bed.  He states that he has no complaints at this time except for the back pain whenever he gets up.  I encouraged the patient to try to get up and walk as appropriate.  Patient was seen and examined. No acute events overnight.     Review of Systems   Musculoskeletal:  Positive for back pain.   Neurological:  Negative for dizziness, tremors, seizures, syncope, facial asymmetry, speech difficulty, weakness, light-headedness, numbness and headaches.       OBJECTIVE     Patient ID: Enoc Roberto is a 63 y.o. male.    Vitals:    24 1555 24 2122 09/10/24 0222 09/10/24 0805   BP: (!) 164/104 130/91 136/80 135/95   BP Location:       Pulse: 80 66 58 72   Resp:  16 18    Temp:  97.8 °F (36.6 °C) 97.6 °F (36.4 °C) 97.5 °F (36.4 °C)   TempSrc:       SpO2: 95% 95% 97% 96%   Weight:       Height:          Temperature:   Temp (24hrs), Av.7 °F (36.5 °C), Min:97.5 °F (36.4 °C), Max:97.9 °F (36.6 °C)    Temperature: 97.5 °F (36.4 °C)      Physical Exam  Vitals reviewed.   HENT:      Head: Normocephalic and atraumatic.   Eyes:      Extraocular Movements: Extraocular movements intact and EOM normal.      Conjunctiva/sclera: Conjunctivae normal.      Pupils: Pupils are equal, round, and reactive to light.   Cardiovascular:      Rate and Rhythm: Normal rate.   Pulmonary:      Effort: Pulmonary effort is normal.   Musculoskeletal:         General: Normal range of motion.    Neurological:      Motor: Motor strength is normal.     Coordination: Finger-Nose-Finger Test normal.      Deep Tendon Reflexes:      Reflex Scores:       Tricep reflexes are 2+ on the right side and 2+ on the left side.       Bicep reflexes are 2+ on the right side and 2+ on the left side.       Brachioradialis reflexes are 2+ on the right side and 2+ on the left side.       Patellar reflexes are 2+ on the right side and 2+ on the left side.  Psychiatric:         Mood and Affect: Mood normal.         Speech: Speech normal.         Behavior: Behavior normal.          Neurologic Exam     Mental Status   Attention: normal. Concentration: normal.   Speech: speech is normal   Level of consciousness: alert    Cranial Nerves     CN II   Right visual field deficit: none  Left visual field deficit: none     CN III, IV, VI   Pupils are equal, round, and reactive to light.  Extraocular motions are normal.     CN V   Facial sensation intact.     CN VII   Facial expression full, symmetric.     CN VIII   CN VIII normal.     CN IX, X   CN IX normal.   CN X normal.     CN XI   CN XI normal.     CN XII   CN XII normal.     Motor Exam   Right arm pronator drift: absent  Left arm pronator drift: absent    Strength   Strength 5/5 throughout.     Sensory Exam   Right arm light touch: normal  Left arm light touch: decreased from fingers  Right leg light touch: decreased from knee  Left leg light touch: decreased from knee    Gait, Coordination, and Reflexes     Coordination   Finger to nose coordination: normal    Reflexes   Right brachioradialis: 2+  Left brachioradialis: 2+  Right biceps: 2+  Left biceps: 2+  Right triceps: 2+  Left triceps: 2+  Right patellar: 2+  Left patellar: 2+      LABORATORY DATA     Labs: I have personally reviewed pertinent reports.    Results from last 7 days   Lab Units 09/10/24  0611 09/09/24  0614 09/08/24  0515 09/07/24  0613 09/06/24  1321   WBC Thousand/uL 7.99 8.59 8.35 8.07 11.00*   HEMOGLOBIN g/dL  12.0 12.7 12.3 12.2 12.6   HEMATOCRIT % 38.4 40.2 39.5 37.6 39.9   PLATELETS Thousands/uL 130* 136* 130* 126* 166   MONO PCT %  --   --   --  7 8   EOS PCT %  --   --   --  22* 23*      Results from last 7 days   Lab Units 09/10/24  0611 09/09/24  0614 09/08/24  0515 09/07/24  0613 09/06/24  1546 09/06/24  1319   SODIUM mmol/L 139 140 139   < > 140  --    POTASSIUM mmol/L 3.7 3.9 3.6   < > 3.9  --    CHLORIDE mmol/L 104 105 105   < > 104  --    CO2 mmol/L 31 28 28   < > 29  --    CO2, I-STAT mmol/L  --   --   --   --   --  31   BUN mg/dL 12 11 9   < > 12  --    CREATININE mg/dL 0.89 0.87 0.86   < > 0.99  --    CALCIUM mg/dL 8.7 8.7 8.4   < > 8.9  --    ALK PHOS U/L  --   --   --   --  90  --    ALT U/L  --   --   --   --  10  --    AST U/L  --   --   --   --  17  --    GLUCOSE, ISTAT mg/dl  --   --   --   --   --  119    < > = values in this interval not displayed.     Results from last 7 days   Lab Units 09/10/24  0611 09/09/24  0614 09/08/24  0515   MAGNESIUM mg/dL 2.0 2.0 2.0     Results from last 7 days   Lab Units 09/10/24  0611 09/09/24  0614 09/08/24  0515   PHOSPHORUS mg/dL 5.0* 4.1 3.6                    IMAGING & DIAGNOSTIC TESTING     Radiology Results: I have personally reviewed pertinent reports.   and I have personally reviewed pertinent films in PACS    MRI brain wo contrast   Final Result by Luis Bhatt MD (09/09 5594)         1. No acute infarction, intracranial image or mass effect.   2. Trace, chronic microangiopathy.   3. Bifrontal and anterior temporal cortical volume loss similar to the prior study, greater than expected for the patient's age. No ventriculomegaly. Findings likely on the basis of cortical atrophy.   4. Moderate left mastoid air cell effusion/signal abnormality is increased from 2020.      Workstation performed: [id number]         XR trauma multiple   Final Result by Manny Chou DO (09/06 1604)      No acute cardiopulmonary disease within limitations of  supine imaging.      Visualized bony structures demonstrate no acute osseous abnormality.               Workstation performed: [id number]         XR forearm 2 views LEFT   Final Result by Manny Chou DO (09/09 0820)      No acute cardiopulmonary disease within limitations of supine imaging.      Visualized bony structures demonstrate no acute osseous abnormality.               Workstation performed: [id number]         XR chest 1 view   Final Result by Manny Chou DO (09/09 0820)      No acute cardiopulmonary disease within limitations of supine imaging.      Visualized bony structures demonstrate no acute osseous abnormality.               Workstation performed: [id number]         XR tibia fibula 2 vw left   Final Result by Manny Chou DO (09/09 0820)      No acute cardiopulmonary disease within limitations of supine imaging.      Visualized bony structures demonstrate no acute osseous abnormality.               Workstation performed: [id number]         XR tibia fibula 2 vw right   Final Result by Manny Chou DO (09/09 0820)      No acute cardiopulmonary disease within limitations of supine imaging.      Visualized bony structures demonstrate no acute osseous abnormality.               Workstation performed: [id number]         TRAUMA - CT chest abdomen pelvis w contrast   Final Result by Sky Metzger MD (09/06 6579)      No acute abnormality in the chest, abdomen or pelvis.      Grossly unchanged splenic hypodensities in comparison to a 2021 prior.   Splenic lesions are generally benign, and considering overall stability, no specific follow-up is recommended.      Findings for this exam as well as concurrent head/C-spine CTs were discussed with Dr. Siegel at 3:00 p.m. on 9/6/2024               Workstation performed: [id number]         TRAUMA - CT spine cervical wo contrast   Final Result by Sky Metzger MD (09/06 9125)      No cervical spine  fracture or traumatic malalignment.      Linear lucency oriented in approximately the AP direction identified in the left pedicle at C7.   No displacement. Not well identified on sagittal/coronal planes.   Findings most likely represent a nutrient canal, a nondisplaced fracture not completely excluded.   Noting no direct priors for comparison.            Workstation performed: [id number]         TRAUMA - CT head wo contrast   Final Result by Sky Metzger MD (09/06 1402)      No acute intracranial abnormality.                  Workstation performed: [id number]             Other Diagnostic Testing: I have personally reviewed pertinent reports.      ACTIVE MEDICATIONS       Current Facility-Administered Medications:     acetaminophen (TYLENOL) tablet 975 mg, TID    allopurinol (ZYLOPRIM) tablet 100 mg, Daily    amLODIPine (NORVASC) tablet 10 mg, Daily    apixaban (ELIQUIS) tablet 5 mg, BID    clonazePAM (KlonoPIN) tablet 0.5 mg, HS    [START ON 9/14/2024] cyanocobalamin injection 1,000 mcg, Q7 Days    DULoxetine (CYMBALTA) delayed release capsule 60 mg, Daily    fluticasone (FLONASE) 50 mcg/act nasal spray 1 spray, Daily    folic acid (FOLVITE) tablet 1 mg, Daily    gabapentin (NEURONTIN) capsule 100 mg, HS    levothyroxine tablet 25 mcg, Early Morning    lidocaine (LIDODERM) 5 % patch 1 patch, Daily    magnesium Oxide (MAG-OX) tablet 400 mg, Daily    melatonin tablet 3 mg, HS    montelukast (SINGULAIR) tablet 10 mg, HS    multivitamin-minerals (CENTRUM) tablet 1 tablet, Daily    oxyCODONE (ROXICODONE) IR tablet 5 mg, Q4H PRN **OR** oxyCODONE (ROXICODONE) split tablet 2.5 mg, Q4H PRN    pantoprazole (PROTONIX) EC tablet 40 mg, Daily    polyethylene glycol (MIRALAX) packet 17 g, Daily PRN    pravastatin (PRAVACHOL) tablet 40 mg, Daily With Dinner    senna (SENOKOT) tablet 17.2 mg, BID    thiamine tablet 100 mg, Daily      Prior to Admission medications    Medication Sig Start Date End Date Taking?  Authorizing Provider   albuterol (PROVENTIL HFA,VENTOLIN HFA) 90 mcg/act inhaler USE 2 INHALATIONS BY MOUTH  EVERY 6 HOURS AS NEEDED FOR WHEEZING  Patient taking differently: if needed 6/22/22   Danial Vasquez DO   alfuzosin (UROXATRAL) 10 mg 24 hr tablet Take 1 tablet (10 mg total) by mouth daily 5/7/24   JAZIEL Bey   allopurinol (ZYLOPRIM) 100 mg tablet TAKE 1 TABLET BY MOUTH EVERY DAY 8/13/24   Danial Vasquez DO   ALPHA LIPOIC ACID PO Take by mouth in the morning  Patient not taking: Reported on 6/20/2024    Historical Provider, MD   amLODIPine (NORVASC) 10 mg tablet TAKE 1 TABLET BY MOUTH EVERY DAY 8/13/24   Danial Vasquez DO   Ascorbic Acid (ANTONIETA-C PO) Take by mouth as needed    Historical Provider, MD   aspirin (ECOTRIN LOW STRENGTH) 81 mg EC tablet Take 1 tablet (81 mg total) by mouth daily 5/20/20   Berna Mtz PA-C   B Complex-Biotin-FA (MULTI-B COMPLEX PO) Take by mouth as needed    Historical Provider, MD   Calcium-Magnesium-Vitamin D (CITRACAL CALCIUM+D PO) Take by mouth in the morning    Historical Provider, MD   clonazePAM (KlonoPIN) 0.5 mg tablet TAKE 1 TABLET BY MOUTH EVERYDAY AT BEDTIME 7/31/24   Danial Vasquez DO   CO ENZYME Q-10 PO Take by mouth in the morning  Patient not taking: Reported on 8/27/2024    Historical Provider, MD   Diclofenac Sodium (VOLTAREN) 1 % APPLY 2 GRAMS TO AFFECTED AREA 4 TIMES A DAY  Patient taking differently: if needed 1/2/24   Adiel Sorenson MD   DULoxetine HCl 40 MG CPEP TAKE 1 CAPSULE (40 MG TOTAL) BY MOUTH DAILY. 5/22/24   Danial Vasquez DO   Eliquis 5 MG TAKE 1 TABLET BY MOUTH TWICE A DAY 2/26/24   July Messina MD   esomeprazole (NexIUM) 40 MG capsule Take 40 mg by mouth every morning before breakfast    Historical Provider, MD   fluticasone (FLONASE) 50 mcg/act nasal spray 2 SPRAYS INTO EACH NOSTRIL DAILY DISPENSE 3 BOTTLES 7/19/24   Danial Vasquez DO   indomethacin (INDOCIN) 50 mg capsule  8/11/22   Historical Provider, MD   levothyroxine 25 mcg  tablet TAKE 1 TABLET BY MOUTH EVERY DAY 8/13/24   Danial Vsaquez DO   loratadine (CLARITIN) 10 mg tablet Take 10 mg by mouth daily.  Patient not taking: Reported on 8/27/2024    Historical Provider, MD   Magnesium 400 MG CAPS Take by mouth daily     Historical Provider, MD   meclizine (ANTIVERT) 12.5 MG tablet Take 1 tablet (12.5 mg total) by mouth every 8 (eight) hours as needed for dizziness  Patient not taking: Reported on 8/27/2024 8/2/23   Anupama Betancourt MD   metFORMIN (GLUCOPHAGE-XR) 750 mg 24 hr tablet TAKE 1 PILL A DAY FOR 2 WEEKS AND THEN TAKE 2 TABLETS A DAY 7/15/24   Steven Pearson PA-C   montelukast (SINGULAIR) 10 mg tablet TAKE 1 TABLET BY MOUTH EVERYDAY AT BEDTIME 8/13/24   Danial Vasquez DO   Multiple Vitamins-Minerals (ICAPS AREDS 2 PO) Take by mouth if needed    Historical Provider, MD   nebivolol (BYSTOLIC) 10 mg tablet TAKE 1 TABLET BY MOUTH EVERY DAY 8/13/24   Danial Vasquez DO   nystatin (MYCOSTATIN) powder Apply topically 3 (three) times a day 2/12/24   Danial Vasquez DO   rosuvastatin (CRESTOR) 5 mg tablet TAKE 1 TABLET (5 MG TOTAL) BY MOUTH DAILY. 8/13/24   Danial Vasquez DO   sildenafil (VIAGRA) 25 MG tablet Take 1 tablet (25 mg total) by mouth as needed for erectile dysfunction 7/31/24   Danial Vasquez DO   tadalafil (CIALIS) 5 MG tablet Take 1 tablet (5 mg total) by mouth in the morning 9/3/24 8/29/25  Jose Rahman MD   tiZANidine (ZANAFLEX) 4 mg tablet TAKE 1 TABLET BY MOUTH 2 TIMES A DAY AS NEEDED FOR MUSCLE SPASMS 7/8/24   Craig Contreras MD   traZODone (DESYREL) 100 mg tablet TAKE 1-2 TABLETS (100-200 MG TOTAL) BY MOUTH DAILY AT BEDTIME 4/22/24   Callum Murphy MD   TURMERIC PO Take by mouth as needed  Patient not taking: Reported on 8/27/2024    Historical Provider, MD         VTE Pharmacologic Prophylaxis: VTE covered by:  apixaban, Oral, 5 mg at 09/10/24 0831      VTE Mechanical Prophylaxis: sequential compression device and foot pump applied    ======    I have discussed the patient's  history, physical exam findings, assessment, and plan in detail with attending, Dr. Artis.    Thank you for allowing me to participate in the care of your patient, Enoc Roberto.    Vicente Hogan,   [facility] Neurology Residency, PGY-3

## 2024-09-10 NOTE — PLAN OF CARE
Problem: PHYSICAL THERAPY ADULT  Goal: Performs mobility at highest level of function for planned discharge setting.  See evaluation for individualized goals.  Description: Treatment/Interventions: Functional transfer training, LE strengthening/ROM, Therapeutic exercise, Endurance training, Patient/family training, Equipment eval/education, Bed mobility, Gait training, Elevations  Equipment Recommended: Walker       See flowsheet documentation for full assessment, interventions and recommendations.  9/10/2024 1438 by Donna Fernandes PT  Note: Prognosis: Fair  Problem List: Decreased strength, Decreased endurance, Impaired balance, Decreased mobility, Obesity, Pain  Assessment: Pt is a 63 y.o. male seen for PT evaluation s/p admit to [facility] on 8/18/2022 w/ Ambulatory dysfunction.  Order placed for PT. Comorbidities affecting pt's physical performance at time of assessment include: HTN, obesity, neuropathy, and chronic pain syndrome . Personal factors affecting pt at time of IE include: anxiety, multi-level environment, limited home support, and recent fall(s). Prior to admission, pt was independent w/ all functional mobility w/ out AD (recent use of SPC), lived in multi-level home, had 3 ESTUARDO (+) railing, and lived with wife . Upon evaluation: Pt requires supervision for bed mobility, supervision for sit to stand, and supervision for ambulation with RW.   (Please find full objective findings from PT assessment regarding body systems outlined above). Impairments and limitations also listed above, especially due to  weakness, impaired balance, decreased endurance, gait deviations, pain, decreased activity tolerance, and fall risk.  Pt's clinical presentation is currently unstable/unpredictable seen in pt's presentation of fall risk, pain with mobility, limited insight into deficits, and significant decline in functional mobility compared to baseline.  Pt to benefit from continued skilled PT tx while in  hospital and upon DC to address deficits as defined above and maximize level of functional mobility.   Recommend  progression of ambulation and stair negotiation as appropriate .        Rehab Resource Intensity Level, PT: III (Minimum Resource Intensity)    See flowsheet documentation for full assessment.     9/10/2024 1438 by Donna Fernandes PT  Note: Prognosis: Fair  Problem List: Decreased strength, Decreased endurance, Impaired balance, Decreased mobility, Obesity, Pain  Assessment: Pt is a 63 y.o. male seen for PT evaluation s/p admit to [facility] on 8/18/2022 w/ Ambulatory dysfunction.  Order placed for PT. Comorbidities affecting pt's physical performance at time of assessment include: HTN, obesity, neuropathy, and chronic pain syndrome . Personal factors affecting pt at time of IE include: anxiety, multi-level environment, limited home support, and recent fall(s). Prior to admission, pt was independent w/ all functional mobility w/ out AD (recent use of SPC), lived in multi-level home, had 3 ESTUARDO (+) railing, and lived with wife . Upon evaluation: Pt requires supervision for bed mobility, supervision for sit to stand, and supervision for ambulation with RW.   (Please find full objective findings from PT assessment regarding body systems outlined above). Impairments and limitations also listed above, especially due to  weakness, impaired balance, decreased endurance, gait deviations, pain, decreased activity tolerance, and fall risk.  Pt's clinical presentation is currently unstable/unpredictable seen in pt's presentation of fall risk, pain with mobility, limited insight into deficits, and significant decline in functional mobility compared to baseline.  Pt to benefit from continued skilled PT tx while in hospital and upon DC to address deficits as defined above and maximize level of functional mobility.   Recommend  progression of ambulation and stair negotiation as appropriate .        Rehab Resource  Intensity Level, PT: III (Minimum Resource Intensity)    See flowsheet documentation for full assessment.

## 2024-09-11 VITALS
BODY MASS INDEX: 42.66 KG/M2 | HEIGHT: 72 IN | DIASTOLIC BLOOD PRESSURE: 86 MMHG | WEIGHT: 315 LBS | SYSTOLIC BLOOD PRESSURE: 147 MMHG | OXYGEN SATURATION: 95 % | HEART RATE: 71 BPM | TEMPERATURE: 97.7 F | RESPIRATION RATE: 15 BRPM

## 2024-09-11 PROBLEM — R41.0 DELIRIUM: Status: ACTIVE | Noted: 2024-09-11

## 2024-09-11 PROBLEM — R41.0 DELIRIUM: Status: RESOLVED | Noted: 2024-09-11 | Resolved: 2024-09-11

## 2024-09-11 LAB
DME PARACHUTE DELIVERY DATE ACTUAL: NORMAL
DME PARACHUTE DELIVERY DATE REQUESTED: NORMAL
DME PARACHUTE ITEM DESCRIPTION: NORMAL
DME PARACHUTE ORDER STATUS: NORMAL
DME PARACHUTE SUPPLIER NAME: NORMAL
DME PARACHUTE SUPPLIER PHONE: NORMAL

## 2024-09-11 PROCEDURE — 99232 SBSQ HOSP IP/OBS MODERATE 35: CPT | Performed by: FAMILY MEDICINE

## 2024-09-11 PROCEDURE — 97535 SELF CARE MNGMENT TRAINING: CPT

## 2024-09-11 PROCEDURE — 92610 EVALUATE SWALLOWING FUNCTION: CPT

## 2024-09-11 PROCEDURE — 99239 HOSP IP/OBS DSCHRG MGMT >30: CPT | Performed by: INTERNAL MEDICINE

## 2024-09-11 RX ORDER — OXYCODONE HYDROCHLORIDE 5 MG/1
5 TABLET ORAL EVERY 4 HOURS PRN
Qty: 20 TABLET | Refills: 0 | Status: SHIPPED | OUTPATIENT
Start: 2024-09-11 | End: 2024-09-19 | Stop reason: SDUPTHER

## 2024-09-11 RX ORDER — ACETAMINOPHEN 325 MG/1
975 TABLET ORAL EVERY 8 HOURS PRN
Status: DISCONTINUED | OUTPATIENT
Start: 2024-09-11 | End: 2024-09-11 | Stop reason: HOSPADM

## 2024-09-11 RX ORDER — PREDNISONE 20 MG/1
20 TABLET ORAL DAILY
Qty: 4 TABLET | Refills: 0 | Status: SHIPPED | OUTPATIENT
Start: 2024-09-12 | End: 2024-09-16

## 2024-09-11 RX ORDER — ACETAMINOPHEN 500 MG
1000 TABLET ORAL EVERY 8 HOURS PRN
Start: 2024-09-11

## 2024-09-11 RX ORDER — NEBIVOLOL 5 MG/1
5 TABLET ORAL DAILY
Qty: 30 TABLET | Refills: 0 | Status: SHIPPED | OUTPATIENT
Start: 2024-09-11

## 2024-09-11 RX ORDER — LANOLIN ALCOHOL/MO/W.PET/CERES
100 CREAM (GRAM) TOPICAL DAILY
Qty: 30 TABLET | Refills: 0 | Status: SHIPPED | OUTPATIENT
Start: 2024-09-12

## 2024-09-11 RX ORDER — SENNOSIDES 8.6 MG
17.2 TABLET ORAL 2 TIMES DAILY PRN
Qty: 20 TABLET | Refills: 0 | Status: SHIPPED | OUTPATIENT
Start: 2024-09-11

## 2024-09-11 RX ORDER — FOLIC ACID 1 MG/1
1 TABLET ORAL DAILY
Qty: 30 TABLET | Refills: 0 | Status: SHIPPED | OUTPATIENT
Start: 2024-09-12

## 2024-09-11 RX ORDER — GABAPENTIN 100 MG/1
200 CAPSULE ORAL
Qty: 60 CAPSULE | Refills: 0 | Status: SHIPPED | OUTPATIENT
Start: 2024-09-11

## 2024-09-11 RX ORDER — LIDOCAINE 50 MG/G
1 PATCH TOPICAL DAILY
Qty: 30 PATCH | Refills: 0 | Status: SHIPPED | OUTPATIENT
Start: 2024-09-12

## 2024-09-11 RX ADMIN — PRAVASTATIN SODIUM 40 MG: 40 TABLET ORAL at 18:09

## 2024-09-11 RX ADMIN — Medication 100 MG: at 08:24

## 2024-09-11 RX ADMIN — ALLOPURINOL 100 MG: 100 TABLET ORAL at 08:24

## 2024-09-11 RX ADMIN — FOLIC ACID 1 MG: 1 TABLET ORAL at 08:24

## 2024-09-11 RX ADMIN — APIXABAN 5 MG: 5 TABLET, FILM COATED ORAL at 08:24

## 2024-09-11 RX ADMIN — DULOXETINE 60 MG: 60 CAPSULE, DELAYED RELEASE ORAL at 08:24

## 2024-09-11 RX ADMIN — PANTOPRAZOLE SODIUM 40 MG: 40 TABLET, DELAYED RELEASE ORAL at 08:24

## 2024-09-11 RX ADMIN — PREDNISONE 20 MG: 20 TABLET ORAL at 08:24

## 2024-09-11 RX ADMIN — AMLODIPINE BESYLATE 10 MG: 10 TABLET ORAL at 08:24

## 2024-09-11 RX ADMIN — FLUTICASONE PROPIONATE 1 SPRAY: 50 SPRAY, METERED NASAL at 08:28

## 2024-09-11 RX ADMIN — SENNOSIDES 17.2 MG: 8.6 TABLET, FILM COATED ORAL at 18:09

## 2024-09-11 RX ADMIN — LIDOCAINE 5% 1 PATCH: 700 PATCH TOPICAL at 08:23

## 2024-09-11 RX ADMIN — LEVOTHYROXINE SODIUM 25 MCG: 25 TABLET ORAL at 05:56

## 2024-09-11 RX ADMIN — APIXABAN 5 MG: 5 TABLET, FILM COATED ORAL at 18:09

## 2024-09-11 RX ADMIN — Medication 400 MG: at 08:24

## 2024-09-11 RX ADMIN — SENNOSIDES 17.2 MG: 8.6 TABLET, FILM COATED ORAL at 08:25

## 2024-09-11 RX ADMIN — MULTIPLE VITAMINS W/ MINERALS TAB 1 TABLET: TAB ORAL at 08:24

## 2024-09-11 NOTE — ASSESSMENT & PLAN NOTE
Patient remains hypertensive and is concerned about being discharged with a BP more elevated than normal.    Consider ACE inhibitor   Manage as per primary team

## 2024-09-11 NOTE — ASSESSMENT & PLAN NOTE
Continue home amlodipine 10 mg   Can restart Bystolic but will decrease to a 5 mg dose instead of the previous 10 mg dose.  If heart rates are doing okay, PCP may consider increasing back up to 10 mg.  The Bystolic was initially held during the hospitalization due to bradycardia with the 10 mg dose.  However, patient states that he has done fine on this medication long-term at the 10 mg dose.

## 2024-09-11 NOTE — ASSESSMENT & PLAN NOTE
Pt is A/O x3 on evaluation. He reports some memory loss over the past several years and has some limitations with completing IADLs. He and his wife manage finances together and he can only drive short distances.   Mini-cog assessment performed on initial consult with a score of 4/5.  CT Head shows trace chronic microangiopathy.  TSH level normal.  Low Vitamin B12 at 232. Supplement B12 orally to attain levels in the 400s  Maintain sleep-wake cycle  Provide supportive care  Consider geriatric assessment as outpatient when patient is medically stable

## 2024-09-11 NOTE — ASSESSMENT & PLAN NOTE
Pt taking 40mg duloxetine for generalized anxiety. Increased to 60mg for therapeutic dose for LALA.

## 2024-09-11 NOTE — CASE MANAGEMENT
Case Management Progress Note    Patient name Enco Roberto  Location S /S -01 MRN [id number]  : 1960 Date 2024       LOS (days): 5  Geometric Mean LOS (GMLOS) (days): 3.1  Days to GMLOS:-1.8        OBJECTIVE:        Current admission status: Inpatient  Preferred Pharmacy:   [facility]/pharmacy #[id number] - [place] - [address] [place] [id number]  Phone: [phone] Fax: [phone]    Primary Care Provider: Danial Vasquez DO    Primary Insurance: MEDICARE  Secondary Insurance: COMMERCIAL MISCELLANEOUS    PROGRESS NOTE:    Weekly Care Management Length of Stay Review     Current LOS: 5 Days    Most Recent Labs:     Lab Results   Component Value Date/Time    WBC 7.99 09/10/2024 06:11 AM    HGB 12.0 09/10/2024 06:11 AM    HCT 38.4 09/10/2024 06:11 AM     (L) 09/10/2024 06:11 AM    SODIUM 139 09/10/2024 06:11 AM    K 3.7 09/10/2024 06:11 AM     09/10/2024 06:11 AM    CO2 31 09/10/2024 06:11 AM    BUN 12 09/10/2024 06:11 AM    CREATININE 0.89 09/10/2024 06:11 AM    GLUC 99 09/10/2024 06:11 AM    ALB 3.2 (L) 09/10/2024 06:11 AM       Most Recent Vitals:   Vitals:    24 1143   BP: 147/86   Pulse: 71   Resp: 15   Temp: 97.7 °F (36.5 °C)   SpO2: 95%        Identified Barriers to Discharge/Discharge Goals/Care Management Interventions: ambulatory dysfunction,     Intended Discharge Disposition: home w/HHC    Expected Discharge Date:  24hrs

## 2024-09-11 NOTE — OCCUPATIONAL THERAPY NOTE
"  OT TREATMENT         09/11/24 0871   OT Last Visit   OT Visit Date 09/11/24   Note Type   Note Type Treatment   Pain Assessment   Pain Assessment Tool 0-10   Pain Score No Pain   Restrictions/Precautions   Weight Bearing Precautions Per Order No   Other Precautions Chair Alarm;Bed Alarm   ADL   Grooming Assistance 5  Supervision/Setup   Grooming Deficit Supervision/safety;Standing with assistive device;Teeth care   Grooming Comments standing at sink to brush teeth   UB Dressing Assistance 6  Modified independent   UB Dressing Comments donning t-shirt   LB Dressing Assistance 5  Supervision/Setup   LB Dressing Deficit Supervision/safety   LB Dressing Comments donning underwear and sweatpants   Bed Mobility   Sit to Supine 6  Modified independent   Transfers   Sit to Stand 5  Supervision   Stand to Sit 5  Supervision   Functional Mobility   Functional Mobility 5  Supervision   Additional Comments engaged in fxl mobility short household distances using RW and supervision   Additional items Rolling walker   Subjective   Subjective \"We're thinking about moving to a one-story house.\"   Cognition   Overall Cognitive Status WFL   Arousal/Participation Alert;Cooperative   Attention Within functional limits   Orientation Level Oriented X4   Memory Within functional limits   Following Commands Follows all commands and directions without difficulty   Activity Tolerance   Activity Tolerance Patient tolerated treatment well;Patient limited by fatigue   Assessment   Assessment Pt seen for OT treatment session. Overall pt making steady progress towards goals. Pt engaged in grooming and dressing tasks with pt requiring supervision or better to complete. Pt demo some improvements with balance and upright tolerance, continuing to require supervision for transfers and mobility but progressing towards mod I with RW. Pt would benefit from cont OT services to maximize safety and fxl performance of ADLs. Cont to recommend level III " minimum resource intensity when medically cleared for d/c. The patient's raw score on the AM-PAC Daily Activity Inpatient Short Form is 21. A raw score of greater than or equal to 19 suggests the patient may benefit from discharge to home. Please refer to the recommendation of the Occupational Therapist for safe discharge planning.   Plan   Treatment Interventions ADL retraining;Functional transfer training;Endurance training;Patient/family training;Equipment evaluation/education;Activityengagement;Compensatory technique education;Neuromuscular reeducation   OT Frequency 2-3x/wk   Discharge Recommendation   Rehab Resource Intensity Level, OT III (Minimum Resource Intensity)   AM-PAC Daily Activity Inpatient   Lower Body Dressing 3   Bathing 3   Toileting 3   Upper Body Dressing 4   Grooming 4   Eating 4   Daily Activity Raw Score 21   Daily Activity Standardized Score (Calc for Raw Score >=11) 44.27   -PAC Applied Cognition Inpatient   Following a Speech/Presentation 4   Understanding Ordinary Conversation 4   Taking Medications 4   Remembering Where Things Are Placed or Put Away 4   Remembering List of 4-5 Errands 4   Taking Care of Complicated Tasks 4   Applied Cognition Raw Score 24   Applied Cognition Standardized Score 62.21     Lucy Castaneda OTR/L   [place] License # [id number]  [place] License # [id number]

## 2024-09-11 NOTE — DISCHARGE SUMMARY
Discharge Summary - Hospitalist   Name: Enoc Roberto 63 y.o. male I MRN: [id number]  Unit/Bed#: S -01 I Date of Admission: 9/6/2024   Date of Service: 9/11/2024 I Hospital Day: 5     Assessment & Plan  Ambulatory dysfunction  Patient presented to the ED as a trauma alert due to multiple falls over the last 3 days  Admitted to trauma service for ambulatory dysfunction  MRI brain negative for acute pathology.  Echo shows LVEF 50% with mild MR and TR.  However, not a cause for the patient's fall.  Additional negative testing -JERMAN, RF, urinalysis, TSH, A1c was only 5.6%.  B1 and ammonia.  Vitamin B6 level 5.3 (borderline marginal).  Fall precautions  Evaluate and treat neuropathy (see below).  Neurology recommends outpatient MRI cervical spine and lumbar spine.  No further inpatient testing recommended by neurology team.  PT/OT evaluations indicate that patient is level 3 minimal resource intensity.  Therefore, would not likely qualify for inpatient rehab.  Will discuss further with patient and  tomorrow.  Peripheral neuropathy  Continues on gabapentin 100 mg at bedtime.  Vitamin B12 supplementation.  Recommended for 1000 mcg every 7 days.  Folic acid supplementation.  1 mg p.o. daily.  Likely surgical related as patient is not diabetic.  May impact patient's ability to ambulate appropriately.  Chronic atrial fibrillation (HCC)  Noted to have slow afib on tele and EKG  After holding bystolic, rates have improved to 60s, still in afib  Continue telemetry today; if HRs increasing could consider trialing alternate BB  eliquis 5 mg twice daily for anticoagulation  Esophageal reflux  Continue Protonix 40 mg while inpatient  Generalized anxiety disorder  Continue home dose of Cymbalta   Klonopin 0.5 mg daily at bedtime.  Trazodone was discontinued due to somnolence.  Morbid obesity with BMI of 45.0-49.9, adult (HCC) with JULIUS  Will need outpatient referral to sleep medicine   Essential  hypertension  Continue home amlodipine 10 mg   Can restart Bystolic but will decrease to a 5 mg dose instead of the previous 10 mg dose.  If heart rates are doing okay, PCP may consider increasing back up to 10 mg.  The Bystolic was initially held during the hospitalization due to bradycardia with the 10 mg dose.  However, patient states that he has done fine on this medication long-term at the 10 mg dose.  Cognitive impairment  Avoid delirium inducing medications.  Monitor p.o. intake.  Supportive care measures.  Continues on levothyroxine 25 mcg p.o. daily.  Assure adequate sleep hygiene.  Geriatric medicine following.  Chronic lower back pain  Continues on acetaminophen 975 mg 3 times daily.  However, he does feel like this is causing some itching so we will decrease down to as needed dosing.  Lidoderm patch daily.  Patient indicates unable to take NSAIDs.  Prednisone 40 mg once given on 9/10/2020 for and will receive 20 mg daily for 5 days, including today.  Therefore, prescription for 4 more days provided.  Oxycodone 5 mg given to be taken every 6 hours as needed with 20 pills prescribed and no refills.     Medical Problems       Resolved Problems  Date Reviewed: 9/7/2024            Resolved    Delirium 9/11/2024     Resolved by  Rhiannon Barone MD        Discharging Physician / Practitioner: Isac Calderon DO  PCP: Danial Vasquez DO  Admission Date:   Admission Orders (From admission, onward)       Ordered        09/06/24 1512  Inpatient Admission  Once                          Discharge Date: 09/11/24    Consultations During Hospital Stay:  Neurology  Geriatric medicine    Procedures Performed:   None    Significant Findings / Test Results:   For summary see ambulatory dysfunction as detailed above.    Incidental Findings:   None    Test Results Pending at Discharge (will require follow up):   None     Outpatient Tests Requested:  MRI lumbar spine and cervical spine.  Neurology will work to get this  arranged.    Complications: None    Reason for Admission: Fall with ambulatory dysfunction    Hospital Course:   Enoc Roberto is a 63 y.o. male patient who originally presented to the hospital on 9/6/2024 due to fall with ambulatory dysfunction.  Patient was assessed by the neurology and geriatric teams.  Multiple studies done as detailed above in the assessment and plan.  It is felt most likely that this represents a neuropathy etiology as well as some degree of deconditioning.  The patient worked with physical therapy and was a level 3 minimal resource intensity and therefore, did not qualify for any inpatient rehab.  However, we were arranging for outpatient VNA services including therapy services.  The patient is recommended to have an MRI of the lumbar spine and cervical spine but these did not need to be done in the inpatient basis and neurology will arrange for these in the outpatient setting.  MRI brain was negative for any acute pathology.  There were some abnormalities and borderline results for things such as the B12 level, folate level, and B6 so supplementations recommended by neurology for all of these different vitamins.  The patient was also complaining of back pain so we did prescribe him prednisone 20 mg daily for the next 4 days and he may also use Voltaren on a scheduled basis after the prednisone is completed for the next 2 weeks to provide additional anti-inflammatory treatment.  He states that he is unable to take NSAIDs due to past issues with the NSAIDs systemically and also was reporting some itching with Tylenol.  Therefore, we did allow him to receive oxycodone as needed as he seems to be tolerating this medication.  However, he was recommended against tizanidine and trazodone as it was felt that sedating effects from these medications could have also contributed to some of his weakness and fall.  Gabapentin is recommended at a dose of 200 mg daily at bedtime to help with  "neuropathy symptoms as well.  Otherwise, see assessment and plan above for additional details.    Condition at Discharge: stable    Discharge Day Visit / Exam:     Vitals: Blood Pressure: 147/86 (09/11/24 1143)  Pulse: 71 (09/11/24 1143)  Temperature: 97.7 °F (36.5 °C) (09/11/24 1143)  Temp Source: Oral (09/09/24 1533)  Respirations: 15 (09/11/24 1143)  Height: 6' 0.05\" (183 cm) (09/07/24 1300)  Weight - Scale: (!) 155 kg (341 lb 11.4 oz) (09/07/24 1300)  SpO2: 95 % (09/11/24 1143)  Exam:   Motor strength is 5/5 to all extremities. Sensation is grossly intact except for the fact that it is decreased in the bilateral lower extremities in a stocking glove type pattern as opposed to following a specific dermatome.  No visual field defects.  No dysarthria or aphasia.  He is awake alert and oriented x 3.  The heart is with a regular rate and rhythm with normal S1 and S2 heart sounds. No obvious murmurs, rubs, or gallops. Lungs are clear to auscultation bilaterally without any wheezing, rhonchi, or rales. Patient is obese but abdomen is nondistended, soft, and nontender to palpation. Bowel sounds are present and normal active. Extremities are with no edema or calf tenderness. No varicosities.     Discussion with Family: Patient declined call to .     Discharge instructions/Information to patient and family:   See after visit summary for information provided to patient and family.      Provisions for Follow-Up Care:  See after visit summary for information related to follow-up care and any pertinent home health orders.      Mobility at time of Discharge:   Basic Mobility Inpatient Raw Score: 18  JH-HLM Goal: 6: Walk 10 steps or more  JH-HLM Achieved: 2: Bed activities/Dependent transfer  HLM Goal NOT achieved. Continue to encourage mobility in post discharge setting.     Disposition:   Home with VNA Services (Reminder: Complete face to face encounter)    Planned Readmission: None    Discharge " Medications:  See after visit summary for reconciled discharge medications provided to patient and/or family.      Administrative Statements   Discharge Statement:  I have spent a total time of 40 minutes in caring for this patient on the day of the visit/encounter. >30 minutes of time was spent on: Diagnostic results, Instructions for management, Patient and family education, Impressions, Counseling / Coordination of care, and Documenting in the medical record.    **Please Note: This note may have been constructed using a voice recognition system**

## 2024-09-11 NOTE — DISCHARGE INSTR - AVS FIRST PAGE
Dear Enoc Roberto,     It was our pleasure to care for you here at [facility].  It is our hope that we were always able to exceed the expected standards for your care during your stay.  You were hospitalized due to ambulatory dysfunction with fall in the setting of neuropathy, arthritis, and multiple prior back surgeries / injuries.  You were cared for on the [facility] 2nd floor under the service of Isac Calderon DO with the [facility] who covers for your primary care physician (PCP), Danial Vasquez DO, while you were hospitalized.  If you have any questions or concerns related to this hospitalization, you may contact us at .  For follow up as well as medication refills, we recommend that you follow up with your primary care physician.  A registered nurse will reach out to you by phone within a few days after your discharge to answer any additional questions that you may have after going home.  However, at this time we provide for you here, the most important instructions / recommendations at discharge:     Notable Medication Adjustments -   Anti-inflammatory - Recommend prednisone 20 mg by mouth daily for 4 more days.  On the last day of the prednisone use, begin using the voltaren (diclofenac) gel four times daily on a scheduled (instead of as needed) basis for a trial of 2 weeks to see if continuous antiinflammatory treatment helps with the back pain.  Tylenol can be used for mild to moderate levels of breakthrough pain in the back.  Oxycodone can be used for severe levels of breakthrough pain.  Do not drive or operate heavy machinery when taking this medication as it can slow reaction time, impaired judgment, or make you sleepy.  Lidoderm patch may be used for additional pain control.  Gabapentin can be used for neuropathy pain and is currently recommended at a dose of 200 mg daily at bedtime.  Due to sedating side effects of  tizanidine (Zanaflex), and trazodone, these medications should be held at this time.  Due to deficiencies noted during this hospitalization, the neurology team has recommended that you take thiamine, folate, and B12 supplements.  These have been prescribed at discharge.  Your Bystolic has been decreased down to 5 mg daily instead of the previous dose of 10 mg daily.  The reason for this decrease is that the 10 mg seem to be making your heart rate go too low while you are in the hospital.  However, your primary care doctor can continue to monitor this and may consider bringing you back up to the previous 10 mg dose if your heart rate seems to be doing okay.  Follow-up with your primary care physician for additional adjustments and recommendations.  You should never take both Viagra and Cialis so take whichever 1 you were on previously.  Testing Required after Discharge -   Neurology will arrange for outpatient MRI of the lumbar spine and cervical spine.  Reach out to their office to coordinate this.  Important follow up information -   Important to follow-up with the neurology office as well as your primary care physician.  Other Instructions -   Work with physical therapy at home to increase mobility.  It is important that you do the exercises that they show you on the therapy days on all of the known therapy days to improve success with therapy.  The senna that was prescribed should be taken just as needed for any constipation.  Please note that the oxycodone can be constipating.  Minimize use of the oxycodone is much as possible to minimize this effect of constipation.  Use walker as recommended for mobility.  Follow-up with your neurosurgeon and pain specialist as you normally would.  Please review this entire after visit summary as additional general instructions including medication list, appointments, activity, diet, any pertinent wound care, and other additional recommendations from your care team that may be  provided for you.      Sincerely,     Isac Calderon, DO

## 2024-09-11 NOTE — PROGRESS NOTES
Progress Note - Geriatric Medicine   Enoc Roberto 63 y.o. male MRN: [id number]  Unit/Bed#: S -01 Encounter: [id number]      Assessment/Plan:  Cognitive impairment  Assessment & Plan  Pt is A/O x3 on evaluation. He reports some memory loss over the past several years and has some limitations with completing IADLs. He and his wife manage finances together and he can only drive short distances.   Mini-cog assessment performed on initial consult with a score of 4/5.  CT Head shows trace chronic microangiopathy.  TSH level normal.  Low Vitamin B12 at 232. Supplement B12 orally to attain levels in the 400s  Maintain sleep-wake cycle  Provide supportive care  Consider geriatric assessment as outpatient when patient is medically stable      Chronic atrial fibrillation (HCC)  Assessment & Plan  Currently rate controlled  Continue anticoagulation with Eliquis    Generalized anxiety disorder  Assessment & Plan  Pt taking 40mg duloxetine for generalized anxiety. Increased to 60mg for therapeutic dose for LALA.    Essential hypertension  Assessment & Plan  Patient remains hypertensive and is concerned about being discharged with a BP more elevated than normal.    Consider ACE inhibitor   Manage as per primary team    Morbid obesity with BMI of 45.0-49.9, adult (HCC) with JULIUS  Assessment & Plan  Pt is noncompliant with use of CPAP. He complains of difficulty sleeping which is likely related to untreated sleep apnea.  Recommend use of oxygen via NC at home and working to increase use of CPAP, which will likely improve insomnia.    * Ambulatory dysfunction  Assessment & Plan  Patient normally ambulates without a cane at baseline but has been using a cane this past week due to increased falls.    PT/OT as tolerated.         Subjective:   Patient seen in follow-up today seems to be doing well.  He is eating well and had a bowel movement yesterday after working with physical therapy.  He is looking forward to be  "discharged and working further with physical therapy to increase his endurance.  Denies any increased pain.    Review of Systems   Constitutional:  Negative for chills and fever.   HENT:  Negative for congestion.    Respiratory:  Negative for shortness of breath.    Cardiovascular:  Negative for chest pain.   Gastrointestinal:  Negative for abdominal pain, constipation and nausea.   Musculoskeletal:  Positive for back pain.   Neurological:  Positive for weakness. Negative for dizziness and light-headedness.         Objective:     Vitals: Blood pressure 147/86, pulse 71, temperature 97.7 °F (36.5 °C), resp. rate 15, height 6' 0.05\" (1.83 m), weight (!) 155 kg (341 lb 11.4 oz), SpO2 95%.,Body mass index is 46.28 kg/m².      Intake/Output Summary (Last 24 hours) at 9/11/2024 1438  Last data filed at 9/11/2024 0500  Gross per 24 hour   Intake 240 ml   Output --   Net 240 ml       Current Medications: Reviewed    Physical Exam:   Physical Exam  Vitals and nursing note reviewed.   Constitutional:       Appearance: Normal appearance. He is obese.   HENT:      Head: Normocephalic.      Nose: Nose normal.      Mouth/Throat:      Mouth: Mucous membranes are moist.   Eyes:      Extraocular Movements: Extraocular movements intact.      Conjunctiva/sclera: Conjunctivae normal.   Cardiovascular:      Rate and Rhythm: Normal rate. Rhythm irregular.      Pulses: Normal pulses.      Heart sounds: Normal heart sounds. No murmur heard.  Pulmonary:      Effort: Pulmonary effort is normal.      Breath sounds: Normal breath sounds. No wheezing, rhonchi or rales.   Abdominal:      General: There is distension.      Palpations: Abdomen is soft.   Musculoskeletal:         General: Normal range of motion.      Cervical back: Normal range of motion.      Right lower leg: Edema present.      Left lower leg: Edema present.      Comments: Trace lower extremity edema   Skin:     General: Skin is warm and dry.      Comments: Generalized " excoriations across his arms, legs, chest, and abdomen.   Neurological:      Mental Status: He is alert and oriented to person, place, and time. Mental status is at baseline.   Psychiatric:         Mood and Affect: Mood normal.          Invasive Devices       Peripheral Intravenous Line  Duration             Peripheral IV 09/10/24 Proximal;Right;Ventral (anterior) Forearm <1 day                    Lab, Imaging and other studies: Reviewed radiology reports from this admission including: MRI brain.

## 2024-09-11 NOTE — CASE MANAGEMENT
Case Management Discharge Planning Note    Patient name Enoc Roberto  Location S /S -01 MRN [id number]  : 1960 Date 2024       Current Admission Date: 2024  Current Admission Diagnosis:Ambulatory dysfunction   Patient Active Problem List    Diagnosis Date Noted Date Diagnosed    Cognitive impairment 2024     Ambulatory dysfunction 2024     OAB (overactive bladder) 2024     Gross hematuria 2024     Cervical radiculopathy 2024     Irritation of left ulnar nerve 2024     Tinea cruris 2024     Arthritis of carpometacarpal (CMC) joint of right thumb 2023     Chronic atrial fibrillation (HCC) 2023     Impaired fasting glucose 2023     Moderate episode of recurrent major depressive disorder (HCC) 2023     Need for pneumococcal vaccine 2022     Preoperative clearance 2022     Peripheral neuropathy 10/12/2021     Anomaly, 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     Paresthesia of bilateral legs 2021     Chronic lower back pain 2021     Protrusion of lumbar intervertebral disc 2021     Hyperuricemia 2021     Loose body in right shoulder 2020     DJD of right shoulder 2020     Generalized anxiety disorder 2020     ST segment depression 2020     Allergic cough 2020     Dysesthesia 2020     Osteoarthritis of left midfoot 2019     Lumbar disc herniation with radiculopathy 2019     Chronic pain syndrome 2018     Cubital tunnel syndrome on left 2018     Obesity, morbid ([facility])      Erectile disorder due to medical condition in male patient 2017     Status post total replacement of right hip 2016     Morbid obesity with BMI of 45.0-49.9, adult ([facility]) with JULIUS 2016     Esophageal reflux 2016     Other specified hypothyroidism 2016     Myofascial pain syndrome 10/01/2014     Pain syndrome, chronic 2013     Intervertebral  disc disorder with radiculopathy of lumbar region 05/24/2013     Spondylosis of lumbar region without myelopathy or radiculopathy 05/24/2013     Lumbar canal stenosis 09/04/2012     Essential hypertension 09/04/2012     Postlaminectomy syndrome, lumbar 07/06/2012       LOS (days): 5  Geometric Mean LOS (GMLOS) (days): 3.1  Days to GMLOS:-1.9     OBJECTIVE:  Risk of Unplanned Readmission Score: 10.47         Current admission status: Inpatient   Preferred Pharmacy:   [id number] [place] [address] [place]  [address] [facility] [address]  Phone: [phone] Fax: [phone]    Primary Care Provider: Danial Vasquez DO    Primary Insurance: MEDICARE  Secondary Insurance: COMMERCIAL MISCELLANEOUS    DISCHARGE DETAILS:    Discharge planning discussed with:: Pt's wifeDorcas  Freedom of Choice: Yes  Comments - Freedom of Choice: HHC    Contacts  Patient Contacts: Dorcas  Relationship to Patient:: Family  Phone Number: [phone]  Reason/Outcome: Emergency Contact, Referral, Discharge Planning, Continuity of Care    Other Referral/Resources/Interventions Provided:  Interventions: HHC  Referral Comments: CM spoke with pt's wife, Dorcas, to f/u on HHC referral. CM reviewed the accepting agencies. As per Dorcas she does not have a preference. She had [facility] who used [facility] before. If they have a reasonable SOC aware CM will reserve. Dorcas aware walker was ordered and will be delievered to the pt prior to dc today.    Treatment Team Recommendation: Home with Home Health Care  Discharge Destination Plan:: Home with Home Health Care    IMM Given (Date):: 09/11/24  IMM Given to:: Family    IMM reviewed with patient's caregiver, patient's caregiver agrees with discharge determination.

## 2024-09-11 NOTE — PLAN OF CARE
Problem: OCCUPATIONAL THERAPY ADULT  Goal: Performs self-care activities at highest level of function for planned discharge setting.  See evaluation for individualized goals.  Description: Treatment Interventions: ADL retraining, Functional transfer training, Endurance training, Patient/family training, Equipment evaluation/education, Activityengagement, Compensatory technique education, Neuromuscular reeducation          See flowsheet documentation for full assessment, interventions and recommendations.   Outcome: Progressing  Note: Limitation: Decreased ADL status, Decreased endurance, Decreased self-care trans, Decreased high-level ADLs (decreased balance and mobility, decreased upright tolerance, pain)  Prognosis: Good  Assessment: Pt seen for OT treatment session. Overall pt making steady progress towards goals. Pt engaged in grooming and dressing tasks with pt requiring supervision or better to complete. Pt demo some improvements with balance and upright tolerance, continuing to require supervision for transfers and mobility but progressing towards mod I with RW. Pt would benefit from cont OT services to maximize safety and fxl performance of ADLs. Cont to recommend level III minimum resource intensity when medically cleared for d/c.     Rehab Resource Intensity Level, OT: III (Minimum Resource Intensity)

## 2024-09-11 NOTE — SPEECH THERAPY NOTE
"Speech-Language Pathology Bedside Swallow Evaluation        Patient Name: Enoc Roberto    Today's Date: 9/11/2024     Problem List  Principal Problem:    Ambulatory dysfunction  Active Problems:    Morbid obesity with BMI of 45.0-49.9, adult (HCC) with JULIUS    Esophageal reflux    Essential hypertension    Generalized anxiety disorder    Chronic lower back pain    Peripheral neuropathy    Chronic atrial fibrillation (HCC)    Cognitive impairment           Summary    Pt presents with normal appearing oral and pharyngeal swallowing skills, no overt s/s aspiration or c/o food dysphagia. Pt admits to occas cough w/ liquids when drinking too fast or initial bite of juicy fresh fruits, ie watermelon or grapes.      Recommendations:   Diet: regular diet and thin liquids   Meds: whole with liquid   Frequent Oral care: 2x/day  Other Recommendations/ considerations: no follow up tx needed       Current Medical Status  Pt is a 63 y.o. male who presented to [facility]  with ambulatory dysfunction. Patient has fallen 10+ times in the last 3 days due to feeling unsteady on his feet. The patient denies chest pain, SOB or LOC. Patient states having periodic palpitations. Patient is an alcoholic and smokes marijuana daily. The patient states having 4 alcoholic drinks daily. Patient states last alcoholic drink was yesterday. Denies withdrawal symptoms or ever having withdrawal symptoms.     Past medical history:   Please see H&P for details    Special Studies:  MRI: 9/9/24 No acute infarction, intracranial image or mass effect.   CXR: 9/6/24 Clear lungs. No evidence of a pneumothorax or pleural effusion.     Social/Education/Vocational Hx:  Pt lives with family    Swallow Information   Prior speech/swallowing tx: none   Current Risks for Dysphagia & Aspiration:  generalized weakness  Current Symptoms/Concerns:  c/o occas coughing w/ liquids or fresh fruits- \"I think everyone does once in a while.\"  Current Diet: " regular diet and thin liquids   Baseline Diet: regular diet and thin liquids  Takes pills- whole w/ water     Baseline Assessment   Behavior/Cognition: alert  Speech/Language Status: able to participate in conversation and able to follow commands  Patient Positioning:  sat EOB     Swallow Mechanism Exam   Facial: symmetrical  Labial: WFL  Lingual: WFL  Velum: unable to visualize  Mandible: adequate ROM  Dentition: adequate  Vocal quality:clear/adequate   Volitional Cough: strong/productive   Respiratory: had NC on while sleeping but took off to eat breakfast    Consistencies Assessed and Performance   Consistencies Administered:  omelet, ricci, fresh fruit, water by straw and apple juice by cup    Oral Stage: pt was able to self feed, drank 8 oz of apple juice by cup consecutive swallows. Mastication/manipulation appeared WNL.  Good bolus control & transfer w/ liquids.     Pharyngeal Stage: swallow initiation was prompt with complete laryngeal excursion. No coughing, throat clearing or other s/s of dysphagia noted.       Esophageal Concerns: none reported      Results Reviewed with: patient   Dysphagia Goals: none at this time        Sarah Etienne MA CCC-SLP  Speech Pathologist  [place] license # [id number]  [place] license # [id number]  Available via Secure Chat

## 2024-09-11 NOTE — PLAN OF CARE
Problem: Potential for Falls  Goal: Patient will remain free of falls  Description: INTERVENTIONS:  - Educate patient/family on patient safety including physical limitations  - Instruct patient to call for assistance with activity   - Consult OT/PT to assist with strengthening/mobility   - Keep Call bell within reach  - Keep bed low and locked with side rails adjusted as appropriate  - Keep care items and personal belongings within reach  - Initiate and maintain comfort rounds  - Make Fall Risk Sign visible to staff  - Offer Toileting every 2 Hours, in advance of need  - Initiate/Maintain bed alarm  - Obtain necessary fall risk management equipment: walker  Problem: PAIN - ADULT  Goal: Verbalizes/displays adequate comfort level or baseline comfort level  Description: Interventions:  - Encourage patient to monitor pain and request assistance  - Assess pain using appropriate pain scale  - Administer analgesics based on type and severity of pain and evaluate response  - Implement non-pharmacological measures as appropriate and evaluate response  - Consider cultural and social influences on pain and pain management  - Notify physician/advanced practitioner if interventions unsuccessful or patient reports new pain  Outcome: Progressing     Problem: INFECTION - ADULT  Goal: Absence or prevention of progression during hospitalization  Description: INTERVENTIONS:  - Assess and monitor for signs and symptoms of infection  - Monitor lab/diagnostic results  - Monitor all insertion sites, i.e. indwelling lines, tubes, and drains  - Monitor endotracheal if appropriate and nasal secretions for changes in amount and color  - [place] appropriate cooling/warming therapies per order  - Administer medications as ordered  - Instruct and encourage patient and family to use good hand hygiene technique  - Identify and instruct in appropriate isolation precautions for identified infection/condition  Outcome: Progressing     Problem:  Knowledge Deficit  Goal: Patient/family/caregiver demonstrates understanding of disease process, treatment plan, medications, and discharge instructions  Description: Complete learning assessment and assess knowledge base.  Interventions:  - Provide teaching at level of understanding  - Provide teaching via preferred learning methods  Outcome: Progressing     - Apply yellow socks and bracelet for high fall risk patients  - Consider moving patient to room near nurses station  Outcome: Progressing

## 2024-09-11 NOTE — ASSESSMENT & PLAN NOTE
Patient presented to the ED as a trauma alert due to multiple falls over the last 3 days  Admitted to trauma service for ambulatory dysfunction  MRI brain negative for acute pathology.  Echo shows LVEF 50% with mild MR and TR.  However, not a cause for the patient's fall.  Additional negative testing -JERMAN, RF, urinalysis, TSH, A1c was only 5.6%.  B1 and ammonia.  Vitamin B6 level 5.3 (borderline marginal).  Fall precautions  Evaluate and treat neuropathy (see below).  Neurology recommends outpatient MRI cervical spine and lumbar spine.  No further inpatient testing recommended by neurology team.  PT/OT evaluations indicate that patient is level 3 minimal resource intensity.  Therefore, would not likely qualify for inpatient rehab.  Will discuss further with patient and  tomorrow.

## 2024-09-11 NOTE — PLAN OF CARE
Problem: Potential for Falls  Goal: Patient will remain free of falls  Description: INTERVENTIONS:  - Educate patient/family on patient safety including physical limitations  - Instruct patient to call for assistance with activity   - Consult OT/PT to assist with strengthening/mobility   - Keep Call bell within reach  - Keep bed low and locked with side rails adjusted as appropriate  - Keep care items and personal belongings within reach  - Initiate and maintain comfort rounds  - Make Fall Risk Sign visible to staff  - Apply yellow socks and bracelet for high fall risk patients  - Consider moving patient to room near nurses station  Outcome: Progressing     Problem: PAIN - ADULT  Goal: Verbalizes/displays adequate comfort level or baseline comfort level  Description: Interventions:  - Encourage patient to monitor pain and request assistance  - Assess pain using appropriate pain scale  - Administer analgesics based on type and severity of pain and evaluate response  - Implement non-pharmacological measures as appropriate and evaluate response  - Consider cultural and social influences on pain and pain management  - Notify physician/advanced practitioner if interventions unsuccessful or patient reports new pain  Outcome: Progressing     Problem: INFECTION - ADULT  Goal: Absence or prevention of progression during hospitalization  Description: INTERVENTIONS:  - Assess and monitor for signs and symptoms of infection  - Monitor lab/diagnostic results  - Monitor all insertion sites, i.e. indwelling lines, tubes, and drains  - Monitor endotracheal if appropriate and nasal secretions for changes in amount and color  - [place] appropriate cooling/warming therapies per order  - Administer medications as ordered  - Instruct and encourage patient and family to use good hand hygiene technique  - Identify and instruct in appropriate isolation precautions for identified infection/condition  Outcome: Progressing  Goal: Absence  of fever/infection during neutropenic period  Description: INTERVENTIONS:  - Monitor WBC    Outcome: Progressing     Problem: SAFETY ADULT  Goal: Patient will remain free of falls  Description: INTERVENTIONS:  - Educate patient/family on patient safety including physical limitations  - Instruct patient to call for assistance with activity   - Consult OT/PT to assist with strengthening/mobility   - Keep Call bell within reach  - Keep bed low and locked with side rails adjusted as appropriate  - Keep care items and personal belongings within reach  - Initiate and maintain comfort rounds  - Apply yellow socks and bracelet for high fall risk patients  - Consider moving patient to room near nurses station  Outcome: Progressing  Goal: Maintain or return to baseline ADL function  Description: INTERVENTIONS:  -  Assess patient's ability to carry out ADLs; assess patient's baseline for ADL function and identify physical deficits which impact ability to perform ADLs (bathing, care of mouth/teeth, toileting, grooming, dressing, etc.)  - Assess/evaluate cause of self-care deficits   - Assess range of motion  - Assess patient's mobility; develop plan if impaired  - Assess patient's need for assistive devices and provide as appropriate  - Encourage maximum independence but intervene and supervise when necessary  - Involve family in performance of ADLs  - Assess for home care needs following discharge   - Consider OT consult to assist with ADL evaluation and planning for discharge  - Provide patient education as appropriate  Outcome: Progressing  Goal: Maintains/Returns to pre admission functional level  Description: INTERVENTIONS:  - Perform AM-PAC 6 Click Basic Mobility/ Daily Activity assessment daily.  - Set and communicate daily mobility goal to care team and patient/family/caregiver.   - Record patient progress and toleration of activity level   Outcome: Progressing     Problem: DISCHARGE PLANNING  Goal: Discharge to home or  other facility with appropriate resources  Description: INTERVENTIONS:  - Identify barriers to discharge w/patient and caregiver  - Arrange for needed discharge resources and transportation as appropriate  - Identify discharge learning needs (meds, wound care, etc.)  - Arrange for interpretive services to assist at discharge as needed  - Refer to Case Management Department for coordinating discharge planning if the patient needs post-hospital services based on physician/advanced practitioner order or complex needs related to functional status, cognitive ability, or social support system  Outcome: Progressing     Problem: Knowledge Deficit  Goal: Patient/family/caregiver demonstrates understanding of disease process, treatment plan, medications, and discharge instructions  Description: Complete learning assessment and assess knowledge base.  Interventions:  - Provide teaching at level of understanding  - Provide teaching via preferred learning methods  Outcome: Progressing

## 2024-09-11 NOTE — ASSESSMENT & PLAN NOTE
Continues on acetaminophen 975 mg 3 times daily.  However, he does feel like this is causing some itching so we will decrease down to as needed dosing.  Lidoderm patch daily.  Patient indicates unable to take NSAIDs.  Prednisone 40 mg once given on 9/10/2020 for and will receive 20 mg daily for 5 days, including today.  Therefore, prescription for 4 more days provided.  Oxycodone 5 mg given to be taken every 6 hours as needed with 20 pills prescribed and no refills.

## 2024-09-12 ENCOUNTER — TRANSITIONAL CARE MANAGEMENT (OUTPATIENT)
Dept: FAMILY MEDICINE CLINIC | Facility: CLINIC | Age: 64
End: 2024-09-12

## 2024-09-12 NOTE — TELEPHONE ENCOUNTER
[facility], 10am, [facility]       HFU/ [facility] [place]/ Ambulatory dysfunction      DC- home- 9/11/2024        ----- Message from Vicente Hogan DO sent at 9/10/2024 11:16 AM EDT -----  Enoc Roberto will need follow-up in  6-8 weeks  with general neurology attending for Other in 60 minute appointment. They will not require outpatient neurological testing.

## 2024-09-13 ENCOUNTER — TELEPHONE (OUTPATIENT)
Age: 64
End: 2024-09-13

## 2024-09-13 NOTE — TELEPHONE ENCOUNTER
Call received from [facility]. They are calling to update PCP that they are going to be caring for pt and will be faxing over orders for PCP. Please advise.

## 2024-09-18 ENCOUNTER — OFFICE VISIT (OUTPATIENT)
Dept: OBGYN CLINIC | Facility: CLINIC | Age: 64
End: 2024-09-18
Payer: MEDICARE

## 2024-09-18 VITALS — BODY MASS INDEX: 42.66 KG/M2 | HEIGHT: 72 IN | WEIGHT: 315 LBS

## 2024-09-18 DIAGNOSIS — M19.012 PRIMARY OSTEOARTHRITIS OF SHOULDERS, BILATERAL: Primary | ICD-10-CM

## 2024-09-18 DIAGNOSIS — M19.011 PRIMARY OSTEOARTHRITIS OF SHOULDERS, BILATERAL: Primary | ICD-10-CM

## 2024-09-18 PROCEDURE — 99213 OFFICE O/P EST LOW 20 MIN: CPT | Performed by: STUDENT IN AN ORGANIZED HEALTH CARE EDUCATION/TRAINING PROGRAM

## 2024-09-18 PROCEDURE — 20610 DRAIN/INJ JOINT/BURSA W/O US: CPT

## 2024-09-18 RX ORDER — BUPIVACAINE HYDROCHLORIDE 2.5 MG/ML
4 INJECTION, SOLUTION INFILTRATION; PERINEURAL
Status: COMPLETED | OUTPATIENT
Start: 2024-09-18 | End: 2024-09-18

## 2024-09-18 RX ORDER — TRIAMCINOLONE ACETONIDE 40 MG/ML
40 INJECTION, SUSPENSION INTRA-ARTICULAR; INTRAMUSCULAR
Status: COMPLETED | OUTPATIENT
Start: 2024-09-18 | End: 2024-09-18

## 2024-09-18 RX ADMIN — TRIAMCINOLONE ACETONIDE 40 MG: 40 INJECTION, SUSPENSION INTRA-ARTICULAR; INTRAMUSCULAR at 13:00

## 2024-09-18 RX ADMIN — BUPIVACAINE HYDROCHLORIDE 4 ML: 2.5 INJECTION, SOLUTION INFILTRATION; PERINEURAL at 13:00
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
## 2024-09-19 ENCOUNTER — OFFICE VISIT (OUTPATIENT)
Dept: FAMILY MEDICINE CLINIC | Facility: CLINIC | Age: 64
End: 2024-09-19

## 2024-09-19 VITALS
HEART RATE: 72 BPM | WEIGHT: 315 LBS | RESPIRATION RATE: 16 BRPM | BODY MASS INDEX: 42.66 KG/M2 | SYSTOLIC BLOOD PRESSURE: 142 MMHG | OXYGEN SATURATION: 95 % | HEIGHT: 72 IN | DIASTOLIC BLOOD PRESSURE: 78 MMHG

## 2024-09-19 DIAGNOSIS — R26.2 AMBULATORY DYSFUNCTION: ICD-10-CM

## 2024-09-19 DIAGNOSIS — G62.9 PERIPHERAL POLYNEUROPATHY: Primary | ICD-10-CM

## 2024-09-19 DIAGNOSIS — M51.16 LUMBAR DISC HERNIATION WITH RADICULOPATHY: ICD-10-CM

## 2024-09-19 DIAGNOSIS — M48.062 SPINAL STENOSIS OF LUMBAR REGION WITH NEUROGENIC CLAUDICATION: ICD-10-CM

## 2024-09-19 PROBLEM — R41.89 COGNITIVE IMPAIRMENT: Status: RESOLVED | Noted: 2024-09-09 | Resolved: 2024-09-19

## 2024-09-19 RX ORDER — OXYCODONE HYDROCHLORIDE 5 MG/1
5 TABLET ORAL EVERY 4 HOURS PRN
Qty: 30 TABLET | Refills: 0 | Status: SHIPPED | OUTPATIENT
Start: 2024-09-19 | End: 2024-09-29

## 2024-09-19 NOTE — ASSESSMENT & PLAN NOTE
Patient has to follow-up with pain management as well as neurosurgery.  Check MRI of the lumbar spine

## 2024-09-19 NOTE — PROGRESS NOTES
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 Value Ref Range    TSH 3RD GENERATON 2.599 0.450 - 4.500 uIU/mL   Echo complete w/ contrast if indicated    Collection Time: 09/07/24  1:40 PM   Result Value Ref Range    A4C EF 48 %    LV Diastolic Volume (BP) 156 mL    LV Systolic Volume (BP) 82 mL    EF 48 %    LVIDd 5.30 cm    LVIDS 3.80 cm    IVSd 1.70 cm    LVPWd 1.60 cm    FS 28 28 - 44    MV E' Tissue Velocity Septal 10 cm/s    LA Volume Index (BP) 45.1 mL/m2    E/A ratio 2.66     E wave deceleration time 168 ms    MV Peak E Josr 85 cm/s    MV Peak A Josr 0.32 m/s    RVID d 4.5 cm    Tricuspid annular plane systolic excursion 1.70 cm    LA size 5.2 cm    LA length (A2C) 6.70 cm    LA volume (BP) 121 mL    RA 2D Volume 90.0 mL    RAA A4C 28.4 cm2    MV stenosis pressure 1/2 time 49 ms    MV valve area p 1/2 method 4.49     TR Peak Josr 2.3 m/s    Triscuspid Valve Regurgitation Peak Gradient 22.0 mmHg    Ao root 3.90 cm    Asc Ao 3.7 cm    Tricuspid valve peak regurgitation velocity 2.33 m/s    Left ventricular stroke volume (2D) 71.00 mL    IVS 1.7 cm    LEFT VENTRICLE SYSTOLIC VOLUME (MOD BIPLANE) 2D 64 mL    LV DIASTOLIC VOLUME (MOD BIPLANE) 2D 135 mL    Left Atrium Area-systolic Four Chamber 34.2 cm2    Left Atrium Area-systolic Apical Two Chamber 30.7 cm2    LVSV, 2D 71 mL    BSA 2.68 m2    LV Diastolic Volume Index (BP) 58.2 mL/m2    LV  Systolic Volume Index (BP) 30.6 mL/m2    LV EF 50    CBC    Collection Time: 09/08/24  5:15 AM   Result Value Ref Range    WBC 8.35 4.31 - 10.16 Thousand/uL    RBC 4.32 3.88 - 5.62 Million/uL    Hemoglobin 12.3 12.0 - 17.0 g/dL    Hematocrit 39.5 36.5 - 49.3 %    MCV 91 82 - 98 fL    MCH 28.5 26.8 - 34.3 pg    MCHC 31.1 (L) 31.4 - 37.4 g/dL    RDW 18.6 (H) 11.6 - 15.1 %    Platelets 130 (L) 149 - 390 Thousands/uL    MPV 13.1 (H) 8.9 - 12.7 fL   Renal function panel    Collection Time: 09/08/24  5:15 AM   Result Value Ref Range    Albumin 3.2 (L) 3.5 - 5.0 g/dL    Calcium 8.4 8.4 - 10.2 mg/dL    Corrected Calcium 9.0 8.3 - 10.1 mg/dL    Phosphorus 3.6 2.3 - 4.1 mg/dL    Glucose 92 65 - 140 mg/dL    BUN 9 5 - 25 mg/dL    Creatinine 0.86 0.60 - 1.30 mg/dL    Sodium 139 135 - 147 mmol/L    Potassium 3.6 3.5 - 5.3 mmol/L    Chloride 105 96 - 108 mmol/L    CO2 28 21 - 32 mmol/L    ANION GAP 6 4 - 13 mmol/L    eGFR 91 ml/min/1.73sq m   Magnesium    Collection Time: 09/08/24  5:15 AM   Result Value Ref Range    Magnesium 2.0 1.9 - 2.7 mg/dL   Ammonia    Collection Time: 09/08/24  5:15 AM   Result Value Ref Range    Ammonia 17 (L) 18 - 72 umol/L   CBC    Collection Time: 09/09/24  6:14 AM   Result Value Ref Range    WBC 8.59 4.31 - 10.16 Thousand/uL    RBC 4.51 3.88 - 5.62 Million/uL    Hemoglobin 12.7 12.0 - 17.0 g/dL    Hematocrit 40.2 36.5 - 49.3 %    MCV 89 82 - 98 fL    MCH 28.2 26.8 - 34.3 pg    MCHC 31.6 31.4 - 37.4 g/dL    RDW 18.6 (H) 11.6 - 15.1 %    Platelets 136 (L) 149 - 390 Thousands/uL   Renal function panel    Collection Time: 09/09/24  6:14 AM   Result Value Ref Range    Albumin 3.3 (L) 3.5 - 5.0 g/dL    Calcium 8.7 8.4 - 10.2 mg/dL    Corrected Calcium 9.3 8.3 - 10.1 mg/dL    Phosphorus 4.1 2.3 - 4.1 mg/dL    Glucose 95 65 - 140 mg/dL    BUN 11 5 - 25 mg/dL    Creatinine 0.87 0.60 - 1.30 mg/dL    Sodium 140 135 - 147 mmol/L    Potassium 3.9 3.5 - 5.3 mmol/L    Chloride 105 96 - 108 mmol/L    CO2 28 21 - 32  mmol/L    ANION GAP 7 4 - 13 mmol/L    eGFR 91 ml/min/1.73sq m   Magnesium    Collection Time: 09/09/24  6:14 AM   Result Value Ref Range    Magnesium 2.0 1.9 - 2.7 mg/dL   CBC    Collection Time: 09/10/24  6:11 AM   Result Value Ref Range    WBC 7.99 4.31 - 10.16 Thousand/uL    RBC 4.25 3.88 - 5.62 Million/uL    Hemoglobin 12.0 12.0 - 17.0 g/dL    Hematocrit 38.4 36.5 - 49.3 %    MCV 90 82 - 98 fL    MCH 28.2 26.8 - 34.3 pg    MCHC 31.3 (L) 31.4 - 37.4 g/dL    RDW 18.3 (H) 11.6 - 15.1 %    Platelets 130 (L) 149 - 390 Thousands/uL    MPV 13.5 (H) 8.9 - 12.7 fL   Renal function panel    Collection Time: 09/10/24  6:11 AM   Result Value Ref Range    Albumin 3.2 (L) 3.5 - 5.0 g/dL    Calcium 8.7 8.4 - 10.2 mg/dL    Corrected Calcium 9.3 8.3 - 10.1 mg/dL    Phosphorus 5.0 (H) 2.3 - 4.1 mg/dL    Glucose 99 65 - 140 mg/dL    BUN 12 5 - 25 mg/dL    Creatinine 0.89 0.60 - 1.30 mg/dL    Sodium 139 135 - 147 mmol/L    Potassium 3.7 3.5 - 5.3 mmol/L    Chloride 104 96 - 108 mmol/L    CO2 31 21 - 32 mmol/L    ANION GAP 4 4 - 13 mmol/L    eGFR 90 ml/min/1.73sq m   Magnesium    Collection Time: 09/10/24  6:11 AM   Result Value Ref Range    Magnesium 2.0 1.9 - 2.7 mg/dL   Heavy Duty / Bariatric Wheeled Walker (Over 300 lbs)    Collection Time: 09/10/24  4:53 PM   Result Value Ref Range    Supplier Name [employer] [facility]     Supplier Phone Number [phone]     Order Status Delivery Successful     Delivery Note      Delivery Request Date 09/10/2024     Date Delivered  09/11/2024     Item Description Heavy Duty Wheeled Walker, Adult        Assessment/Plan:    Peripheral neuropathy  This has been noted previously.    Patient did have consultation with neurology    -Patient is on B12 and folic acid supplementation    -Hospital recommendation for MRI of the cervical spine (which was just completed at the end of June) and MRI of the lumbar spine which I will order.  Not certain how that will appear has the patient  does have hip arthroplasty which can cause quite a bit of artifact but order placed    Lumbar disc herniation with radiculopathy  Patient has to follow-up with pain management as well as neurosurgery.  Check MRI of the lumbar spine    Ambulatory dysfunction  Patient is receiving in-home physical therapy    -Order provided for MRI of the lumbar spine which was recommended by neurology while he was inpatient.  I do not believe that MRI of the cervical spine is warranted since he just had that completed at the end of June.  Has history of both spinal as well as cervical surgery    -Order provided for another cane.  He has walker at home and would be recommended to have a cane in his car          Problem List Items Addressed This Visit          Nervous and Auditory    Lumbar disc herniation with radiculopathy     Patient has to follow-up with pain management as well as neurosurgery.  Check MRI of the lumbar spine         Relevant Orders    MRI lumbar spine wo contrast    Cane    RESOLVED: Peripheral polyneuropathy - Primary    Relevant Orders    MRI lumbar spine wo contrast    Cane       Care Coordination    Ambulatory dysfunction     Patient is receiving in-home physical therapy    -Order provided for MRI of the lumbar spine which was recommended by neurology while he was inpatient.  I do not believe that MRI of the cervical spine is warranted since he just had that completed at the end of June.  Has history of both spinal as well as cervical surgery    -Order provided for another cane.  He has walker at home and would be recommended to have a cane in his car         Relevant Orders    Cane       Surgery/Wound/Pain    Spinal stenosis of lumbar region with neurogenic claudication    Relevant Medications    oxyCODONE (ROXICODONE) 5 immediate release tablet    Other Relevant Orders    MRI lumbar spine wo contrast    Cane

## 2024-09-19 NOTE — ASSESSMENT & PLAN NOTE
Patient is receiving in-home physical therapy    -Order provided for MRI of the lumbar spine which was recommended by neurology while he was inpatient.  I do not believe that MRI of the cervical spine is warranted since he just had that completed at the end of June.  Has history of both spinal as well as cervical surgery    -Order provided for another cane.  He has walker at home and would be recommended to have a cane in his car

## 2024-09-19 NOTE — ASSESSMENT & PLAN NOTE
This has been noted previously.    Patient did have consultation with neurology    -Patient is on B12 and folic acid supplementation    -Hospital recommendation for MRI of the cervical spine (which was just completed at the end of June) and MRI of the lumbar spine which I will order.  Not certain how that will appear has the patient does have hip arthroplasty which can cause quite a bit of artifact but order placed

## 2024-09-20 ENCOUNTER — CLINICAL SUPPORT (OUTPATIENT)
Dept: BARIATRICS | Facility: CLINIC | Age: 64
End: 2024-09-20

## 2024-09-20 ENCOUNTER — TELEPHONE (OUTPATIENT)
Facility: MEDICAL CENTER | Age: 64
End: 2024-09-20

## 2024-09-20 VITALS
SYSTOLIC BLOOD PRESSURE: 124 MMHG | TEMPERATURE: 97.8 F | HEART RATE: 97 BPM | RESPIRATION RATE: 16 BRPM | BODY MASS INDEX: 42.66 KG/M2 | HEIGHT: 72 IN | WEIGHT: 315 LBS | DIASTOLIC BLOOD PRESSURE: 70 MMHG

## 2024-09-20 DIAGNOSIS — R63.5 ABNORMAL WEIGHT GAIN: Primary | ICD-10-CM

## 2024-09-20 PROCEDURE — RECHECK

## 2024-09-20 NOTE — PROGRESS NOTES
Patient last visit weight: 328 lb 9.6 oz  Patient current visit weight: 337.0 lb    If you are taking phentermine or other oral weight loss medications, are you experiencing any of the following symptoms:  Headache: NO  Blurred Vision: NO  Chest Pain: NO  Palpitations:NO  Insomnia: YES  SPECIFY ORAL MEDICATION AND DOSAGE: METFORMIN 750 MG 2 PILLS A DAY    If you are taking an injectable medication,  are you experiencing any of the following symptoms:  Bloating:   Nausea:  Vomiting:   Constipation:   Diarrhea:  SPECIFY INJECTABLE MEDICATION AND CURRENT DOSAGE:      Vitals:    Is BP less than 100/60?NO  Is BP greater than 140/90?NO  Is HR greater than 100?NO  **If yes to any of the above, have patient relax and repeat in 5-10 minutes**    Repeat values:    Is BP less than 100/60?  Is BP greater than 140/90?  Is HR greater than 100?  **If values remain outside of ranges above, please consult provider for next steps**

## 2024-09-25 PROBLEM — I25.10 CORONARY ARTERIOSCLEROSIS: Status: ACTIVE | Noted: 2024-09-25

## 2024-09-26 ENCOUNTER — OFFICE VISIT (OUTPATIENT)
Dept: CARDIOLOGY CLINIC | Facility: CLINIC | Age: 64
End: 2024-09-26
Payer: MEDICARE

## 2024-09-26 VITALS
DIASTOLIC BLOOD PRESSURE: 74 MMHG | HEART RATE: 94 BPM | SYSTOLIC BLOOD PRESSURE: 128 MMHG | BODY MASS INDEX: 42.66 KG/M2 | OXYGEN SATURATION: 96 % | WEIGHT: 315 LBS | HEIGHT: 72 IN

## 2024-09-26 DIAGNOSIS — I10 ESSENTIAL HYPERTENSION: Primary | ICD-10-CM

## 2024-09-26 DIAGNOSIS — G47.33 OSA (OBSTRUCTIVE SLEEP APNEA): ICD-10-CM

## 2024-09-26 DIAGNOSIS — E66.01 OBESITY, MORBID (HCC): ICD-10-CM

## 2024-09-26 DIAGNOSIS — R73.01 IMPAIRED FASTING GLUCOSE: ICD-10-CM

## 2024-09-26 DIAGNOSIS — I48.20 CHRONIC ATRIAL FIBRILLATION (HCC): ICD-10-CM

## 2024-09-26 DIAGNOSIS — I25.10 CORONARY ARTERIOSCLEROSIS: ICD-10-CM

## 2024-09-26 PROCEDURE — 99214 OFFICE O/P EST MOD 30 MIN: CPT

## 2024-09-26 NOTE — ASSESSMENT & PLAN NOTE
HR today 90's , usually in the 70's  ECHO LVEF 50 %, mild JASON, mild MR , mild TR  Continue Bystolic 5 mg daily.    Advised If HR stays above 100, Can consider a 24 hr holter to monitor daily heart rate average and increasing Bystolic back to 10 mg daily  Continue eliquis 5 mg twice daily for anticoagulation

## 2024-09-26 NOTE — PROGRESS NOTES
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 considering overall stability, no specific follow-up is recommended. Findings for this exam as well as concurrent head/C-spine CTs were discussed with Dr. Siegel at 3:00 p.m. on 9/6/2024 Workstation performed: [id number]     TRAUMA - CT spine cervical wo contrast    Result Date: 9/6/2024  Narrative: CT CERVICAL SPINE - WITHOUT CONTRAST INDICATION:   TRAUMA. COMPARISON: MRI cervical spine without contrast 6/27/2024 TECHNIQUE:  CT examination of the cervical spine was performed without intravenous contrast.  Contiguous axial images were obtained. Multiplanar 2D reformatted images were created from the source data. Radiation dose length product (DLP) for this visit:  627.25 mGy-cm .  This examination, like all CT scans performed in the [facility], was performed utilizing techniques to minimize radiation dose exposure, including the use of iterative  reconstruction and automated exposure control. IMAGE QUALITY:  Diagnostic. FINDINGS: ALIGNMENT: There is straightening of normal cervical lordosis. Unchanged trace anterolisthesis of C3 on C4 and C4 on C5. VERTEBRAE: Linear lucency oriented approximately in the AP direction involving the left pedicle at C7 (313:111). Lesion not well appreciated on sagittal/coronal planes. This  likely represents a nutrient canal. No direct priors for comparison. DEGENERATIVE CHANGES: Mild multilevel cervical degenerative changes are noted without critical central canal stenosis. PREVERTEBRAL AND PARASPINAL SOFT TISSUES: Unremarkable THORACIC INLET:  Normal.     Impression: No cervical spine fracture or traumatic malalignment. Linear lucency oriented in approximately the AP direction identified in the left pedicle at C7. No displacement. Not well identified on sagittal/coronal planes. Findings most likely represent a nutrient canal, a nondisplaced fracture not completely excluded. Noting no direct priors for comparison. Workstation performed: [id number]     TRAUMA - CT head wo contrast    Result Date: 9/6/2024  Narrative: CT BRAIN - WITHOUT CONTRAST INDICATION:   TRAUMA. COMPARISON: MRI brain without contrast 1/24/2020 CT head without contrast 11/14/2018. TECHNIQUE:  CT examination of the brain was performed.  Multiplanar 2D reformatted images were created from the source data. Radiation dose length product (DLP) for this visit:  1078.77 mGy-cm .  This examination, like all CT scans performed in the [facility], was performed utilizing techniques to minimize radiation dose exposure, including the use of iterative reconstruction and automated exposure control. IMAGE QUALITY:  Diagnostic. FINDINGS: PARENCHYMA:  No intracranial mass, mass effect or midline shift. No CT signs of acute infarction.  No acute parenchymal hemorrhage. VENTRICLES AND EXTRA-AXIAL SPACES:  Normal for the patient's age. VISUALIZED ORBITS: Normal visualized orbits. PARANASAL SINUSES: Normal visualized paranasal sinuses. CALVARIUM AND EXTRACRANIAL SOFT TISSUES:  Normal.     Impression: No acute intracranial abnormality. Workstation performed: [id number]       Review of Systems   Constitutional: Negative for chills, diaphoresis, fever and malaise/fatigue.   Cardiovascular:  Negative for chest pain, dyspnea on exertion,  leg swelling, orthopnea, palpitations and syncope.   Respiratory:  Negative for cough and shortness of breath.    Musculoskeletal:  Positive for back pain.   Gastrointestinal:  Negative for abdominal pain, nausea and vomiting.   Neurological:  Positive for numbness (both feet and left hand). Negative for dizziness, headaches and light-headedness.   Psychiatric/Behavioral:  Negative for altered mental status.    All other systems reviewed and are negative.    Except as noted in HPI, is otherwise reviewed in detail and a 12 point review of systems is negative.    Physical Exam  Vitals reviewed.   Constitutional:       General: He is not in acute distress.     Appearance: Normal appearance. He is not diaphoretic.   HENT:      Head: Normocephalic and atraumatic.   Eyes:      General: No scleral icterus.     Extraocular Movements: Extraocular movements intact.      Pupils: Pupils are equal, round, and reactive to light.   Cardiovascular:      Rate and Rhythm: Normal rate.      Pulses: Normal pulses.           Radial pulses are 2+ on the right side and 2+ on the left side.      Heart sounds: Normal heart sounds, S1 normal and S2 normal.   Pulmonary:      Effort: Pulmonary effort is normal. No tachypnea or respiratory distress.      Breath sounds: Normal breath sounds. No wheezing or rales.   Abdominal:      General: Bowel sounds are normal. There is no distension.      Palpations: Abdomen is soft.   Musculoskeletal:      Right lower leg: No edema.      Left lower leg: No edema.   Skin:     General: Skin is warm and dry.      Capillary Refill: Capillary refill takes less than 2 seconds.   Neurological:      Mental Status: He is alert and oriented to person, place, and time.      Gait: Gait normal.   Psychiatric:         Mood and Affect: Mood normal.         Behavior: Behavior normal.          **Please Note: This note may have been constructed using a voice recognition system**     Thank you for the opportunity to  participate in the care of this patient.   JAZIEL Martínez

## 2024-09-26 NOTE — ASSESSMENT & PLAN NOTE
Right and left heart catheterization performed May 2020 showed normal right heart pressures.  The coronary arteriogram revealed no severe CAD.  Patient had 40-50% mid LAD stenosis with luminal irregularities elsewhere.     - Lipid panel has been stable.  Last LDL 37.  Continue Crestor 5 mg daily

## 2024-09-26 NOTE — PATIENT INSTRUCTIONS
Please obtain a pulse oximeter, mainly to monitor your heart rate and let us know if heart stabilizes 60-90  Continue to monitor your blood pressure at home. Keep a diary of your blood pressure readings  Continue heart healthy diet by limiting saturated fat and added sugars while increasing fiber and healthy fats.  Avoid canned foods, fast food/Chinese food, and processed meats (hot dogs, lunch meat, and sausage etc.). Caution with condiments.    Bring complete list of medications to your follow-up appointment.   Follow-up in 6 months or earlier if any concerns  Please call the office if you have any questions

## 2024-09-26 NOTE — ASSESSMENT & PLAN NOTE
- he has difficulty wearing CPAP and tolerating the mask. F/u with pulmonology about other options

## 2024-09-26 NOTE — ASSESSMENT & PLAN NOTE
BP today 128/74, at home in 120's-130's  Continue amlodipine 10 mg   Continue Bystolic at 5 mg daily. If BP becomes elevated can consider going back to 10 mg daily

## 2024-09-30 DIAGNOSIS — M48.062 SPINAL STENOSIS OF LUMBAR REGION WITH NEUROGENIC CLAUDICATION: ICD-10-CM

## 2024-09-30 DIAGNOSIS — M51.26 PROTRUSION OF LUMBAR INTERVERTEBRAL DISC: ICD-10-CM

## 2024-09-30 DIAGNOSIS — R26.2 AMBULATORY DYSFUNCTION: ICD-10-CM

## 2024-09-30 DIAGNOSIS — M51.16 INTERVERTEBRAL DISC DISORDER WITH RADICULOPATHY OF LUMBAR REGION: Primary | ICD-10-CM

## 2024-10-02 ENCOUNTER — TELEPHONE (OUTPATIENT)
Age: 64
End: 2024-10-02

## 2024-10-02 NOTE — TELEPHONE ENCOUNTER
Pt returned missed call from office today, I can't see any notes. Can we call the pt and confirm that his appt is still OK and that he does not need imaging prior to appt?  Thank You

## 2024-10-02 NOTE — TELEPHONE ENCOUNTER
Pt called to say he was in the hospital last month and had blood work done including a TSH, 3rd gen with Free T4 reflex and a couple of CBCs. The pt would like a new lab slip to just get the rest of the labs Dr Vasquez ordered for his appt on 10/25/24 so the lab does not repeat the other two tests. Please order a CMP, A1C, Lipid and Uric acid.

## 2024-10-02 NOTE — TELEPHONE ENCOUNTER
Patient called in to report he mistakenly has been taking Trospium Chloride with Tadalafil over the last few weeks. He had stopped it previously but then restarted it forgetting that he was not on it anymore. Patient states he did experience some dizziness. Patient is now going to throw away remaining med so this does not happen again.

## 2024-10-03 NOTE — TELEPHONE ENCOUNTER
Perform the labs as they were previously ordered.  His last CBC was abnormal and it would not be an issue repeating his TSH almost 2 months later from when he had it done

## 2024-10-08 ENCOUNTER — OFFICE VISIT (OUTPATIENT)
Dept: NEUROSURGERY | Facility: CLINIC | Age: 64
End: 2024-10-08
Payer: MEDICARE

## 2024-10-08 VITALS
HEART RATE: 85 BPM | WEIGHT: 315 LBS | BODY MASS INDEX: 42.66 KG/M2 | RESPIRATION RATE: 16 BRPM | SYSTOLIC BLOOD PRESSURE: 126 MMHG | TEMPERATURE: 96 F | DIASTOLIC BLOOD PRESSURE: 81 MMHG | HEIGHT: 72 IN

## 2024-10-08 DIAGNOSIS — G56.22 ULNAR NEUROPATHY OF LEFT UPPER EXTREMITY: Primary | ICD-10-CM

## 2024-10-08 DIAGNOSIS — M54.50 LOWER BACK PAIN: ICD-10-CM

## 2024-10-08 DIAGNOSIS — R29.6 MULTIPLE FALLS: ICD-10-CM

## 2024-10-08 PROCEDURE — 99214 OFFICE O/P EST MOD 30 MIN: CPT | Performed by: NEUROLOGICAL SURGERY

## 2024-10-08 RX ORDER — CHLORHEXIDINE GLUCONATE ORAL RINSE 1.2 MG/ML
15 SOLUTION DENTAL ONCE
OUTPATIENT
Start: 2024-10-08 | End: 2024-10-08

## 2024-10-08 NOTE — PROGRESS NOTES
Ambulatory Visit  Name: Enoc Roberto      : 1960      MRN: [id number]  Encounter Provider: Mikael Olivera MD  Encounter Date: 10/8/2024   Encounter department: [facility]    Assessment & Plan  Ulnar neuropathy of left upper extremity  Clinical examination, the spectrum of his complaints, as well as the ultrasound all support the diagnosis of a ulnar neuropathy.  He has been refractory to conservative measures including limited movement and protecting his elbow from contact.  In spite of this his pain numbness and tingling continues.  Is for each of these reasons I have recommended the following surgical intervention.    The risks, benefits and complications of surgery were explained in detail to Enoc Roberto and his wife including hemorrhage, infection, CSF leakage, wound problems, pain, weakness, numbness, dysesthesiae, paralysis, coma, and death. Also, the possibility  of further surgery being required was emphasized.     Other potential medical complications were outlined, including deep venous thrombosis, pulmonary embolism, pneumonia, urinary tract infection, myocardial infarction,  and stroke.     The need for physical therapy, occupational therapy, and rehabilitation was also mentioned. The alternatives to surgery were discussed. Enoc Roberto and his wife asked relevant questions and asked that we proceed with arrangements for surgery.  His wife  Orders:    Case request operating room: External neurolysis of the left ulnar nerve at the cubital tunnel; Standing    Case request operating room: External neurolysis of the left ulnar nerve at the cubital tunnel    Lower back pain  An MRI of the LS spine has been ordered by Dr. Vasquez we will see him back following the completion of the study.       Multiple falls  Review MRI of the LS spine as above         History of Present Illness   HPI  Review of Systems   Musculoskeletal:          Follow-up (F/U MRI CSPINE 6/27/24 SL, US MSK 6/27/24 [facility], TSP XR 5/21/24; LOV 5/14/2024 DKO/Dx: Ulnar neuropathy of left upper extremity; Radiculopathy, cervical; Thoracic spine pain/S/p SCS 6/19/18 SF /)     Positive for mild neck pain and neck stiffness/tightness. If he leans on his left side he gets pain from his elbow down.  Positive for numbness in the left arm.   Positive for shortness of breath (when the upper back is having pain).    Positive for back pain (center lower back pain radiates to the back of both legs), gait problem (unsteady on feet, multiple falls; last fall was Sept. 2024) and myalgias (spasms in the back radiates around to the abdomen on the left side).    Positive for weakness (feels like back gives out, in both legs has hard time going up and down steps without holding onto something) and numbness (constant in both legs and both feet - neuropathy).     Physical therapy: Working with a therapist at home hoping to transition into outpatient. Mild improvement with balance and his back.  Epidural steroid injections: Not recently.    Symptom Status - unchanged   All other systems reviewed and are negative.  His symptoms are worsened when he (driving a car with a high armrest between the seats.  This causes his arm and hand to become numb.  He also complains of falls and low back pain.  He has numbness of both of his feet.  He does carry the diagnosis of peripheral neuropathy.  He has a dorsal column stimulator in place he has not had an imaging study of his low back since 2021.  He is recently admitted for multiple falls which occurred during daylight hours in normal well lit conditions.  No solid etiology was identified for these falls.    I have personally reviewed the MA's review of systems and made changes as necessary.      Objective     /81 (BP Location: Right arm, Patient Position: Sitting, Cuff Size: Standard)   Pulse 85   Temp (!) 96 °F (35.6 °C) (Temporal)   Resp 16   Ht 6'  (1.829 m)   Wt (!) 149 kg (329 lb)   BMI 44.62 kg/m²     Physical Exam  Vitals and nursing note reviewed.   Constitutional:       General: He is not in acute distress.     Appearance: Normal appearance. He is obese. He is not ill-appearing, toxic-appearing or diaphoretic.   HENT:      Head: Normocephalic and atraumatic.      Nose: Nose normal.   Eyes:      Extraocular Movements: Extraocular movements intact.      Pupils: Pupils are equal, round, and reactive to light.   Cardiovascular:      Rate and Rhythm: Normal rate.      Pulses: Normal pulses.   Pulmonary:      Effort: Pulmonary effort is normal.   Abdominal:      Palpations: Abdomen is soft.      Comments: Obese   Musculoskeletal:         General: No swelling, tenderness, deformity or signs of injury. Normal range of motion.      Cervical back: Normal range of motion and neck supple.      Right lower leg: No edema.      Left lower leg: No edema.   Skin:     General: Skin is warm and dry.   Neurological:      General: No focal deficit present.      Mental Status: He is alert and oriented to person, place, and time. Mental status is at baseline.      Cranial Nerves: No cranial nerve deficit.      Sensory: Sensory deficit (Decreased fine touch and pinprick in the ulnar nerve distribution on the left) present.      Motor: Weakness (Reduced hand  strength on the left) present.      Coordination: Coordination normal.      Gait: Gait ( ) normal.      Deep Tendon Reflexes: Reflexes normal.      Comments: Positive Tinel's over the elbow.   Psychiatric:         Mood and Affect: Mood normal.         Behavior: Behavior normal.         Thought Content: Thought content normal.         Judgment: Judgment normal.   Neurologic Exam     Mental Status   Oriented to person, place, and time.     Cranial Nerves     CN III, IV, VI   Pupils are equal, round, and reactive to light.    I personally reviewed the following image studies in PACS and associated radiology reports:  Ultrasound(s). My interpretation of the radiology images/reports is: MRI of the cervical spine and ultrasound of the left elbow are carefully reviewed.  This musculoskeletal ultrasound demonstrates enlargement of the ulnar nerve going into the cubital tunnel strongly suggestive of ulnar neuropathy.  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  The MRI of the cervical spine demonstrates cervical spondylosis greater on the left than on the right.  This produces compromise of the neuroforamen but there is no spinal stenosis associated with it.

## 2024-10-18 ENCOUNTER — TELEPHONE (OUTPATIENT)
Age: 64
End: 2024-10-18

## 2024-10-25 ENCOUNTER — OFFICE VISIT (OUTPATIENT)
Dept: FAMILY MEDICINE CLINIC | Facility: CLINIC | Age: 64
End: 2024-10-25

## 2024-10-25 VITALS
WEIGHT: 315 LBS | SYSTOLIC BLOOD PRESSURE: 132 MMHG | OXYGEN SATURATION: 95 % | DIASTOLIC BLOOD PRESSURE: 80 MMHG | HEIGHT: 72 IN | RESPIRATION RATE: 16 BRPM | BODY MASS INDEX: 42.66 KG/M2 | HEART RATE: 85 BPM

## 2024-10-25 DIAGNOSIS — G62.9 PERIPHERAL POLYNEUROPATHY: ICD-10-CM

## 2024-10-25 DIAGNOSIS — Z23 ENCOUNTER FOR IMMUNIZATION: ICD-10-CM

## 2024-10-25 DIAGNOSIS — M51.16 LUMBAR DISC HERNIATION WITH RADICULOPATHY: Primary | ICD-10-CM

## 2024-10-25 RX ORDER — OXYCODONE HYDROCHLORIDE 5 MG/1
5 TABLET ORAL EVERY 4 HOURS PRN
Qty: 30 TABLET | Refills: 0 | Status: SHIPPED | OUTPATIENT
Start: 2024-10-25

## 2024-10-25 RX ORDER — GABAPENTIN 300 MG/1
300 CAPSULE ORAL
Qty: 90 CAPSULE | Refills: 1 | Status: SHIPPED | OUTPATIENT
Start: 2024-10-25

## 2024-10-25 NOTE — ASSESSMENT & PLAN NOTE
Still awaiting MRI of the lumbar spine which is scheduled in 4 days.    -Patient was given gabapentin 200 mg at bedtime while in the hospital.  He has Gabapentin as well as Lyrica previously.  So that he is taking a more therapeutic dose I will place him on 300 mg at bedtime.  He is a 327 pound large statured person.  Probably would need a higher dosage

## 2024-10-25 NOTE — ASSESSMENT & PLAN NOTE
Patient has followed up with neurosurgery.  He should be following up with pain management as well.  Has a lot going on at this time    -MRI of the lumbar spine ordered    -Given refill of as needed oxycodone    Prior to prescribing the controlled substance, a patient search was performed on the [place] prescription drug monitoring program web site.  There was no evidence of diversion or misuse.  Prescription provided
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
## 2024-10-29 ENCOUNTER — HOSPITAL ENCOUNTER (OUTPATIENT)
Dept: MRI IMAGING | Facility: HOSPITAL | Age: 64
Discharge: HOME | End: 2024-10-29
Payer: MEDICARE

## 2024-10-29 VITALS — DIASTOLIC BLOOD PRESSURE: 91 MMHG | OXYGEN SATURATION: 95 % | SYSTOLIC BLOOD PRESSURE: 141 MMHG | HEART RATE: 91 BPM

## 2024-10-29 DIAGNOSIS — M48.062 SPINAL STENOSIS OF LUMBAR REGION WITH NEUROGENIC CLAUDICATION: ICD-10-CM

## 2024-10-29 DIAGNOSIS — M51.16 LUMBAR DISC HERNIATION WITH RADICULOPATHY: ICD-10-CM

## 2024-10-29 DIAGNOSIS — G62.9 PERIPHERAL POLYNEUROPATHY: ICD-10-CM

## 2024-10-29 PROCEDURE — 72148 MRI LUMBAR SPINE W/O DYE: CPT

## 2024-10-31 NOTE — RESULT ENCOUNTER NOTE
Please call patient and notify that results of the MRI of his lumbar spine are largely unchanged.  Bulging disks without stenosis.  L5-S1 stable postoperative findings.

## 2024-11-01 ENCOUNTER — TELEPHONE (OUTPATIENT)
Dept: FAMILY MEDICINE CLINIC | Facility: CLINIC | Age: 64
End: 2024-11-01

## 2024-11-01 NOTE — TELEPHONE ENCOUNTER
----- Message from Danial Vasquez DO sent at 10/31/2024  5:18 PM EDT -----  Please call patient and notify that results of the MRI of his lumbar spine are largely unchanged.  Bulging disks without stenosis.  L5-S1 stable postoperative findings.

## 2024-11-08 ENCOUNTER — TELEPHONE (OUTPATIENT)
Age: 64
End: 2024-11-08

## 2024-11-08 DIAGNOSIS — E66.01 OBESITY, MORBID (HCC): ICD-10-CM

## 2024-11-08 RX ORDER — METFORMIN HYDROCHLORIDE 750 MG/1
TABLET, EXTENDED RELEASE ORAL
Qty: 180 TABLET | Refills: 0 | Status: SHIPPED | OUTPATIENT
Start: 2024-11-08

## 2024-11-08 NOTE — TELEPHONE ENCOUNTER
Wayne from [facility] pharmacy called because he is going to be faxing over paperwork for the provider to sign and fax back. Please look out for fax. Thank you.

## 2024-11-19 ENCOUNTER — OFFICE VISIT (OUTPATIENT)
Dept: UROLOGY | Facility: CLINIC | Age: 64
End: 2024-11-19
Payer: MEDICARE

## 2024-11-19 VITALS
OXYGEN SATURATION: 94 % | BODY MASS INDEX: 42.66 KG/M2 | DIASTOLIC BLOOD PRESSURE: 80 MMHG | HEART RATE: 96 BPM | WEIGHT: 315 LBS | SYSTOLIC BLOOD PRESSURE: 140 MMHG | HEIGHT: 72 IN

## 2024-11-19 DIAGNOSIS — Z12.5 SCREENING FOR PROSTATE CANCER: ICD-10-CM

## 2024-11-19 DIAGNOSIS — R35.0 URINARY FREQUENCY: Primary | ICD-10-CM

## 2024-11-19 LAB — POST-VOID RESIDUAL VOLUME, ML POC: 0 ML

## 2024-11-19 PROCEDURE — 99213 OFFICE O/P EST LOW 20 MIN: CPT

## 2024-11-19 PROCEDURE — 51798 US URINE CAPACITY MEASURE: CPT

## 2024-11-19 RX ORDER — TADALAFIL 5 MG/1
5 TABLET ORAL DAILY
Qty: 90 TABLET | Refills: 3 | Status: SHIPPED | OUTPATIENT
Start: 2024-11-19 | End: 2025-11-14
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
## 2024-12-06 DIAGNOSIS — I10 ESSENTIAL HYPERTENSION: ICD-10-CM

## 2024-12-06 DIAGNOSIS — Z56.6 WORK-RELATED STRESS: ICD-10-CM

## 2024-12-06 DIAGNOSIS — F41.1 GENERALIZED ANXIETY DISORDER: ICD-10-CM

## 2024-12-06 RX ORDER — NEBIVOLOL 5 MG/1
5 TABLET ORAL DAILY
Qty: 90 TABLET | Refills: 1 | Status: SHIPPED | OUTPATIENT
Start: 2024-12-06

## 2024-12-06 RX ORDER — CLONAZEPAM 0.5 MG/1
0.5 TABLET ORAL
Qty: 90 TABLET | Refills: 0 | Status: SHIPPED | OUTPATIENT
Start: 2024-12-06

## 2024-12-06 SDOH — HEALTH STABILITY - MENTAL HEALTH: OTHER PHYSICAL AND MENTAL STRAIN RELATED TO WORK: Z56.6
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
## 2024-12-06 NOTE — TELEPHONE ENCOUNTER
Reason for call:   [x] Refill   [] Prior Auth  [] Other:     Office:   [x] PCP/Provider - Dr Vasquez  [] Specialty/Provider -     Medication: clonazepam     Dose/Frequency: 0.5 mg take one at bedtime     Quantity: 90    Pharmacy: [facility]    Does the patient have enough for 3 days?   [] Yes   [x] No - Send as HP to POD

## 2024-12-09 ENCOUNTER — TELEPHONE (OUTPATIENT)
Age: 64
End: 2024-12-09
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
## 2024-12-16 ENCOUNTER — OFFICE VISIT (OUTPATIENT)
Dept: CARDIOLOGY CLINIC | Facility: CLINIC | Age: 64
End: 2024-12-16
Payer: MEDICARE

## 2024-12-16 VITALS
BODY MASS INDEX: 42.6 KG/M2 | WEIGHT: 314.5 LBS | DIASTOLIC BLOOD PRESSURE: 82 MMHG | HEART RATE: 67 BPM | HEIGHT: 72 IN | OXYGEN SATURATION: 97 % | SYSTOLIC BLOOD PRESSURE: 126 MMHG

## 2024-12-16 DIAGNOSIS — I10 ESSENTIAL HYPERTENSION: Primary | ICD-10-CM

## 2024-12-16 DIAGNOSIS — E66.01 OBESITY, MORBID (HCC): ICD-10-CM

## 2024-12-16 DIAGNOSIS — G47.33 OSA (OBSTRUCTIVE SLEEP APNEA): ICD-10-CM

## 2024-12-16 DIAGNOSIS — I25.10 CORONARY ARTERIOSCLEROSIS: ICD-10-CM

## 2024-12-16 DIAGNOSIS — E78.2 MIXED HYPERLIPIDEMIA: ICD-10-CM

## 2024-12-16 DIAGNOSIS — Z01.810 PREOPERATIVE CARDIOVASCULAR EXAMINATION: ICD-10-CM

## 2024-12-16 DIAGNOSIS — I48.20 CHRONIC ATRIAL FIBRILLATION (HCC): ICD-10-CM

## 2024-12-16 PROCEDURE — 93000 ELECTROCARDIOGRAM COMPLETE: CPT | Performed by: INTERNAL MEDICINE

## 2024-12-16 PROCEDURE — 99214 OFFICE O/P EST MOD 30 MIN: CPT | Performed by: INTERNAL MEDICINE

## 2024-12-16 RX ORDER — LOSARTAN POTASSIUM 25 MG/1
25 TABLET ORAL DAILY
Qty: 90 TABLET | Refills: 3 | Status: SHIPPED | OUTPATIENT
Start: 2024-12-16

## 2024-12-16 NOTE — PATIENT INSTRUCTIONS
Please start losartan 25 mg/day.  Please call if there is a problem.  I will see you in follow-up.

## 2024-12-18 ENCOUNTER — TELEPHONE (OUTPATIENT)
Age: 64
End: 2024-12-18

## 2024-12-18 NOTE — TELEPHONE ENCOUNTER
PT CALLED TO LET SS DKO KNOW THAT HE IS CONTINUING TO COMPLETE HIS CLEARANCES FOR UPCOMING NSX 1/10/2025. PT STATES CARDIO IS COMPLETE AND PULMONOLOGY IS UPCOMING THIS WEEK.    PT HAS SOME QUESTIONS ABOUT WHAT LABWORK IS STILL NEEDED IN PREPARATION FOR NSX DKO.    PT IS NOT SURE IF LABS THAT WERE RUN DURING HIS HOSPITAL STAY ARE VALID OR IF NEW LABWORK NEEDS TO BE COMPLETED. PT DISCUSSED WITH PCP AND WAS ADVISED TO FOLLOW UP WITH NSX FOR CLARIFICATION.    PT IS HOPING TO GET ANY NEEDED BLOODWORK COMPLETED ASAP AND WOULD APPRECIATE A CB TO CLEAR THINGS UP.    PLEASE CB PT TO REVIEW CLEARANCES, SPECIFICALLY BLOODWORK, SO HIS NSX DOES NOT NEED TO BE POSTPONED FURTHER.      THANK YOU

## 2024-12-19 ENCOUNTER — CONSULT (OUTPATIENT)
Dept: FAMILY MEDICINE CLINIC | Facility: CLINIC | Age: 64
End: 2024-12-19
Payer: MEDICARE

## 2024-12-19 ENCOUNTER — APPOINTMENT (OUTPATIENT)
Dept: LAB | Facility: CLINIC | Age: 64
End: 2024-12-19
Payer: MEDICARE

## 2024-12-19 ENCOUNTER — TELEPHONE (OUTPATIENT)
Age: 64
End: 2024-12-19

## 2024-12-19 VITALS
SYSTOLIC BLOOD PRESSURE: 122 MMHG | DIASTOLIC BLOOD PRESSURE: 72 MMHG | WEIGHT: 315 LBS | BODY MASS INDEX: 42.66 KG/M2 | OXYGEN SATURATION: 96 % | HEART RATE: 79 BPM | HEIGHT: 72 IN

## 2024-12-19 DIAGNOSIS — Z01.812 PRE-OPERATIVE LABORATORY EXAMINATION: ICD-10-CM

## 2024-12-19 DIAGNOSIS — I48.20 CHRONIC ATRIAL FIBRILLATION (HCC): ICD-10-CM

## 2024-12-19 DIAGNOSIS — Z79.01 LONG TERM (CURRENT) USE OF ANTICOAGULANTS: ICD-10-CM

## 2024-12-19 DIAGNOSIS — M79.18 MYOFASCIAL PAIN SYNDROME: ICD-10-CM

## 2024-12-19 DIAGNOSIS — G56.22 CUBITAL TUNNEL SYNDROME ON LEFT: Primary | ICD-10-CM

## 2024-12-19 DIAGNOSIS — G56.22 IRRITATION OF LEFT ULNAR NERVE: ICD-10-CM

## 2024-12-19 DIAGNOSIS — E78.2 MIXED HYPERLIPIDEMIA: ICD-10-CM

## 2024-12-19 DIAGNOSIS — I10 ESSENTIAL HYPERTENSION: ICD-10-CM

## 2024-12-19 DIAGNOSIS — Z79.899 ENCOUNTER FOR LONG-TERM (CURRENT) USE OF MEDICATIONS: ICD-10-CM

## 2024-12-19 DIAGNOSIS — G56.22 ULNAR NEUROPATHY OF LEFT UPPER EXTREMITY: ICD-10-CM

## 2024-12-19 DIAGNOSIS — R73.01 IMPAIRED FASTING GLUCOSE: ICD-10-CM

## 2024-12-19 DIAGNOSIS — E79.0 HYPERURICEMIA: ICD-10-CM

## 2024-12-19 DIAGNOSIS — R29.6 MULTIPLE FALLS: ICD-10-CM

## 2024-12-19 DIAGNOSIS — M54.50 LOWER BACK PAIN: ICD-10-CM

## 2024-12-19 LAB
ALBUMIN SERPL BCG-MCNC: 3.9 G/DL (ref 3.5–5)
ALP SERPL-CCNC: 89 U/L (ref 34–104)
ALT SERPL W P-5'-P-CCNC: 10 U/L (ref 7–52)
ANION GAP SERPL CALCULATED.3IONS-SCNC: 9 MMOL/L (ref 4–13)
APTT PPP: 33 SECONDS (ref 23–34)
AST SERPL W P-5'-P-CCNC: 17 U/L (ref 13–39)
BILIRUB SERPL-MCNC: 0.48 MG/DL (ref 0.2–1)
BUN SERPL-MCNC: 16 MG/DL (ref 5–25)
CALCIUM SERPL-MCNC: 9.4 MG/DL (ref 8.4–10.2)
CHLORIDE SERPL-SCNC: 103 MMOL/L (ref 96–108)
CHOLEST SERPL-MCNC: 198 MG/DL (ref ?–200)
CO2 SERPL-SCNC: 30 MMOL/L (ref 21–32)
CREAT SERPL-MCNC: 1.2 MG/DL (ref 0.6–1.3)
EST. AVERAGE GLUCOSE BLD GHB EST-MCNC: 126 MG/DL
GFR SERPL CREATININE-BSD FRML MDRD: 63 ML/MIN/1.73SQ M
GLUCOSE P FAST SERPL-MCNC: 101 MG/DL (ref 65–99)
HBA1C MFR BLD: 6 %
HDLC SERPL-MCNC: 47 MG/DL
INR PPP: 1.28 (ref 0.85–1.19)
LDLC SERPL CALC-MCNC: 112 MG/DL (ref 0–100)
NONHDLC SERPL-MCNC: 151 MG/DL
POTASSIUM SERPL-SCNC: 4.1 MMOL/L (ref 3.5–5.3)
PROT SERPL-MCNC: 6.5 G/DL (ref 6.4–8.4)
PROTHROMBIN TIME: 16.3 SECONDS (ref 12.3–15)
SODIUM SERPL-SCNC: 142 MMOL/L (ref 135–147)
TRIGL SERPL-MCNC: 193 MG/DL (ref ?–150)
TSH SERPL DL<=0.05 MIU/L-ACNC: 1.9 UIU/ML (ref 0.45–4.5)
URATE SERPL-MCNC: 6.4 MG/DL (ref 3.5–8.5)

## 2024-12-19 PROCEDURE — 84550 ASSAY OF BLOOD/URIC ACID: CPT

## 2024-12-19 PROCEDURE — 85610 PROTHROMBIN TIME: CPT

## 2024-12-19 PROCEDURE — G2211 COMPLEX E/M VISIT ADD ON: HCPCS | Performed by: FAMILY MEDICINE

## 2024-12-19 PROCEDURE — 99214 OFFICE O/P EST MOD 30 MIN: CPT | Performed by: FAMILY MEDICINE

## 2024-12-19 PROCEDURE — 85027 COMPLETE CBC AUTOMATED: CPT

## 2024-12-19 PROCEDURE — 36415 COLL VENOUS BLD VENIPUNCTURE: CPT

## 2024-12-19 PROCEDURE — 85007 BL SMEAR W/DIFF WBC COUNT: CPT

## 2024-12-19 PROCEDURE — 80053 COMPREHEN METABOLIC PANEL: CPT

## 2024-12-19 PROCEDURE — 84443 ASSAY THYROID STIM HORMONE: CPT

## 2024-12-19 PROCEDURE — 80061 LIPID PANEL: CPT

## 2024-12-19 PROCEDURE — 85730 THROMBOPLASTIN TIME PARTIAL: CPT

## 2024-12-19 PROCEDURE — 83036 HEMOGLOBIN GLYCOSYLATED A1C: CPT

## 2024-12-19 NOTE — ASSESSMENT & PLAN NOTE
Patient remains on Bystolic and anticoagulation with Eliquis    -3-day hold for Eliquis for surgery and resume after surgery was completed

## 2024-12-19 NOTE — ASSESSMENT & PLAN NOTE
Patient is medically cleared for surgery of the left ulnar nerve at the cubital tunnel    Medical clearance sent to neurosurgeon's office
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
## 2024-12-19 NOTE — TELEPHONE ENCOUNTER
The patient called to inform that he would be a few minutes late for his visit with Dr. Vasquez because he is  in traffic.

## 2024-12-20 ENCOUNTER — RESULTS FOLLOW-UP (OUTPATIENT)
Dept: FAMILY MEDICINE CLINIC | Facility: CLINIC | Age: 64
End: 2024-12-20

## 2024-12-20 LAB
ANISOCYTOSIS BLD QL SMEAR: PRESENT
BASOPHILS # BLD MANUAL: 0 THOUSAND/UL (ref 0–0.1)
BASOPHILS NFR MAR MANUAL: 0 % (ref 0–1)
DIFFERENTIAL COMMENT: ABNORMAL
EOSINOPHIL # BLD MANUAL: 1.74 THOUSAND/UL (ref 0–0.4)
EOSINOPHIL NFR BLD MANUAL: 18 % (ref 0–6)
ERYTHROCYTE [DISTWIDTH] IN BLOOD BY AUTOMATED COUNT: 17.8 % (ref 11.6–15.1)
HCT VFR BLD AUTO: 42.1 % (ref 36.5–49.3)
HGB BLD-MCNC: 12.8 G/DL (ref 12–17)
LYMPHOCYTES # BLD AUTO: 2.51 THOUSAND/UL (ref 0.6–4.47)
LYMPHOCYTES # BLD AUTO: 22 % (ref 14–44)
MCH RBC QN AUTO: 26.5 PG (ref 26.8–34.3)
MCHC RBC AUTO-ENTMCNC: 30.4 G/DL (ref 31.4–37.4)
MCV RBC AUTO: 87 FL (ref 82–98)
MONOCYTES # BLD AUTO: 0.77 THOUSAND/UL (ref 0–1.22)
MONOCYTES NFR BLD: 8 % (ref 4–12)
NEUTROPHILS # BLD MANUAL: 4.64 THOUSAND/UL (ref 1.85–7.62)
NEUTS SEG NFR BLD AUTO: 48 % (ref 43–75)
PATHOLOGY REVIEW: YES
PLATELET # BLD AUTO: 136 THOUSANDS/UL (ref 149–390)
PLATELET BLD QL SMEAR: ADEQUATE
PLATELET CLUMP BLD QL SMEAR: PRESENT
RBC # BLD AUTO: 4.83 MILLION/UL (ref 3.88–5.62)
RBC MORPH BLD: PRESENT
VARIANT LYMPHS # BLD AUTO: 4 %
WBC # BLD AUTO: 9.66 THOUSAND/UL (ref 4.31–10.16)

## 2024-12-20 PROCEDURE — 85060 BLOOD SMEAR INTERPRETATION: CPT | Performed by: PATHOLOGY

## 2024-12-31 NOTE — PRE-PROCEDURE INSTRUCTIONS
Pre-Surgery Instructions:   Medication Instructions    albuterol (PROVENTIL HFA,VENTOLIN HFA) 90 mcg/act inhaler Take Day of Surgery; unless usually taken at night -PRN    alfuzosin (UROXATRAL) 10 mg 24 hr tablet Do not take day of surgery; continue as prescribed excluding DOS - takes HS    allopurinol (ZYLOPRIM) 100 mg tablet Do not take day of surgery; continue as prescribed excluding DOS - takes HS    amLODIPine (NORVASC) 10 mg tablet Take Day of Surgery; unless usually taken at night     Ascorbic Acid (ANTONIETA-C PO) Avoid 1 week prior to surgery      aspirin (ECOTRIN LOW STRENGTH) 81 mg EC tablet Per [facility], hold for 1 week    Calcium-Magnesium-Vitamin D (CITRACAL CALCIUM+D PO) Avoid 1 week prior to surgery      clonazePAM (KlonoPIN) 0.5 mg tablet Do not take day of surgery; continue as prescribed excluding DOS - takes HS    Diclofenac Sodium (VOLTAREN) 1 % Do not take day of surgery; continue as prescribed excluding DOS     DULoxetine HCl 40 MG CPEP Do not take day of surgery; continue as prescribed excluding DOS - takes HS    Eliquis 5 MG Per CC, hold for 3 days prior    esomeprazole (NexIUM) 40 MG capsule Take Day of Surgery; unless usually taken at night     fluticasone (FLONASE) 50 mcg/act nasal spray Take Day of Surgery; unless usually taken at night -PRN    gabapentin (NEURONTIN) 300 mg capsule Do not take day of surgery; continue as prescribed excluding DOS - takes HS    levothyroxine 25 mcg tablet Take Day of Surgery; unless usually taken at night     lidocaine (LIDODERM) 5 % Do not take day of surgery; continue as prescribed excluding DOS     losartan (COZAAR) 25 mg tablet Do not take day of surgery; continue as prescribed excluding DOS     Magnesium 400 MG CAPS Avoid 1 week prior to surgery      metFORMIN (GLUCOPHAGE-XR) 750 mg 24 hr tablet Do not take day of surgery; continue as prescribed excluding DOS     montelukast (SINGULAIR) 10 mg tablet Do not take day of surgery; continue as prescribed excluding DOS  - takes HS    Multiple Vitamins-Minerals (ICAPS AREDS 2 PO) Avoid 1 week prior to surgery      nebivolol (BYSTOLIC) 5 mg tablet Take Day of Surgery; unless usually taken at night     rosuvastatin (CRESTOR) 5 mg tablet Do not take day of surgery; continue as prescribed excluding DOS - takes HS    senna (SENOKOT) 8.6 mg Do not take day of surgery; continue as prescribed excluding DOS -PRN    tadalafil (CIALIS) 5 MG tablet Take Day of Surgery; unless usually taken at night -QD    Medication instructions for day surgery reviewed. Please use only a sip of water to take your instructed medications. Avoid all over the counter vitamins, supplements and NSAIDS for one week prior to surgery per anesthesia guidelines. Tylenol is ok to take as needed.     You will receive a call one business day prior to surgery with an arrival time and hospital directions. If your surgery is scheduled on a Monday, the hospital will be calling you on the Friday prior to your surgery. If you have not heard from anyone by 8pm, please call the hospital supervisor through the hospital  at [phone]. ([place] [phone] or [place] [phone]).    Do not eat or drink anything after midnight the night before your surgery, including candy, mints, lifesavers, or chewing gum. Do not drink alcohol 24hrs before your surgery. Try not to smoke at least 24hrs before your surgery.       Follow the pre surgery showering instructions as listed in the “My Surgical Experience Booklet” or otherwise provided by your surgeon's office. Do not use a blade to shave the surgical area 1 week before surgery. It is okay to use a clean electric clippers up to 24 hours before surgery. Do not apply any lotions, creams, including makeup, cologne, deodorant, or perfumes after showering on the day of your surgery. Do not use dry shampoo, hair spray, hair gel, or any type of hair products.     No contact lenses, eye make-up, or artificial eyelashes. Remove nail  polish, including gel polish, and any artificial, gel, or acrylic nails if possible. Remove all jewelry including rings and body piercing jewelry.     Wear causal clothing that is easy to take on and off. Consider your type of surgery.    Keep any valuables, jewelry, piercings at home. Please bring any specially ordered equipment (sling, braces) if indicated.    Arrange for a responsible person to drive you to and from the hospital on the day of your surgery. Please confirm the visitor policy for the day of your procedure when you receive your phone call with an arrival time.     Call the surgeon's office with any new illnesses, exposures, or additional questions prior to surgery.    Please reference your “My Surgical Experience Booklet” for additional information to prepare for your upcoming surgery.

## 2025-01-02 ENCOUNTER — APPOINTMENT (OUTPATIENT)
Dept: LAB | Facility: CLINIC | Age: 65
End: 2025-01-02
Payer: MEDICARE

## 2025-01-02 DIAGNOSIS — R29.6 MULTIPLE FALLS: ICD-10-CM

## 2025-01-02 DIAGNOSIS — G56.22 ULNAR NEUROPATHY OF LEFT UPPER EXTREMITY: ICD-10-CM

## 2025-01-02 DIAGNOSIS — Z79.899 ENCOUNTER FOR LONG-TERM (CURRENT) USE OF MEDICATIONS: ICD-10-CM

## 2025-01-02 DIAGNOSIS — Z01.812 PRE-OPERATIVE LABORATORY EXAMINATION: ICD-10-CM

## 2025-01-02 DIAGNOSIS — M54.50 LOWER BACK PAIN: ICD-10-CM

## 2025-01-02 DIAGNOSIS — Z79.01 LONG TERM (CURRENT) USE OF ANTICOAGULANTS: ICD-10-CM

## 2025-01-02 LAB
BACTERIA UR QL AUTO: ABNORMAL /HPF
BILIRUB UR QL STRIP: NEGATIVE
CLARITY UR: CLEAR
COLOR UR: YELLOW
GLUCOSE UR STRIP-MCNC: NEGATIVE MG/DL
HGB UR QL STRIP.AUTO: NEGATIVE
KETONES UR STRIP-MCNC: NEGATIVE MG/DL
LEUKOCYTE ESTERASE UR QL STRIP: NEGATIVE
MUCOUS THREADS UR QL AUTO: ABNORMAL
NITRITE UR QL STRIP: NEGATIVE
NON-SQ EPI CELLS URNS QL MICRO: ABNORMAL /HPF
PH UR STRIP.AUTO: 7 [PH]
PROT UR STRIP-MCNC: ABNORMAL MG/DL
RBC #/AREA URNS AUTO: ABNORMAL /HPF
SP GR UR STRIP.AUTO: 1.02 (ref 1–1.03)
UROBILINOGEN UR STRIP-ACNC: <2 MG/DL
WBC #/AREA URNS AUTO: ABNORMAL /HPF

## 2025-01-02 PROCEDURE — 81001 URINALYSIS AUTO W/SCOPE: CPT

## 2025-01-10 ENCOUNTER — HOSPITAL ENCOUNTER (OUTPATIENT)
Facility: HOSPITAL | Age: 65
Setting detail: OUTPATIENT SURGERY
Discharge: HOME | End: 2025-01-10
Attending: NEUROLOGICAL SURGERY | Admitting: NEUROLOGICAL SURGERY
Payer: MEDICARE

## 2025-01-10 ENCOUNTER — ANESTHESIA (OUTPATIENT)
Dept: PERIOP | Facility: HOSPITAL | Age: 65
End: 2025-01-10
Payer: MEDICARE

## 2025-01-10 ENCOUNTER — ANESTHESIA EVENT (OUTPATIENT)
Dept: PERIOP | Facility: HOSPITAL | Age: 65
End: 2025-01-10
Payer: MEDICARE

## 2025-01-10 VITALS
DIASTOLIC BLOOD PRESSURE: 86 MMHG | OXYGEN SATURATION: 96 % | RESPIRATION RATE: 14 BRPM | TEMPERATURE: 96.5 F | HEIGHT: 72 IN | WEIGHT: 315 LBS | HEART RATE: 75 BPM | SYSTOLIC BLOOD PRESSURE: 139 MMHG | BODY MASS INDEX: 42.66 KG/M2

## 2025-01-10 DIAGNOSIS — M51.16 LUMBAR DISC HERNIATION WITH RADICULOPATHY: ICD-10-CM

## 2025-01-10 DIAGNOSIS — Z01.810 PREOPERATIVE CARDIOVASCULAR EXAMINATION: ICD-10-CM

## 2025-01-10 DIAGNOSIS — I10 ESSENTIAL HYPERTENSION: ICD-10-CM

## 2025-01-10 DIAGNOSIS — E78.2 MIXED HYPERLIPIDEMIA: ICD-10-CM

## 2025-01-10 DIAGNOSIS — I25.10 CORONARY ARTERIOSCLEROSIS: ICD-10-CM

## 2025-01-10 LAB — GLUCOSE SERPL-MCNC: 116 MG/DL (ref 65–140)

## 2025-01-10 PROCEDURE — 82948 REAGENT STRIP/BLOOD GLUCOSE: CPT

## 2025-01-10 PROCEDURE — 64718 REVISE ULNAR NERVE AT ELBOW: CPT | Performed by: NEUROLOGICAL SURGERY

## 2025-01-10 RX ORDER — LIDOCAINE HYDROCHLORIDE 10 MG/ML
INJECTION, SOLUTION EPIDURAL; INFILTRATION; INTRACAUDAL; PERINEURAL AS NEEDED
Status: DISCONTINUED | OUTPATIENT
Start: 2025-01-10 | End: 2025-01-10

## 2025-01-10 RX ORDER — ONDANSETRON 2 MG/ML
4 INJECTION INTRAMUSCULAR; INTRAVENOUS ONCE AS NEEDED
Status: DISCONTINUED | OUTPATIENT
Start: 2025-01-10 | End: 2025-01-10 | Stop reason: HOSPADM

## 2025-01-10 RX ORDER — PROPOFOL 10 MG/ML
INJECTION, EMULSION INTRAVENOUS AS NEEDED
Status: DISCONTINUED | OUTPATIENT
Start: 2025-01-10 | End: 2025-01-10

## 2025-01-10 RX ORDER — HYDROMORPHONE HCL IN WATER/PF 6 MG/30 ML
0.2 PATIENT CONTROLLED ANALGESIA SYRINGE INTRAVENOUS
Status: DISCONTINUED | OUTPATIENT
Start: 2025-01-10 | End: 2025-01-10 | Stop reason: HOSPADM

## 2025-01-10 RX ORDER — LIDOCAINE HYDROCHLORIDE AND EPINEPHRINE 10; 10 MG/ML; UG/ML
INJECTION, SOLUTION INFILTRATION; PERINEURAL AS NEEDED
Status: DISCONTINUED | OUTPATIENT
Start: 2025-01-10 | End: 2025-01-10 | Stop reason: HOSPADM

## 2025-01-10 RX ORDER — ASPIRIN 81 MG/1
81 TABLET ORAL DAILY
Start: 2025-01-13

## 2025-01-10 RX ORDER — GINSENG 100 MG
CAPSULE ORAL AS NEEDED
Status: DISCONTINUED | OUTPATIENT
Start: 2025-01-10 | End: 2025-01-10 | Stop reason: HOSPADM

## 2025-01-10 RX ORDER — FENTANYL CITRATE/PF 50 MCG/ML
25 SYRINGE (ML) INJECTION
Status: DISCONTINUED | OUTPATIENT
Start: 2025-01-10 | End: 2025-01-10 | Stop reason: HOSPADM

## 2025-01-10 RX ORDER — SODIUM CHLORIDE, SODIUM LACTATE, POTASSIUM CHLORIDE, CALCIUM CHLORIDE 600; 310; 30; 20 MG/100ML; MG/100ML; MG/100ML; MG/100ML
100 INJECTION, SOLUTION INTRAVENOUS CONTINUOUS
Status: CANCELLED | OUTPATIENT
Start: 2025-01-10

## 2025-01-10 RX ORDER — EPHEDRINE SULFATE 50 MG/ML
INJECTION INTRAVENOUS AS NEEDED
Status: DISCONTINUED | OUTPATIENT
Start: 2025-01-10 | End: 2025-01-10

## 2025-01-10 RX ORDER — CHLORHEXIDINE GLUCONATE ORAL RINSE 1.2 MG/ML
15 SOLUTION DENTAL ONCE
Status: DISCONTINUED | OUTPATIENT
Start: 2025-01-10 | End: 2025-01-10

## 2025-01-10 RX ORDER — FENTANYL CITRATE 50 UG/ML
INJECTION, SOLUTION INTRAMUSCULAR; INTRAVENOUS AS NEEDED
Status: DISCONTINUED | OUTPATIENT
Start: 2025-01-10 | End: 2025-01-10

## 2025-01-10 RX ORDER — CEFAZOLIN SODIUM 1 G/50ML
1000 SOLUTION INTRAVENOUS ONCE
Status: COMPLETED | OUTPATIENT
Start: 2025-01-10 | End: 2025-01-10

## 2025-01-10 RX ORDER — SODIUM CHLORIDE, SODIUM LACTATE, POTASSIUM CHLORIDE, CALCIUM CHLORIDE 600; 310; 30; 20 MG/100ML; MG/100ML; MG/100ML; MG/100ML
20 INJECTION, SOLUTION INTRAVENOUS CONTINUOUS
Status: DISCONTINUED | OUTPATIENT
Start: 2025-01-10 | End: 2025-01-10 | Stop reason: HOSPADM

## 2025-01-10 RX ORDER — MIDAZOLAM HYDROCHLORIDE 2 MG/2ML
INJECTION, SOLUTION INTRAMUSCULAR; INTRAVENOUS AS NEEDED
Status: DISCONTINUED | OUTPATIENT
Start: 2025-01-10 | End: 2025-01-10

## 2025-01-10 RX ORDER — CEFAZOLIN SODIUM 2 G/50ML
2000 SOLUTION INTRAVENOUS ONCE
Status: COMPLETED | OUTPATIENT
Start: 2025-01-10 | End: 2025-01-10

## 2025-01-10 RX ORDER — ONDANSETRON 2 MG/ML
INJECTION INTRAMUSCULAR; INTRAVENOUS AS NEEDED
Status: DISCONTINUED | OUTPATIENT
Start: 2025-01-10 | End: 2025-01-10

## 2025-01-10 RX ORDER — OXYCODONE HYDROCHLORIDE 5 MG/1
2.5 TABLET ORAL EVERY 4 HOURS PRN
Refills: 0 | Status: DISCONTINUED | OUTPATIENT
Start: 2025-01-10 | End: 2025-01-10 | Stop reason: HOSPADM

## 2025-01-10 RX ORDER — OXYCODONE HYDROCHLORIDE 5 MG/1
2.5-5 TABLET ORAL EVERY 4 HOURS PRN
Qty: 10 TABLET | Refills: 0 | Status: SHIPPED | OUTPATIENT
Start: 2025-01-10 | End: 2025-01-20

## 2025-01-10 RX ORDER — OXYCODONE HYDROCHLORIDE 5 MG/1
5 TABLET ORAL EVERY 4 HOURS PRN
Refills: 0 | Status: DISCONTINUED | OUTPATIENT
Start: 2025-01-10 | End: 2025-01-10 | Stop reason: HOSPADM

## 2025-01-10 RX ORDER — DEXAMETHASONE SODIUM PHOSPHATE 10 MG/ML
INJECTION, SOLUTION INTRAMUSCULAR; INTRAVENOUS AS NEEDED
Status: DISCONTINUED | OUTPATIENT
Start: 2025-01-10 | End: 2025-01-10

## 2025-01-10 RX ADMIN — PROPOFOL 50 MG: 10 INJECTION, EMULSION INTRAVENOUS at 15:48

## 2025-01-10 RX ADMIN — OXYCODONE HYDROCHLORIDE 5 MG: 5 TABLET ORAL at 17:11

## 2025-01-10 RX ADMIN — EPHEDRINE SULFATE 10 MG: 50 INJECTION, SOLUTION INTRAVENOUS at 15:23

## 2025-01-10 RX ADMIN — EPHEDRINE SULFATE 10 MG: 50 INJECTION, SOLUTION INTRAVENOUS at 15:14

## 2025-01-10 RX ADMIN — DEXAMETHASONE SODIUM PHOSPHATE 10 MG: 10 INJECTION, SOLUTION INTRAMUSCULAR; INTRAVENOUS at 15:03

## 2025-01-10 RX ADMIN — CEFAZOLIN SODIUM 1000 MG: 2 SOLUTION INTRAVENOUS at 15:00

## 2025-01-10 RX ADMIN — CEFAZOLIN SODIUM 2000 MG: 1 SOLUTION INTRAVENOUS at 15:00

## 2025-01-10 RX ADMIN — PHENYLEPHRINE HYDROCHLORIDE 20 MCG/MIN: 10 INJECTION INTRAVENOUS at 15:07

## 2025-01-10 RX ADMIN — SODIUM CHLORIDE, SODIUM LACTATE, POTASSIUM CHLORIDE, AND CALCIUM CHLORIDE 20 ML/HR: .6; .31; .03; .02 INJECTION, SOLUTION INTRAVENOUS at 12:35

## 2025-01-10 RX ADMIN — MIDAZOLAM 2 MG: 1 INJECTION INTRAMUSCULAR; INTRAVENOUS at 14:52

## 2025-01-10 RX ADMIN — ONDANSETRON 4 MG: 2 INJECTION INTRAMUSCULAR; INTRAVENOUS at 15:03

## 2025-01-10 RX ADMIN — PROPOFOL 200 MG: 10 INJECTION, EMULSION INTRAVENOUS at 14:55

## 2025-01-10 RX ADMIN — PROPOFOL 50 MG: 10 INJECTION, EMULSION INTRAVENOUS at 15:07

## 2025-01-10 RX ADMIN — LIDOCAINE HYDROCHLORIDE 50 MG: 10 INJECTION, SOLUTION EPIDURAL; INFILTRATION; INTRACAUDAL; PERINEURAL at 14:55

## 2025-01-10 RX ADMIN — FENTANYL CITRATE 25 MCG: 50 INJECTION INTRAMUSCULAR; INTRAVENOUS at 14:59

## 2025-01-10 NOTE — ANESTHESIA PREPROCEDURE EVALUATION
Procedure:  External neurolysis of the left ulnar nerve at the cubital tunnel (Left: Arm Lower)    Relevant Problems   CARDIO   (+) Chronic atrial fibrillation (HCC)   (+) Coronary arteriosclerosis   (+) Essential hypertension      ENDO   (+) Other specified hypothyroidism      GI/HEPATIC   (+) Esophageal reflux      MUSCULOSKELETAL   (+) Arthritis of carpometacarpal (CMC) joint of right thumb   (+) Chronic lower back pain   (+) DJD of right shoulder   (+) Myofascial pain syndrome   (+) Osteoarthritis of left midfoot   (+) Spondylosis of lumbar region without myelopathy or radiculopathy      NEURO/PSYCH   (+) Chronic lower back pain   (+) Chronic pain syndrome   (+) Generalized anxiety disorder   (+) Moderate episode of recurrent major depressive disorder (HCC)   (+) Myofascial pain syndrome   (+) Paresthesia of bilateral legs      PULMONARY   (+) JULIUS (obstructive sleep apnea)      Other   (+) Anomaly, spleen        Physical Exam    Airway    Mallampati score: II         Dental   No notable dental hx     Cardiovascular      Pulmonary      Other Findings        Anesthesia Plan  ASA Score- 3     Anesthesia Type- general with ASA Monitors.         Additional Monitors:     Airway Plan: LMA.    Comment: I, Dr. Chaudhry, the attending physician, have personally seen and evaluated the patient prior to anesthetic care.  I have reviewed the pre-anesthetic record, and other medical records if appropriate to the anesthetic care.  If a CRNA is involved in the case, I have reviewed the CRNA assessment, if present, and agree.  The patient is in a suitable condition to proceed with my formulated anesthetic plan.  .       Plan Factors-    Chart reviewed.                      Induction- intravenous.    Postoperative Plan-         Informed Consent- Anesthetic plan and risks discussed with patient.  I personally reviewed this patient with the CRNA. Discussed and agreed on the Anesthesia Plan with the CRNA..

## 2025-01-10 NOTE — OP NOTE
OPERATIVE REPORT  PATIENT NAME: Enoc Roberto    :  1960  MRN: [id number]  Pt Location:  OR ROOM 09    SURGERY DATE: 1/10/2025    Surgeons and Role:     * Mikael Olivera MD - Primary    Preop Diagnosis:  Ulnar neuropathy of left upper extremity [G56.22]    Post-Op Diagnosis Codes:     * Ulnar neuropathy of left upper extremity [G56.22]    Procedure(s):  Left - External neurolysis of the left ulnar nerve at the cubital tunnel    Specimen(s):  * No specimens in log *    Estimated Blood Loss:   Minimal    Drains:  * No LDAs found *    Anesthesia Type:   General    Operative Indications:  Ulnar neuropathy of left upper extremity [G56.22]      Operative Findings:  Scar entrapping the ulnar nerve      Complications:   None    Procedure and Technique:  After institution of monitored anesthesia control the patient was placed supine on the stretcher.  The left arm was extended onto a separate table.  The hand forearm and upper arm were all prepped with Betadine soap then DuraPrep.  Double layer drapes were placed in normal fashion and a 3M Betadine impregnated sticky drape or placed over these.  An extremity drape was used for this purpose.    A timeout was called and all parameters of timeout were followed.    The procedure began in the ulnar groove.  In this region the skin was injected with 1% lidocaine with 1 100,000 epinephrine.  #15 blade was used to incise the skin.  Bovie and bipolar used throughout the procedure to maintain hemostasis.  Bovie and the cutting setting was used to divide the tissues to the dermis.  A sharp dissection then was carried out to the region of the ulnar groove and the ulnar nerve was identified.  Was first identified proximally and then extended through the ulnar groove into the forearm.  A relatively small 3 cm incision was utilized however this was manipulated superiorly and inferiorly to expose the nerve over a much broader distance.  Next under use of loops and  external neurolysis was performed.  At the conclusion of this the nerve was freed and had no further restriction on the outside contour of the nerve.  This was present at the entrance to the ulnar groove before hand.  A Penfield 4 could be easily passed into the mid forearm and mid upper arm following this dissection.  Copious quantities of antibiotic irrigation were used to cleanse out the region.  Next the tissues were approximated with interrupted inverted 3-0 Vicryl suture.  The dermis was approximated with interrupted inverted 3-0 Vicryl suture.  The skin was closed with a running 4-0 Monocryl.  Clean sterile dressings were placed.  A sling was placed.    The patient was aroused from anesthesia and taken to the recovery room having tolerated the procedure well.   I was present for all critical portions of the procedure.    Patient Disposition:  PACU              SIGNATURE: Mikael Olivera MD  DATE: January 10, 2025  TIME: 4:23 PM

## 2025-01-10 NOTE — INTERVAL H&P NOTE
H&P reviewed. After examining the patient I find no changes in the patients condition since the H&P had been written.    Vitals:    01/10/25 1152   BP: 136/97   Pulse: 85   Resp: 18   Temp: 97.7 °F (36.5 °C)   SpO2: 97%

## 2025-01-10 NOTE — ANESTHESIA POSTPROCEDURE EVALUATION
Post-Op Assessment Note    CV Status:  Stable    Pain management: adequate       Mental Status:  Sleepy   Hydration Status:  Euvolemic   PONV Controlled:  Controlled   Airway Patency:  Patent     Post Op Vitals Reviewed: Yes    No anethesia notable event occurred.    Staff: CRNA           Last Filed PACU Vitals:  Vitals Value Taken Time   Temp 97.4 °F (36.3 °C) 01/10/25 1621   Pulse 72 01/10/25 1622   /81 01/10/25 1622   Resp 17 01/10/25 1622   SpO2 97 % FM 6l 01/10/25 1622   Vitals shown include unfiled device data.

## 2025-01-10 NOTE — DISCHARGE INSTR - AVS FIRST PAGE
Discharge Instructions  Ulnar nerve decompression    Surgical incision care:  Remove outer dressing on post op day 1  Remove inner dressing on post op day 2  Incisions may be left open to air after all dressings have been removed, but should remain clean.  THREE days after surgery you START showering  Avoid rubbing the incision  Do NOT immerse the incisions in water for 6 weeks.  Staples/suture will be removed at your 2 week postoperative visit.   Do not apply any creams or ointments to the incision, unless otherwise instructed by [facility].  Contact office if increasing redness, drainage, pain or swelling occurs around the incisions or if you develop a fever greater than 101F            Activity:  Do not lift, push or pull more than 10 pounds for 2 weeks.  Avoid bending, lifting and twisting for 2 weeks. No running. No athletic activities until cleared.   No driving for at least 2 weeks or until cleared by Neurosurgery.   Continue to change bed linens and pajamas more frequently. Wear clean clothes daily.   May walk as tolerated. Recommend 4 short walks daily.   You are to wear sling whenever out of bed for the first week after surgery, then you can wear this as needed for comfort thereafter        Postoperative medication:  [facility] will provide pain medication in the postoperative period. All prescriptions must come from a single practice.   Take medications as prescribed.  Call office with any questions/concerns.  May use over the counter Tylenol. No NSAIDs (ie. Ibuprofen, Aleve, Advil, Naproxen)  Please contact office for questions regarding dosage and modifications.  YOU ARE OKAY TO RESUME YOUR ASPIRIN ON 1/13 AND YOUR ELIQUIS ON 1/17  Do not operate heavy machinery or vehicles while taking sedating medications.  Use a bowel regimen while on opioids as they induce constipation. Ie. Senokot-S, Miralax, Colace, etc. Increase fiber and water intake.      **  Please notify the office if incision becomes red, swollen, tender, or has increased drainage, and temp>101.  Return to the ER if you experience increased headache, difficulty walking, change in vision or speech, drowsiness, weakness, nausea/vomiting, or seizures.**

## 2025-01-14 NOTE — ANESTHESIA POSTPROCEDURE EVALUATION
Post-Op Assessment Note    CV Status:  Stable    Pain management: adequate       Mental Status:  Alert   Hydration Status:  Stable   PONV Controlled:  None   Airway Patency:  Patent     Post Op Vitals Reviewed: Yes    No anethesia notable event occurred.    Staff: Anesthesiologist           Last Filed PACU Vitals:  Vitals Value Taken Time   Temp 97.6 °F (36.4 °C) 01/10/25 1645   Pulse 71 01/10/25 1645   /56 01/10/25 1645   Resp 14 01/10/25 1645   SpO2 95 % 01/10/25 1645       Modified Shikha:     Vitals Value Taken Time   Activity 2 01/10/25 1645   Respiration 2 01/10/25 1645   Circulation 2 01/10/25 1645   Consciousness 2 01/10/25 1645   Oxygen Saturation 2 01/10/25 1645     Modified Shikha Score: 10

## 2025-01-15 ENCOUNTER — TELEPHONE (OUTPATIENT)
Dept: NEUROSURGERY | Facility: CLINIC | Age: 65
End: 2025-01-15

## 2025-01-22 DIAGNOSIS — R06.02 SHORTNESS OF BREATH: ICD-10-CM

## 2025-01-22 RX ORDER — FLUTICASONE PROPIONATE 50 MCG
2 SPRAY, SUSPENSION (ML) NASAL DAILY
Qty: 48 ML | Refills: 1 | Status: SHIPPED | OUTPATIENT
Start: 2025-01-22

## 2025-01-24 ENCOUNTER — CLINICAL SUPPORT (OUTPATIENT)
Dept: NEUROSURGERY | Facility: CLINIC | Age: 65
End: 2025-01-24

## 2025-01-24 DIAGNOSIS — Z48.89 POSTOPERATIVE VISIT: Primary | ICD-10-CM

## 2025-01-24 PROCEDURE — 99024 POSTOP FOLLOW-UP VISIT: CPT | Performed by: NEUROLOGICAL SURGERY

## 2025-01-24 NOTE — PROGRESS NOTES
"                                                                             Post-Op Visit- Neurosurgery    Enoc Roberto 64 y.o. male MRN: [id number]    Chief Complaint:  Patient presents post: External neurolysis of the left ulnar nerve at the cubital tunnel - Left    History of Present Illness:  Patient presents for 2 week POV for incision check accompanied by spouse and ambulating with a cane.  Patient reports he is doing well overall.  He denies any new weakness, numbness or tingling since the surgery, he reports significant improvement in numbness and tingling in fingers.   Patient admits to slowly improving surgical pain at this time and rates their pain as a Pain Score:   3/10. Patient's spouse expressed concern over incision appearance, Incision is well approximated with some mild erythema. Patient arrived with incision covered in medical tape and stated it \"felt like it was getting infected\". Upon inspection some of the Monocryl suture has begun to unravel. Upon review with KRISTA HILLIARD, no antibiotics at this time, do not clip sutures. Patient will send photo via [employer] for review next week.       Wound Exam: Well approximated, mild erythema, some unraveling of the Monocryl suture.  Location: left elbow      Discussion/Summary:  Doing well postoperatively. Reviewed incision care with patient including daily observation for s/s infection including: increased erythema, edema, drainage, dehiscence of incision or fever >101.  Should these be observed, he understands that he is to call and/or return immediately for reassessment.  Advised patient to continue cleansing area with mild soap and water and pat dry. Not to apply any lotions, creams, or ointment.    Verified date/time/location of upcoming POV.  He is to call the office with any further questions or concerns, or if any incisional issues or fevers would arise.          "

## 2025-02-03 ENCOUNTER — OFFICE VISIT (OUTPATIENT)
Dept: PAIN MEDICINE | Facility: CLINIC | Age: 65
End: 2025-02-03
Payer: MEDICARE

## 2025-02-03 ENCOUNTER — TELEPHONE (OUTPATIENT)
Dept: PAIN MEDICINE | Facility: CLINIC | Age: 65
End: 2025-02-03

## 2025-02-03 DIAGNOSIS — M96.1 POSTLAMINECTOMY SYNDROME OF LUMBAR REGION: ICD-10-CM

## 2025-02-03 DIAGNOSIS — M54.16 LUMBAR RADICULOPATHY: ICD-10-CM

## 2025-02-03 DIAGNOSIS — G89.4 CHRONIC PAIN SYNDROME: Primary | ICD-10-CM

## 2025-02-03 DIAGNOSIS — Z98.1 S/P LUMBAR SPINAL FUSION: ICD-10-CM

## 2025-02-03 PROCEDURE — 99214 OFFICE O/P EST MOD 30 MIN: CPT | Performed by: ANESTHESIOLOGY

## 2025-02-03 PROCEDURE — G2211 COMPLEX E/M VISIT ADD ON: HCPCS | Performed by: ANESTHESIOLOGY

## 2025-02-03 NOTE — TELEPHONE ENCOUNTER
This patient is being considered for a neuraxial injection for the management of their pain. However, the patient is currently on an anticoagulant per your orders. The American Society of Regional Anesthesia Guideline on neuraxial procedures and anti-coagulation indicates that medication should be held as follows: Eliquis 3 days prior to injection.   Please advise if you ok the hold of this medication.     Thank you,     D[place] Card  Procedure   [facility]
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
## 2025-02-03 NOTE — TELEPHONE ENCOUNTER
This patient is being considered for a neuraxial injection for the management of their pain. However, the patient is currently on an anticoagulant per your orders. The American Society of Regional Anesthesia Guideline on neuraxial procedures and anti-coagulation indicates that medication should be held as follows: Eliquis 3 days prior to injection.   Please advise if you ok the hold of this medication.    Thank you,    Dallas Card  Procedure   [facility]

## 2025-02-04 ENCOUNTER — TELEPHONE (OUTPATIENT)
Dept: RADIOLOGY | Facility: CLINIC | Age: 65
End: 2025-02-04

## 2025-02-04 NOTE — TELEPHONE ENCOUNTER
Anticoag hold approval documented in Encounter dated 2/3/25    S/w pt, advised he will need to hold Eliquis 3 full days prior to inj on 2/14, LD Eliquis 2/10. Pt verbalized understanding and appreciation.

## 2025-02-04 NOTE — TELEPHONE ENCOUNTER
Caller: Anshul     Doctor/Office: Dr Contreras    Call regarding :        Call was transferred to: [facility] to [facility]

## 2025-02-04 NOTE — TELEPHONE ENCOUNTER
----- Message from Tere SULLIVAN sent at 2/4/2025  3:43 PM EST -----  Regarding: FQ pt-Eliquis hold  Patient has been scheduled with Dr. Contreras for TFESI on 2/14/25.  Patient is currently on Eliquis, hold approval was obtained and can be found in the patient's chart.  Patient is aware that our clinical department will be contacting them with further instructions on hold dates.    Thank you,  [place]

## 2025-02-05 ENCOUNTER — OFFICE VISIT (OUTPATIENT)
Dept: NEUROSURGERY | Facility: CLINIC | Age: 65
End: 2025-02-05

## 2025-02-05 ENCOUNTER — OFFICE VISIT (OUTPATIENT)
Dept: OBGYN CLINIC | Facility: CLINIC | Age: 65
End: 2025-02-05
Payer: MEDICARE

## 2025-02-05 VITALS
HEIGHT: 72 IN | BODY MASS INDEX: 42.66 KG/M2 | WEIGHT: 315 LBS | TEMPERATURE: 98.3 F | DIASTOLIC BLOOD PRESSURE: 70 MMHG | OXYGEN SATURATION: 92 % | SYSTOLIC BLOOD PRESSURE: 118 MMHG | HEART RATE: 76 BPM

## 2025-02-05 VITALS — HEIGHT: 72 IN | BODY MASS INDEX: 42.66 KG/M2 | WEIGHT: 315 LBS

## 2025-02-05 DIAGNOSIS — M19.012 PRIMARY OSTEOARTHRITIS OF SHOULDERS, BILATERAL: Primary | ICD-10-CM

## 2025-02-05 DIAGNOSIS — M19.011 PRIMARY OSTEOARTHRITIS OF SHOULDERS, BILATERAL: Primary | ICD-10-CM

## 2025-02-05 DIAGNOSIS — Z45.42 ENCOUNTER FOR FITTING AND ADJUSTMENT OF SPINAL CORD STIMULATOR: Primary | ICD-10-CM

## 2025-02-05 PROCEDURE — 99213 OFFICE O/P EST LOW 20 MIN: CPT | Performed by: STUDENT IN AN ORGANIZED HEALTH CARE EDUCATION/TRAINING PROGRAM

## 2025-02-05 PROCEDURE — 99024 POSTOP FOLLOW-UP VISIT: CPT | Performed by: STUDENT IN AN ORGANIZED HEALTH CARE EDUCATION/TRAINING PROGRAM

## 2025-02-05 PROCEDURE — 20610 DRAIN/INJ JOINT/BURSA W/O US: CPT

## 2025-02-05 RX ORDER — BUPIVACAINE HYDROCHLORIDE 2.5 MG/ML
4 INJECTION, SOLUTION INFILTRATION; PERINEURAL
Status: COMPLETED | OUTPATIENT
Start: 2025-02-05 | End: 2025-02-05

## 2025-02-05 RX ORDER — TRIAMCINOLONE ACETONIDE 40 MG/ML
40 INJECTION, SUSPENSION INTRA-ARTICULAR; INTRAMUSCULAR
Status: COMPLETED | OUTPATIENT
Start: 2025-02-05 | End: 2025-02-05

## 2025-02-05 RX ADMIN — TRIAMCINOLONE ACETONIDE 40 MG: 40 INJECTION, SUSPENSION INTRA-ARTICULAR; INTRAMUSCULAR at 14:15

## 2025-02-05 RX ADMIN — BUPIVACAINE HYDROCHLORIDE 4 ML: 2.5 INJECTION, SOLUTION INFILTRATION; PERINEURAL at 14:15
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
## 2025-02-05 NOTE — PROGRESS NOTES
Name: Enoc Roberto      : 1960      MRN: [id number]  Encounter Provider: Aaron Stephens MD  Encounter Date: 2025   Encounter department: [facility] [place]  :  Assessment & Plan  Encounter for fitting and adjustment of spinal cord stimulator  Enoc Roberto is a 64 year old male with a history of low back pain and prior SCS placement who returns with worsening pain. His appointment was initially schedule a a battery replacement appointment, however interrogation of his device today with the rep shows that the system remains in good working order with about 2 more year left of life. He is inquiring if anything can be done to adjust the stimulator. I did review his recent CT C/A/P which showed the stimulator in good position. I also reviewed his recent L spine MRI from 10/29/24 which demonstrates no high grade neuroforaminal or central stenosis and no evidence of hardware issue from prior L5-1 fusion. Overall there is no surgical target for his pain. As his battery is not yet dead I would not advise a battery exchange. The rep is here today for further adjustments and he is established with pain medicine. I'll continue to be available as needed.          History of Present Illness   HPI  Enoc Roberto is a 64 year old male with a history of low back pain and prior SCS placement who returns with worsening pain. See assessment and plan.   Review of Systems   Musculoskeletal:  Positive for back pain, gait problem and myalgias.        Pain: mid to lower back  Numbness in toes - neuropathy  Ambulates w/ cane for stability  Pain increases in any upright position     Neurological:  Positive for numbness.   All other systems reviewed and are negative.   I have personally reviewed the MA's review of systems and made changes as necessary.         Objective   /70 (BP Location: Right arm, Patient Position: Sitting, Cuff Size: Adult)   Pulse 76   Temp 98.3  °F (36.8 °C) (Temporal)   Ht 6' (1.829 m)   Wt (!) 145 kg (320 lb)   SpO2 92%   BMI 43.40 kg/m²     Physical Exam  Neurological Exam  Awake and alert  Oriented and appropriate  Laying flat on exam as it is uncomfortable for hm to be upright  SLR negative  Grossly 5/5 BLE        Administrative Statements   I have spent a total time of 20 minutes in caring for this patient on the day of the visit/encounter including Diagnostic results, Prognosis, Impressions, Counseling / Coordination of care, and Documenting in the medical record.

## 2025-02-14 ENCOUNTER — TELEPHONE (OUTPATIENT)
Age: 65
End: 2025-02-14

## 2025-02-14 ENCOUNTER — HOSPITAL ENCOUNTER (OUTPATIENT)
Dept: RADIOLOGY | Facility: CLINIC | Age: 65
End: 2025-02-14
Payer: MEDICARE

## 2025-02-14 VITALS
HEART RATE: 76 BPM | OXYGEN SATURATION: 94 % | RESPIRATION RATE: 20 BRPM | TEMPERATURE: 98.2 F | DIASTOLIC BLOOD PRESSURE: 89 MMHG | SYSTOLIC BLOOD PRESSURE: 136 MMHG

## 2025-02-14 DIAGNOSIS — M54.16 LUMBAR RADICULOPATHY: ICD-10-CM

## 2025-02-14 DIAGNOSIS — M96.1 POSTLAMINECTOMY SYNDROME OF LUMBAR REGION: ICD-10-CM

## 2025-02-14 PROCEDURE — 64483 NJX AA&/STRD TFRM EPI L/S 1: CPT | Performed by: ANESTHESIOLOGY

## 2025-02-14 RX ORDER — BUPIVACAINE HCL/PF 2.5 MG/ML
2 VIAL (ML) INJECTION ONCE
Status: COMPLETED | OUTPATIENT
Start: 2025-02-14 | End: 2025-02-14

## 2025-02-14 RX ORDER — METHYLPREDNISOLONE ACETATE 80 MG/ML
80 INJECTION, SUSPENSION INTRA-ARTICULAR; INTRALESIONAL; INTRAMUSCULAR; PARENTERAL; SOFT TISSUE ONCE
Status: COMPLETED | OUTPATIENT
Start: 2025-02-14 | End: 2025-02-14

## 2025-02-14 RX ORDER — 0.9 % SODIUM CHLORIDE 0.9 %
4 VIAL (ML) INJECTION ONCE
Status: COMPLETED | OUTPATIENT
Start: 2025-02-14 | End: 2025-02-14

## 2025-02-14 RX ADMIN — IOHEXOL 1 ML: 300 INJECTION, SOLUTION INTRAVENOUS at 13:11

## 2025-02-14 RX ADMIN — BUPIVACAINE HYDROCHLORIDE 2 ML: 2.5 INJECTION, SOLUTION EPIDURAL; INFILTRATION; INTRACAUDAL at 13:11

## 2025-02-14 RX ADMIN — Medication 4 ML: at 13:09

## 2025-02-14 RX ADMIN — METHYLPREDNISOLONE ACETATE 80 MG: 80 INJECTION, SUSPENSION INTRA-ARTICULAR; INTRALESIONAL; INTRAMUSCULAR; SOFT TISSUE at 13:11

## 2025-02-14 RX ADMIN — LIDOCAINE HYDROCHLORIDE 4 ML: 20 INJECTION, SOLUTION EPIDURAL; INFILTRATION; INTRACAUDAL at 13:09
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
## 2025-02-14 NOTE — DISCHARGE INSTR - LAB
Epidural Steroid Injection  **Resume Eliquis and Aspirin**  WHAT YOU NEED TO KNOW:   An epidural steroid injection (ALEXANDRA) is a procedure to inject steroid medicine into the epidural space. The epidural space is between your spinal cord and vertebrae. Steroids reduce inflammation and fluid buildup in your spine that may be causing pain. You may be given pain medicine along with the steroids.          ACTIVITY  Do not drive or operate machinery today.  No strenuous activity today - bending, lifting, etc.  You may resume normal activites starting tomorrow - start slowly and as tolerated.  You may shower today, but no tub baths or hot tubs.  You may have numbness for several hours from the local anesthetic. Please use caution and common sense, especially with weight-bearing activities.    CARE OF THE INJECTION SITE  If you have soreness or pain, apply ice to the area today (20 minutes on/20 minutes off).  Starting tomorrow, you may use warm, moist heat or ice if needed.  You may have an increase or change in your discomfort for 36-48 hours after your treatment.  Apply ice and continue with any pain medication you have been prescribed.  Notify the [facility] if you have any of the following: redness, drainage, swelling, headache, stiff neck or fever above 100°F.    SPECIAL INSTRUCTIONS  Our office will contact you in approximately 14 days for a progress report.    MEDICATIONS  Continue to take all routine medications.  Our office may have instructed you to hold some medications.    As no general anesthesia was used in today's procedure, you should not experience any side effects related to anesthesia.     If you are diabetic, the steroids used in today's injection may temporarily increase your blood sugar levels after the first few days after your injection. Please keep a close eye on your sugars and alert the doctor who manages your diabetes if your sugars are significantly high from your baseline or you are  symptomatic.     If you have a problem specifically related to your procedure, please call our office at [phone].  Problems not related to your procedure should be directed to your primary care physician.

## 2025-02-20 ENCOUNTER — OFFICE VISIT (OUTPATIENT)
Dept: NEUROSURGERY | Facility: CLINIC | Age: 65
End: 2025-02-20

## 2025-02-20 VITALS
SYSTOLIC BLOOD PRESSURE: 140 MMHG | OXYGEN SATURATION: 96 % | TEMPERATURE: 95 F | BODY MASS INDEX: 42.66 KG/M2 | HEART RATE: 78 BPM | DIASTOLIC BLOOD PRESSURE: 88 MMHG | HEIGHT: 72 IN | WEIGHT: 315 LBS

## 2025-02-20 DIAGNOSIS — G56.22 ULNAR NEUROPATHY OF LEFT UPPER EXTREMITY: ICD-10-CM

## 2025-02-20 DIAGNOSIS — Z48.811 ENCOUNTER FOR SURGICAL AFTERCARE FOLLOWING SURGERY OF NERVOUS SYSTEM: Primary | ICD-10-CM

## 2025-02-20 PROCEDURE — 99024 POSTOP FOLLOW-UP VISIT: CPT | Performed by: NEUROLOGICAL SURGERY
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
## 2025-02-24 ENCOUNTER — TELEPHONE (OUTPATIENT)
Age: 65
End: 2025-02-24

## 2025-02-24 NOTE — TELEPHONE ENCOUNTER
I called the patient to provide the number for info link to contact [phone]. He said he already reported the issue to our IT. He said he was concerned because the person claimed to be from Dr. Vasquez's office but wanted to transfer him to his Medicare agent and was trying to sell him a swab kit. He said he was told if he didn't he would be at risk of losing his coverage. I advised the patient to call Medicare to report this as it appears he has fraudulent activity with his account.

## 2025-02-24 NOTE — TELEPHONE ENCOUNTER
Patient calling stating he thinks there is a security issue going on.  He stated he got a call from Dr. Vasquez's office (our number) confirming Dr. Olivera apt.  He stated the person knew too much information from his chart.  He wanted to speak with the  at the office.  I spoke with the office and she was in a meeting.  I told Anshul we would call him back and he stated he has been in  for 40 years and this is definitely an issue.    Please call patient back.  Thank you.

## 2025-02-27 ENCOUNTER — TELEPHONE (OUTPATIENT)
Dept: FAMILY MEDICINE CLINIC | Facility: CLINIC | Age: 65
End: 2025-02-27

## 2025-02-27 DIAGNOSIS — I10 ESSENTIAL HYPERTENSION: ICD-10-CM

## 2025-02-27 DIAGNOSIS — R06.02 SHORTNESS OF BREATH: ICD-10-CM

## 2025-02-27 DIAGNOSIS — E66.01 OBESITY, MORBID (HCC): ICD-10-CM

## 2025-02-27 DIAGNOSIS — E78.2 MIXED HYPERLIPIDEMIA: ICD-10-CM

## 2025-02-27 DIAGNOSIS — Z56.6 WORK-RELATED STRESS: ICD-10-CM

## 2025-02-27 DIAGNOSIS — Z01.810 PREOPERATIVE CARDIOVASCULAR EXAMINATION: ICD-10-CM

## 2025-02-27 DIAGNOSIS — I48.20 CHRONIC ATRIAL FIBRILLATION (HCC): ICD-10-CM

## 2025-02-27 DIAGNOSIS — I25.10 CORONARY ARTERIOSCLEROSIS: ICD-10-CM

## 2025-02-27 DIAGNOSIS — R35.0 URINARY FREQUENCY: ICD-10-CM

## 2025-02-27 DIAGNOSIS — G62.9 PERIPHERAL POLYNEUROPATHY: ICD-10-CM

## 2025-02-27 DIAGNOSIS — K59.00 CONSTIPATION: ICD-10-CM

## 2025-02-27 DIAGNOSIS — G47.33 OSA (OBSTRUCTIVE SLEEP APNEA): ICD-10-CM

## 2025-02-27 DIAGNOSIS — M48.062 SPINAL STENOSIS OF LUMBAR REGION WITH NEUROGENIC CLAUDICATION: ICD-10-CM

## 2025-02-27 DIAGNOSIS — F41.1 GENERALIZED ANXIETY DISORDER: ICD-10-CM

## 2025-02-27 DIAGNOSIS — M10.071 ACUTE IDIOPATHIC GOUT INVOLVING TOE OF RIGHT FOOT: ICD-10-CM

## 2025-02-27 DIAGNOSIS — R05.8 ALLERGIC COUGH: ICD-10-CM

## 2025-02-27 DIAGNOSIS — E03.9 HYPOTHYROIDISM, UNSPECIFIED TYPE: Chronic | ICD-10-CM

## 2025-02-27 RX ORDER — FLUTICASONE PROPIONATE 50 MCG
2 SPRAY, SUSPENSION (ML) NASAL DAILY
Qty: 48 ML | Refills: 0 | Status: SHIPPED | OUTPATIENT
Start: 2025-02-27

## 2025-02-27 RX ORDER — LEVOTHYROXINE SODIUM 25 UG/1
25 TABLET ORAL DAILY
Qty: 90 TABLET | Refills: 1 | Status: SHIPPED | OUTPATIENT
Start: 2025-02-27

## 2025-02-27 RX ORDER — CLONAZEPAM 0.5 MG/1
0.5 TABLET ORAL
Qty: 90 TABLET | Refills: 0 | Status: SHIPPED | OUTPATIENT
Start: 2025-02-27

## 2025-02-27 RX ORDER — LIDOCAINE 50 MG/G
1 PATCH TOPICAL DAILY
Qty: 90 PATCH | Refills: 1 | Status: SHIPPED | OUTPATIENT
Start: 2025-02-27

## 2025-02-27 RX ORDER — ROSUVASTATIN CALCIUM 5 MG/1
5 TABLET, COATED ORAL DAILY
Qty: 90 TABLET | Refills: 1 | Status: SHIPPED | OUTPATIENT
Start: 2025-02-27

## 2025-02-27 RX ORDER — ALLOPURINOL 100 MG/1
100 TABLET ORAL DAILY
Qty: 90 TABLET | Refills: 1 | Status: SHIPPED | OUTPATIENT
Start: 2025-02-27

## 2025-02-27 RX ORDER — MONTELUKAST SODIUM 10 MG/1
10 TABLET ORAL
Qty: 90 TABLET | Refills: 1 | Status: SHIPPED | OUTPATIENT
Start: 2025-02-27

## 2025-02-27 RX ORDER — LOSARTAN POTASSIUM 25 MG/1
25 TABLET ORAL DAILY
Qty: 90 TABLET | Refills: 1 | Status: SHIPPED | OUTPATIENT
Start: 2025-02-27

## 2025-02-27 RX ORDER — AMLODIPINE BESYLATE 10 MG/1
10 TABLET ORAL DAILY
Qty: 90 TABLET | Refills: 1 | Status: SHIPPED | OUTPATIENT
Start: 2025-02-27

## 2025-02-27 RX ORDER — ALBUTEROL SULFATE 90 UG/1
2 INHALANT RESPIRATORY (INHALATION) EVERY 6 HOURS PRN
Qty: 26.8 G | Refills: 1 | Status: SHIPPED | OUTPATIENT
Start: 2025-02-27

## 2025-02-27 RX ORDER — GABAPENTIN 300 MG/1
300 CAPSULE ORAL
Qty: 90 CAPSULE | Refills: 1 | Status: SHIPPED | OUTPATIENT
Start: 2025-02-27

## 2025-02-27 RX ORDER — NEBIVOLOL 5 MG/1
5 TABLET ORAL DAILY
Qty: 90 TABLET | Refills: 1 | Status: SHIPPED | OUTPATIENT
Start: 2025-02-27

## 2025-02-27 RX ORDER — DULOXETINE 40 MG/1
1 CAPSULE, DELAYED RELEASE ORAL DAILY
Qty: 90 CAPSULE | Refills: 1 | Status: SHIPPED | OUTPATIENT
Start: 2025-02-27

## 2025-02-27 SDOH — HEALTH STABILITY - MENTAL HEALTH: OTHER PHYSICAL AND MENTAL STRAIN RELATED TO WORK: Z56.6

## 2025-02-27 NOTE — TELEPHONE ENCOUNTER
Reason for call:   [x] Refill   [] Prior Auth  [x] Other: Pharmacy change     Office:   [x] PCP/Provider -   [] Specialty/Provider -     Medication:     allopurinol (ZYLOPRIM) 100 mg TAKE 1 TABLET BY MOUTH EVERY DAY    albuterol (PROVENTIL HFA,VENTOLIN HFA) 90 mcg/act USE 2 INHALATIONS BY MOUTH EVERY 6 HOURS AS NEEDED FOR WHEEZING   amLODIPine (NORVASC) 10 mg TAKE 1 TABLET BY MOUTH EVERY DAY       apixaban (Eliquis) 5 mg Take 1 tablet (5 mg total) by mouth 2 (two) times a day    clonazePAM (KlonoPIN) 0.5 mg Take 1 tablet (0.5 mg total) by mouth daily at bedtime   DULoxetine HCl 40 MG TAKE 1 CAPSULE (40 MG TOTAL) BY MOUTH DAILY       fluticasone (FLONASE) 50 mcg/act nasal spray 2 SPRAYS INTO EACH NOSTRIL DAILY DISPENSE 3 BOTTLES        gabapentin (NEURONTIN) 300 mg Take 1 capsule (300 mg total) by mouth daily at bedtime    levothyroxine 25 mcg TAKE 1 TABLET BY MOUTH EVERY DAY     lidocaine (LIDODERM) 5 % on 12 hrs off 12 hrs    montelukast (SINGULAIR) 10 mg 1 tablet at bedtime   Bystolic 5 mg 1 tablet daily   rosuvastatin (CRESTOR) 5 mg 1 tablet daily       [facility]     Does the patient have enough for 3 days?   [] Yes   [x] No - Send as HP to POD

## 2025-02-27 NOTE — TELEPHONE ENCOUNTER
Reason for call:   [x] Refill   [] Prior Auth  [x] Other: Pharmacy Change     Office:   [] PCP/Provider -   [x] Specialty/Provider - [facility]     Medication: metFORMIN (GLUCOPHAGE-XR) 750 mg 24 hr TAKE 2 TABLETS A DAY       [facility]     Does the patient have enough for 3 days?   [x] Yes   [] No - Send as HP to POD

## 2025-02-27 NOTE — TELEPHONE ENCOUNTER
Reason for call:   [x] Refill   [] Prior Auth  [x] Other: Pharmacy Change     Office:   [] PCP/Provider -   [x] Specialty/Provider - [facility]     Medication:     losartan (COZAAR) 25 mg Take 1 tablet (25 mg total) by mouth daily          [facility]     Does the patient have enough for 3 days?   [] Yes   [x] No - Send as HP to POD

## 2025-02-27 NOTE — TELEPHONE ENCOUNTER
Reason for call:   [] Refill   [x] Prior Auth - indicated with request for pharmacy change  [] Other:     Office:   [x] PCP/Provider - [facility]  [x] Specialty/Provider -     Medication: lidocaine (LIDODERM) 5 %     Dose/Frequency:  Apply 1 patch topically over 12 hours daily     Quantity: 90    Pharmacy: [facility]

## 2025-02-27 NOTE — TELEPHONE ENCOUNTER
Reason for call:   [x] Refill   [] Prior Auth  [x] Other: Pharmacy Change     Office:   [] PCP/Provider -   [x] Specialty/Provider - [facility]     Medication: tadalafil (CIALIS) 5 MG Take 1 tablet (5 mg total) by mouth in the morning     alfuzosin (UROXATRAL) 10 mg 24 hr Take 1 tablet (10 mg total) by mouth daily     [facility]     Does the patient have enough for 3 days?   [x] Yes   [] No - Send as HP to POD

## 2025-02-28 ENCOUNTER — TELEPHONE (OUTPATIENT)
Dept: RADIOLOGY | Facility: MEDICAL CENTER | Age: 65
End: 2025-02-28

## 2025-02-28 ENCOUNTER — TELEPHONE (OUTPATIENT)
Age: 65
End: 2025-02-28

## 2025-02-28 RX ORDER — METFORMIN HYDROCHLORIDE 750 MG/1
TABLET, EXTENDED RELEASE ORAL
Qty: 180 TABLET | Refills: 0 | Status: SHIPPED | OUTPATIENT
Start: 2025-02-28

## 2025-02-28 RX ORDER — ALFUZOSIN HYDROCHLORIDE 10 MG/1
10 TABLET, EXTENDED RELEASE ORAL DAILY
Qty: 90 TABLET | Refills: 1 | Status: SHIPPED | OUTPATIENT
Start: 2025-02-28

## 2025-02-28 RX ORDER — TADALAFIL 5 MG/1
5 TABLET ORAL DAILY
Qty: 90 TABLET | Refills: 1 | Status: SHIPPED | OUTPATIENT
Start: 2025-02-28 | End: 2026-02-23

## 2025-02-28 NOTE — TELEPHONE ENCOUNTER
PA for TADALAFIL 5 MG SUBMITTED to [email or web address]    via    [x]CMM-KEY: [id number]      [x]PA sent as URGENT    All office notes, labs and other pertaining documents and studies sent. Clinical questions answered. Awaiting determination from insurance company.     Turnaround time for your insurance to make a decision on your Prior Authorization can take 7-21 business days.

## 2025-02-28 NOTE — TELEPHONE ENCOUNTER
PA lidocaine (LIDODERM) 5 % SUBMITTED     to [facility]    via    [x]CMM-KEY:  [id number]  []Surescripts-Case ID #    []Availity-Auth ID #  NDC #    []Faxed to plan   []Other website    []Phone call Case ID #      []PA sent as URGENT    All office notes, labs and other pertaining documents and studies sent. Clinical questions answered. Awaiting determination from insurance company.     Turnaround time for your insurance to make a decision on your Prior Authorization can take 7-21 business days.

## 2025-02-28 NOTE — TELEPHONE ENCOUNTER
PA lidocaine (LIDODERM) 5 % APPROVED     Date(s) approved 2/14/25 until further notice    Case # [id number]    Patient advised by          [x][employer] Message  []Phone call   []LMOM  []L/M to call office as no active Communication consent on file  []Unable to leave detailed message as VM not approved on Communication consent       Pharmacy advised by    [x]Fax  []Phone call    Specialty Pharmacy    []

## 2025-02-28 NOTE — TELEPHONE ENCOUNTER
PA for TADALAFIL 5 MG  APPROVED     Date(s) approved UNTIL 12/31/2099        Patient advised by          [x][employer] Message  []Phone call   []LMOM  []L/M to call office as no active Communication consent on file  []Unable to leave detailed message as VM not approved on Communication consent       Pharmacy advised by    [x]Fax  []Phone call       Approval letter scanned into Media Yes

## 2025-03-04 NOTE — TELEPHONE ENCOUNTER
Patient Reports        70-75 %     improvement post injection    Pain Level   can go up high if sitting too long  /10   He do  more around the house   When he gets the pain it helps to lay down

## 2025-03-11 ENCOUNTER — OFFICE VISIT (OUTPATIENT)
Dept: OBGYN CLINIC | Facility: CLINIC | Age: 65
End: 2025-03-11
Payer: MEDICARE

## 2025-03-11 ENCOUNTER — TELEPHONE (OUTPATIENT)
Dept: OBGYN CLINIC | Facility: CLINIC | Age: 65
End: 2025-03-11

## 2025-03-11 VITALS — BODY MASS INDEX: 42.66 KG/M2 | WEIGHT: 315 LBS | HEIGHT: 72 IN

## 2025-03-11 DIAGNOSIS — M18.11 ARTHRITIS OF CARPOMETACARPAL (CMC) JOINT OF RIGHT THUMB: Primary | ICD-10-CM

## 2025-03-11 PROCEDURE — 20600 DRAIN/INJ JOINT/BURSA W/O US: CPT | Performed by: STUDENT IN AN ORGANIZED HEALTH CARE EDUCATION/TRAINING PROGRAM

## 2025-03-11 PROCEDURE — 99214 OFFICE O/P EST MOD 30 MIN: CPT | Performed by: STUDENT IN AN ORGANIZED HEALTH CARE EDUCATION/TRAINING PROGRAM

## 2025-03-11 RX ORDER — BETAMETHASONE SODIUM PHOSPHATE AND BETAMETHASONE ACETATE 3; 3 MG/ML; MG/ML
6 INJECTION, SUSPENSION INTRA-ARTICULAR; INTRALESIONAL; INTRAMUSCULAR; SOFT TISSUE
Status: COMPLETED | OUTPATIENT
Start: 2025-03-11 | End: 2025-03-11

## 2025-03-11 RX ORDER — LIDOCAINE HYDROCHLORIDE 10 MG/ML
1 INJECTION, SOLUTION INFILTRATION; PERINEURAL
Status: COMPLETED | OUTPATIENT
Start: 2025-03-11 | End: 2025-03-11

## 2025-03-11 RX ADMIN — LIDOCAINE HYDROCHLORIDE 1 ML: 10 INJECTION, SOLUTION INFILTRATION; PERINEURAL at 13:45

## 2025-03-11 RX ADMIN — BETAMETHASONE SODIUM PHOSPHATE AND BETAMETHASONE ACETATE 6 MG: 3; 3 INJECTION, SUSPENSION INTRA-ARTICULAR; INTRALESIONAL; INTRAMUSCULAR; SOFT TISSUE at 13:45

## 2025-03-11 NOTE — ASSESSMENT & PLAN NOTE
The patient did see good relief from the previous injection. We discussed continued non operative treatment options versus surgical intervention.  The patient elected to proceed with non operative treatment options.  He consented and underwent a right thumb CMC injection in the office today without any complications.  He was provided with hand OA AVS.  Orders:    Small joint arthrocentesis: R thumb MCP

## 2025-03-11 NOTE — TELEPHONE ENCOUNTER
Called patient to confirm insurance info for appt and he informed that Medicare account was compromised.    He is getting new card issued to him but the old one should continue to be fine for billing purposes.

## 2025-03-11 NOTE — PATIENT INSTRUCTIONS
"Hand Arthritis    Some things that can be beneficial in relieving your pain are:  Voltaren Gel - An antiinflammatory gel you can apply topically on the area of pain rather than taking pills by mouth  Lidocaine Cream - A numbing agent you can apply topically - just make sure to use something to help apply this as it will make any skin it touches go numb!  Comfort Cool Brace - you can buy this off [employer] and wear as needed.  Push Metagrip Brace - you can buy this off [employer] and wear as needed.  Heat/Ice - Use whatever works. If you notice your joints don't do well with the cold, make sure to wear gloves and keep fingers warm.  Paraffin Wax - These machines can typically be found at physical therapy offices or sometimes even nail salons. They use the idea of \"moist heat\" to help with pain. If this is something that works well for you, you can buy your own machine to use at home offline.   Therapy - Some patients find therapy to be helpful to strengthen the muscles around the joint. If you are interested, we can always place an order for you.  Steroid Injections - These injections do not get rid of the arthritis, but help with the inflammation caused by the arthritis. You can receive these injections every 3 months or longer as needed. If one steroid doesn't seem to give you the best results, we do have other steroids we can try to see if you get a better response. It's like the typical Coke vs Pepsi debate... for some reason, some people just like one better than the other!     "
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
## 2025-03-14 ENCOUNTER — NURSE TRIAGE (OUTPATIENT)
Age: 65
End: 2025-03-14

## 2025-03-14 NOTE — TELEPHONE ENCOUNTER
FOLLOW UP: Await provider instruction.     REASON FOR CONVERSATION: Testicle Pain    SYMPTOMS: Last night during intimacy, patient had erection, when wife was touching left side of base of penis with hand patient experienced sharp pain in left testicle. Patient does not currently have the pain. Asked patient if he touches testicle does he feel pain he states no, asked him if he touched that area right now on the base of penis does he have the pain and patient states no, but he feels it was r/t erection.       OTHER: Reviewed ED precautions. Patient had f/u in May, inquiring if he should be seen prior.     DISPOSITION: Discuss with Provider and Call Back Patient    Answer Assessment - Initial Assessment Questions  1. When did your pain start, which side is it located on and is it constant or intermittent? Can you also qualify the pain in which you are feeling? (aching, dull, sharp, something else)  1 occurrence of sharp left sided testicular pain last night.   2. Do you have any other areas of pain, particularly flank or back?  no  3. Do you have any aggravating or alleviating factors?  During intimacy, patient had erection, when wife was touching left side of base of penis, experienced sharp pain in left testicle.  4. Did you suffer an injury to the scrotal/groin area?  no  5. Have you noticed any mild, moderate, or severe scrotal or testicular swelling and is there any redness or bruising?no  6. Are you experiencing any other symptoms such as a temperature of 101 or higher, any urinary symptoms such as frequency, urgency, inability to urinate?  no  7. Do you have a history of testicular pain, swelling or other testicular issues?  no  8. Have you had any recent urological procedure or surgery? If yes, what procedure or surgery did you have?  no  9. Have you had a scrotal or groin ultrasound? If yes, when?   no  10. Were you referred by your PCP, ED (Emergency Department), urgent care or another  specialty?  N/a  11. Have you been prescribed any medications for this issue? If yes, what are those medications?  N/a    Protocols used: Urology-Scrotal / Testicular Pain-ADULT-[place]

## 2025-03-14 NOTE — TELEPHONE ENCOUNTER
Relayed message from provider. Advised patient to call back if it happens again. Verbalized understanding

## 2025-03-17 ENCOUNTER — OFFICE VISIT (OUTPATIENT)
Dept: PAIN MEDICINE | Facility: CLINIC | Age: 65
End: 2025-03-17
Payer: MEDICARE

## 2025-03-17 VITALS
DIASTOLIC BLOOD PRESSURE: 98 MMHG | WEIGHT: 315 LBS | HEART RATE: 94 BPM | RESPIRATION RATE: 14 BRPM | HEIGHT: 72 IN | SYSTOLIC BLOOD PRESSURE: 131 MMHG | BODY MASS INDEX: 42.66 KG/M2

## 2025-03-17 DIAGNOSIS — F11.20 UNCOMPLICATED OPIOID DEPENDENCE (HCC): ICD-10-CM

## 2025-03-17 DIAGNOSIS — Z79.891 LONG-TERM CURRENT USE OF OPIATE ANALGESIC: ICD-10-CM

## 2025-03-17 DIAGNOSIS — G89.4 CHRONIC PAIN SYNDROME: Primary | ICD-10-CM

## 2025-03-17 DIAGNOSIS — M54.16 LUMBAR RADICULOPATHY: ICD-10-CM

## 2025-03-17 DIAGNOSIS — M96.1 POSTLAMINECTOMY SYNDROME OF LUMBAR REGION: ICD-10-CM

## 2025-03-17 PROCEDURE — 80305 DRUG TEST PRSMV DIR OPT OBS: CPT | Performed by: ANESTHESIOLOGY

## 2025-03-17 PROCEDURE — G2211 COMPLEX E/M VISIT ADD ON: HCPCS | Performed by: ANESTHESIOLOGY

## 2025-03-17 PROCEDURE — 99214 OFFICE O/P EST MOD 30 MIN: CPT | Performed by: ANESTHESIOLOGY
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
## 2025-03-19 ENCOUNTER — TELEPHONE (OUTPATIENT)
Age: 65
End: 2025-03-19

## 2025-03-19 LAB
6MAM UR QL CFM: NEGATIVE NG/ML
7AMINOCLONAZEPAM UR QL CFM: NORMAL NG/ML
A-OH ALPRAZ UR QL CFM: NEGATIVE NG/ML
AMPHET UR QL CFM: NEGATIVE NG/ML
AMPHET UR QL CFM: NEGATIVE NG/ML
BUPRENORPHINE UR QL CFM: NEGATIVE NG/ML
BUTALBITAL UR QL CFM: NEGATIVE NG/ML
BZE UR QL CFM: NEGATIVE NG/ML
CODEINE UR QL CFM: NEGATIVE NG/ML
EDDP UR QL CFM: NEGATIVE NG/ML
ETHYL GLUCURONIDE UR QL CFM: NEGATIVE NG/ML
ETHYL SULFATE UR QL SCN: NEGATIVE NG/ML
FENTANYL UR QL CFM: NEGATIVE NG/ML
GLIADIN IGG SER IA-ACNC: NEGATIVE NG/ML
HYDROCODONE UR QL CFM: NEGATIVE NG/ML
HYDROCODONE UR QL CFM: NEGATIVE NG/ML
HYDROMORPHONE UR QL CFM: NEGATIVE NG/ML
LORAZEPAM UR QL CFM: NEGATIVE NG/ML
MDMA UR QL CFM: NEGATIVE NG/ML
ME-PHENIDATE UR QL CFM: NEGATIVE NG/ML
MEPERIDINE UR QL CFM: NEGATIVE NG/ML
METHADONE UR QL CFM: NEGATIVE NG/ML
METHAMPHET UR QL CFM: NEGATIVE NG/ML
MORPHINE UR QL CFM: NEGATIVE NG/ML
MORPHINE UR QL CFM: NEGATIVE NG/ML
NORBUPRENORPHINE UR QL CFM: NEGATIVE NG/ML
NORDIAZEPAM UR QL CFM: ABNORMAL NG/ML
NORFENTANYL UR QL CFM: NEGATIVE NG/ML
NORHYDROCODONE UR QL CFM: NEGATIVE NG/ML
NORHYDROCODONE UR QL CFM: NEGATIVE NG/ML
NORMEPERIDINE UR QL CFM: NEGATIVE NG/ML
NOROXYCODONE UR QL CFM: NEGATIVE NG/ML
OXAZEPAM UR QL CFM: ABNORMAL NG/ML
OXYCODONE UR QL CFM: NEGATIVE NG/ML
OXYMORPHONE UR QL CFM: NEGATIVE NG/ML
OXYMORPHONE UR QL CFM: NEGATIVE NG/ML
PARA-FLUOROFENTANYL QUANTIFICATION: NORMAL NG/ML
PCP UR QL CFM: NEGATIVE NG/ML
PHENOBARB UR QL CFM: NEGATIVE NG/ML
SECOBARBITAL UR QL CFM: NEGATIVE NG/ML
SL AMB 5F-ADB-M7 METABOLITE QUANTIFICATION: NEGATIVE NG/ML
SL AMB 7-OH-MITRAGYNINE (KRATOM ALKALOID) QUANTIFICATION: NEGATIVE NG/ML
SL AMB AB-FUBINACA-M3 METABOLITE QUANTIFICATION: NEGATIVE NG/ML
SL AMB ACETYL FENTANYL QUANTIFICATION: NORMAL NG/ML
SL AMB ACETYL NORFENTANYL QUANTIFICATION: NORMAL NG/ML
SL AMB ACRYL FENTANYL QUANTIFICATION: NORMAL NG/ML
SL AMB CARFENTANIL QUANTIFICATION: NORMAL NG/ML
SL AMB CTHC (MARIJUANA METABOLITE) QUANTIFICATION: ABNORMAL NG/ML
SL AMB DEXTROMETHORPHAN QUANTIFICATION: NEGATIVE NG/ML
SL AMB DEXTRORPHAN (DEXTROMETHORPHAN METABOLITE) QUANT: NEGATIVE NG/ML
SL AMB DEXTRORPHAN (DEXTROMETHORPHAN METABOLITE) QUANT: NEGATIVE NG/ML
SL AMB JWH018 METABOLITE QUANTIFICATION: NEGATIVE NG/ML
SL AMB JWH073 METABOLITE QUANTIFICATION: NEGATIVE NG/ML
SL AMB MDMB-FUBINACA-M1 METABOLITE QUANTIFICATION: NEGATIVE NG/ML
SL AMB N-DESMETHYL-TRAMADOL QUANTIFICATION: NEGATIVE NG/ML
SL AMB PHENTERMINE QUANTIFICATION: NEGATIVE NG/ML
SL AMB RCS4 METABOLITE QUANTIFICATION: NEGATIVE NG/ML
SL AMB RITALINIC ACID QUANTIFICATION: NEGATIVE NG/ML
SPECIMEN DRAWN SERPL: NEGATIVE NG/ML
TAPENTADOL UR QL CFM: NEGATIVE NG/ML
TEMAZEPAM UR QL CFM: ABNORMAL NG/ML
TEMAZEPAM UR QL CFM: ABNORMAL NG/ML
TRAMADOL UR QL CFM: NEGATIVE NG/ML
URATE/CREAT 24H UR: NEGATIVE NG/ML

## 2025-03-19 NOTE — TELEPHONE ENCOUNTER
Caller: juan Landers    Doctor: Dr. Contreras    Reason for call: pt called stating he received a message stating that he has new test results.  There is nothing in pt chart on my end.  Not sure how they are uploaded into the epic system for the drug screening.  If someone can please call pt back    Call back#: [phone]

## 2025-03-19 NOTE — TELEPHONE ENCOUNTER
S/w Pt, advised results are still pending from outside lab. SPA will contact once available for review. Pt verbalized understanding.

## 2025-03-20 ENCOUNTER — TELEPHONE (OUTPATIENT)
Dept: PAIN MEDICINE | Facility: CLINIC | Age: 65
End: 2025-03-20

## 2025-03-20 NOTE — TELEPHONE ENCOUNTER
Caller: Anshul    Doctor: Dr. Contreras    Reason for call: patient calling back to schedule his repeat UDS. Quach stated he can come in April 3 @ 1 pm.      Call back#: [phone]

## 2025-03-20 NOTE — TELEPHONE ENCOUNTER
Please let patient know that the THC still came back highly positive so we will have to wait another week before he retests.

## 2025-03-20 NOTE — TELEPHONE ENCOUNTER
Pt informed of the same as per task from FQ.  Pt feels it will take longer and doesn't think retesting next week will show a difference.  RN asked pt about coming in early April to retest. Pt said he was still sleeping and asked if he could c/b later today to tell us when he will come in to re-test.    Await pt's c/b. *Pls let FQ's NARGIS Olmos know what day pt decides to come in to do repeat UDS.

## 2025-03-20 NOTE — TELEPHONE ENCOUNTER
Caller: Patient    Doctor: Dr. FIELDS    Reason for call: Calling back to schedule his urine testing     Patient stated any day work in the afternoon  Clerical Team will be calling patient back     Call back#:

## 2025-03-20 NOTE — TELEPHONE ENCOUNTER
FQ pt will be coming in on 4/3/25 to repeat the UDS. Amarilis was made aware to expect pt on 4/3/25 for repeat UDS.

## 2025-03-21 ENCOUNTER — TELEPHONE (OUTPATIENT)
Age: 65
End: 2025-03-21

## 2025-03-21 DIAGNOSIS — M96.1 POSTLAMINECTOMY SYNDROME OF LUMBAR REGION: ICD-10-CM

## 2025-03-21 DIAGNOSIS — M51.16 INTERVERTEBRAL DISC DISORDER WITH RADICULOPATHY OF LUMBAR REGION: Primary | ICD-10-CM

## 2025-03-21 NOTE — TELEPHONE ENCOUNTER
Caller: pt    Doctor: Dr. jc    Reason for call: pt needs a script for PT.    Call back#: [phone]

## 2025-03-24 ENCOUNTER — TELEPHONE (OUTPATIENT)
Dept: PHYSICAL THERAPY | Facility: OTHER | Age: 65
End: 2025-03-24

## 2025-03-24 NOTE — TELEPHONE ENCOUNTER
Called the patient per the [facility] online appointment that was submitted.     Voice message left for patient to call back. Phone number and hours of business provided.     Program will be discussed when a return call is received.

## 2025-03-27 ENCOUNTER — EVALUATION (OUTPATIENT)
Dept: PHYSICAL THERAPY | Facility: REHABILITATION | Age: 65
End: 2025-03-27
Payer: MEDICARE

## 2025-03-27 DIAGNOSIS — M96.1 POSTLAMINECTOMY SYNDROME OF LUMBAR REGION: ICD-10-CM

## 2025-03-27 DIAGNOSIS — M51.16 INTERVERTEBRAL DISC DISORDER WITH RADICULOPATHY OF LUMBAR REGION: ICD-10-CM

## 2025-03-27 PROCEDURE — 97110 THERAPEUTIC EXERCISES: CPT | Performed by: PHYSICAL THERAPIST

## 2025-03-27 PROCEDURE — 97163 PT EVAL HIGH COMPLEX 45 MIN: CPT | Performed by: PHYSICAL THERAPIST

## 2025-03-27 NOTE — PROGRESS NOTES
PT Evaluation     Today's date: 3/27/2025  Patient name: Enoc Roberto  : 1960  MRN: [id number]  Referring provider: Danial Vasquez DO  Dx:   Encounter Diagnosis     ICD-10-CM    1. Intervertebral disc disorder with radiculopathy of lumbar region  M51.16 Ambulatory referral to Physical Therapy     PT plan of care cert/re-cert      2. Postlaminectomy syndrome of lumbar region  M96.1 Ambulatory referral to Physical Therapy     PT plan of care cert/re-cert          Start Time: 1330  Stop Time: 1415  Total time in clinic (min): 45 minutes    Assessment  Impairments: abnormal muscle firing, abnormal muscle tone, abnormal or restricted ROM, abnormal movement, activity intolerance, impaired physical strength and pain with function    Assessment details: Enoc Roberto is a 64 y.o. male presenting to outpatient physical therapy on 25 with referral from MD for CLBP. Upon evaluation, Enoc demonstrates impaired LE and core strength but overall good LS AROM. Weakness noted in glutes mainly. The listed impairments and functional limitation are effecting Enoc ability to function at prior level. They can continue to benefit from physical therapy services at this times in order to address the above discussed impairments and functional limitation in order to allow for a return to premorbid status.    HEP: Bridge with band, Supine march, palloff press.     Understanding of Dx/Px/POC: good     Prognosis: good    Plan  Patient would benefit from: skilled PT  Planned modality interventions: thermotherapy: hydrocollator packs    Planned therapy interventions: joint mobilization, manual therapy, ADL training, balance, balance/weight bearing training, neuromuscular re-education, home exercise program, therapeutic exercise, therapeutic activities, strengthening, patient education, functional ROM exercises and gait training    Frequency: 2x week  Duration in weeks: 12  Treatment plan discussed  with: patient        Subjective Evaluation    History of Present Illness  Mechanism of injury: Enoc is a 64 y.o. male presenting to physical therapy on 25 with referral from MD for CLBP.     Patient symptoms are located across his lower LS and is increased with standing and walking, sitting. States that any position that keeps him in an upright position.  Does feel that walking is better then static positions.  States that as pain increases he will get a sympathetic response with sweating.     Patient symptoms are constant  Patient symptoms are localized for the most part but as they intensify he will get pain and numbness in his feet, thighs.   Patient denies bowel and bladder changes (denies urinary retention)/saddle numbness    Sleep is uninterrupted. Feels most comfortable usually on his side.    Patient Goals  Patient goals for therapy: decreased pain    Pain  Current pain rating: 3  At worst pain ratin  Location: see above      Short Term Goals:   1. Patient will be Independent with hep  2. Patient will improve pain with activity by 50%  3. Patient will report GROC 50% or greater      Long Term Goals:   1. Patient will improve FOTO to greater then goal  2. Patient will improve pain with activity to 2/10 or less  3. Patient will continue with HEP independence to allow for decreased future reoccurrence of pain and loss in function  4. Patient will report GROC 75% or greater  5. Patient will improve standing and walking tolerance to 30 min      Objective      Palpation: normal tone lower LS  Myotomes (L/R): WNL LE     Reflexes:  (L/R) L3-4:    2+    S1:   unable to elicit resp      GAIT: SPC, lateral trunk lean toward side of cane        Lumbar  % of normal   Flex. WNL   Extn. WNL   SG Left -   SG Right -   ROT Left WNL   ROT Right WNL           MMT         AROM          PROM    Hip       L       R        L           R      L     R   Flex.         Extn.         Abd.         Add.         IR.          ER.                  G. Max 3+ 3+       G. Med.         Iliop. 4 4           HIP PROM WNL    Neuro Dynamic Testing:  Slump test: L= neg    R=  neg     Straight leg raise:   L= neg     R=   neg         Hip:   WNL           Segmental mobility:   LS=  hypermobile                   Precautions:       Manuals                                                                 Neuro Re-Ed                                                                                                        Ther Ex                                                                                                                     Ther Activity                                       Gait Training                                       Modalities

## 2025-03-31 ENCOUNTER — EVALUATION (OUTPATIENT)
Dept: OCCUPATIONAL THERAPY | Age: 65
End: 2025-03-31
Payer: MEDICARE

## 2025-03-31 ENCOUNTER — OFFICE VISIT (OUTPATIENT)
Dept: PHYSICAL THERAPY | Facility: REHABILITATION | Age: 65
End: 2025-03-31
Payer: MEDICARE

## 2025-03-31 DIAGNOSIS — M51.16 INTERVERTEBRAL DISC DISORDER WITH RADICULOPATHY OF LUMBAR REGION: Primary | ICD-10-CM

## 2025-03-31 DIAGNOSIS — Z48.811 ENCOUNTER FOR SURGICAL AFTERCARE FOLLOWING SURGERY OF NERVOUS SYSTEM: ICD-10-CM

## 2025-03-31 DIAGNOSIS — G56.22 ULNAR NEUROPATHY OF LEFT UPPER EXTREMITY: ICD-10-CM

## 2025-03-31 DIAGNOSIS — M96.1 POSTLAMINECTOMY SYNDROME OF LUMBAR REGION: ICD-10-CM

## 2025-03-31 PROCEDURE — 97112 NEUROMUSCULAR REEDUCATION: CPT

## 2025-03-31 PROCEDURE — 97166 OT EVAL MOD COMPLEX 45 MIN: CPT

## 2025-03-31 PROCEDURE — 97110 THERAPEUTIC EXERCISES: CPT

## 2025-03-31 NOTE — PROGRESS NOTES
"Daily Note     Today's date: 3/31/2025  Patient name: Enoc Roberto  : 1960  MRN: [id number]  Referring provider: Danial Vasquez DO  Dx:   Encounter Diagnosis     ICD-10-CM    1. Intervertebral disc disorder with radiculopathy of lumbar region  M51.16       2. Postlaminectomy syndrome of lumbar region  M96.1           Start Time: 1445  Stop Time: 1525  Total time in clinic (min): 40 minutes    Subjective: Pt reports some cramping with performance of HEP.  Notes LB and BLE feeling pretty good, but admits he has not been doing much today.        Objective: See treatment diary below      Assessment: Tolerated treatment well. Fatigues quickly with assigned exercise, especially standing interventions.  Slight cramping in L hip/HS during bridges which improved with cues for glute activation.  Patient would benefit from continued PT to further improve core stability, increase strength/endurance, and reduce stress to lumbar spine.        Plan: Continue per plan of care.      Precautions:       Manuals  3/31                                                               Neuro Re-Ed                          Hooklying clamshell  YHB  2x10           bridge  YHB  3x5           Pball   Supine  5\"x10           Pallof press  Purp XS  2x10 ea                                     Ther Ex                          VG  L7  5 min           HS stretch  20\"x2 ea                                                                            Ther Activity                                       Gait Training                                       Modalities                                            "
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
## 2025-04-07 ENCOUNTER — APPOINTMENT (OUTPATIENT)
Dept: PHYSICAL THERAPY | Facility: REHABILITATION | Age: 65
End: 2025-04-07
Payer: MEDICARE

## 2025-04-07 ENCOUNTER — OFFICE VISIT (OUTPATIENT)
Dept: PHYSICAL THERAPY | Facility: REHABILITATION | Age: 65
End: 2025-04-07
Payer: MEDICARE

## 2025-04-07 DIAGNOSIS — M96.1 POSTLAMINECTOMY SYNDROME OF LUMBAR REGION: ICD-10-CM

## 2025-04-07 DIAGNOSIS — M51.16 INTERVERTEBRAL DISC DISORDER WITH RADICULOPATHY OF LUMBAR REGION: Primary | ICD-10-CM

## 2025-04-07 PROCEDURE — 97112 NEUROMUSCULAR REEDUCATION: CPT

## 2025-04-07 PROCEDURE — 97110 THERAPEUTIC EXERCISES: CPT

## 2025-04-07 NOTE — PROGRESS NOTES
"Daily Note     Today's date: 2025  Patient name: Enoc Roberto  : 1960  MRN: [id number]  Referring provider: Danial Vasquez DO  Dx:   Encounter Diagnosis     ICD-10-CM    1. Intervertebral disc disorder with radiculopathy of lumbar region  M51.16       2. Postlaminectomy syndrome of lumbar region  M96.1                      Subjective: feels about the same, continues to have low tolerance to sitting or standing more than 15 min. HEP every 3rd day      Objective: See treatment diary below    EDU on the need and benefit of performing HEP to continue to work on set goals, and manage symptoms.      Assessment: Tolerated treatment fair. Early onset fatigue note with all strengthening tasks, more notable with posterior chain work in standing. Continued PT would be beneficial to improve function.    '      Plan: Continue per plan of care.       Precautions:       Manuals  3/31 4/7                                                              Neuro Re-Ed                          Hooklying clamshell  YHB  2x10 YHB 2x10          bridge  YHB  3x5 YHB  4x5          Pball   Supine  5\"x10 Supine  5\"x10          Pallof press  Purp XS  2x10 ea [place] 6# 10x5\"          TrA alt LE                          Ther Ex                          VG  L7  5 min L7 6 min          HS stretch  20\"x2 ea 30x20\" ea supine SOS          UE ext hold   2x10x5\" MTB                                                              Ther Activity                                       Gait Training                                       Modalities                                              "

## 2025-04-14 ENCOUNTER — OFFICE VISIT (OUTPATIENT)
Dept: PHYSICAL THERAPY | Facility: REHABILITATION | Age: 65
End: 2025-04-14
Payer: MEDICARE

## 2025-04-14 DIAGNOSIS — M96.1 POSTLAMINECTOMY SYNDROME OF LUMBAR REGION: ICD-10-CM

## 2025-04-14 DIAGNOSIS — M51.16 INTERVERTEBRAL DISC DISORDER WITH RADICULOPATHY OF LUMBAR REGION: Primary | ICD-10-CM

## 2025-04-14 PROCEDURE — 97110 THERAPEUTIC EXERCISES: CPT | Performed by: PHYSICAL THERAPIST

## 2025-04-14 PROCEDURE — 97112 NEUROMUSCULAR REEDUCATION: CPT | Performed by: PHYSICAL THERAPIST

## 2025-04-14 NOTE — PROGRESS NOTES
"Daily Note     Today's date: 2025  Patient name: Enoc Roberto  : 1960  MRN: [id number]  Referring provider: Danial Vasquez DO  Dx:   Encounter Diagnosis     ICD-10-CM    1. Intervertebral disc disorder with radiculopathy of lumbar region  M51.16       2. Postlaminectomy syndrome of lumbar region  M96.1           Start Time: 1620  Stop Time: 1658  Total time in clinic (min): 38 minutes    Subjective: Patient reports feeling good, has been getting more done around the house.      Objective: See treatment diary below      Assessment: Tolerated treatment well. Patient tolerating standing exercises for long time frames indicating improvements in endurance.       Plan: Continue per plan of care.      Precautions:       Manuals  3/31 4/7 4/14                                                             Neuro Re-Ed                          Hooklying clamshell  YHB  2x10 YHB 2x10 RHB  5\"x20         bridge  YHB  3x5 YHB  4x5 RHB  5\"x20         Pball   Supine  5\"x10 Supine  5\"x10 Standing  Alt march - 2x10         Pallof press  Purp XS  2x10 ea [place] 6# 10x5\" YJB  5\"x10          TrA alt LE                          Ther Ex                          VG  L7  5 min L7 6 min L7 5 min                      UE ext hold   2x10x5\" MTB          Leg press             STS    2x10                                   Ther Activity                                       Gait Training                                       Modalities                                                "

## 2025-04-18 ENCOUNTER — OFFICE VISIT (OUTPATIENT)
Dept: PHYSICAL THERAPY | Facility: REHABILITATION | Age: 65
End: 2025-04-18
Payer: MEDICARE

## 2025-04-18 DIAGNOSIS — M51.16 INTERVERTEBRAL DISC DISORDER WITH RADICULOPATHY OF LUMBAR REGION: ICD-10-CM

## 2025-04-18 DIAGNOSIS — M96.1 POSTLAMINECTOMY SYNDROME OF LUMBAR REGION: Primary | ICD-10-CM

## 2025-04-18 PROCEDURE — 97112 NEUROMUSCULAR REEDUCATION: CPT | Performed by: PHYSICAL THERAPIST

## 2025-04-18 PROCEDURE — 97140 MANUAL THERAPY 1/> REGIONS: CPT | Performed by: PHYSICAL THERAPIST

## 2025-04-18 PROCEDURE — 97110 THERAPEUTIC EXERCISES: CPT | Performed by: PHYSICAL THERAPIST

## 2025-04-18 NOTE — PROGRESS NOTES
"Daily Note     Today's date: 2025  Patient name: Enoc Roberto  : 1960  MRN: [id number]  Referring provider: Danial Vasquez DO  Dx:   Encounter Diagnosis     ICD-10-CM    1. Postlaminectomy syndrome of lumbar region  M96.1       2. Intervertebral disc disorder with radiculopathy of lumbar region  M51.16           Start Time: 1245  Stop Time: 1325  Total time in clinic (min): 40 minutes    Subjective: Patient reports doing well in his lower back. States that his mid back is more bothersome today, may be due to sleeping position, CPAP.      Objective: See treatment diary below    Hypomoble mid TS, L side ribs 6-8    Assessment: Tolerated treatment well. Patient reports less TS pain post mobilization, feeling better breathing. Did well with standing exercises today, does require rest breaks between exercises.       Plan: Continue per plan of care.      Precautions:       Manuals  3/31 4/7 4/14 4/18        assessment     3'        Prone TS/rib PA mob     AB G4                                  Neuro Re-Ed                          Hooklying clamshell  YHB  2x10 YHB 2x10 RHB  5\"x20 RHB  5\"x20        bridge  YHB  3x5 YHB  4x5 RHB  5\"x20 RHB  5\"x20        Pball   Supine  5\"x10 Supine  5\"x10 Standing  Alt march - 2x10 Standing  Alt march - 2x10        Pallof press  Purp XS  2x10 ea [place] 6# 10x5\" YJB  5\"x10  YJB  5\"x10         TrA alt LE             Side stepping     2xF RHB        Ther Ex                          VG  L7  5 min L7 6 min L7 5 min                      UE ext hold   2x10x5\" MTB          Leg press             STS    2x10 3x10                                  Ther Activity                                       Gait Training                                       Modalities                                                  "

## 2025-04-21 ENCOUNTER — OFFICE VISIT (OUTPATIENT)
Dept: PAIN MEDICINE | Facility: CLINIC | Age: 65
End: 2025-04-21
Payer: MEDICARE

## 2025-04-21 ENCOUNTER — OFFICE VISIT (OUTPATIENT)
Dept: PHYSICAL THERAPY | Facility: REHABILITATION | Age: 65
End: 2025-04-21
Attending: ANESTHESIOLOGY
Payer: MEDICARE

## 2025-04-21 VITALS
WEIGHT: 315 LBS | SYSTOLIC BLOOD PRESSURE: 148 MMHG | DIASTOLIC BLOOD PRESSURE: 98 MMHG | HEIGHT: 72 IN | BODY MASS INDEX: 42.66 KG/M2 | HEART RATE: 85 BPM

## 2025-04-21 DIAGNOSIS — M79.18 MYOFASCIAL PAIN SYNDROME: ICD-10-CM

## 2025-04-21 DIAGNOSIS — F11.20 UNCOMPLICATED OPIOID DEPENDENCE (HCC): ICD-10-CM

## 2025-04-21 DIAGNOSIS — M96.1 POSTLAMINECTOMY SYNDROME OF LUMBAR REGION: ICD-10-CM

## 2025-04-21 DIAGNOSIS — M96.1 POSTLAMINECTOMY SYNDROME OF LUMBAR REGION: Primary | ICD-10-CM

## 2025-04-21 DIAGNOSIS — G89.4 CHRONIC PAIN SYNDROME: Primary | ICD-10-CM

## 2025-04-21 DIAGNOSIS — M54.16 LUMBAR RADICULOPATHY: ICD-10-CM

## 2025-04-21 DIAGNOSIS — Z79.891 LONG-TERM CURRENT USE OF OPIATE ANALGESIC: ICD-10-CM

## 2025-04-21 DIAGNOSIS — M51.16 INTERVERTEBRAL DISC DISORDER WITH RADICULOPATHY OF LUMBAR REGION: ICD-10-CM

## 2025-04-21 PROCEDURE — 97112 NEUROMUSCULAR REEDUCATION: CPT | Performed by: PHYSICAL THERAPIST

## 2025-04-21 PROCEDURE — G2211 COMPLEX E/M VISIT ADD ON: HCPCS | Performed by: ANESTHESIOLOGY

## 2025-04-21 PROCEDURE — 97110 THERAPEUTIC EXERCISES: CPT | Performed by: PHYSICAL THERAPIST

## 2025-04-21 PROCEDURE — 99214 OFFICE O/P EST MOD 30 MIN: CPT | Performed by: ANESTHESIOLOGY

## 2025-04-21 PROCEDURE — 97140 MANUAL THERAPY 1/> REGIONS: CPT | Performed by: PHYSICAL THERAPIST

## 2025-04-21 RX ORDER — TRAZODONE HYDROCHLORIDE 50 MG/1
25 TABLET ORAL
COMMUNITY
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
## 2025-04-21 NOTE — PROGRESS NOTES
"Daily Note     Today's date: 2025  Patient name: Enoc Roberto  : 1960  MRN: [id number]  Referring provider: Danial Vasquez DO  Dx:   Encounter Diagnosis     ICD-10-CM    1. Postlaminectomy syndrome of lumbar region  M96.1       2. Intervertebral disc disorder with radiculopathy of lumbar region  M51.16           Start Time: 1445  Stop Time: 1530  Total time in clinic (min): 45 minutes    Subjective: Patient reports that he has been on the go moving today. Lower back mildly sore. Mid back stiff but feels that mobilizations in PT were helpful.       Objective: See treatment diary below      Assessment: Tolerated treatment well. Patient with good exercise tolerance as anti rotation band was added to STS, mild compensation from hips.       Plan: Continue per plan of care.      Precautions:       Manuals  3/31 4/7 4/14 4/18 4/21       assessment     3'        Prone TS/rib PA mob     AB G4 AB G4                                 Neuro Re-Ed             Seated posterior chain strengthening - hip hinge      20# ball  20x       Hooklying clamshell  YHB  2x10 YHB 2x10 RHB  5\"x20 RHB  5\"x20        bridge  YHB  3x5 YHB  4x5 RHB  5\"x20 RHB  5\"x20 RHB  5\"x20       Pball   Supine  5\"x10 Supine  5\"x10 Standing  Alt march - 2x10 Standing  Alt march - 2x10        Pallof press  Purp XS  2x10 ea [place] 6# 10x5\" YJB  5\"x10  YJB  5\"x10  YJB  5\"x10        TrA alt LE             Side stepping     2xF RHB 2xF RHB       Ther Ex                          VG  L7  5 min L7 6 min L7 5 min                      UE ext hold   2x10x5\" MTB          Leg press             STS    2x10 3x10 3x10-anti rotation                                  Ther Activity                                       Gait Training                                       Modalities                                                    "

## 2025-04-24 ENCOUNTER — VBI (OUTPATIENT)
Dept: ADMINISTRATIVE | Facility: OTHER | Age: 65
End: 2025-04-24

## 2025-04-24 NOTE — TELEPHONE ENCOUNTER
Patient contacted to schedule Annual Wellness Visit.   Patient agreed to schedule appointment.      Thank you.  Maris Concepcion  [facility]

## 2025-04-25 ENCOUNTER — OFFICE VISIT (OUTPATIENT)
Dept: PHYSICAL THERAPY | Facility: REHABILITATION | Age: 65
End: 2025-04-25
Payer: MEDICARE

## 2025-04-25 DIAGNOSIS — M96.1 POSTLAMINECTOMY SYNDROME OF LUMBAR REGION: Primary | ICD-10-CM

## 2025-04-25 DIAGNOSIS — M51.16 INTERVERTEBRAL DISC DISORDER WITH RADICULOPATHY OF LUMBAR REGION: ICD-10-CM

## 2025-04-25 PROCEDURE — 97110 THERAPEUTIC EXERCISES: CPT | Performed by: PHYSICAL THERAPIST

## 2025-04-25 PROCEDURE — 97112 NEUROMUSCULAR REEDUCATION: CPT | Performed by: PHYSICAL THERAPIST

## 2025-04-25 NOTE — PROGRESS NOTES
"Daily Note     Today's date: 2025  Patient name: Enoc Roberto  : 1960  MRN: [id number]  Referring provider: Danial Vasquez DO  Dx:   Encounter Diagnosis     ICD-10-CM    1. Postlaminectomy syndrome of lumbar region  M96.1       2. Intervertebral disc disorder with radiculopathy of lumbar region  M51.16           Start Time: 1205  Stop Time: 1245  Total time in clinic (min): 40 minutes    Subjective: Patient reports more neck pain today vs back pain. His back is more sore the normal as he did work on his fish tanks last night.      Objective: See treatment diary below      Assessment: Tolerated treatment well. Patient with increased fatigue today as noted with rest breaks bw standing exercises. I will attribute this to increased soreness from yesterdays activity.       Plan: Continue per plan of care.      Precautions:       Manuals  3/31 4/7 4/14 4/18 4/21 4/25      assessment     3'        Prone TS/rib PA mob     AB G4 AB G4                                 Neuro Re-Ed             Seated posterior chain strengthening - hip hinge      20# ball  20x 20# ball  20x      Hooklying clamshell  YHB  2x10 YHB 2x10 RHB  5\"x20 RHB  5\"x20        bridge  YHB  3x5 YHB  4x5 RHB  5\"x20 RHB  5\"x20 RHB  5\"x20 RHB  5\"x20      Hooklying march       RHB  5\"x20      Pball   Supine  5\"x10 Supine  5\"x10 Standing  Alt march - 2x10 Standing  Alt march - 2x10        Pallof press  Purp XS  2x10 ea [place] 6# 10x5\" YJB  5\"x10  YJB  5\"x10  YJB  5\"x10  YJB  5\"x15      TrA alt LE             Side stepping     2xF RHB 2xF RHB       Ther Ex                          VG  L7  5 min L7 6 min L7 5 min                      UE ext hold   2x10x5\" MTB          Leg press             STS    2x10 3x10 3x10-anti rotation  NP      [place] walk outs       20#  2x10                   Ther Activity                                       Gait Training                                       Modalities                                     "

## 2025-04-27 LAB
6MAM UR QL CFM: NEGATIVE NG/ML
7AMINOCLONAZEPAM UR QL CFM: NORMAL NG/ML
A-OH ALPRAZ UR QL CFM: NEGATIVE NG/ML
AMPHET UR QL CFM: NEGATIVE NG/ML
AMPHET UR QL CFM: NEGATIVE NG/ML
BUPRENORPHINE UR QL CFM: NEGATIVE NG/ML
BUTALBITAL UR QL CFM: NEGATIVE NG/ML
BZE UR QL CFM: NEGATIVE NG/ML
CODEINE UR QL CFM: NEGATIVE NG/ML
EDDP UR QL CFM: NEGATIVE NG/ML
ETHYL GLUCURONIDE UR QL CFM: ABNORMAL NG/ML
ETHYL SULFATE UR QL SCN: ABNORMAL NG/ML
FENTANYL UR QL CFM: NEGATIVE NG/ML
GLIADIN IGG SER IA-ACNC: NEGATIVE NG/ML
HYDROCODONE UR QL CFM: NEGATIVE NG/ML
HYDROCODONE UR QL CFM: NEGATIVE NG/ML
HYDROMORPHONE UR QL CFM: NEGATIVE NG/ML
LORAZEPAM UR QL CFM: NEGATIVE NG/ML
MDMA UR QL CFM: NEGATIVE NG/ML
ME-PHENIDATE UR QL CFM: NEGATIVE NG/ML
MEPERIDINE UR QL CFM: NEGATIVE NG/ML
METHADONE UR QL CFM: NEGATIVE NG/ML
METHAMPHET UR QL CFM: NEGATIVE NG/ML
MORPHINE UR QL CFM: NEGATIVE NG/ML
MORPHINE UR QL CFM: NEGATIVE NG/ML
NORBUPRENORPHINE UR QL CFM: NEGATIVE NG/ML
NORDIAZEPAM UR QL CFM: NEGATIVE NG/ML
NORFENTANYL UR QL CFM: NEGATIVE NG/ML
NORHYDROCODONE UR QL CFM: NEGATIVE NG/ML
NORHYDROCODONE UR QL CFM: NEGATIVE NG/ML
NORMEPERIDINE UR QL CFM: NEGATIVE NG/ML
NOROXYCODONE UR QL CFM: NEGATIVE NG/ML
OXAZEPAM UR QL CFM: ABNORMAL NG/ML
OXYCODONE UR QL CFM: NEGATIVE NG/ML
OXYMORPHONE UR QL CFM: NEGATIVE NG/ML
OXYMORPHONE UR QL CFM: NEGATIVE NG/ML
PARA-FLUOROFENTANYL QUANTIFICATION: NORMAL NG/ML
PCP UR QL CFM: NEGATIVE NG/ML
PHENOBARB UR QL CFM: NEGATIVE NG/ML
SECOBARBITAL UR QL CFM: NEGATIVE NG/ML
SL AMB 5F-ADB-M7 METABOLITE QUANTIFICATION: NEGATIVE NG/ML
SL AMB 7-OH-MITRAGYNINE (KRATOM ALKALOID) QUANTIFICATION: NEGATIVE NG/ML
SL AMB AB-FUBINACA-M3 METABOLITE QUANTIFICATION: NEGATIVE NG/ML
SL AMB ACETYL FENTANYL QUANTIFICATION: NORMAL NG/ML
SL AMB ACETYL NORFENTANYL QUANTIFICATION: NORMAL NG/ML
SL AMB ACRYL FENTANYL QUANTIFICATION: NORMAL NG/ML
SL AMB CARFENTANIL QUANTIFICATION: NORMAL NG/ML
SL AMB CTHC (MARIJUANA METABOLITE) QUANTIFICATION: ABNORMAL NG/ML
SL AMB DEXTROMETHORPHAN QUANTIFICATION: NEGATIVE NG/ML
SL AMB DEXTRORPHAN (DEXTROMETHORPHAN METABOLITE) QUANT: NEGATIVE NG/ML
SL AMB DEXTRORPHAN (DEXTROMETHORPHAN METABOLITE) QUANT: NEGATIVE NG/ML
SL AMB JWH018 METABOLITE QUANTIFICATION: NEGATIVE NG/ML
SL AMB JWH073 METABOLITE QUANTIFICATION: NEGATIVE NG/ML
SL AMB MDMB-FUBINACA-M1 METABOLITE QUANTIFICATION: NEGATIVE NG/ML
SL AMB N-DESMETHYL-TRAMADOL QUANTIFICATION: NEGATIVE NG/ML
SL AMB PHENTERMINE QUANTIFICATION: NEGATIVE NG/ML
SL AMB RCS4 METABOLITE QUANTIFICATION: NEGATIVE NG/ML
SL AMB RITALINIC ACID QUANTIFICATION: NEGATIVE NG/ML
SPECIMEN DRAWN SERPL: NEGATIVE NG/ML
TAPENTADOL UR QL CFM: NEGATIVE NG/ML
TEMAZEPAM UR QL CFM: NEGATIVE NG/ML
TEMAZEPAM UR QL CFM: NEGATIVE NG/ML
TRAMADOL UR QL CFM: NEGATIVE NG/ML
URATE/CREAT 24H UR: NEGATIVE NG/ML

## 2025-04-28 ENCOUNTER — OFFICE VISIT (OUTPATIENT)
Dept: PHYSICAL THERAPY | Facility: REHABILITATION | Age: 65
End: 2025-04-28
Payer: MEDICARE

## 2025-04-28 DIAGNOSIS — M96.1 POSTLAMINECTOMY SYNDROME OF LUMBAR REGION: Primary | ICD-10-CM

## 2025-04-28 DIAGNOSIS — M51.16 INTERVERTEBRAL DISC DISORDER WITH RADICULOPATHY OF LUMBAR REGION: ICD-10-CM

## 2025-04-28 PROCEDURE — 97110 THERAPEUTIC EXERCISES: CPT | Performed by: PHYSICAL THERAPIST

## 2025-04-28 NOTE — PROGRESS NOTES
"Daily Note     Today's date: 2025  Patient name: Enoc Roberto  : 1960  MRN: [id number]  Referring provider: Danial Vasquez DO  Dx:   Encounter Diagnosis     ICD-10-CM    1. Postlaminectomy syndrome of lumbar region  M96.1       2. Intervertebral disc disorder with radiculopathy of lumbar region  M51.16           Start Time: 1620  Stop Time: 1650  Total time in clinic (min): 30 minutes    Subjective: Patient reports that his back and neck felt good today as he saw a new chiropractor.       Objective: See treatment diary below      Assessment: Tolerated treatment well. Patient with improved standing and exercise tolerance today, he needed less rest breaks to lie/sit then normal as we increased exercise intensity and added new exercises.       Plan: Continue per plan of care.      Precautions:       Manuals  3/31 4/7 4/14 4/18 4/21 4/25 4/28     assessment     3'        Prone TS/rib PA mob     AB G4 AB G4                                 Neuro Re-Ed             Seated posterior chain strengthening - hip hinge      20# ball  20x 20# ball  20x 20# ball  20x     Hooklying clamshell  YHB  2x10 YHB 2x10 RHB  5\"x20 RHB  5\"x20        bridge  YHB  3x5 YHB  4x5 RHB  5\"x20 RHB  5\"x20 RHB  5\"x20 RHB  5\"x20 On pball  5\"x25     Hooklying march       RHB  5\"x20      Pball   Supine  5\"x10 Supine  5\"x10 Standing  Alt march - 2x10 Standing  Alt march - 2x10   BTB - 30\"x3     Pallof press  Purp XS  2x10 ea [place] 6# 10x5\" YJB  5\"x10  YJB  5\"x10  YJB  5\"x10  YJB  5\"x15 YJB  5\"x15 ea side     TrA alt LE             Side stepping     2xF RHB 2xF RHB  NP     Ther Ex                          VG  L7  5 min L7 6 min L7 5 min                      UE ext hold   2x10x5\" MTB          Leg press             STS    2x10 3x10 3x10-anti rotation  NP      Tony walk outs       20#  2x10                   Ther Activity             Farmer carry        20# KB  2'x2                  Gait Training                                 "       Modalities

## 2025-04-29 ENCOUNTER — OFFICE VISIT (OUTPATIENT)
Dept: BARIATRICS | Facility: CLINIC | Age: 65
End: 2025-04-29
Payer: MEDICARE

## 2025-04-29 VITALS
RESPIRATION RATE: 16 BRPM | BODY MASS INDEX: 41.24 KG/M2 | SYSTOLIC BLOOD PRESSURE: 134 MMHG | HEIGHT: 73 IN | WEIGHT: 311.2 LBS | TEMPERATURE: 98.8 F | HEART RATE: 96 BPM | DIASTOLIC BLOOD PRESSURE: 76 MMHG

## 2025-04-29 DIAGNOSIS — I48.20 CHRONIC ATRIAL FIBRILLATION (HCC): ICD-10-CM

## 2025-04-29 DIAGNOSIS — E66.01 OBESITY, MORBID (HCC): Primary | ICD-10-CM

## 2025-04-29 DIAGNOSIS — I25.10 CORONARY ARTERIOSCLEROSIS: ICD-10-CM

## 2025-04-29 DIAGNOSIS — I10 ESSENTIAL HYPERTENSION: ICD-10-CM

## 2025-04-29 DIAGNOSIS — G47.33 OSA (OBSTRUCTIVE SLEEP APNEA): ICD-10-CM

## 2025-04-29 PROCEDURE — G2211 COMPLEX E/M VISIT ADD ON: HCPCS | Performed by: PHYSICIAN ASSISTANT

## 2025-04-29 PROCEDURE — 99214 OFFICE O/P EST MOD 30 MIN: CPT | Performed by: PHYSICIAN ASSISTANT

## 2025-04-29 RX ORDER — TIRZEPATIDE 2.5 MG/.5ML
2.5 INJECTION, SOLUTION SUBCUTANEOUS WEEKLY
Qty: 2 ML | Refills: 0 | Status: SHIPPED | OUTPATIENT
Start: 2025-04-29 | End: 2025-05-27

## 2025-04-29 NOTE — ASSESSMENT & PLAN NOTE
Patient is pursuing conservative plan.    -  not interested in surgery and not a candidate for VLCD due to history of afib and gout.  - Initial weight loss goal of 5-10% weight loss for improved health  - Weight loss can improve patient's co-morbid conditions and/or prevent weight-related complications.      Initial Weight:338.2  Last Visit:328.6  Current Weight : 311.2  Change: -27lb (-17.4 lb from February)      Goals:  Do not skip meals.  Food log 2200 calories-would decrease carbs.  Doing more breads currently  Increase physical activity discussed benefits of walking even short intervals     To start on zepbound for OSATo start on initial dose of medication and then to titrate as tolerated.  They have tried more than 6 months of lifestyle modifications including diet and activity changes and has had insignificant weight loss of less than 1 lb a week. Patient denies personal and family history of MCT and MEN2 tumors. Patient denies personal history of pancreatitis. Side effects discussed but not limited to diarrhea, bloating, constipation, GI upset, heartburn, increased heart rate, headache, low blood sugar, fatigue and dizziness. Titration and medication administration discussed.  Will stop metformin once covered.    -stopped topamax due to fatigue.

## 2025-04-29 NOTE — PROGRESS NOTES
Assessment/Plan:    Obesity, morbid (HCC)  Patient is pursuing conservative plan.    -  not interested in surgery and not a candidate for VLCD due to history of afib and gout.  - Initial weight loss goal of 5-10% weight loss for improved health  - Weight loss can improve patient's co-morbid conditions and/or prevent weight-related complications.      Initial Weight:338.2  Last Visit:328.6  Current Weight : 311.2  Change: -27lb (-17.4 lb from February)      Goals:  Do not skip meals.  Food log 2200 calories-would decrease carbs.  Doing more breads currently  Increase physical activity discussed benefits of walking even short intervals     To start on zepbound for OSATo start on initial dose of medication and then to titrate as tolerated.  They have tried more than 6 months of lifestyle modifications including diet and activity changes and has had insignificant weight loss of less than 1 lb a week. Patient denies personal and family history of MCT and MEN2 tumors. Patient denies personal history of pancreatitis. Side effects discussed but not limited to diarrhea, bloating, constipation, GI upset, heartburn, increased heart rate, headache, low blood sugar, fatigue and dizziness. Titration and medication administration discussed.  Will stop metformin once covered.    -stopped topamax due to fatigue.      JULIUS (obstructive sleep apnea)  Using BiPAP  -should improve with weight loss, dietary, and lifestyle changes  -to start on zepbound    Essential hypertension  On bystolic and norvasc  -should improve with weight loss, dietary, and lifestyle changes        Return in about 3 months (around 7/29/2025).       Diagnoses and all orders for this visit:    Obesity, morbid (HCC)  -     tirzepatide (Zepbound) 2.5 mg/0.5 mL auto-injector; Inject 0.5 mL (2.5 mg total) under the skin once a week for 28 days    Coronary arteriosclerosis  -     tirzepatide (Zepbound) 2.5 mg/0.5 mL auto-injector; Inject 0.5 mL (2.5 mg total) under the  skin once a week for 28 days    JULIUS (obstructive sleep apnea)  -     tirzepatide (Zepbound) 2.5 mg/0.5 mL auto-injector; Inject 0.5 mL (2.5 mg total) under the skin once a week for 28 days    Chronic atrial fibrillation (HCC)  -     tirzepatide (Zepbound) 2.5 mg/0.5 mL auto-injector; Inject 0.5 mL (2.5 mg total) under the skin once a week for 28 days    Essential hypertension  -     tirzepatide (Zepbound) 2.5 mg/0.5 mL auto-injector; Inject 0.5 mL (2.5 mg total) under the skin once a week for 28 days          Subjective:   Chief Complaint   Patient presents with    Follow-up     Mwm f/u: waist: 50in.        Patient ID: Enoc Roberto  is a 64 y.o. male with excess weight/obesity here to pursue weight managment.  Patient is pursuing Conservative Program.     HPI  His wife had been following with keto diet but she does get foods that she does not typically eat and so he eats it.  .  It has been more carbs-bagels , breads recently. He is tolerating metformin well.    Wt Readings from Last 10 Encounters:   04/29/25 (!) 141 kg (311 lb 3.2 oz)   04/21/25 (!) 149 kg (329 lb)   03/17/25 (!) 149 kg (329 lb)   03/11/25 (!) 149 kg (329 lb)   02/20/25 (!) 149 kg (329 lb)   02/05/25 (!) 149 kg (329 lb)   02/05/25 (!) 145 kg (320 lb)   01/10/25 (!) 143 kg (315 lb)   12/19/24 (!) 144 kg (318 lb)   12/18/24 (!) 143 kg (315 lb)       Exercise:PT  Hydration:water      The following portions of the patient's history were reviewed and updated as appropriate: He  has a past medical history of Acid reflux, Acute renal failure (HCC), Alcohol intoxication, episodic (HCC) (12/17/2010), Analgesic use, Anxiety, Arthritis, Asthma, Avascular necrosis of femoral head, right (HCC), Avascular necrosis of femur head, right (HCC), Avascular necrosis of hip, left (HCC), Gonzales esophagus, Burn, Cervical disc herniation, Chronic pain, Chronic pain disorder, Chronic sinusitis (11/15/2008), Colon polyp, CPAP (continuous positive airway pressure)  dependence, Depression, Disease of thyroid gland, Disorder of male genital organs, Elevated serum creatinine, GERD (gastroesophageal reflux disease), Gynecomastia, High cholesterol, History of colon polyps, Hypertension, Hypogonadism in male, Hypothyroid, 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  He   Patient Active Problem List    Diagnosis Date Noted    JULIUS (obstructive sleep apnea) 09/26/2024    Coronary arteriosclerosis 09/25/2024    Spinal stenosis of lumbar region with neurogenic claudication 09/19/2024    Ambulatory dysfunction 09/06/2024    OAB (overactive bladder) 08/27/2024    Gross hematuria 08/27/2024    Cervical radiculopathy 05/06/2024    Irritation of left ulnar nerve 03/18/2024    Tinea cruris 02/12/2024    Arthritis of carpometacarpal (CMC) joint of right thumb 11/21/2023    Chronic atrial fibrillation (HCC) 09/14/2023    Impaired fasting glucose 09/14/2023    Moderate episode of recurrent major depressive disorder (HCC) 03/24/2023    Need for pneumococcal vaccine 03/07/2022    Preoperative clearance 01/26/2022    Peripheral neuropathy 10/12/2021    Anomaly, 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    Paresthesia of bilateral legs 09/21/2021    Chronic lower back pain 09/21/2021    Protrusion of lumbar intervertebral disc 09/21/2021    Hyperuricemia 03/01/2021    Loose body in right shoulder 09/25/2020    DJD of right shoulder 09/21/2020    Generalized anxiety disorder 06/16/2020    ST segment depression 05/13/2020    Allergic cough 04/08/2020    Dysesthesia 03/16/2020    Osteoarthritis of left midfoot 09/05/2019    Lumbar radiculopathy 03/25/2019    Cubital tunnel syndrome on left 04/23/2018    Obesity, morbid (HCC)     Erectile disorder due to medical  condition in male patient 08/11/2017    Status post total replacement of right hip 08/05/2016    Esophageal reflux 08/05/2016    Other specified hypothyroidism 08/05/2016    Myofascial pain syndrome 10/01/2014    Chronic pain syndrome 09/27/2013    Intervertebral disc disorder with radiculopathy of lumbar region 05/24/2013    Spondylosis of lumbar region without myelopathy or radiculopathy 05/24/2013    Lumbar canal stenosis 09/04/2012    Essential hypertension 09/04/2012    Postlaminectomy syndrome of lumbar region 07/06/2012     He  has a past surgical history that includes Colonoscopy; Decompression core hip bilateral; Lumbar fusion (2009); ORIF ankle fracture (Bilateral); Tonsillectomy; [place] tooth extraction; pr open treatment bimalleolar ankle fracture (Right, 12/22/2016); pr arthrp acetblr/prox fem prostc agrft/algrft (Right, 08/05/2016); Ankle ligament reconstruction (Left); Hip surgery (07/21/2016); Joint replacement; Fracture surgery; pr shi impltj nstim eltrds plate/paddle edrl (N/A, 06/19/2018); Upper gastrointestinal endoscopy; Back surgery; and pr neuroplasty &/transposition ulnar nerve elbow (Left, 1/10/2025).  His family history includes Arthritis in his father; Atrial fibrillation in his father; Cancer in his family, father, and mother; Diabetes type II in his paternal grandmother; Hypertension in his family and father; Other in his family; Thyroid disease in his daughter.  He  reports that he has never smoked. He has never used smokeless tobacco. He reports current alcohol use of about 6.0 standard drinks of alcohol per week. He reports current drug use. Frequency: 7.00 times per week. Drug: Marijuana.  Current Outpatient Medications   Medication Sig Dispense Refill    albuterol (PROVENTIL HFA,VENTOLIN HFA) 90 mcg/act inhaler Inhale 2 puffs every 6 (six) hours as needed for wheezing 26.8 g 1    alfuzosin (UROXATRAL) 10 mg 24 hr tablet Take 1 tablet (10 mg total) by mouth daily 90 tablet 1     allopurinol (ZYLOPRIM) 100 mg tablet Take 1 tablet (100 mg total) by mouth daily 90 tablet 1    amLODIPine (NORVASC) 10 mg tablet Take 1 tablet (10 mg total) by mouth daily 90 tablet 1    apixaban (Eliquis) 5 mg Take 1 tablet (5 mg total) by mouth 2 (two) times a day 180 tablet 1    Ascorbic Acid (ANTONIETA-C PO) Take by mouth as needed      aspirin 81 mg EC tablet Take 1 tablet (81 mg total) by mouth daily Do not start before January 13, 2025.      Calcium-Magnesium-Vitamin D (CITRACAL CALCIUM+D PO) Take by mouth in the morning      clonazePAM (KlonoPIN) 0.5 mg tablet Take 1 tablet (0.5 mg total) by mouth daily at bedtime 90 tablet 0    Diclofenac Sodium (VOLTAREN) 1 % APPLY 2 GRAMS TO AFFECTED AREA 4 TIMES A  g 1    DULoxetine HCl 40 MG CPEP Take 1 capsule (40 mg total) by mouth daily 90 capsule 1    esomeprazole (NexIUM) 40 MG capsule Take 40 mg by mouth every morning before breakfast      fluticasone (FLONASE) 50 mcg/act nasal spray 2 sprays into each nostril daily Dispense 3 bottles 48 mL 0    gabapentin (NEURONTIN) 300 mg capsule Take 1 capsule (300 mg total) by mouth daily at bedtime 90 capsule 1    levothyroxine 25 mcg tablet Take 1 tablet (25 mcg total) by mouth daily 90 tablet 1    lidocaine (LIDODERM) 5 % Apply 1 patch topically over 12 hours daily Remove & Discard patch within 12 hours or as directed by MD 90 patch 1    losartan (COZAAR) 25 mg tablet Take 1 tablet (25 mg total) by mouth daily 90 tablet 1    Magnesium 400 MG CAPS Take by mouth daily       metFORMIN (GLUCOPHAGE-XR) 750 mg 24 hr tablet Take 1 pill a day for 2 weeks and then take 2 tablets a day 180 tablet 0    montelukast (SINGULAIR) 10 mg tablet Take 1 tablet (10 mg total) by mouth daily at bedtime 90 tablet 1    Multiple Vitamins-Minerals (ICAPS AREDS 2 PO) Take by mouth if needed      nebivolol (BYSTOLIC) 5 mg tablet Take 1 tablet (5 mg total) by mouth daily 90 tablet 1    rosuvastatin (CRESTOR) 5 mg tablet Take 1 tablet (5 mg total)  by mouth daily 90 tablet 1    senna (SENOKOT) 8.6 mg Take 2 tablets (17.2 mg total) by mouth 2 (two) times a day as needed for constipation 20 tablet 0    tadalafil (CIALIS) 5 MG tablet Take 1 tablet (5 mg total) by mouth in the morning 90 tablet 1    tirzepatide (Zepbound) 2.5 mg/0.5 mL auto-injector Inject 0.5 mL (2.5 mg total) under the skin once a week for 28 days 2 mL 0    traZODone (DESYREL) 50 mg tablet Take 25 mg by mouth daily at bedtime      folic acid (FOLVITE) 1 mg tablet Take 1 tablet (1 mg total) by mouth daily (Patient not taking: Reported on 2/3/2025) 30 tablet 0    thiamine 100 MG tablet Take 1 tablet (100 mg total) by mouth daily (Patient not taking: Reported on 4/29/2025) 30 tablet 0     No current facility-administered medications for this visit.     Current Outpatient Medications on File Prior to Visit   Medication Sig    albuterol (PROVENTIL HFA,VENTOLIN HFA) 90 mcg/act inhaler Inhale 2 puffs every 6 (six) hours as needed for wheezing    alfuzosin (UROXATRAL) 10 mg 24 hr tablet Take 1 tablet (10 mg total) by mouth daily    allopurinol (ZYLOPRIM) 100 mg tablet Take 1 tablet (100 mg total) by mouth daily    amLODIPine (NORVASC) 10 mg tablet Take 1 tablet (10 mg total) by mouth daily    apixaban (Eliquis) 5 mg Take 1 tablet (5 mg total) by mouth 2 (two) times a day    Ascorbic Acid (ANTONIETA-C PO) Take by mouth as needed    aspirin 81 mg EC tablet Take 1 tablet (81 mg total) by mouth daily Do not start before January 13, 2025.    Calcium-Magnesium-Vitamin D (CITRACAL CALCIUM+D PO) Take by mouth in the morning    clonazePAM (KlonoPIN) 0.5 mg tablet Take 1 tablet (0.5 mg total) by mouth daily at bedtime    Diclofenac Sodium (VOLTAREN) 1 % APPLY 2 GRAMS TO AFFECTED AREA 4 TIMES A DAY    DULoxetine HCl 40 MG CPEP Take 1 capsule (40 mg total) by mouth daily    esomeprazole (NexIUM) 40 MG capsule Take 40 mg by mouth every morning before breakfast    fluticasone (FLONASE) 50 mcg/act nasal spray 2 sprays into  each nostril daily Dispense 3 bottles    gabapentin (NEURONTIN) 300 mg capsule Take 1 capsule (300 mg total) by mouth daily at bedtime    levothyroxine 25 mcg tablet Take 1 tablet (25 mcg total) by mouth daily    lidocaine (LIDODERM) 5 % Apply 1 patch topically over 12 hours daily Remove & Discard patch within 12 hours or as directed by MD    losartan (COZAAR) 25 mg tablet Take 1 tablet (25 mg total) by mouth daily    Magnesium 400 MG CAPS Take by mouth daily     metFORMIN (GLUCOPHAGE-XR) 750 mg 24 hr tablet Take 1 pill a day for 2 weeks and then take 2 tablets a day    montelukast (SINGULAIR) 10 mg tablet Take 1 tablet (10 mg total) by mouth daily at bedtime    Multiple Vitamins-Minerals (ICAPS AREDS 2 PO) Take by mouth if needed    nebivolol (BYSTOLIC) 5 mg tablet Take 1 tablet (5 mg total) by mouth daily    rosuvastatin (CRESTOR) 5 mg tablet Take 1 tablet (5 mg total) by mouth daily    senna (SENOKOT) 8.6 mg Take 2 tablets (17.2 mg total) by mouth 2 (two) times a day as needed for constipation    tadalafil (CIALIS) 5 MG tablet Take 1 tablet (5 mg total) by mouth in the morning    traZODone (DESYREL) 50 mg tablet Take 25 mg by mouth daily at bedtime    folic acid (FOLVITE) 1 mg tablet Take 1 tablet (1 mg total) by mouth daily (Patient not taking: Reported on 2/3/2025)    thiamine 100 MG tablet Take 1 tablet (100 mg total) by mouth daily (Patient not taking: Reported on 4/29/2025)     No current facility-administered medications on file prior to visit.     He is allergic to nsaids, penicillins, sulfa antibiotics, tylenol [acetaminophen], other, acetazolamide, and oxytetracycline..    Review of Systems   Constitutional:  Negative for fatigue.   Respiratory:  Negative for shortness of breath.    Cardiovascular:  Negative for chest pain and palpitations.   Gastrointestinal:  Negative for abdominal pain, constipation and diarrhea.   Endocrine: Negative for cold intolerance and heat intolerance.   Genitourinary:   "Negative for difficulty urinating.   Skin:  Negative for rash.   Neurological:  Negative for headaches.   Psychiatric/Behavioral:  Negative for dysphoric mood. The patient is not nervous/anxious.        Objective:    /76   Pulse 96   Temp 98.8 °F (37.1 °C)   Resp 16   Ht 6' 1\" (1.854 m)   Wt (!) 141 kg (311 lb 3.2 oz)   BMI 41.06 kg/m²      Physical Exam  Vitals and nursing note reviewed.   Constitutional:       General: He is not in acute distress.     Appearance: He is well-developed. He is obese.   HENT:      Head: Normocephalic and atraumatic.   Eyes:      Conjunctiva/sclera: Conjunctivae normal.   Neck:      Thyroid: No thyromegaly.   Pulmonary:      Effort: Pulmonary effort is normal. No respiratory distress.   Skin:     Findings: No rash (visible).   Neurological:      Mental Status: He is alert and oriented to person, place, and time.   Psychiatric:         Behavior: Behavior normal.         "

## 2025-04-30 ENCOUNTER — TELEPHONE (OUTPATIENT)
Dept: RADIOLOGY | Facility: CLINIC | Age: 65
End: 2025-04-30

## 2025-04-30 NOTE — ASSESSMENT & PLAN NOTE
Using BiPAP  -should improve with weight loss, dietary, and lifestyle changes  -to start on zepbound

## 2025-04-30 NOTE — TELEPHONE ENCOUNTER
Please let patient know I reviewed the UDS results and the THC has come down significantly.  However, it did show alcohol which he cannot take while being prescribed any opiates especially since he is on clonazepam since the combination can seriously depress respiratory rate and lead to death.    If he will stop drinking alcohol then I will send in the opiate prescription for him

## 2025-05-01 ENCOUNTER — OFFICE VISIT (OUTPATIENT)
Dept: PAIN MEDICINE | Facility: CLINIC | Age: 65
End: 2025-05-01
Payer: MEDICARE

## 2025-05-01 DIAGNOSIS — M51.16 INTERVERTEBRAL DISC DISORDER WITH RADICULOPATHY OF LUMBAR REGION: Primary | ICD-10-CM

## 2025-05-01 DIAGNOSIS — F11.20 UNCOMPLICATED OPIOID DEPENDENCE (HCC): ICD-10-CM

## 2025-05-01 DIAGNOSIS — G89.4 CHRONIC PAIN SYNDROME: ICD-10-CM

## 2025-05-01 DIAGNOSIS — Z79.891 LONG-TERM CURRENT USE OF OPIATE ANALGESIC: ICD-10-CM

## 2025-05-01 DIAGNOSIS — M96.1 LUMBAR POSTLAMINECTOMY SYNDROME: ICD-10-CM

## 2025-05-01 DIAGNOSIS — M48.062 SPINAL STENOSIS OF LUMBAR REGION WITH NEUROGENIC CLAUDICATION: ICD-10-CM

## 2025-05-01 DIAGNOSIS — M79.18 MYOFASCIAL PAIN SYNDROME: ICD-10-CM

## 2025-05-01 PROCEDURE — 99214 OFFICE O/P EST MOD 30 MIN: CPT

## 2025-05-01 RX ORDER — LIDOCAINE 50 MG/G
1 PATCH TOPICAL DAILY
Qty: 90 PATCH | Refills: 2 | Status: SHIPPED | OUTPATIENT
Start: 2025-05-01
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
## 2025-05-01 NOTE — PROGRESS NOTES
"Daily Note     Today's date: 2025  Patient name: Enoc Roberto  : 1960  MRN: [id number]  Referring provider: Danial Vasquez DO  Dx:   Encounter Diagnosis     ICD-10-CM    1. Postlaminectomy syndrome of lumbar region  M96.1       2. Intervertebral disc disorder with radiculopathy of lumbar region  M51.16           Start Time: 1250  Stop Time: 1325  Total time in clinic (min): 35 minutes    Subjective: Patient reports that he did sit a lot earlier in the week with driving and appointments as his back was more sore. Chiropractor is helping with his neck pain.      Objective: See treatment diary below      Assessment: Tolerated treatment well. Patient demonstrated fatigue post treatment and exhibited good technique with therapeutic exercises      Plan: Continue per plan of care.      Precautions:       Manuals  3/31 4/7 4/14 4/18 4/21 4/25 4/28 5/2    assessment     3'        Prone TS/rib PA mob     AB G4 AB G4                                 Neuro Re-Ed             STS          5 KG ball-2x10  + foam    Seated posterior chain strengthening - hip hinge      20# ball  20x 20# ball  20x 20# ball  20x     Hooklying clamshell  YHB  2x10 YHB 2x10 RHB  5\"x20 RHB  5\"x20        bridge  YHB  3x5 YHB  4x5 RHB  5\"x20 RHB  5\"x20 RHB  5\"x20 RHB  5\"x20 On pball  5\"x25 On pball  5\"x25    Hooklying march       RHB  5\"x20      Pball   Supine  5\"x10 Supine  5\"x10 Standing  Alt march - 2x10 Standing  Alt march - 2x10   BTB - 30\"x3     Pallof press  Purp XS  2x10 ea Gage 6# 10x5\" YJB  5\"x10  YJB  5\"x10  YJB  5\"x10  YJB  5\"x15 YJB  5\"x15 ea side YJB  5\"x15 ea side    TrA alt LE             Side stepping     2xF RHB 2xF RHB  NP     Ther Ex                          VG  L7  5 min L7 6 min L7 5 min                      UE ext hold   2x10x5\" MTB          Leg press             STS    2x10 3x10 3x10-anti rotation  NP      Tony walk outs       20#  2x10                   Ther Activity             Dan carry        " 20# KB  2'x2 25# KB  2'x2                 Gait Training                                       Modalities

## 2025-05-02 ENCOUNTER — OFFICE VISIT (OUTPATIENT)
Dept: PHYSICAL THERAPY | Facility: REHABILITATION | Age: 65
End: 2025-05-02
Payer: MEDICARE

## 2025-05-02 ENCOUNTER — TELEPHONE (OUTPATIENT)
Dept: BARIATRICS | Facility: CLINIC | Age: 65
End: 2025-05-02

## 2025-05-02 DIAGNOSIS — M51.16 INTERVERTEBRAL DISC DISORDER WITH RADICULOPATHY OF LUMBAR REGION: ICD-10-CM

## 2025-05-02 DIAGNOSIS — M96.1 POSTLAMINECTOMY SYNDROME OF LUMBAR REGION: Primary | ICD-10-CM

## 2025-05-02 PROCEDURE — 97110 THERAPEUTIC EXERCISES: CPT | Performed by: PHYSICAL THERAPIST

## 2025-05-02 NOTE — TELEPHONE ENCOUNTER
PA for Zepbound SUBMITTED to [facility]     via    [x]CMM-KEY: [id number]   []Surescripts-Case ID #   []Availity-Auth ID # NDC #   []Faxed to plan   []Other website   []Phone call Case ID #     []PA sent as URGENT    All office notes, labs and other pertaining documents and studies sent. Clinical questions answered. Awaiting determination from insurance company.     Turnaround time for your insurance to make a decision on your Prior Authorization can take 7-21 business days.

## 2025-05-02 NOTE — TELEPHONE ENCOUNTER
"PA for Zepbound APPROVED     Date(s) approved 4/18/2025 - \"until further notice\"     Case #    Patient advised by          [x][employer] Message  []Phone call   []LMOM  []L/M to call office as no active Communication consent on file  []Unable to leave detailed message as VM not approved on Communication consent       Pharmacy advised by    [x]Fax  []Phone call  []Secure Chat    Specialty Pharmacy    []     Approval letter scanned into Media Yes     "

## 2025-05-05 ENCOUNTER — OFFICE VISIT (OUTPATIENT)
Dept: PHYSICAL THERAPY | Facility: REHABILITATION | Age: 65
End: 2025-05-05
Attending: ANESTHESIOLOGY
Payer: MEDICARE

## 2025-05-05 DIAGNOSIS — M96.1 POSTLAMINECTOMY SYNDROME OF LUMBAR REGION: Primary | ICD-10-CM

## 2025-05-05 DIAGNOSIS — M51.16 INTERVERTEBRAL DISC DISORDER WITH RADICULOPATHY OF LUMBAR REGION: ICD-10-CM

## 2025-05-05 PROCEDURE — 97110 THERAPEUTIC EXERCISES: CPT | Performed by: PHYSICAL THERAPIST

## 2025-05-05 NOTE — PROGRESS NOTES
Daily Note / Reassessment     Today's date: 2025  Patient name: Enoc Roberto  : 1960  MRN: [id number]  Referring provider: Danial Vasquez DO  Dx:   Encounter Diagnosis     ICD-10-CM    1. Postlaminectomy syndrome of lumbar region  M96.1       2. Intervertebral disc disorder with radiculopathy of lumbar region  M51.16           Start Time: 1615  Stop Time: 1700  Total time in clinic (min): 45 minutes    Subjective: Patient reports doing well but a little stiff today from increased driving.       Objective: See treatment diary below    Short Term Goals:   1. Patient will be Independent with hep  2. Patient will improve pain with activity by 50%  3. Patient will report GROC 50% or greater    GOALS IMPROVED      Long Term Goals:   1. Patient will improve FOTO to greater then goal  2. Patient will improve pain with activity to 2/10 or less  3. Patient will continue with HEP independence to allow for decreased future reoccurrence of pain and loss in function  4. Patient will report GROC 75% or greater  5. Patient will improve standing and walking tolerance to 30 min    GOALS IMPROVED      Assessment: Tolerated treatment well. Patient continues to improve with his standing tolerance. He increased walking today to ~ 4-5 min before needing a break.     Patient is a 64 y.o. year old male who attended physical therapy for 10 treatment sessions regarding CLBP. Patient reports  improvement at this time which correlates to improved impairments and functionality.  Patient has shown improvement throughout PT by demonstrating decreased pain, increased range of motion, increased strength, and improved tolerance to activity.  Patient continues to present with pain, decreased ROM, decreased strength, and decreased tolerance to activity. Enoc would benefit from continued physical therapy to address these issues and to maximize function. Thank you.         Plan: Continue per plan of care.      Precautions:  Diagnosis: Routine Veeg                  r/o seizure  Assesment/plan:  admit to med-surg  labs/ecg/c-xray  Veeg  Neuro checks  seizure precautions  Neurology consult    Diagnosis: ADHD  Assesment/plan:  continue home meds  GI/DVT prophylaxis  monitor vss  monitor pt "      Manuals  3/31 4/7 4/14 4/18 4/21 4/25 4/28 5/2 5/5   assessment     3'        Prone TS/rib PA mob     AB G4 AB G4                                 Neuro Re-Ed             STS          5 KG ball-2x10  + foam 5 KG ball-3x10  + foam   Seated posterior chain strengthening - hip hinge      20# ball  20x 20# ball  20x 20# ball  20x     Hooklying clamshell  YHB  2x10 YHB 2x10 RHB  5\"x20 RHB  5\"x20        bridge  YHB  3x5 YHB  4x5 RHB  5\"x20 RHB  5\"x20 RHB  5\"x20 RHB  5\"x20 On pball  5\"x25 On pball  5\"x25 On pball  5\"x25   Hooklying march       RHB  5\"x20      Pball   Supine  5\"x10 Supine  5\"x10 Standing  Alt march - 2x10 Standing  Alt march - 2x10   BTB - 30\"x3     Pallof press  Purp XS  2x10 ea Tony 6# 10x5\" YJB  5\"x10  YJB  5\"x10  YJB  5\"x10  YJB  5\"x15 YJB  5\"x15 ea side YJB  5\"x15 ea side YJB  5\"x15 ea side   TrA alt LE             Side stepping     2xF RHB 2xF RHB  NP  3x30\"  RHB   Ther Ex                          VG  L7  5 min L7 6 min L7 5 min                      UE ext hold   2x10x5\" MTB          Leg press             STS    2x10 3x10 3x10-anti rotation  NP      Tony walk outs       20#  2x10                   Ther Activity             Farmer carry        20# KB  2'x2 25# KB  2'x2 25# KB  5' total alt arms                Gait Training                                       Modalities                                                            "

## 2025-05-07 LAB
6MAM UR QL CFM: NEGATIVE NG/ML
7AMINOCLONAZEPAM UR QL CFM: NORMAL NG/ML
A-OH ALPRAZ UR QL CFM: NEGATIVE NG/ML
AMPHET UR QL CFM: NEGATIVE NG/ML
AMPHET UR QL CFM: NEGATIVE NG/ML
BUPRENORPHINE UR QL CFM: NEGATIVE NG/ML
BUTALBITAL UR QL CFM: NEGATIVE NG/ML
BZE UR QL CFM: NEGATIVE NG/ML
CODEINE UR QL CFM: NEGATIVE NG/ML
EDDP UR QL CFM: NEGATIVE NG/ML
ETHYL GLUCURONIDE UR QL CFM: NEGATIVE NG/ML
ETHYL SULFATE UR QL SCN: NEGATIVE NG/ML
FENTANYL UR QL CFM: NEGATIVE NG/ML
GLIADIN IGG SER IA-ACNC: NEGATIVE NG/ML
HYDROCODONE UR QL CFM: NEGATIVE NG/ML
HYDROCODONE UR QL CFM: NEGATIVE NG/ML
HYDROMORPHONE UR QL CFM: NEGATIVE NG/ML
LORAZEPAM UR QL CFM: NEGATIVE NG/ML
MDMA UR QL CFM: NEGATIVE NG/ML
ME-PHENIDATE UR QL CFM: NEGATIVE NG/ML
MEPERIDINE UR QL CFM: NEGATIVE NG/ML
METHADONE UR QL CFM: NEGATIVE NG/ML
METHAMPHET UR QL CFM: NEGATIVE NG/ML
MORPHINE UR QL CFM: NEGATIVE NG/ML
MORPHINE UR QL CFM: NEGATIVE NG/ML
NORBUPRENORPHINE UR QL CFM: NEGATIVE NG/ML
NORDIAZEPAM UR QL CFM: NEGATIVE NG/ML
NORFENTANYL UR QL CFM: NEGATIVE NG/ML
NORHYDROCODONE UR QL CFM: NEGATIVE NG/ML
NORHYDROCODONE UR QL CFM: NEGATIVE NG/ML
NORMEPERIDINE UR QL CFM: NEGATIVE NG/ML
NOROXYCODONE UR QL CFM: NEGATIVE NG/ML
OXAZEPAM UR QL CFM: ABNORMAL NG/ML
OXYCODONE UR QL CFM: NEGATIVE NG/ML
OXYMORPHONE UR QL CFM: NEGATIVE NG/ML
OXYMORPHONE UR QL CFM: NEGATIVE NG/ML
PARA-FLUOROFENTANYL QUANTIFICATION: NORMAL NG/ML
PCP UR QL CFM: NEGATIVE NG/ML
PHENOBARB UR QL CFM: NEGATIVE NG/ML
SECOBARBITAL UR QL CFM: NEGATIVE NG/ML
SL AMB 5F-ADB-M7 METABOLITE QUANTIFICATION: NEGATIVE NG/ML
SL AMB 7-OH-MITRAGYNINE (KRATOM ALKALOID) QUANTIFICATION: NEGATIVE NG/ML
SL AMB AB-FUBINACA-M3 METABOLITE QUANTIFICATION: NEGATIVE NG/ML
SL AMB ACETYL FENTANYL QUANTIFICATION: NORMAL NG/ML
SL AMB ACETYL NORFENTANYL QUANTIFICATION: NORMAL NG/ML
SL AMB ACRYL FENTANYL QUANTIFICATION: NORMAL NG/ML
SL AMB CARFENTANIL QUANTIFICATION: NORMAL NG/ML
SL AMB CTHC (MARIJUANA METABOLITE) QUANTIFICATION: ABNORMAL NG/ML
SL AMB DEXTROMETHORPHAN QUANTIFICATION: NEGATIVE NG/ML
SL AMB DEXTRORPHAN (DEXTROMETHORPHAN METABOLITE) QUANT: NEGATIVE NG/ML
SL AMB DEXTRORPHAN (DEXTROMETHORPHAN METABOLITE) QUANT: NEGATIVE NG/ML
SL AMB JWH018 METABOLITE QUANTIFICATION: NEGATIVE NG/ML
SL AMB JWH073 METABOLITE QUANTIFICATION: NEGATIVE NG/ML
SL AMB MDMB-FUBINACA-M1 METABOLITE QUANTIFICATION: NEGATIVE NG/ML
SL AMB N-DESMETHYL-TRAMADOL QUANTIFICATION: NEGATIVE NG/ML
SL AMB PHENTERMINE QUANTIFICATION: NEGATIVE NG/ML
SL AMB RCS4 METABOLITE QUANTIFICATION: NEGATIVE NG/ML
SL AMB RITALINIC ACID QUANTIFICATION: NEGATIVE NG/ML
SPECIMEN DRAWN SERPL: NEGATIVE NG/ML
TAPENTADOL UR QL CFM: NEGATIVE NG/ML
TEMAZEPAM UR QL CFM: NEGATIVE NG/ML
TEMAZEPAM UR QL CFM: NEGATIVE NG/ML
TRAMADOL UR QL CFM: NEGATIVE NG/ML
URATE/CREAT 24H UR: NEGATIVE NG/ML

## 2025-05-09 ENCOUNTER — TELEPHONE (OUTPATIENT)
Dept: RADIOLOGY | Facility: CLINIC | Age: 65
End: 2025-05-09

## 2025-05-09 ENCOUNTER — APPOINTMENT (OUTPATIENT)
Dept: PHYSICAL THERAPY | Facility: REHABILITATION | Age: 65
End: 2025-05-09
Payer: MEDICARE

## 2025-05-09 DIAGNOSIS — G89.4 CHRONIC PAIN SYNDROME: ICD-10-CM

## 2025-05-09 DIAGNOSIS — G89.4 CHRONIC PAIN SYNDROME: Primary | ICD-10-CM

## 2025-05-09 RX ORDER — OXYCODONE HYDROCHLORIDE 5 MG/1
5 TABLET ORAL 2 TIMES DAILY PRN
Qty: 14 TABLET | Refills: 0 | Status: SHIPPED | OUTPATIENT
Start: 2025-05-09

## 2025-05-09 RX ORDER — OXYCODONE HYDROCHLORIDE 5 MG/1
5 TABLET ORAL 2 TIMES DAILY PRN
Qty: 60 TABLET | Refills: 0 | Status: SHIPPED | OUTPATIENT
Start: 2025-06-12

## 2025-05-09 RX ORDER — OXYCODONE HYDROCHLORIDE 5 MG/1
5 TABLET ORAL 2 TIMES DAILY PRN
Qty: 60 TABLET | Refills: 0 | Status: SHIPPED | OUTPATIENT
Start: 2025-05-14

## 2025-05-09 NOTE — TELEPHONE ENCOUNTER
Caller: Enoc     Doctor:Linnette     Reason for call: Patient calling stating per pharmacy script was cancelled for 7 day supply of oxyCODONE (Roxicodone) 5 immediate release tablet. Patient is in the pharmacy parking lot waiting states if this can be resolve right away he leaving and has long trip ahead please advise      Call back#: [phone]

## 2025-05-09 NOTE — TELEPHONE ENCOUNTER
Pt informed the next 1 month Rx for his oxycodone was sent that can be filled on 5/14 and then his June Rx can be filled on 6/12/25.  Pt was also given his next ovs for further refill for 7/8 w/ AS.

## 2025-05-09 NOTE — TELEPHONE ENCOUNTER
----- Message from JAZIEL Romo sent at 5/9/2025 10:28 AM EDT -----  I see he filled his 7 day supply from the pharmacy. I sent over the remaining 2 scripts that he can  on 5/14/2025 and 6/12/2025  ----- Message -----  From: Monica Neil RN  Sent: 5/8/2025  10:59 AM EDT  To: JAZIEL Shipley    RN s/w pt and advised the same as per task.  Pt said the pharmacy called him and told him his Ins Co will only allow a 1 week supply and then another Rx can be sent and filled.  RN s/w [facility] and they said pt's Ins considers him opioid naive and will only approve the first Rx as a 7 day supply.  Pt has not picked up RX yet.  You will need to send the next Rx which can be a 1 month supply.  You will prob need to re-calculate the June Rx.  ----- Message -----  From: JAZIEL Shipley  Sent: 5/8/2025   9:27 AM EDT  To: [facility]    Please call patient and let him know his UDS came back negative for alcohol.  I sent 2-month scripts of oxycodone 5 mg that he can take twice a day as needed over to the pharmacy that he can  today and again on 6/6/2025.  He will need an 8-week office visit for refills

## 2025-05-09 NOTE — TELEPHONE ENCOUNTER
Pt informed the 7 day Rx has been re-sent, and [facility] also has the 5/14 and 6/12 Rxs on file .

## 2025-05-15 NOTE — PROGRESS NOTES
"Daily Note     Today's date: 5/15/2025  Patient name: Enoc Roberto  : 1960  MRN: [id number]  Referring provider: Danial Vasquez DO  Dx: No diagnosis found.               Subjective: ***      Objective: See treatment diary below      Assessment: Tolerated treatment {Tolerated treatment :}. Patient {assessment:}      Plan: {PLAN:}     Precautions:       Manuals  3/31 4/7 4/14 4/18 4/21 4/25 4/28 5/2 5/5   assessment     3'        Prone TS/rib PA mob     AB G4 AB G4                                 Neuro Re-Ed             STS          5 KG ball-2x10  + foam 5 KG ball-3x10  + foam   Seated posterior chain strengthening - hip hinge      20# ball  20x 20# ball  20x 20# ball  20x     Hooklying clamshell  YHB  2x10 YHB 2x10 RHB  5\"x20 RHB  5\"x20        bridge  YHB  3x5 YHB  4x5 RHB  5\"x20 RHB  5\"x20 RHB  5\"x20 RHB  5\"x20 On pball  5\"x25 On pball  5\"x25 On pball  5\"x25   Hooklying march       RHB  5\"x20      Pball   Supine  5\"x10 Supine  5\"x10 Standing  Alt march - 2x10 Standing  Alt march - 2x10   BTB - 30\"x3     Pallof press  Purp XS  2x10 ea Tony 6# 10x5\" YJB  5\"x10  YJB  5\"x10  YJB  5\"x10  YJB  5\"x15 YJB  5\"x15 ea side YJB  5\"x15 ea side YJB  5\"x15 ea side   TrA alt LE             Side stepping     2xF RHB 2xF RHB  NP  3x30\"  RHB   Ther Ex                          VG  L7  5 min L7 6 min L7 5 min                      UE ext hold   2x10x5\" MTB          Leg press             STS    2x10 3x10 3x10-anti rotation  NP      Hammond walk outs       20#  2x10                   Ther Activity             Farmer carry        20# KB  2'x2 25# KB  2'x2 25# KB  5' total alt arms                Gait Training                                       Modalities                                                              "

## 2025-05-16 ENCOUNTER — APPOINTMENT (OUTPATIENT)
Dept: PHYSICAL THERAPY | Facility: REHABILITATION | Age: 65
End: 2025-05-16
Attending: ANESTHESIOLOGY
Payer: MEDICARE

## 2025-05-19 ENCOUNTER — TELEPHONE (OUTPATIENT)
Dept: UROLOGY | Facility: AMBULATORY SURGERY CENTER | Age: 65
End: 2025-05-19

## 2025-05-19 NOTE — TELEPHONE ENCOUNTER
Called and gave a friendly reminder directly to this pt to have his PSA drawn for his upcoming appt 5/21/25. If this pt calls back please relay this message again. Thank you

## 2025-05-20 ENCOUNTER — APPOINTMENT (OUTPATIENT)
Dept: LAB | Facility: CLINIC | Age: 65
End: 2025-05-20
Payer: MEDICARE

## 2025-05-20 DIAGNOSIS — Z12.5 SCREENING FOR PROSTATE CANCER: ICD-10-CM

## 2025-05-20 LAB — PSA SERPL-MCNC: 0.33 NG/ML (ref 0–4)

## 2025-05-20 PROCEDURE — 36415 COLL VENOUS BLD VENIPUNCTURE: CPT

## 2025-05-20 PROCEDURE — G0103 PSA SCREENING: HCPCS

## 2025-05-21 ENCOUNTER — OFFICE VISIT (OUTPATIENT)
Dept: UROLOGY | Facility: CLINIC | Age: 65
End: 2025-05-21
Payer: MEDICARE

## 2025-05-21 VITALS
WEIGHT: 315 LBS | BODY MASS INDEX: 42.66 KG/M2 | DIASTOLIC BLOOD PRESSURE: 70 MMHG | HEART RATE: 71 BPM | OXYGEN SATURATION: 96 % | SYSTOLIC BLOOD PRESSURE: 138 MMHG | HEIGHT: 72 IN

## 2025-05-21 DIAGNOSIS — Z12.5 SCREENING FOR PROSTATE CANCER: ICD-10-CM

## 2025-05-21 DIAGNOSIS — N32.81 OAB (OVERACTIVE BLADDER): Primary | ICD-10-CM

## 2025-05-21 DIAGNOSIS — R35.0 URINARY FREQUENCY: ICD-10-CM

## 2025-05-21 LAB
SL AMB  POCT GLUCOSE, UA: NORMAL
SL AMB LEUKOCYTE ESTERASE,UA: 25
SL AMB POCT BILIRUBIN,UA: NORMAL
SL AMB POCT BLOOD,UA: NORMAL
SL AMB POCT CLARITY,UA: CLEAR
SL AMB POCT COLOR,UA: NORMAL
SL AMB POCT KETONES,UA: NORMAL
SL AMB POCT NITRITE,UA: NORMAL
SL AMB POCT PH,UA: 6.5
SL AMB POCT SPECIFIC GRAVITY,UA: 1.01
SL AMB POCT URINE PROTEIN: NORMAL
SL AMB POCT UROBILINOGEN: NORMAL

## 2025-05-21 PROCEDURE — 81003 URINALYSIS AUTO W/O SCOPE: CPT

## 2025-05-21 PROCEDURE — 99203 OFFICE O/P NEW LOW 30 MIN: CPT

## 2025-05-21 RX ORDER — TADALAFIL 5 MG/1
5 TABLET ORAL DAILY
Qty: 90 TABLET | Refills: 3 | Status: SHIPPED | OUTPATIENT
Start: 2025-05-21 | End: 2026-05-16

## 2025-05-21 NOTE — ASSESSMENT & PLAN NOTE
- Continue 5 mg daily Cialis.  Patient content with urinary symptoms and tolerating medication well.  Rx refills sent to pharmacy.  Orders:    POCT urine dip auto non-scope

## 2025-05-21 NOTE — PROGRESS NOTES
Name: Enoc Roberto      : 1960      MRN: [id number]  Encounter Provider: JAZIEL Hancock  Encounter Date: 2025   Encounter department: [facility]  :  Assessment & Plan  OAB (overactive bladder)  - Continue 5 mg daily Cialis.  Patient content with urinary symptoms and tolerating medication well.  Rx refills sent to pharmacy.  Orders:    POCT urine dip auto non-scope    Screening for prostate cancer  -PSA is stable at 0.334 completed on 25.. Patient has a family hx of prostate cancer in his father.  - Repeat PSA ordered to be completed in 1 year.  Orders:    PSA, Total Screen; Future    Patient will return in 1 year for follow-up with PSA prior to visit.  All questions addressed.  Please do not hesitate to reach out with further questions or concerns.    History of Present Illness   Enoc Roberto is a 64 y.o. male who presents here for follow-up of overactive bladder.  Patient last seen by me in 2024.  Patient has previously tried trospium which was not effective. He had a cystoscopy with Dr. Rahman (24) showing a mildly obstructive prostate and bladder with mild trabeculations. Prostate voulme 27 cc.  He is on daily Cialis and content with urinary symptoms.        Patient denies dysuria and gross hematuria. Denies hx of recurrent UTIs.      PSA is stable at 0.334 completed on 25.. Patient has a family hx of prostate cancer in his father.    Using Bipap. Significant reduction in nocturia.          Review of Systems   Constitutional:  Negative for activity change, chills, fatigue and fever.   HENT:  Negative for congestion, rhinorrhea and sore throat.    Eyes:  Negative for photophobia, redness and visual disturbance.   Respiratory:  Negative for cough, shortness of breath and wheezing.    Cardiovascular:  Negative for chest pain, palpitations and leg swelling.   Gastrointestinal:  Negative for abdominal pain, diarrhea, nausea and  vomiting.   Genitourinary:  Negative for difficulty urinating, dysuria, flank pain, frequency, hematuria and urgency.   Neurological:  Negative for weakness, light-headedness and headaches.          Objective   Ht 6' (1.829 m)   BMI 42.21 kg/m²     Physical Exam  Vitals reviewed.   Constitutional:       General: He is not in acute distress.     Appearance: He is well-developed.   HENT:      Head: Normocephalic and atraumatic.     Eyes:      Conjunctiva/sclera: Conjunctivae normal.     Pulmonary:      Effort: Pulmonary effort is normal. No respiratory distress.     Neurological:      Mental Status: He is alert and oriented to person, place, and time.     Psychiatric:         Mood and Affect: Mood normal.          Results   Lab Results   Component Value Date    PSA 0.334 05/20/2025    PSA 0.32 03/04/2024    PSA 0.51 02/03/2023     Lab Results   Component Value Date    GLUCOSE 119 09/06/2024    CALCIUM 9.4 12/19/2024     04/21/2017    K 4.1 12/19/2024    CO2 30 12/19/2024     12/19/2024    BUN 16 12/19/2024    CREATININE 1.20 12/19/2024     Lab Results   Component Value Date    WBC 9.66 12/19/2024    HGB 12.8 12/19/2024    HCT 42.1 12/19/2024    MCV 87 12/19/2024     (L) 12/19/2024       Office Urine Dip  No results found for this or any previous visit (from the past hour).

## 2025-05-23 ENCOUNTER — OFFICE VISIT (OUTPATIENT)
Dept: PHYSICAL THERAPY | Facility: REHABILITATION | Age: 65
End: 2025-05-23
Attending: ANESTHESIOLOGY
Payer: MEDICARE

## 2025-05-23 DIAGNOSIS — M51.16 INTERVERTEBRAL DISC DISORDER WITH RADICULOPATHY OF LUMBAR REGION: ICD-10-CM

## 2025-05-23 DIAGNOSIS — M96.1 POSTLAMINECTOMY SYNDROME OF LUMBAR REGION: Primary | ICD-10-CM

## 2025-05-23 PROCEDURE — 97110 THERAPEUTIC EXERCISES: CPT | Performed by: PHYSICAL THERAPIST

## 2025-05-23 NOTE — PROGRESS NOTES
"Daily Note     Today's date: 2025  Patient name: Enoc Roberto  : 1960  MRN: [id number]  Referring provider: Danial Vasquez DO  Dx:   Encounter Diagnosis     ICD-10-CM    1. Postlaminectomy syndrome of lumbar region  M96.1       2. Intervertebral disc disorder with radiculopathy of lumbar region  M51.16           Start Time: 1145  Stop Time: 1230  Total time in clinic (min): 45 minutes    Subjective: Patient reports that his back was tight when he was at a [facility] service 2 weeks ago. He has to stand a lot, more then normal along with more prolonged sitting the he was used to. He has recovered.       Objective: See treatment diary below      Assessment: Tolerated treatment well. Patient did well with exercises today as he seems to have maintained his endurance that he has built to despite increase pain 2 weeks ago.      Plan: Continue per plan of care.      Precautions:       Manuals 5/23 3/31 4/7 4/14 4/18 4/21 4/25 4/28 5/2 5/5   assessment     3'        Prone TS/rib PA mob     AB G4 AB G4                                 Neuro Re-Ed             STS  5 KG ball-3x10  + foam        5 KG ball-2x10  + foam 5 KG ball-3x10  + foam   Seated posterior chain strengthening - hip hinge      20# ball  20x 20# ball  20x 20# ball  20x     Hooklying clamshell  YHB  2x10 YHB 2x10 RHB  5\"x20 RHB  5\"x20        bridge On pball  5\"x25 YHB  3x5 YHB  4x5 RHB  5\"x20 RHB  5\"x20 RHB  5\"x20 RHB  5\"x20 On pball  5\"x25 On pball  5\"x25 On pball  5\"x25   Hooklying march       RHB  5\"x20      Pball   Supine  5\"x10 Supine  5\"x10 Standing  Alt march - 2x10 Standing  Alt march - 2x10   BTB - 30\"x3     Pallof press YJB  5\"x10 ea side Purp XS  2x10 ea Tony 6# 10x5\" YJB  5\"x10  YJB  5\"x10  YJB  5\"x10  YJB  5\"x15 YJB  5\"x15 ea side YJB  5\"x15 ea side YJB  5\"x15 ea side   TrA alt LE             Side stepping 3x30\"  RHB    2xF RHB 2xF RHB  NP  3x30\"  RHB   Ther Ex                          VG  L7  5 min L7 6 min L7 5 min    " "                  UE ext hold   2x10x5\" MTB          Leg press             STS    2x10 3x10 3x10-anti rotation  NP      [place] walk outs       20#  2x10                   Ther Activity             Dan carry 20# KB  5' total alt arms       20# KB  2'x2 25# KB  2'x2 25# KB  5' total alt arms                Gait Training                                       Modalities                                                                "

## 2025-05-26 NOTE — TELEPHONE ENCOUNTER
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     Ddx includes Sjogren's-associated CT-ILD (acutely exacerbated), multiple myeloma / diffuse pulmonary amyloidosis, RA-ILD, bacterial / viral pneumonia with ARDS, cardiogenic pulmonary edema associated with mitral regurgitation.      Problem List:  - acute hypoxemic respiratory failure  - CT-ILD secondary to Sjogren's, acutely exacerbated  - multiple myeloma, untreated (dx 11/2024)  - RA  - HFmrEF  - ICH / SAH (2024)  - underweight      Plan:  Neuro  - pain / sedation: precedex, propofol, fentanyl  - target RASS 0 to -1  - monitor for delirium  - mobility: PT / OT when able    Cardiovascular  - TTE completed  - target MAP > 65mmHg  - monitor for dysrhythmia    Respiratory  - continue mech vent; wean as tolerated  - continue pulse steroids (methylpred 1g/d); monitor for improvement in resp parameters and imaging  - goals of oxygenation / ventilation reviewed  - chest imaging reviewed    GI  - Nutrition: initiate TF via OGT  - bowel regimen  - GI PPx: famotidine      - ROSARIO, likely secondary to ATN, possibly associated with MM  - SPEP / UPEP / GISELL; will consider quantifying urine albumin / 24h  - nephrology consult  - target euvolemia: fluid balance even  - replete lytes as indicated    ID  - micro data, lines / tubes reviewed  - Antimicrobials: cefepime / azithro -> ceftriaxone  - monitor temperature curve  - skin breakdown: none    Heme  - normocytic anemia: monitor for bleeding  - VTE PPx: heparin SC tid    Endo  - target blood glucose < 180    Code status / ethical issues: Full  Dispo:  Order placed for bilateral L5 TF [facility]  ______________________________________________    24 hour / Interval History:  5/26/25: Intubated, sedated. Tolerates pulse steroids. Prop @ 40, fent @ 75. Not on IV norepi. Awakens, follows commands. Hgb low, transfusing 1u PRBC this AM. Fluid balance +3.1L.    5/25/25: Intubated since yesterday, sedated with prop @ 25, fent 50/h. Started on pulse steroids. Increasing creatinine. TTE without significant abnormality besides mild-moderate MR. Fluid balance +1.4L.   ______________________________________________    ALLERGIES & MEDICATIONS  ALLERGIES:   Allergen Reactions    Sulfa Antibiotics Other (See Comments)     Scheduled Medications:  Current Facility-Administered Medications   Medication Dose Route Frequency Provider Last Rate Last Admin    ipratropium-albuterol (DUONEB) 0.5-2.5 (3) MG/3ML nebulizer solution 3 mL  3 mL Nebulization Q4H Resp Eamon Carr MD   3 mL at 05/26/25 0402    cefTRIAXone (ROCEPHIN) 2,000 mg in sterile water (preservative free) IV syringe  2,000 mg Intravenous Daily Eamon Carr MD   2,000 mg at 05/26/25 0821    albumin human injection 12.5 g  12.5 g Intravenous 4 times per day Jeff Yanes  mL/hr at 05/26/25 0614 12.5 g at 05/26/25 0614    thiamine (VITAMIN B1) tablet 200 mg  200 mg Per OG Tube Daily Jeimy Rojas RD   200 mg at 05/26/25 0821    sodium chloride 0.9 % injection 2 mL  2 mL Intracatheter 2 times per day Angelika Schuler NP   2 mL at 05/26/25 0821    heparin (porcine) injection 5,000 Units  5,000 Units Subcutaneous 3 times per day Angelika Schuler NP   5,000 Units at 05/25/25 2151    famotidine (PEPCID) injection 20 mg  20 mg Intravenous Daily Angelika Schuler NP   20 mg at 05/26/25 0821    docusate sodium-sennosides (SENOKOT S) 50-8.6 MG 2 tablet  2 tablet Per NG Tube QHS Angelika Schuler NP   2 tablet at 05/25/25 2055    CARBOXYMethylcellulose (REFRESH TEARS) 0.5 % ophthalmic solution 1 drop  1 drop Both Eyes 6 times per day Ganga  Angelika HASSAN NP   1 drop at 05/26/25 0749    methylPREDNISolone (SOLU-Medrol) 500 mg in sodium chloride 0.9 % 100 mL IVPB  500 mg Intravenous 2 times per day Angelika Schuler  mL/hr at 05/26/25 0842 500 mg at 05/26/25 0842     Infusions:  Current Facility-Administered Medications   Medication Dose Route Frequency Provider Last Rate Last Admin    sodium chloride 0.9% infusion   Intravenous Continuous PRN Kimberlee Peoples MD        lactated ringers infusion   Intravenous Continuous Jeff Yanes K,  mL/hr at 05/26/25 0750 New Bag at 05/26/25 0750    dexMEDEtomidine (PRECEDEX) 400 mcg/100 mL in sodium chloride 0.9 % infusion  0-1.5 mcg/kg/hr (Dosing Weight) Intravenous Continuous Angelika Schuler NP   Completed at 05/24/25 1711    sodium chloride 0.9% infusion   Intravenous Continuous PRN Angelika Schuler NP        propofol (DIPRIVAN) infusion  0-50 mcg/kg/min (Dosing Weight) Intravenous Continuous Angelika Schuler NP 12.2 mL/hr at 05/26/25 0700 40 mcg/kg/min at 05/26/25 0700    fentaNYL (SUBLIMAZE) 2,500 mcg/250 mL in sodium chloride 0.9 % infusion  0-75 mcg/hr Intravenous Continuous Angelika Schuler NP 7.5 mL/hr at 05/26/25 0700 75 mcg/hr at 05/26/25 0700    NORepinephrine (LEVOPHED) 16 mg/250 mL in sodium chloride 0.9 % infusion  0-30 mcg/min Intravenous Continuous Angelika Schuler NP   Completed at 05/25/25 2105     PRNs:  sodium chloride, , Continuous PRN  sodium chloride, 10 mL, PRN  sodium chloride, , Continuous PRN  acetaminophen, 650 mg, Q4H PRN   Or  acetaminophen (TYLENOL) suppository, 650 mg, Q4H PRN  hydrALAZINE, 10 mg, Q6H PRN  albuterol, 2.5 mg, Q4H Resp PRN  polyethylene glycol, 17 g, Daily PRN   Or  bisacodyl, 10 mg, Daily PRN  ophthalmic solution, 1 drop, PRN        HISTORY  Past Medical History:   Diagnosis Date    Heart failure, unspecified  (CMD)     Hypertensive heart and renal disease with congestive heart failure  (CMD)     Primary cardiomyopathy  (CMD)          History  reviewed. No pertinent surgical history.      Social History     Tobacco Use    Smoking status: Former     Types: Cigars   Substance Use Topics    Alcohol use: Not Currently     Alcohol/week: 2.0 standard drinks of alcohol     Types: 2 Glasses of wine per week    Drug use: Never     History reviewed. No pertinent family history.  Medications Prior to Admission   Medication Sig Dispense Refill    atorvastatin (LIPITOR) 40 MG tablet Take 40 mg by mouth nightly.      brivaracetam (BRIVIACT) 50 MG tablet Take 50 mg by mouth every 12 hours.      carvedilol (COREG) 12.5 MG tablet Take 12.5 mg by mouth in the morning and 12.5 mg in the evening. Take with meals.      hydrALAZINE (APRESOLINE) 25 MG tablet Take 75 mg by mouth in the morning and 75 mg at noon and 75 mg in the evening.      sevelamer (RENAGEL) 800 MG tablet Take 800 mg by mouth in the morning and 800 mg at noon and 800 mg in the evening. Take with meals.      melatonin 3 MG Take 3 mg by mouth nightly.      lidocaine (LIDODERM) 5 % patch Place 1 patch onto the skin every 24 hours. LOW BACK      gabapentin (NEURONTIN) 100 MG capsule Take 100 mg by mouth every evening.      cholecalciferol (VITAMIN D) 25 mcg(1,000 units) tablet Take 25 mcg by mouth daily.      ferrous sulfate 325 (65 FE) MG tablet Take 325 mg by mouth daily (with breakfast).      ipratropium-albuterol (DUONEB) 0.5-2.5 (3) MG/3ML nebulizer solution Inhale 3 mL into the lungs every 4 (four) hours if needed for wheezing for up to 30 days.      acetaminophen (TYLENOL) 325 MG tablet Take 650 mg by mouth every 6 hours as needed for Pain.      sodium chloride (OCEAN) 0.65 % nasal spray Spray 2 sprays in each nostril daily.      diphenhydrAMINE (BENADRYL) 25 MG capsule Take 50 mg by mouth every 6 hours as needed for Itching.         VITAL SIGNS  Vitals:    05/26/25 0700 05/26/25 0800 05/26/25 0815 05/26/25 0830   BP: (!) 156/81 (!) 163/87 (!) 162/86 (!) 165/84   BP Location:  RLE - Right lower extremity      Patient Position:  Semi-Joshi's     Pulse: 77 76 76 80   Resp: (!) 32 (!) 32 (!) 32 (!) 32   Temp:  97.3 °F (36.3 °C)  97.3 °F (36.3 °C)   TempSrc:  Temporal  Temporal   SpO2: 99% 99%     Weight:       Height:           Body mass index is 18.34 kg/m².  Weight over the past 48 Hours:  Patient Vitals for the past 48 hrs:   Weight   05/24/25 1053 50 kg (110 lb 3.7 oz)   05/25/25 0328 51 kg (112 lb 7 oz)   05/26/25 0400 54.7 kg (120 lb 9.5 oz)   05/26/25 0424 54.7 kg (120 lb 9.5 oz)       INTAKE / OUTPUT    Intake/Output Summary (Last 24 hours) at 5/26/2025 0843  Last data filed at 5/26/2025 0800  Gross per 24 hour   Intake 3313.3 ml   Output 235 ml   Net 3078.3 ml       Mechanical / non-invasive ventilation:  Vent Settings    Last Value  Mode       Rate    32  Tidal Volume    350 mL  Pressure Support       PEEP/CPAC/EPAP       FiO2    30 %  Peak Inspiratory Pressure    33 cm H2O  Plateau Pressure    30 cm H2O      Physical Exam:   GENERAL: No apparent distress   SKIN: No generalized rash  HEAD: normocephalic, atraumatic  NECK: neck is supple, no masses palpable  LUNGS: lungs are course bilaterally  HEART: normal rate and rhythm, S1 and S2 normal  ABDOMEN: abdomen is soft, normo- active bowel sounds, nontender, non-distended  NEUROLOGIC: Moves all four extremities, no focal deficits  EXTREMITIES: no clubbing, no cyanosis, no edema     Bedside Echo: IVC narrow caliber and highly variable with respirations; LV and RV systolic function appear grossly normal via subcostal view.      DIAGNOSTICS    Laboratory Results:  Recent Results (from the past 24 hours)   GLUCOSE, BEDSIDE - POINT OF CARE    Collection Time: 05/25/25 11:08 AM    Specimen: Blood   Result Value Ref Range    GLUCOSE, BEDSIDE - POINT OF CARE 105 (H) 70 - 99 mg/dL   Comprehensive Metabolic Panel    Collection Time: 05/25/25 11:42 AM    Specimen: Blood, Arterial   Result Value Ref Range    Fasting Status      Sodium 134 (L) 135 - 145 mmol/L    Potassium 4.4  3.4 - 5.1 mmol/L    Chloride 106 97 - 110 mmol/L    Carbon Dioxide 21 21 - 32 mmol/L    Anion Gap 11 7 - 19 mmol/L    Glucose 127 (H) 70 - 99 mg/dL    BUN 39 (H) 6 - 20 mg/dL    Creatinine 2.42 (H) 0.51 - 0.95 mg/dL    Glomerular Filtration Rate 23 (L) >=60    BUN/Cr 16 7 - 25    Calcium 9.6 8.4 - 10.2 mg/dL    Bilirubin, Total 0.3 0.2 - 1.0 mg/dL    GOT/AST 31 <=37 Units/L    GPT/ALT 8 <64 Units/L    Alkaline Phosphatase 78 45 - 117 Units/L    Albumin 1.9 (L) 3.4 - 5.0 g/dL    Protein, Total 6.9 6.4 - 8.2 g/dL    Globulin 5.0 (H) 2.0 - 4.0 g/dL    A/G Ratio 0.4 (L) 1.0 - 2.4   Magnesium    Collection Time: 05/25/25 11:42 AM    Specimen: Blood, Arterial   Result Value Ref Range    Magnesium 1.9 1.7 - 2.4 mg/dL   Phosphorus    Collection Time: 05/25/25 11:42 AM    Specimen: Blood, Arterial   Result Value Ref Range    Phosphorus 6.3 (H) 2.4 - 4.7 mg/dL   TROPONIN I, HIGH SENSITIVITY    Collection Time: 05/25/25 11:42 AM    Specimen: Blood, Arterial   Result Value Ref Range    Troponin I, High Sensitivity 211 (HH) <52 ng/L   BLOOD GAS, ARTERIAL WITH COOXIMETRY - RESPIRATORY    Collection Time: 05/25/25  1:05 PM    Specimen: Blood, Arterial   Result Value Ref Range    BASE EXCESS / DEFICIT, ARTERIAL - RESPIRATORY -7 (L) -2 - 3 mmol/L    HCO3, ARTERIAL - RESPIRATORY 21 (L) 22 - 28 mmol/L    O2 CONTENT, ARTERIAL - RESPIRATORY 12 (L) 15 - 23 %    PCO2, ARTERIAL - RESPIRATORY 53 (H) 32 - 45 mm Hg    PH, ARTERIAL - RESPIRATORY 7.20 (LL) 7.35 - 7.45 Units    PO2, ARTERIAL - RESPIRATORY 109 (H) 83 - 108 mm Hg    O2 SATURATION, ARTERIAL - RESPIRATORY 99 95 - 99 %    CONDITION - RESPIRATORY ;ACVC+, RR 24, , +5     CARBOXYHEMOGLOBIN - RESPIRATORY 1.2 <2.1 %    FIO2 - RESPIRATORY 40 %    METHEMOGLOBIN - RESPIRATORY 0.6 <=1.6 %    OXYHEMOGLOBIN, ARTERIAL - RESPIRATORY 97.0 94.0 - 98.0 %    P/F RATIO - RESPIRATORY 290 (L) 300 - 500    SITE - RESPIRATORY Arterial Line     TEMPERATURE - RESPIRATORY 35.8 degrees C   CALCIUM,  IONIZED - RESPIRATORY    Collection Time: 05/25/25  1:05 PM    Specimen: Blood   Result Value Ref Range    CALCIUM, IONIZED - RESPIRATORY 1.52 (H) 1.15 - 1.29 mmol/L   ELECTROLYTE PANEL - RESPIRATORY    Collection Time: 05/25/25  1:05 PM    Specimen: Blood   Result Value Ref Range    SODIUM - RESPIRATORY 130 (L) 135 - 145 mmol/L    POTASSIUM - RESPIRATORY 4.7 3.4 - 5.1 mmol/L    CHLORIDE - RESPIRATORY 106 97 - 110 mmol/L   GLUCOSE - RESPIRATORY    Collection Time: 05/25/25  1:05 PM    Specimen: Blood   Result Value Ref Range    GLUCOSE - RESPIRATORY 122 (H) 65 - 99 mg/dL   HEMATOCRIT - RESPIRATORY    Collection Time: 05/25/25  1:05 PM    Specimen: Blood   Result Value Ref Range    HEMATOCRIT - RESPIRATORY 26.0 (L) 36.0 - 46.5 %   HEMOGLOBIN - RESPIRATORY    Collection Time: 05/25/25  1:05 PM    Specimen: Blood   Result Value Ref Range    HEMOGLOBIN - RESPIRATORY 8.8 (L) 12.0 - 15.5 g/dL   Electrocardiogram 12-Lead    Collection Time: 05/25/25  2:31 PM   Result Value Ref Range    Ventricular Rate EKG/Min (BPM) 63     Atrial Rate (BPM) 63     MI-Interval (MSEC) 152     QRS-Interval (MSEC) 84     QT-Interval (MSEC) 468     QTc 479     P Axis (Degrees) 69     R Axis (Degrees) 27     T Axis (Degrees) 86     REPORT TEXT       Normal sinus rhythm  Cannot rule out  Anterior infarct  , age undetermined  Abnormal ECG  When compared with ECG of  24-MAY-2025 11:06,  Vent. rate  has decreased  BY  54 BPM  ST no longer depressed in  Inferior leads  T wave inversion no longer evident in  Inferior leads  Nonspecific T wave abnormality now evident in  Anterolateral leads     BLOOD GAS, ARTERIAL - RESPIRATORY    Collection Time: 05/25/25  3:00 PM    Specimen: Blood, Arterial   Result Value Ref Range    BASE EXCESS / DEFICIT, ARTERIAL - RESPIRATORY -8 (L) -2 - 3 mmol/L    HCO3, ARTERIAL - RESPIRATORY 20 (L) 22 - 28 mmol/L    PCO2, ARTERIAL - RESPIRATORY 47 (H) 32 - 45 mm Hg    PH, ARTERIAL - RESPIRATORY 7.23 (LL) 7.35 - 7.45 Units     PO2, ARTERIAL - RESPIRATORY 80 (L) 83 - 108 mm Hg    O2 SATURATION, ARTERIAL - RESPIRATORY 94 (L) 95 - 99 %    CONDITION - RESPIRATORY ;ACVC+, RR 32, , +5     FIO2 - RESPIRATORY 30 %    P/F RATIO - RESPIRATORY 283 (L) 300 - 500    SITE - RESPIRATORY Arterial Line     TEMPERATURE - RESPIRATORY 36.0 degrees C   LACTIC ACID, ARTERIAL - RESPIRATORY    Collection Time: 05/25/25  3:00 PM    Specimen: Blood, Arterial   Result Value Ref Range    LACTIC ACID, ARTERIAL - RESPIRATORY 0.5 <1.6 mmol/L   TROPONIN I, HIGH SENSITIVITY    Collection Time: 05/25/25  3:48 PM    Specimen: Blood, Venous   Result Value Ref Range    Troponin I, High Sensitivity 147 (HH) <52 ng/L   Basic Metabolic Panel    Collection Time: 05/25/25  3:48 PM    Specimen: Blood, Venous   Result Value Ref Range    Fasting Status      Sodium 135 135 - 145 mmol/L    Potassium 4.3 3.4 - 5.1 mmol/L    Chloride 108 97 - 110 mmol/L    Carbon Dioxide 21 21 - 32 mmol/L    Anion Gap 10 7 - 19 mmol/L    Glucose 115 (H) 70 - 99 mg/dL    BUN 38 (H) 6 - 20 mg/dL    Creatinine 2.35 (H) 0.51 - 0.95 mg/dL    Glomerular Filtration Rate 24 (L) >=60    BUN/Cr 16 7 - 25    Calcium 9.0 8.4 - 10.2 mg/dL   Basic Metabolic Panel    Collection Time: 05/25/25  5:19 PM    Specimen: Blood, Arterial   Result Value Ref Range    Fasting Status      Sodium 134 (L) 135 - 145 mmol/L    Potassium 4.3 3.4 - 5.1 mmol/L    Chloride 107 97 - 110 mmol/L    Carbon Dioxide 21 21 - 32 mmol/L    Anion Gap 10 7 - 19 mmol/L    Glucose 118 (H) 70 - 99 mg/dL    BUN 38 (H) 6 - 20 mg/dL    Creatinine 2.43 (H) 0.51 - 0.95 mg/dL    Glomerular Filtration Rate 23 (L) >=60    BUN/Cr 16 7 - 25    Calcium 9.3 8.4 - 10.2 mg/dL   Hepatitis Serology Panel Acute with Reflex HCV PCR    Collection Time: 05/25/25  5:19 PM    Specimen: Blood, Arterial   Result Value Ref Range    Hepatitis A Antibody, IgM Negative Negative    Hepatitis A Interpretation      Hepatitis B Surface Antigen Negative Negative    Hepatitis B  Core Antibody, IgM Negative Negative    Hepatitis B Interpretation      Hepatitis C Antibody Negative Negative   HIV 1/HIV 2 Antigen/Antibody Screen    Collection Time: 05/25/25  5:19 PM    Specimen: Blood, Arterial   Result Value Ref Range    HIV Antigen/ Antibody Combo Screen Nonreactive Nonreactive   BLOOD GAS, ARTERIAL - RESPIRATORY    Collection Time: 05/25/25  9:20 PM    Specimen: Blood, Arterial   Result Value Ref Range    BASE EXCESS / DEFICIT, ARTERIAL - RESPIRATORY -6 (L) -2 - 3 mmol/L    HCO3, ARTERIAL - RESPIRATORY 22 22 - 28 mmol/L    PCO2, ARTERIAL - RESPIRATORY 47 (H) 32 - 45 mm Hg    PH, ARTERIAL - RESPIRATORY 7.26 (L) 7.35 - 7.45 Units    PO2, ARTERIAL - RESPIRATORY 72 (L) 83 - 108 mm Hg    O2 SATURATION, ARTERIAL - RESPIRATORY 92 (L) 95 - 99 %    CONDITION - RESPIRATORY VC+28,350,30,5     FIO2 - RESPIRATORY 30 %    P/F RATIO - RESPIRATORY 243 (L) 300 - 500    SITE - RESPIRATORY Arterial Line     TEMPERATURE - RESPIRATORY 36.7 degrees C   LACTIC ACID, ARTERIAL - RESPIRATORY    Collection Time: 05/25/25  9:20 PM    Specimen: Blood, Arterial   Result Value Ref Range    LACTIC ACID, ARTERIAL - RESPIRATORY 0.6 <1.6 mmol/L   Basic Metabolic Panel    Collection Time: 05/25/25 10:01 PM    Specimen: Blood, Arterial   Result Value Ref Range    Fasting Status      Sodium 134 (L) 135 - 145 mmol/L    Potassium 4.3 3.4 - 5.1 mmol/L    Chloride 107 97 - 110 mmol/L    Carbon Dioxide 21 21 - 32 mmol/L    Anion Gap 10 7 - 19 mmol/L    Glucose 103 (H) 70 - 99 mg/dL    BUN 38 (H) 6 - 20 mg/dL    Creatinine 2.41 (H) 0.51 - 0.95 mg/dL    Glomerular Filtration Rate 23 (L) >=60    BUN/Cr 16 7 - 25    Calcium 9.2 8.4 - 10.2 mg/dL   Comprehensive Metabolic Panel    Collection Time: 05/26/25  4:21 AM    Specimen: Blood, Arterial   Result Value Ref Range    Fasting Status      Sodium 135 135 - 145 mmol/L    Potassium 4.2 3.4 - 5.1 mmol/L    Chloride 107 97 - 110 mmol/L    Carbon Dioxide 21 21 - 32 mmol/L    Anion Gap 11 7 -  19 mmol/L    Glucose 120 (H) 70 - 99 mg/dL    BUN 39 (H) 6 - 20 mg/dL    Creatinine 2.49 (H) 0.51 - 0.95 mg/dL    Glomerular Filtration Rate 22 (L) >=60    BUN/Cr 16 7 - 25    Calcium 8.7 8.4 - 10.2 mg/dL    Bilirubin, Total 0.2 0.2 - 1.0 mg/dL    GOT/AST 16 <=37 Units/L    GPT/ALT <7 <64 Units/L    Alkaline Phosphatase 50 45 - 117 Units/L    Albumin 2.3 (L) 3.4 - 5.0 g/dL    Protein, Total 5.9 (L) 6.4 - 8.2 g/dL    Globulin 3.6 2.0 - 4.0 g/dL    A/G Ratio 0.6 (L) 1.0 - 2.4   Magnesium    Collection Time: 05/26/25  4:21 AM    Specimen: Blood, Arterial   Result Value Ref Range    Magnesium 1.7 1.7 - 2.4 mg/dL   Phosphorus    Collection Time: 05/26/25  4:21 AM    Specimen: Blood, Arterial   Result Value Ref Range    Phosphorus 6.3 (H) 2.4 - 4.7 mg/dL   CBC No Differential    Collection Time: 05/26/25  4:21 AM    Specimen: Blood, Arterial   Result Value Ref Range    WBC 9.9 4.2 - 11.0 K/mcL    RBC 2.04 (L) 4.00 - 5.20 mil/mcL    HGB 6.0 (LL) 12.0 - 15.5 g/dL    HCT 19.0 (L) 36.0 - 46.5 %    MCV 93.1 78.0 - 100.0 fl    MCH 29.4 26.0 - 34.0 pg    MCHC 31.6 (L) 32.0 - 36.5 g/dL     140 - 450 K/mcL    RDW-CV 13.5 11.0 - 15.0 %    RDW-SD 45.8 39.0 - 50.0 fL    NRBC 0 <=0 /100 WBC   TYPE/SCREEN    Collection Time: 05/26/25  4:21 AM    Specimen: Blood, Arterial   Result Value Ref Range    ABO/RH(D) O Rh Positive     ANTIBODY SCREEN Negative     TYPE AND SCREEN EXPIRATION DATE 05/29/2025 23:59    Prepare Red Blood Cells: 1 Units    Collection Time: 05/26/25  5:26 AM   Result Value Ref Range    UNIT BLOOD TYPE O Pos     ISBT BLOOD TYPE 5100     BLOOD EXPIRATION DATE [id number]     UNIT NUMBER [id number]     DISPENSE STATUS Issued     PRODUCT ID Red Blood Cells     PRODUCT CODE [id number]     PRODUCT DESCRIPTION RBC AS-1 LR     CROSSMATCH RESULT Compatible     ISSUE DATE/TIME [id number]      No results found    Invalid input(s): \"PCO2T\", \"O2S\", \"O2TH\"    All available diagnostic information (including  microbiology / imaging / echo / EKG) personally reviewed.    ______________________________________________    Eamon Carr MD  Critical Care Medicine  [facility]    Critical Care Time: 60 minutes (excluding time for procedures, documented separately)

## 2025-05-29 ENCOUNTER — OFFICE VISIT (OUTPATIENT)
Dept: FAMILY MEDICINE CLINIC | Facility: CLINIC | Age: 65
End: 2025-05-29
Payer: MEDICARE

## 2025-05-29 ENCOUNTER — OFFICE VISIT (OUTPATIENT)
Dept: PHYSICAL THERAPY | Facility: REHABILITATION | Age: 65
End: 2025-05-29
Attending: ANESTHESIOLOGY
Payer: MEDICARE

## 2025-05-29 VITALS
HEART RATE: 80 BPM | HEIGHT: 72 IN | BODY MASS INDEX: 42.66 KG/M2 | WEIGHT: 315 LBS | DIASTOLIC BLOOD PRESSURE: 72 MMHG | OXYGEN SATURATION: 96 % | SYSTOLIC BLOOD PRESSURE: 130 MMHG | TEMPERATURE: 98.6 F

## 2025-05-29 DIAGNOSIS — M96.1 POSTLAMINECTOMY SYNDROME OF LUMBAR REGION: Primary | ICD-10-CM

## 2025-05-29 DIAGNOSIS — M51.16 INTERVERTEBRAL DISC DISORDER WITH RADICULOPATHY OF LUMBAR REGION: ICD-10-CM

## 2025-05-29 DIAGNOSIS — F33.1 MODERATE EPISODE OF RECURRENT MAJOR DEPRESSIVE DISORDER (HCC): ICD-10-CM

## 2025-05-29 DIAGNOSIS — L50.9 URTICARIA: Primary | ICD-10-CM

## 2025-05-29 DIAGNOSIS — L24.9 IRRITANT CONTACT DERMATITIS, UNSPECIFIED TRIGGER: ICD-10-CM

## 2025-05-29 PROBLEM — T31.0 BURN (ANY DEGREE) INVOLVING LESS THAN 10% OF BODY SURFACE: Status: ACTIVE | Noted: 2021-08-02

## 2025-05-29 PROBLEM — T23.231A: Status: ACTIVE | Noted: 2021-08-02

## 2025-05-29 PROCEDURE — 97110 THERAPEUTIC EXERCISES: CPT | Performed by: PHYSICAL THERAPIST

## 2025-05-29 PROCEDURE — 99213 OFFICE O/P EST LOW 20 MIN: CPT

## 2025-05-29 RX ORDER — PREDNISONE 20 MG/1
40 TABLET ORAL DAILY
Qty: 10 TABLET | Refills: 0 | Status: SHIPPED | OUTPATIENT
Start: 2025-05-29 | End: 2025-06-03

## 2025-05-29 NOTE — PROGRESS NOTES
Name: Enoc Roberto      : 1960      MRN: [id number]  Encounter Provider: JAZIEL Rodriguez  Encounter Date: 2025   Encounter department: [facility]  :  Assessment & Plan  Urticaria  Urticaria present on exam - patient denies any known triggers - recently started on Oxycodone and was taking a herbal supplement (name unknown) prior to rash onset. Advised he is out of the window for Shingles treatment and have low suspicion as rash crosses over both sides of body and is without systemic symptoms. He stopped the herbal supplement but is still taking Oxycodone. Recommend stopping Oxycodone and reaching out to prescribing physician regarding possible allergic response. Will give short course of prednisone to help with inflammation and symptom management. Recommend continued use of benadryl before bed to help assist with overnight itching and sleep.        Irritant contact dermatitis, unspecified trigger  Short course of prednisone as directed. Recommend holding off on Shingrix vaccine for now until inflammatory response resolves. Will get at Pharmacy as patient has Medicare insurance.   Orders:    predniSONE 20 mg tablet; Take 2 tablets (40 mg total) by mouth daily for 5 days      Moderate episode of recurrent major depressive disorder (HCC)  No change in his stressors. Patient remains on trazodone, Cymbalta and Klonopin. Patient does follow-up with connie  for counseling               History of Present Illness   64 year old male presents for rash to the left side of his neck x 3 weeks . He denies any known triggers - reports h/o chicken pox as a child and did not receive his Shingrix vaccine. He is concerned for possible shingles rashHe reports the rash is itchy but not painful. Denies fevers, chills, body aches, discharges, or fatigue. Denies any known contacts with a similar rash. He did try changing laundry detergents to see if that was the cause but  relates the rash remained unchanged. . He denies outside exposures. Does relate recently starting Oxycodone by pain management and noticed the rash developed a week later. Did also take an herbal OTC supplement (name unknown) prior to rash onset. He stopped the supplement but continued the oxycodone. He has applied topical cortisone and OTC benadryl with minimal symptom improvement.     Rash  This is a new problem. The current episode started in the past 7 days. The problem is unchanged. The affected locations include the neck. The problem is moderate. The rash is characterized by dryness, itchiness, redness and pain. He was exposed to nothing. The rash first occurred at home. Associated symptoms include itching. Pertinent negatives include no anorexia, congestion, cough, decreased physical activity, decreased responsiveness, decreased sleep, drinking less, diarrhea, facial edema, fatigue, fever, joint pain, rhinorrhea, shortness of breath, sore throat or vomiting. Past treatments include antihistamine and anti-itch cream. The treatment provided no relief. His past medical history is significant for varicella. There is no history of allergies, asthma or eczema. There were no sick contacts.     Review of Systems   Constitutional:  Negative for activity change, appetite change, chills, decreased responsiveness, fatigue and fever.   HENT:  Negative for congestion, ear pain, rhinorrhea and sore throat.    Eyes:  Negative for pain and visual disturbance.   Respiratory:  Negative for cough and shortness of breath.    Cardiovascular:  Negative for chest pain and palpitations.   Gastrointestinal:  Negative for abdominal pain, anorexia, constipation, diarrhea, nausea and vomiting.   Genitourinary:  Negative for dysuria and hematuria.   Musculoskeletal:  Positive for myalgias. Negative for arthralgias, back pain, joint pain and neck pain.   Skin:  Positive for color change, itching and rash. Negative for pallor and wound.    Allergic/Immunologic: Negative for environmental allergies and food allergies.   Neurological:  Negative for dizziness, seizures, syncope, light-headedness and headaches.   All other systems reviewed and are negative.      Objective   /72   Pulse 80   Temp 98.6 °F (37 °C)   Ht 6' (1.829 m)   Wt (!) 147 kg (323 lb)   SpO2 96%   BMI 43.81 kg/m²      Physical Exam  Vitals and nursing note reviewed.   Constitutional:       General: He is not in acute distress.     Appearance: Normal appearance. He is well-developed. He is obese.   HENT:      Head: Normocephalic and atraumatic.     Eyes:      Conjunctiva/sclera: Conjunctivae normal.       Cardiovascular:      Rate and Rhythm: Normal rate and regular rhythm.      Pulses: Normal pulses.      Heart sounds: No murmur heard.  Pulmonary:      Effort: Pulmonary effort is normal. No respiratory distress.      Breath sounds: Normal breath sounds.   Abdominal:      Palpations: Abdomen is soft.      Tenderness: There is no abdominal tenderness.     Musculoskeletal:         General: No swelling.      Cervical back: Neck supple.     Skin:     General: Skin is warm and dry.      Capillary Refill: Capillary refill takes less than 2 seconds.      Findings: Erythema and rash present. Rash is macular, pustular and urticarial. Rash is not crusting, scaling or vesicular.      Comments: Urticaria rash over chest wall on both sides of body. Isolated pustules. No crusting or scabbing present. Mild surrounding erythema. No discharges.      Neurological:      General: No focal deficit present.      Mental Status: He is alert and oriented to person, place, and time.     Psychiatric:         Mood and Affect: Mood normal.         Behavior: Behavior normal.         Thought Content: Thought content normal.         Judgment: Judgment normal.

## 2025-05-29 NOTE — PROGRESS NOTES
"Daily Note     Today's date: 2025  Patient name: Enoc Roberto  : 1960  MRN: [id number]  Referring provider: Danial Vasquez DO  Dx:   Encounter Diagnosis     ICD-10-CM    1. Postlaminectomy syndrome of lumbar region  M96.1       2. Intervertebral disc disorder with radiculopathy of lumbar region  M51.16           Start Time: 1730  Stop Time: 1810  Total time in clinic (min): 40 minutes    Subjective: Patient reports that he did sit more when driving today and did tweak his back when under the sink last night.      Objective: See treatment diary below      Assessment: Tolerated treatment well. Patient demonstrated fatigue post treatment and exhibited good technique with therapeutic exercises      Plan: Continue per plan of care.      Precautions:       Manuals    assessment     3'        Prone TS/rib PA mob     AB G4 AB G4                                 Neuro Re-Ed             STS  5 KG ball-3x10  + foam 5 KG ball-3x10  + foam       5 KG ball-2x10  + foam 5 KG ball-3x10  + foam   Seated posterior chain strengthening - hip hinge      20# ball  20x 20# ball  20x 20# ball  20x     Hooklying clamshell   YHB 2x10 RHB  5\"x20 RHB  5\"x20        bridge On pball  5\"x25 RHB  10x  [id number] YHB  4x5 RHB  5\"x20 RHB  5\"x20 RHB  5\"x20 RHB  5\"x20 On pball  5\"x25 On pball  5\"x25 On pball  5\"x25   Hooklying march       RHB  5\"x20      Pball    Supine  5\"x10 Standing  Alt march - 2x10 Standing  Alt march - 2x10   BTB - 30\"x3     Pallof press YJB  5\"x10 ea side YJB  5\"x15 ea side Tony 6# 10x5\" YJB  5\"x10  YJB  5\"x10  YJB  5\"x10  YJB  5\"x15 YJB  5\"x15 ea side YJB  5\"x15 ea side YJB  5\"x15 ea side   TrA alt LE             Side stepping 3x30\"  RHB 3x30\"  RHB   2xF RHB 2xF RHB  NP  3x30\"  RHB   Ther Ex                          VG   L7 6 min L7 5 min                      UE ext hold   2x10x5\" MTB          Leg press             STS    2x10 3x10 3x10-anti rotation  NP  "     [place] walk outs       20#  2x10                   Ther Activity             Dan carry 20# KB  5' total alt arms 25# KB  5' total alt arms      20# KB  2'x2 25# KB  2'x2 25# KB  5' total alt arms                Gait Training                                       Modalities

## 2025-06-02 ENCOUNTER — OFFICE VISIT (OUTPATIENT)
Dept: PHYSICAL THERAPY | Facility: REHABILITATION | Age: 65
End: 2025-06-02
Attending: ANESTHESIOLOGY
Payer: MEDICARE

## 2025-06-02 DIAGNOSIS — M96.1 POSTLAMINECTOMY SYNDROME OF LUMBAR REGION: Primary | ICD-10-CM

## 2025-06-02 DIAGNOSIS — I25.10 CORONARY ARTERIOSCLEROSIS: ICD-10-CM

## 2025-06-02 DIAGNOSIS — I10 ESSENTIAL HYPERTENSION: ICD-10-CM

## 2025-06-02 DIAGNOSIS — M51.16 INTERVERTEBRAL DISC DISORDER WITH RADICULOPATHY OF LUMBAR REGION: ICD-10-CM

## 2025-06-02 DIAGNOSIS — E78.2 MIXED HYPERLIPIDEMIA: ICD-10-CM

## 2025-06-02 DIAGNOSIS — Z01.810 PREOPERATIVE CARDIOVASCULAR EXAMINATION: ICD-10-CM

## 2025-06-02 DIAGNOSIS — R35.0 URINARY FREQUENCY: ICD-10-CM

## 2025-06-02 PROCEDURE — 97140 MANUAL THERAPY 1/> REGIONS: CPT

## 2025-06-02 PROCEDURE — 97110 THERAPEUTIC EXERCISES: CPT

## 2025-06-02 RX ORDER — ALFUZOSIN HYDROCHLORIDE 10 MG/1
10 TABLET, EXTENDED RELEASE ORAL DAILY
Qty: 90 TABLET | Refills: 1 | Status: SHIPPED | OUTPATIENT
Start: 2025-06-02

## 2025-06-02 NOTE — PROGRESS NOTES
"Daily Note     Today's date: 2025  Patient name: Enoc Roberto  : 1960  MRN: [id number]  Referring provider: Danial Vasquez DO  Dx:   Encounter Diagnosis     ICD-10-CM    1. Postlaminectomy syndrome of lumbar region  M96.1       2. Intervertebral disc disorder with radiculopathy of lumbar region  M51.16             Start Time: 1727  Stop Time: 1806  Total time in clinic (min): 39 minutes  ~Pt arrived 12 minutes late but was accommodated.      Subjective: Pt reports that his back has not been bothering him too much since his last session because he has not been sitting or standing for long periods of time.      Objective: See treatment diary below      Assessment: Tolerated treatment well. Added the vigor gym intervention to improve the pt's BLE strength to improve the support of his lower back. He demonstrated a good tolerance for a slight increase in intensity of his resisted bridge exercise. Demonstrated a good challenge with all of his exercises today. Patient demonstrated fatigue post treatment and exhibited good technique with therapeutic exercises.      Plan: Continue per plan of care.      Precautions:       Manuals    assessment     3'        Prone TS/rib PA mob     AB G4 AB G4                                 Neuro Re-Ed             STS  5 KG ball-3x10  + foam 5 KG ball-3x10  + foam 5 KG ball-3x10  + foam      5 KG ball-2x10  + foam 5 KG ball-3x10  + foam   Seated posterior chain strengthening - hip hinge      20# ball  20x 20# ball  20x 20# ball  20x     Hooklying clamshell     RHB  5\"x20        bridge On pball  5\"x25 RHB  10x  [id number] RHB  15x,  [id number]  RHB  5\"x20 RHB  5\"x20 RHB  5\"x20 On pball  5\"x25 On pball  5\"x25 On pball  5\"x25   Hooklying march       RHB  5\"x20      Pball      Standing  Alt march - 2x10   BTB - 30\"x3     Pallof press YJB  5\"x10 ea side YJB  5\"x15 ea side YJB  5\"x15 ea side  YJB  5\"x10  YJB  5\"x10  YJB  5\"x15 " "YJB  5\"x15 ea side YJB  5\"x15 ea side YJB  5\"x15 ea side   TrA alt LE             Side stepping 3x30\"  RHB 3x30\"  RHB 3x30\"  RHB  2xF RHB 2xF RHB  NP  3x30\"  RHB   Ther Ex                          VG   L7 8'                       UE ext hold             Leg press             STS     3x10 3x10-anti rotation  NP      [place] walk outs       20#  2x10                   Ther Activity             Dan carry 20# KB  5' total alt arms 25# KB  5' total alt arms      20# KB  2'x2 25# KB  2'x2 25# KB  5' total alt arms                Gait Training                                       Modalities                                                                  "

## 2025-06-06 ENCOUNTER — APPOINTMENT (OUTPATIENT)
Dept: PHYSICAL THERAPY | Facility: REHABILITATION | Age: 65
End: 2025-06-06
Attending: ANESTHESIOLOGY
Payer: MEDICARE

## 2025-06-06 NOTE — PROGRESS NOTES
"Daily Note     Today's date: 2025  Patient name: Enoc Roberto  : 1960  MRN: [id number]  Referring provider: Danial Vasquez DO  Dx: No diagnosis found.               Subjective: ***      Objective: See treatment diary below      Assessment: Tolerated treatment {Tolerated treatment :}. Patient {assessment:}      Plan: {PLAN:}     Precautions:       Manuals    assessment     3'        Prone TS/rib PA mob     AB G4 AB G4                                 Neuro Re-Ed             STS  5 KG ball-3x10  + foam 5 KG ball-3x10  + foam 5 KG ball-3x10  + foam 5 KG ball-3x10  + foam     5 KG ball-2x10  + foam 5 KG ball-3x10  + foam   Seated posterior chain strengthening - hip hinge      20# ball  20x 20# ball  20x 20# ball  20x     Hooklying clamshell     RHB  5\"x20        bridge On pball  5\"x25 RHB  10x  [id number] RHB  15x,  P-[id number] RHB  15x,  P-zcut02x RHB  5\"x20 RHB  5\"x20 RHB  5\"x20 On pball  5\"x25 On pball  5\"x25 On pball  5\"x25   Hooklying march       RHB  5\"x20      Pball      Standing  Alt march - 2x10   BTB - 30\"x3     Pallof press YJB  5\"x10 ea side YJB  5\"x15 ea side YJB  5\"x15 ea side YJB  5\"x15 ea side YJB  5\"x10  YJB  5\"x10  YJB  5\"x15 YJB  5\"x15 ea side YJB  5\"x15 ea side YJB  5\"x15 ea side   TrA alt LE             Side stepping 3x30\"  RHB 3x30\"  RHB 3x30\"  RHB 3x30\"  RHB 2xF RHB 2xF RHB  NP  3x30\"  RHB   Ther Ex                          VG   L7 8' L7  8'                      UE ext hold             Leg press             STS     3x10 3x10-anti rotation  NP      [place] walk outs       20#  2x10                   Ther Activity             Dan carry 20# KB  5' total alt arms 25# KB  5' total alt arms  25# KB  5' total alt arms    20# KB  2'x2 25# KB  2'x2 25# KB  5' total alt arms   Multif walk out    10x ea side         Gait Training                                       Modalities                                  "

## 2025-06-12 NOTE — PROGRESS NOTES
"Daily Note     Today's date: 2025  Patient name: Enoc Roberto  : 1960  MRN: [id number]  Referring provider: Danial Vasquez DO  Dx:   Encounter Diagnosis     ICD-10-CM    1. Postlaminectomy syndrome of lumbar region  M96.1       2. Intervertebral disc disorder with radiculopathy of lumbar region  M51.16           Start Time: 1225  Stop Time: 1300  Total time in clinic (min): 35 minutes    Subjective: Patient reports feeling more sick today, back so so.       Objective: See treatment diary below      Assessment: Tolerated treatment well. Patient with decreased exercise intensity to accommodate for how he was feeling. He still was able to complete the prescribed exercises.      Plan: Continue per plan of care.      Precautions:       Manuals    assessment             Prone TS/rib PA mob      AB G4                                 Neuro Re-Ed             STS  5 KG ball-3x10  + foam 5 KG ball-3x10  + foam 5 KG ball-3x10  + foam 5 KG ball-3x10  + foam 10# ball  2x10    5 KG ball-2x10  + foam 5 KG ball-3x10  + foam   Seated posterior chain strengthening - hip hinge      20# ball  20x 20# ball  20x 20# ball  20x     Hooklying clamshell             bridge On pball  5\"x25 RHB  10x  [id number] RHB  15x,  P-hyuu48k RHB  15x,  P-bket00z RHB  15x,  P-gsab04z RHB  5\"x20 RHB  5\"x20 On pball  5\"x25 On pball  5\"x25 On pball  5\"x25   Hooklying march       RHB  5\"x20      Pball         BTB - 30\"x3     Pallof press YJB  5\"x10 ea side YJB  5\"x15 ea side YJB  5\"x15 ea side YJB  5\"x15 ea side  YJB  5\"x10  YJB  5\"x15 YJB  5\"x15 ea side YJB  5\"x15 ea side YJB  5\"x15 ea side   TrA alt LE             Side stepping 3x30\"  RHB 3x30\"  RHB 3x30\"  RHB 3x30\"  RHB  2xF RHB  NP  3x30\"  RHB   Ther Ex                          VG   L7 8' L7  8'                      UE ext hold             Leg press             STS      3x10-anti rotation  NP      [place] walk outs       20#  2x10       "             Ther Activity             Dan carry 20# KB  5' total alt arms 25# KB  5' total alt arms  25# KB  5' total alt arms 25# KB  5' total alt arms   20# KB  2'x2 25# KB  2'x2 25# KB  5' total alt arms   Multif walk out    10x ea side 10x ea side  5#        Gait Training                                       Modalities

## 2025-06-13 ENCOUNTER — OFFICE VISIT (OUTPATIENT)
Dept: PHYSICAL THERAPY | Facility: REHABILITATION | Age: 65
End: 2025-06-13
Attending: ANESTHESIOLOGY
Payer: MEDICARE

## 2025-06-13 DIAGNOSIS — M96.1 POSTLAMINECTOMY SYNDROME OF LUMBAR REGION: Primary | ICD-10-CM

## 2025-06-13 DIAGNOSIS — M51.16 INTERVERTEBRAL DISC DISORDER WITH RADICULOPATHY OF LUMBAR REGION: ICD-10-CM

## 2025-06-13 PROCEDURE — 97110 THERAPEUTIC EXERCISES: CPT | Performed by: PHYSICAL THERAPIST

## 2025-06-20 ENCOUNTER — APPOINTMENT (OUTPATIENT)
Dept: PHYSICAL THERAPY | Facility: REHABILITATION | Age: 65
End: 2025-06-20
Attending: ANESTHESIOLOGY
Payer: MEDICARE

## 2025-06-24 DIAGNOSIS — Z56.6 WORK-RELATED STRESS: ICD-10-CM

## 2025-06-24 DIAGNOSIS — F41.1 GENERALIZED ANXIETY DISORDER: ICD-10-CM

## 2025-06-24 SDOH — HEALTH STABILITY - MENTAL HEALTH: OTHER PHYSICAL AND MENTAL STRAIN RELATED TO WORK: Z56.6

## 2025-06-24 NOTE — TELEPHONE ENCOUNTER
Patient called to request a refill for their clonazepam advised a refill was requested on 6/24/25 and is pending approval. Patient verbalized understanding and is in agreement.     Does the patient have enough for 3 days?   [] Yes   [x] No - Send as HP to POD

## 2025-06-25 ENCOUNTER — TELEPHONE (OUTPATIENT)
Dept: FAMILY MEDICINE CLINIC | Facility: CLINIC | Age: 65
End: 2025-06-25

## 2025-06-25 RX ORDER — CLONAZEPAM 0.5 MG/1
0.5 TABLET ORAL
Qty: 90 TABLET | Refills: 0 | Status: SHIPPED | OUTPATIENT
Start: 2025-06-25

## 2025-06-25 NOTE — TELEPHONE ENCOUNTER
Patient called in regards to status of medication clonazepam 0.5 mg. Informed patient that script was sent today at 7:52 am to pharmacy. Patient verbalized understanding.

## 2025-06-26 ENCOUNTER — OFFICE VISIT (OUTPATIENT)
Dept: FAMILY MEDICINE CLINIC | Facility: CLINIC | Age: 65
End: 2025-06-26
Payer: MEDICARE

## 2025-06-26 VITALS
HEIGHT: 72 IN | BODY MASS INDEX: 42.66 KG/M2 | WEIGHT: 315 LBS | DIASTOLIC BLOOD PRESSURE: 82 MMHG | OXYGEN SATURATION: 98 % | SYSTOLIC BLOOD PRESSURE: 124 MMHG | HEART RATE: 94 BPM

## 2025-06-26 DIAGNOSIS — J01.40 ACUTE NON-RECURRENT PANSINUSITIS: Primary | ICD-10-CM

## 2025-06-26 PROBLEM — Z23 NEED FOR PNEUMOCOCCAL VACCINE: Status: RESOLVED | Noted: 2022-03-07 | Resolved: 2025-06-26

## 2025-06-26 PROBLEM — Z01.818 PREOPERATIVE CLEARANCE: Status: RESOLVED | Noted: 2022-01-26 | Resolved: 2025-06-26

## 2025-06-26 PROCEDURE — 99213 OFFICE O/P EST LOW 20 MIN: CPT

## 2025-06-26 RX ORDER — AZELASTINE 1 MG/ML
1 SPRAY, METERED NASAL 2 TIMES DAILY
Qty: 30 ML | Refills: 0 | Status: SHIPPED | OUTPATIENT
Start: 2025-06-26

## 2025-06-26 RX ORDER — DIPHENHYDRAMINE HCL 25 MG
25 CAPSULE ORAL EVERY 6 HOURS PRN
COMMUNITY

## 2025-06-26 NOTE — PROGRESS NOTES
Name: Enoc Roberto      : 1960      MRN: [id number]  Encounter Provider: JAZIEL Rodriguez  Encounter Date: 2025   Encounter department: [facility]  :  Assessment & Plan  Acute non-recurrent pansinusitis  Reports minimal symptom relief with flonase - will trial short course of Astelin - advised no more than 3 days at a time and to not use with flonase. Will also add on Augmentin as symptoms have been ongoing >3 weeks. Reports h/o PCN as a child but has been on Amoxicillin without issue. Discussed taking medication with food/probiotic to avoid upset stomach. Continue over-the-counter products as needed - discussed coricidin and mucinex for HBP preferred over sudafed. Advised against taking nyquil with Singulair due to sedation effect. Will f/u as needed.   Orders:    azelastine (ASTELIN) 0.1 % nasal spray; 1 spray into each nostril 2 (two) times a day Use in each nostril as directed. Do not use longer than 3 days.    amoxicillin-clavulanate (AUGMENTIN) 875-125 mg per tablet; Take 1 tablet by mouth every 12 (twelve) hours for 7 days           History of Present Illness   64 year old male presents for sinus pressure/congestion x 2-3 weeks. He reports h/o seasonal allergies but relates symptoms are not responding to allergy medications prescribed (flonase, singulair). He denies any known sick contacts. Reports feeling warm but did not measure his temperature. Denies cough or SOB. He has been taking mucinex, sudafed, and nyquil in addition to prescribed medications with minimal symptom improvement.     Sinus Problem  This is a new problem. The current episode started 1 to 4 weeks ago. The problem is unchanged. There has been no fever. The pain is moderate. Associated symptoms include congestion, sinus pressure and a sore throat. Pertinent negatives include no chills, coughing, diaphoresis, ear pain, headaches, hoarse voice, neck pain, shortness of breath, sneezing or  swollen glands. Past treatments include oral decongestants and nasal decongestants. The treatment provided no relief.     Review of Systems   Constitutional:  Negative for activity change, appetite change, chills, diaphoresis, fatigue and fever.   HENT:  Positive for congestion, postnasal drip, rhinorrhea, sinus pressure, sinus pain and sore throat. Negative for ear discharge, ear pain, hoarse voice, sneezing and trouble swallowing.    Eyes:  Negative for visual disturbance.   Respiratory:  Negative for cough, chest tightness and shortness of breath.    Cardiovascular:  Negative for chest pain and palpitations.   Gastrointestinal:  Negative for abdominal pain, constipation, diarrhea, nausea and vomiting.   Musculoskeletal:  Negative for neck pain.   Skin:  Negative for color change and pallor.   Allergic/Immunologic: Positive for environmental allergies. Negative for food allergies.   Neurological:  Positive for dizziness (Intermittent with movement). Negative for light-headedness and headaches.       Objective   Ht 6' (1.829 m)   BMI 43.81 kg/m²      Physical Exam  Vitals and nursing note reviewed.   Constitutional:       General: He is awake. He is not in acute distress.     Appearance: Normal appearance. He is well-developed. He is obese.   HENT:      Head: Normocephalic and atraumatic.      Right Ear: Hearing, tympanic membrane, ear canal and external ear normal.      Left Ear: Hearing, ear canal and external ear normal. A middle ear effusion is present.      Nose: Congestion and rhinorrhea present. Rhinorrhea is clear.      Right Turbinates: Not enlarged, swollen or pale.      Left Turbinates: Not enlarged, swollen or pale.      Right Sinus: No maxillary sinus tenderness or frontal sinus tenderness.      Left Sinus: No maxillary sinus tenderness or frontal sinus tenderness.      Mouth/Throat:      Lips: Pink.      Mouth: Mucous membranes are moist.      Pharynx: Oropharynx is clear. Uvula midline. Posterior  oropharyngeal erythema and postnasal drip present.      Tonsils: No tonsillar exudate or tonsillar abscesses. 2+ on the right. 2+ on the left.     Eyes:      Conjunctiva/sclera: Conjunctivae normal.       Cardiovascular:      Rate and Rhythm: Normal rate and regular rhythm.      Pulses: Normal pulses.      Heart sounds: Normal heart sounds. No murmur heard.  Pulmonary:      Effort: Pulmonary effort is normal. No respiratory distress.      Breath sounds: Normal breath sounds.   Abdominal:      Palpations: Abdomen is soft.      Tenderness: There is no abdominal tenderness.     Musculoskeletal:         General: No swelling.      Cervical back: Full passive range of motion without pain, normal range of motion and neck supple.   Lymphadenopathy:      Cervical: No cervical adenopathy.     Skin:     General: Skin is warm and dry.      Capillary Refill: Capillary refill takes less than 2 seconds.     Neurological:      General: No focal deficit present.      Mental Status: He is alert and oriented to person, place, and time.     Psychiatric:         Mood and Affect: Mood normal.         Behavior: Behavior normal. Behavior is cooperative.         Thought Content: Thought content normal.         Judgment: Judgment normal.

## 2025-06-26 NOTE — PATIENT INSTRUCTIONS
Take antibiotic as directed for next week. Complete entire course of therapy even if symptoms improve and take medication with food to avoid upset stomach. Use flonase as directed as needed for congestion and mucinex as needed for cough. Follow-up with PCP in 3-5 days if no improvement of symptoms. Report to the ER if symptoms worsen.

## 2025-07-02 ENCOUNTER — TELEPHONE (OUTPATIENT)
Age: 65
End: 2025-07-02

## 2025-07-02 DIAGNOSIS — J01.40 ACUTE NON-RECURRENT PANSINUSITIS: Primary | ICD-10-CM

## 2025-07-02 RX ORDER — PREDNISONE 20 MG/1
40 TABLET ORAL DAILY
Qty: 10 TABLET | Refills: 0 | Status: SHIPPED | OUTPATIENT
Start: 2025-07-02 | End: 2025-07-07

## 2025-07-02 NOTE — TELEPHONE ENCOUNTER
Patient called to say that he is on his last antibiotic. His dizziness if better but he still has ST at the top of his throat and clogged sinuses. Patient wants to know what to do? Please, advise.

## 2025-07-08 ENCOUNTER — OFFICE VISIT (OUTPATIENT)
Dept: PAIN MEDICINE | Facility: CLINIC | Age: 65
End: 2025-07-08
Payer: MEDICARE

## 2025-07-08 DIAGNOSIS — M51.16 INTERVERTEBRAL DISC DISORDER WITH RADICULOPATHY OF LUMBAR REGION: Primary | ICD-10-CM

## 2025-07-08 DIAGNOSIS — M79.18 MYOFASCIAL PAIN SYNDROME: ICD-10-CM

## 2025-07-08 DIAGNOSIS — M96.1 LUMBAR POSTLAMINECTOMY SYNDROME: ICD-10-CM

## 2025-07-08 DIAGNOSIS — F11.20 UNCOMPLICATED OPIOID DEPENDENCE (HCC): ICD-10-CM

## 2025-07-08 DIAGNOSIS — Z79.891 LONG-TERM CURRENT USE OF OPIATE ANALGESIC: ICD-10-CM

## 2025-07-08 DIAGNOSIS — G89.4 CHRONIC PAIN SYNDROME: ICD-10-CM

## 2025-07-08 PROCEDURE — 99214 OFFICE O/P EST MOD 30 MIN: CPT

## 2025-07-08 RX ORDER — OXYCODONE HYDROCHLORIDE 5 MG/1
5 TABLET ORAL EVERY 8 HOURS PRN
Qty: 90 TABLET | Refills: 0 | Status: SHIPPED | OUTPATIENT
Start: 2025-08-08

## 2025-07-08 RX ORDER — OXYCODONE HYDROCHLORIDE 5 MG/1
5 TABLET ORAL EVERY 8 HOURS PRN
Qty: 90 TABLET | Refills: 0 | Status: SHIPPED | OUTPATIENT
Start: 2025-09-06

## 2025-07-08 RX ORDER — OXYCODONE HYDROCHLORIDE 5 MG/1
5 TABLET ORAL EVERY 8 HOURS PRN
Qty: 90 TABLET | Refills: 0 | Status: SHIPPED | OUTPATIENT
Start: 2025-07-10

## 2025-07-08 NOTE — ASSESSMENT & PLAN NOTE
Orders:    oxyCODONE (Roxicodone) 5 immediate release tablet; Take 1 tablet (5 mg total) by mouth every 8 (eight) hours as needed for moderate pain Max Daily Amount: 15 mg Do not start before September 6, 2025.    oxyCODONE (Roxicodone) 5 immediate release tablet; Take 1 tablet (5 mg total) by mouth every 8 (eight) hours as needed for moderate pain Max Daily Amount: 15 mg Do not start before August 8, 2025.    oxyCODONE (Roxicodone) 5 immediate release tablet; Take 1 tablet (5 mg total) by mouth every 8 (eight) hours as needed for moderate pain Max Daily Amount: 15 mg Do not start before July 10, 2025.    [place] spine and pain procedure; Future    MM DT_Alprazolam Definitive Test    MM DT_Amphetamine Definitive Test    MM DT_Buprenorphine Definitive Test    MM DT_Butalbital Definitive Test    MM DT_Clonazepam Definitive Test    MM DT_Cocaine Definitive Test    MM DT_Codeine Definitive Test    MM DT_Dextromethorphan Definitive Test    MM Diazepam Definitive Test    MM DT_Ethyl Glucuronide/Ethyl Sulfate Definitive Test    MM DT_Fentanyl Definitive Test    MM DT_Heroin Definitive Test    MM DT_Hydrocodone Definitive Test    MM DT_Hydromorphone Definitive Test    MM DT_Kratom Definitive Test    MM DT_Levorphanol Definitive Test    MM DT_MDMA Definitive Test    MM DT_Meperidine Definitive Test    MM DT_Methadone Definitive Test    MM DT_Methamphetamine Definitive Test    MM DT_Methylphenidate Definitive Test    MM DT_Morphine Definitive Test    MM Lorazepam Definitive Test    MM DT_Oxazepam Definitive Test    MM DT_Oxycodone Definitive Test    MM DT_Oxymorphone Definitive Test    MM DT_Phencyclidine Definitive Test    MM DT_Phenobarbital Definitive Test    MM DT_Phentermine Definitive Test    MM DT_Secobarbital Definitive Test    MM DT_Spice Definitive Test    MM DT_Tapentadol Definitive Test    MM DT_Temazapam Definitive Test    MM DT_THC Definitive Test    MM DT_Tramadol Definitive Test

## 2025-07-08 NOTE — PROGRESS NOTES
Name: Enoc Roberto      : 1960      MRN: [id number]  Encounter Provider: JAZIEL Shipley  Encounter Date: 2025   Encounter department: [facility]  :  Assessment & Plan  Intervertebral disc disorder with radiculopathy of lumbar region  Chronic pain syndrome  Lumbar postlaminectomy syndrome  Myofascial pain syndrome  Long-term current use of opiate analgesic  Uncomplicated opioid dependence (HCC)    Orders:    oxyCODONE (Roxicodone) 5 immediate release tablet; Take 1 tablet (5 mg total) by mouth every 8 (eight) hours as needed for moderate pain Max Daily Amount: 15 mg Do not start before 2025.    oxyCODONE (Roxicodone) 5 immediate release tablet; Take 1 tablet (5 mg total) by mouth every 8 (eight) hours as needed for moderate pain Max Daily Amount: 15 mg Do not start before 2025.    oxyCODONE (Roxicodone) 5 immediate release tablet; Take 1 tablet (5 mg total) by mouth every 8 (eight) hours as needed for moderate pain Max Daily Amount: 15 mg Do not start before July 10, 2025.    [place] spine and pain procedure; Future    MM DT_Alprazolam Definitive Test    MM DT_Amphetamine Definitive Test    MM DT_Buprenorphine Definitive Test    MM DT_Butalbital Definitive Test    MM DT_Clonazepam Definitive Test    MM DT_Cocaine Definitive Test    MM DT_Codeine Definitive Test    MM DT_Dextromethorphan Definitive Test    MM Diazepam Definitive Test    MM DT_Ethyl Glucuronide/Ethyl Sulfate Definitive Test    MM DT_Fentanyl Definitive Test    MM DT_Heroin Definitive Test    MM DT_Hydrocodone Definitive Test    MM DT_Hydromorphone Definitive Test    MM DT_Kratom Definitive Test    MM DT_Levorphanol Definitive Test    MM DT_MDMA Definitive Test    MM DT_Meperidine Definitive Test    MM DT_Methadone Definitive Test    MM DT_Methamphetamine Definitive Test    MM DT_Methylphenidate Definitive Test    MM DT_Morphine Definitive Test    MM Lorazepam Definitive Test     MM DT_Oxazepam Definitive Test    MM DT_Oxycodone Definitive Test    MM DT_Oxymorphone Definitive Test    MM DT_Phencyclidine Definitive Test    MM DT_Phenobarbital Definitive Test    MM DT_Phentermine Definitive Test    MM DT_Secobarbital Definitive Test    MM DT_Spice Definitive Test    MM DT_Tapentadol Definitive Test    MM DT_Temazapam Definitive Test    MM DT_THC Definitive Test    MM DT_Tramadol Definitive Test    Will schedule patient for repeat bilateral L5 TFESI to help decrease inflammation and provide him relief of his low back pain and radiating pain into his lower extremities.  Complete risks and benefits including bleeding, infection, tissue reaction, nerve injury and allergic reaction were discussed.  The approach was demonstrated using models and literature was provided.  Verbal and written consent was obtained.  His pain continues to be managed with taking oxycodone, therefore I will continue him on this medication as prescribed, however I will change the dosing to every 8 hours as needed with hopes to provide him relief of his pain in the evenings.  3-month scripts for oxycodone 5 mg were electronically sent to the patient's pharmacy with do not fill dates of 7/10/2025, 8/8/2025 and 9/6/2025.  We will follow-up in 12 weeks for medication reevaluation and refills, or sooner if necessary.    There are risks associated with opioid medications, including dependence, addiction and tolerance. The patient understands and agrees to use these medications only as prescribed. Potential side effects of the medications include, but are not limited to, constipation, drowsiness, addiction, impaired judgment and risk of fatal overdose if not taken as prescribed. The patient was warned against driving while taking sedation medications.  Sharing medications is a felony. At this point in time, the patient is showing no signs of addiction, abuse, diversion or suicidal ideation.  A urine drug screen was collected at  today's office visit as part of our medication management protocol. The point of care testing results were appropriate for what was being prescribed. The specimen will be sent for confirmatory testing. The drug screen is medically necessary because the patient is either dependent on opioid medication or is being considered for opioid medication therapy and the results could impact ongoing or future treatment. The drug screen is to evaluate for the presences or absence of prescribed, non-prescribed, and/or illicit drugs/substances.  [place] Prescription Drug Monitoring Program report was reviewed and was appropriate      My impressions and treatment recommendations were discussed in detail with the patient who verbalized understanding and had no further questions.  Discharge instructions were provided. I personally saw and examined the patient and I agree with the above discussed plan of care.    History of Present Illness     Enoc Roberto is a 64 y.o. male with a history of chronic pain secondary to low back pain, lumbar intervertebral disc disorder with radiculopathy, lumbar postlaminectomy syndrome and myofascial pain.  He was last seen on 5/1/2025 where he was started on oxycodone 5 mg.  He states this medication regimen has been helpful, however the twice a day dosing does not provide him enough coverage throughout the day.  He presents to the office with worsening bilateral low back pain and pain that radiates into bilateral lower extremities that is worse with standing and sitting.    Current pain medications include oxycodone 5 mg that he takes twice a day as needed, gabapentin as prescribed by his PCP and lidocaine patches.  He was started on oxycodone at his last office visit which has been helpful in managing his pain, however the twice a day dosing is not providing him much relief and he finds his pain worsens in the evening.    Opioid contract date 3/17/25  Last UDS Date: 4/24/25  Last  Dose: 7/9/25    Review of Systems   Respiratory:  Negative for shortness of breath.    Cardiovascular:  Negative for chest pain.   Gastrointestinal:  Negative for constipation, diarrhea, nausea and vomiting.   Musculoskeletal:  Positive for back pain and gait problem. Negative for arthralgias, joint swelling and myalgias.        Bilateral leg numbness   Skin:  Negative for rash.   Neurological:  Negative for dizziness, seizures and weakness.   All other systems reviewed and are negative.      Medical History Reviewed by provider this encounter:  Tobacco  Allergies  Meds  Problems  Med Hx  Surg Hx  Fam Hx     .       Objective   There were no vitals taken for this visit.     Pain Score:   4 (By the end of the day an 8)  Physical Exam  Constitutional: normal, well developed, well nourished, alert, in no distress and non-toxic and no overt pain behavior.  Eyes: anicteric  HEENT: grossly intact  Neck: supple, symmetric, trachea midline and no masses   Pulmonary: even and unlabored  Cardiovascular: No edema or pitting edema present  Skin: Normal without rashes or lesions and well hydrated  Psychiatric: Mood and affect appropriate  Neurologic: Cranial Nerves II-XII grossly intact  Musculoskeletal: antalgic    Lumbar Spine Exam    Appearance:  Normal lordosis  Palpation/Tenderness:  left lumbar paraspinal tenderness  right lumbar paraspinal tenderness  Sensory:  no sensory deficits noted  Range of Motion:  Flexion:  Minimally limited  with pain  Extension:  Minimally limited  with pain  Motor Strength:  Left hip flexion:  5/5  Right hip flexion:  5/5  Left knee flexion:  5/5  Left knee extension:  5/5  Right knee flexion:  5/5  Right knee extension:  5/5  Left foot dorsiflexion:  5/5  Left foot plantar flexion:  5/5  Right foot dorsiflexion:  5/5  Right foot plantar flexion:  5/5

## 2025-07-08 NOTE — PATIENT INSTRUCTIONS
"Patient Education     Taking opioids safely   The Basics   Written by the doctors and editors at [facility]   What are opioids? -- Opioids are a group of prescription medicines that relieve pain. They work by attaching to \"opioid receptors\" in the body and blocking pain signals.  Opioids are sometimes used when other types of pain medicine do not help enough. Opioids can be helpful for treating short-term, or \"acute,\" pain, like after surgery or an injury. They are also sometimes used to treat long-term, or \"chronic,\" pain, like for people with cancer. But they come with risks.  If your doctor prescribes an opioid medicine, it's important to understand the risks and know how to stay safe.  What are the risks of taking opioids? -- You should know that:   Opioids have side effects. Some are just bothersome, and some can be dangerous. For example, taking too much of an opioid is called an \"overdose.\" An overdose can cause serious problems and even death.   In some cases, taking opioids can lead to misuse. For example, people might take the medicine when they don't need it for pain. Or they might take more than they are supposed to. Sharing or selling opioids are other examples of misuse.   There is a risk of addiction. This is also called \"opioid use disorder.\"  If you take too much, or take opioids with alcohol or certain other drugs, it can cause serious harm. It can even cause death from overdose.  How do I stay safe? -- There are things you can do to stay safe if you need to take an opioid medicine (figure 1). These things help protect yourself and others.  Know your medicines:    Opioids come in different forms. \"Immediate-release\" medicines work quickly and last for a short time. \"Extended-release\" medicines work more slowly and last longer. Make sure that you know what type of opioid you have. Read the label and the information that comes with your prescription.   Follow your treatment plan carefully. Take only " "the dose your doctor prescribes, and only as often as they tell you to.   Never take opioids that were not prescribed to you.   Some opioids come combined with other medicines like acetaminophen or an \"NSAID\" (like ibuprofen). Do not take any extra NSAIDs or acetaminophen without talking to your doctor first.   Make sure that all of your doctors know every medicine you take, even those that are non-prescription. Some medicines can affect the way opioids work. Bring a complete list of all of your pain medicines and other medicines with you whenever you go to a doctor, nurse, dentist, or pharmacist.   Ask your doctor or pharmacist if it is safe to take your other medicines with your opioid medicine.  Use and store your medicine safely:    Do not drink alcohol while you are taking opioids.   Do not take opioids with medicines that make you sleepy, unless your doctor tells you to. Examples include:   \"Benzodiazepines\" like diazepam (sample brand name: Valium) or alprazolam (sample brand name: Xanax)   Gabapentin (sample brand name: Neurontin) or pregabalin (brand name: Lyrica)   Muscle relaxants like baclofen or cyclobenzaprine   Sleeping pills like zolpidem (sample brand name: Ambien)   Talk to your doctor about whether it is safe to drive. Opioids can make you feel tired or have trouble thinking clearly. If you are starting a new prescription or taking a higher dose, you might need to avoid things like driving, using dangerous machinery, or other activities that could be risky.   Store your opioids in a safe place, such as a locked cabinet. This prevents children, teens, or anyone else from getting to them.   Never share your opioids with other people.  Be aware of side effects:    Opioid medicines can cause side effects. There are often ways to prevent or treat these.   Call your doctor or nurse if you have side effects that bother you, such as:   Constipation - Your doctor or nurse might suggest that you take a " "laxative to prevent or treat constipation. If your bowel movements are hard and dry, a stool softener might help. Drink plenty of water, and try to get regular physical activity.   Mild nausea or stomach discomfort - Taking the medicine with or after food can help with this. Nausea usually gets better with time.   Severe nausea, vomiting, or itchiness - If you have any of these problems, your doctor might be able to switch you to a different medicine.   Dry mouth   Feeling dizzy or sleepy, or having trouble thinking clearly   Vision problems   Being clumsy or falling down   Know the signs of an opioid overdose. Get help right away if you think that you or someone else took too much of an opioid medicine. Signs of an overdose are listed below.  Stay safe when stopping your opioid medicine:    When opioids are needed to treat acute pain, doctors usually try to prescribe them for only a short time. This usually means a few days or a week. They also prescribe the lowest dose possible to relieve pain.   Follow your doctor's instructions about how to stop taking your opioid once your pain has improved. This usually involves \"tapering,\" or reducing the dose gradually. If you stop an opioid suddenly, this can cause unpleasant symptoms like stomach ache, diarrhea, or shakes. This is called \"withdrawal.\" Tapering the dose can help prevent withdrawal.   When your pain gets better, get rid of any leftover medicines. Your doctor, nurse, or pharmacist can suggest ways to get rid of them. This might involve flushing them down the toilet or mixing them with something like dirt or cat litter, then putting the mixture in a sealed container in the trash. Some police stations and pharmacies also take leftover medicines.  What is naloxone? -- Naloxone is a medicine that reverses the effects of opioids. It can prevent death from an opioid overdose. Naloxone comes as an injection (shot), or as a spray that goes in the nose. Naloxone nasal " spray (brand name: Narcan) is available without a prescription.  If you or someone you know uses opioids, it's a good idea to keep naloxone with you. Make sure that you and your family and friends know how and when to use it.  When should I call for help? -- If you are taking an opioid, it's important to know when to get help. Signs of an opioid overdose include:   Extreme sleepiness   Slow breathing, or no breathing at all   Very small pupils (the black circles in the center of the eyes)   Very slow heartbeat  If you took too much of your opioid medicine or think that someone is having an opioid overdose:   If you have naloxone, give it immediately. Naloxone can save a person's life. But it needs to be given as soon as possible.   Call for an ambulance right away (in the [place] and [place], call 9-1-1).  Call your doctor or nurse if:   You are having side effects that bother you.   You have questions about how to take your medicine.   You are having trouble managing your pain.  All topics are updated as new evidence becomes available and our peer review process is complete.  This topic retrieved from [employer] on: May 15, 2024.  Topic 482990 Version 1.0  Release: 32.4.3 - C32.134  © 2024 UpToDate, Inc. and/or its affiliates. All rights reserved.  figure 1: Tips for medication safety     Tips to use your medicine safely include:  (A) Read labels so you know how to take your medicines.  (B) Have a routine for taking your medicines.  (C) Make sure you know what foods, drinks, and other medicines are safe for you.  (D) Store medicines in a safe place.  (E) Work with your health care team and ask questions if you have them.  Graphic 392400 Version 2.0  Consumer Information Use and Disclaimer   Disclaimer: This generalized information is a limited summary of diagnosis, treatment, and/or medication information. It is not meant to be comprehensive and should be used as a tool to help the user understand and/or assess potential  diagnostic and treatment options. It does NOT include all information about conditions, treatments, medications, side effects, or risks that may apply to a specific patient. It is not intended to be medical advice or a substitute for the medical advice, diagnosis, or treatment of a health care provider based on the health care provider's examination and assessment of a patient's specific and unique circumstances. Patients must speak with a health care provider for complete information about their health, medical questions, and treatment options, including any risks or benefits regarding use of medications. This information does not endorse any treatments or medications as safe, effective, or approved for treating a specific patient. UpToDate, Inc. and its affiliates disclaim any warranty or liability relating to this information or the use thereof.The use of this information is governed by the Terms of Use, available at https://www.wolters2NDNATUREuwer.com/en/know/clinical-effectiveness-terms. 2024© UpToDate, Inc. and its affiliates and/or licensors. All rights reserved.  Copyright   © 2024 UpToDate, Inc. and/or its affiliates. All rights reserved.

## 2025-07-12 LAB
6MAM UR QL CFM: NEGATIVE NG/ML
7AMINOCLONAZEPAM UR QL CFM: NORMAL NG/ML
A-OH ALPRAZ UR QL CFM: NEGATIVE NG/ML
AMPHET UR QL CFM: NEGATIVE NG/ML
AMPHET UR QL CFM: NEGATIVE NG/ML
BUPRENORPHINE UR QL CFM: NEGATIVE NG/ML
BUTALBITAL UR QL CFM: NEGATIVE NG/ML
BZE UR QL CFM: NEGATIVE NG/ML
CODEINE UR QL CFM: NEGATIVE NG/ML
EDDP UR QL CFM: NEGATIVE NG/ML
ETHYL GLUCURONIDE UR QL CFM: ABNORMAL NG/ML
ETHYL SULFATE UR QL SCN: NEGATIVE NG/ML
FENTANYL UR QL CFM: NEGATIVE NG/ML
GLIADIN IGG SER IA-ACNC: NEGATIVE NG/ML
HYDROCODONE UR QL CFM: NEGATIVE NG/ML
HYDROCODONE UR QL CFM: NEGATIVE NG/ML
HYDROMORPHONE UR QL CFM: NEGATIVE NG/ML
LORAZEPAM UR QL CFM: NEGATIVE NG/ML
MDMA UR QL CFM: NEGATIVE NG/ML
ME-PHENIDATE UR QL CFM: NEGATIVE NG/ML
MEPERIDINE UR QL CFM: NEGATIVE NG/ML
METHADONE UR QL CFM: NEGATIVE NG/ML
METHAMPHET UR QL CFM: NEGATIVE NG/ML
MORPHINE UR QL CFM: NEGATIVE NG/ML
MORPHINE UR QL CFM: NEGATIVE NG/ML
NORBUPRENORPHINE UR QL CFM: NEGATIVE NG/ML
NORDIAZEPAM UR QL CFM: NEGATIVE NG/ML
NORFENTANYL UR QL CFM: NEGATIVE NG/ML
NORHYDROCODONE UR QL CFM: NEGATIVE NG/ML
NORHYDROCODONE UR QL CFM: NEGATIVE NG/ML
NORMEPERIDINE UR QL CFM: NEGATIVE NG/ML
NOROXYCODONE UR QL CFM: NORMAL NG/ML
OXAZEPAM UR QL CFM: NEGATIVE NG/ML
OXYCODONE UR QL CFM: NORMAL NG/ML
OXYMORPHONE UR QL CFM: NORMAL NG/ML
OXYMORPHONE UR QL CFM: NORMAL NG/ML
PARA-FLUOROFENTANYL QUANTIFICATION: NORMAL NG/ML
PCP UR QL CFM: NEGATIVE NG/ML
PHENOBARB UR QL CFM: NEGATIVE NG/ML
SECOBARBITAL UR QL CFM: NEGATIVE NG/ML
SL AMB 5F-ADB-M7 METABOLITE QUANTIFICATION: NEGATIVE NG/ML
SL AMB 7-OH-MITRAGYNINE (KRATOM ALKALOID) QUANTIFICATION: NEGATIVE NG/ML
SL AMB AB-FUBINACA-M3 METABOLITE QUANTIFICATION: NEGATIVE NG/ML
SL AMB ACETYL FENTANYL QUANTIFICATION: NORMAL NG/ML
SL AMB ACETYL NORFENTANYL QUANTIFICATION: NORMAL NG/ML
SL AMB ACRYL FENTANYL QUANTIFICATION: NORMAL NG/ML
SL AMB CARFENTANIL QUANTIFICATION: NORMAL NG/ML
SL AMB CTHC (MARIJUANA METABOLITE) QUANTIFICATION: NEGATIVE NG/ML
SL AMB DEXTROMETHORPHAN QUANTIFICATION: NEGATIVE NG/ML
SL AMB DEXTRORPHAN (DEXTROMETHORPHAN METABOLITE) QUANT: NEGATIVE NG/ML
SL AMB DEXTRORPHAN (DEXTROMETHORPHAN METABOLITE) QUANT: NEGATIVE NG/ML
SL AMB JWH018 METABOLITE QUANTIFICATION: NEGATIVE NG/ML
SL AMB JWH073 METABOLITE QUANTIFICATION: NEGATIVE NG/ML
SL AMB MDMB-FUBINACA-M1 METABOLITE QUANTIFICATION: NEGATIVE NG/ML
SL AMB N-DESMETHYL-TRAMADOL QUANTIFICATION: NEGATIVE NG/ML
SL AMB PHENTERMINE QUANTIFICATION: NEGATIVE NG/ML
SL AMB RCS4 METABOLITE QUANTIFICATION: NEGATIVE NG/ML
SL AMB RITALINIC ACID QUANTIFICATION: NEGATIVE NG/ML
SPECIMEN DRAWN SERPL: NEGATIVE NG/ML
TAPENTADOL UR QL CFM: NEGATIVE NG/ML
TEMAZEPAM UR QL CFM: NEGATIVE NG/ML
TEMAZEPAM UR QL CFM: NEGATIVE NG/ML
TRAMADOL UR QL CFM: NEGATIVE NG/ML
URATE/CREAT 24H UR: NEGATIVE NG/ML

## 2025-07-14 ENCOUNTER — TELEPHONE (OUTPATIENT)
Dept: PAIN MEDICINE | Facility: CLINIC | Age: 65
End: 2025-07-14

## 2025-07-14 NOTE — TELEPHONE ENCOUNTER
This patient is being considered for a neuraxial injection for the management of their pain. However, the patient is currently on an anticoagulant per your orders. The American Society of Regional Anest[employer] Guideline on neuraxial procedures and anti-coagulation indicates that medication should be held as follows: Eliquis 3 days prior to injection.   Please advise if you ok the hold of this medication.    Thank you,    [place] Card  Procedure   [facility]

## 2025-07-21 DIAGNOSIS — R35.0 URINARY FREQUENCY: ICD-10-CM

## 2025-07-21 RX ORDER — ALFUZOSIN HYDROCHLORIDE 10 MG/1
10 TABLET, EXTENDED RELEASE ORAL DAILY
Qty: 90 TABLET | Refills: 0 | Status: CANCELLED | OUTPATIENT
Start: 2025-07-21

## 2025-07-29 ENCOUNTER — HOSPITAL ENCOUNTER (OUTPATIENT)
Dept: RADIOLOGY | Facility: CLINIC | Age: 65
Discharge: HOME | End: 2025-07-29
Admitting: ANESTHESIOLOGY
Payer: MEDICARE

## 2025-07-29 VITALS
HEART RATE: 75 BPM | SYSTOLIC BLOOD PRESSURE: 124 MMHG | DIASTOLIC BLOOD PRESSURE: 81 MMHG | OXYGEN SATURATION: 94 % | TEMPERATURE: 97.4 F | RESPIRATION RATE: 20 BRPM

## 2025-07-29 DIAGNOSIS — M51.16 INTERVERTEBRAL DISC DISORDER WITH RADICULOPATHY OF LUMBAR REGION: ICD-10-CM

## 2025-07-29 PROCEDURE — 64483 NJX AA&/STRD TFRM EPI L/S 1: CPT | Performed by: ANESTHESIOLOGY

## 2025-07-29 RX ORDER — METHYLPREDNISOLONE ACETATE 80 MG/ML
80 INJECTION, SUSPENSION INTRA-ARTICULAR; INTRALESIONAL; INTRAMUSCULAR; PARENTERAL; SOFT TISSUE ONCE
Status: COMPLETED | OUTPATIENT
Start: 2025-07-29 | End: 2025-07-29

## 2025-07-29 RX ORDER — 0.9 % SODIUM CHLORIDE 0.9 %
4 VIAL (ML) INJECTION ONCE
Status: COMPLETED | OUTPATIENT
Start: 2025-07-29 | End: 2025-07-29

## 2025-07-29 RX ORDER — BUPIVACAINE HCL/PF 2.5 MG/ML
2 VIAL (ML) INJECTION ONCE
Status: COMPLETED | OUTPATIENT
Start: 2025-07-29 | End: 2025-07-29

## 2025-07-29 RX ADMIN — IOHEXOL 1 ML: 300 INJECTION, SOLUTION INTRAVENOUS at 11:47

## 2025-07-29 RX ADMIN — LIDOCAINE HYDROCHLORIDE 4 ML: 20 INJECTION, SOLUTION EPIDURAL; INFILTRATION; INTRACAUDAL at 11:46

## 2025-07-29 RX ADMIN — METHYLPREDNISOLONE ACETATE 80 MG: 80 INJECTION, SUSPENSION INTRA-ARTICULAR; INTRALESIONAL; INTRAMUSCULAR; SOFT TISSUE at 11:47

## 2025-07-29 RX ADMIN — BUPIVACAINE HYDROCHLORIDE 2 ML: 2.5 INJECTION, SOLUTION EPIDURAL; INFILTRATION; INTRACAUDAL at 11:47

## 2025-07-29 RX ADMIN — Medication 4 ML: at 11:46

## 2025-07-29 NOTE — TELEPHONE ENCOUNTER
Patient called in regards to his medication refill request on 7/22. Message was relayed to the patient, he will go back to his pharmacy to  refill.     Pt cb: [phone]

## 2025-07-30 ENCOUNTER — OFFICE VISIT (OUTPATIENT)
Dept: BARIATRICS | Facility: CLINIC | Age: 65
End: 2025-07-30
Payer: MEDICARE

## 2025-07-30 VITALS
SYSTOLIC BLOOD PRESSURE: 140 MMHG | DIASTOLIC BLOOD PRESSURE: 88 MMHG | HEART RATE: 79 BPM | HEIGHT: 73 IN | WEIGHT: 315 LBS | TEMPERATURE: 98 F | BODY MASS INDEX: 41.75 KG/M2 | RESPIRATION RATE: 17 BRPM

## 2025-07-30 DIAGNOSIS — I10 ESSENTIAL HYPERTENSION: ICD-10-CM

## 2025-07-30 DIAGNOSIS — G47.33 OSA (OBSTRUCTIVE SLEEP APNEA): ICD-10-CM

## 2025-07-30 DIAGNOSIS — E66.01 OBESITY, MORBID (HCC): Primary | ICD-10-CM

## 2025-07-30 PROCEDURE — G2211 COMPLEX E/M VISIT ADD ON: HCPCS | Performed by: PHYSICIAN ASSISTANT

## 2025-07-30 PROCEDURE — 99214 OFFICE O/P EST MOD 30 MIN: CPT | Performed by: PHYSICIAN ASSISTANT

## 2025-07-30 RX ORDER — METFORMIN HYDROCHLORIDE 750 MG/1
TABLET, EXTENDED RELEASE ORAL
Qty: 180 TABLET | Refills: 1 | Status: SHIPPED | OUTPATIENT
Start: 2025-07-30

## 2025-07-31 ENCOUNTER — TELEPHONE (OUTPATIENT)
Age: 65
End: 2025-07-31

## 2025-08-01 ENCOUNTER — APPOINTMENT (OUTPATIENT)
Dept: LAB | Facility: CLINIC | Age: 65
End: 2025-08-01
Attending: FAMILY MEDICINE
Payer: MEDICARE

## 2025-08-01 DIAGNOSIS — R73.01 IMPAIRED FASTING GLUCOSE: ICD-10-CM

## 2025-08-01 DIAGNOSIS — I25.10 CORONARY ARTERIOSCLEROSIS: ICD-10-CM

## 2025-08-01 DIAGNOSIS — I48.20 CHRONIC ATRIAL FIBRILLATION (HCC): Primary | ICD-10-CM

## 2025-08-01 DIAGNOSIS — E79.0 HYPERURICEMIA: ICD-10-CM

## 2025-08-01 DIAGNOSIS — I10 ESSENTIAL HYPERTENSION: ICD-10-CM

## 2025-08-01 DIAGNOSIS — E03.8 OTHER SPECIFIED HYPOTHYROIDISM: ICD-10-CM

## 2025-08-01 DIAGNOSIS — F41.1 GENERALIZED ANXIETY DISORDER: ICD-10-CM

## 2025-08-01 LAB
ALBUMIN SERPL BCG-MCNC: 4 G/DL (ref 3.5–5)
ALP SERPL-CCNC: 67 U/L (ref 34–104)
ALT SERPL W P-5'-P-CCNC: 14 U/L (ref 7–52)
ANION GAP SERPL CALCULATED.3IONS-SCNC: 4 MMOL/L (ref 4–13)
AST SERPL W P-5'-P-CCNC: 16 U/L (ref 13–39)
BASOPHILS # BLD AUTO: 0.05 THOUSANDS/ÂΜL (ref 0–0.1)
BASOPHILS NFR BLD AUTO: 1 % (ref 0–1)
BILIRUB SERPL-MCNC: 0.33 MG/DL (ref 0.2–1)
BUN SERPL-MCNC: 19 MG/DL (ref 5–25)
CALCIUM SERPL-MCNC: 9.1 MG/DL (ref 8.4–10.2)
CHLORIDE SERPL-SCNC: 103 MMOL/L (ref 96–108)
CHOLEST SERPL-MCNC: 133 MG/DL (ref ?–200)
CO2 SERPL-SCNC: 33 MMOL/L (ref 21–32)
CREAT SERPL-MCNC: 1.12 MG/DL (ref 0.6–1.3)
EOSINOPHIL # BLD AUTO: 0.91 THOUSAND/ÂΜL (ref 0–0.61)
EOSINOPHIL NFR BLD AUTO: 16 % (ref 0–6)
ERYTHROCYTE [DISTWIDTH] IN BLOOD BY AUTOMATED COUNT: 17.5 % (ref 11.6–15.1)
EST. AVERAGE GLUCOSE BLD GHB EST-MCNC: 117 MG/DL
GFR SERPL CREATININE-BSD FRML MDRD: 68 ML/MIN/1.73SQ M
GLUCOSE P FAST SERPL-MCNC: 94 MG/DL (ref 65–99)
HBA1C MFR BLD: 5.7 %
HCT VFR BLD AUTO: 33.8 % (ref 36.5–49.3)
HDLC SERPL-MCNC: 73 MG/DL
HGB BLD-MCNC: 9.8 G/DL (ref 12–17)
IMM GRANULOCYTES # BLD AUTO: 0.01 THOUSAND/UL (ref 0–0.2)
IMM GRANULOCYTES NFR BLD AUTO: 0 % (ref 0–2)
LDLC SERPL CALC-MCNC: 48 MG/DL (ref 0–100)
LYMPHOCYTES # BLD AUTO: 1.5 THOUSANDS/ÂΜL (ref 0.6–4.47)
LYMPHOCYTES NFR BLD AUTO: 26 % (ref 14–44)
MCH RBC QN AUTO: 24.9 PG (ref 26.8–34.3)
MCHC RBC AUTO-ENTMCNC: 29 G/DL (ref 31.4–37.4)
MCV RBC AUTO: 86 FL (ref 82–98)
MONOCYTES # BLD AUTO: 0.69 THOUSAND/ÂΜL (ref 0.17–1.22)
MONOCYTES NFR BLD AUTO: 12 % (ref 4–12)
NEUTROPHILS # BLD AUTO: 2.72 THOUSANDS/ÂΜL (ref 1.85–7.62)
NEUTS SEG NFR BLD AUTO: 45 % (ref 43–75)
NONHDLC SERPL-MCNC: 60 MG/DL
NRBC BLD AUTO-RTO: 0 /100 WBCS
PLATELET # BLD AUTO: 115 THOUSANDS/UL (ref 149–390)
POTASSIUM SERPL-SCNC: 4.5 MMOL/L (ref 3.5–5.3)
PROT SERPL-MCNC: 6.1 G/DL (ref 6.4–8.4)
RBC # BLD AUTO: 3.93 MILLION/UL (ref 3.88–5.62)
SODIUM SERPL-SCNC: 140 MMOL/L (ref 135–147)
TRIGL SERPL-MCNC: 59 MG/DL (ref ?–150)
TSH SERPL DL<=0.05 MIU/L-ACNC: 3.31 UIU/ML (ref 0.45–4.5)
URATE SERPL-MCNC: 5.5 MG/DL (ref 3.5–8.5)
WBC # BLD AUTO: 5.88 THOUSAND/UL (ref 4.31–10.16)

## 2025-08-01 PROCEDURE — 36415 COLL VENOUS BLD VENIPUNCTURE: CPT | Performed by: FAMILY MEDICINE

## 2025-08-01 PROCEDURE — 84550 ASSAY OF BLOOD/URIC ACID: CPT | Performed by: FAMILY MEDICINE

## 2025-08-01 PROCEDURE — 85025 COMPLETE CBC W/AUTO DIFF WBC: CPT | Performed by: FAMILY MEDICINE

## 2025-08-01 PROCEDURE — 84443 ASSAY THYROID STIM HORMONE: CPT | Performed by: FAMILY MEDICINE

## 2025-08-01 PROCEDURE — 80053 COMPREHEN METABOLIC PANEL: CPT | Performed by: FAMILY MEDICINE

## 2025-08-01 PROCEDURE — 80061 LIPID PANEL: CPT | Performed by: FAMILY MEDICINE

## 2025-08-01 PROCEDURE — 83036 HEMOGLOBIN GLYCOSYLATED A1C: CPT | Performed by: FAMILY MEDICINE

## 2025-08-04 ENCOUNTER — OFFICE VISIT (OUTPATIENT)
Dept: FAMILY MEDICINE CLINIC | Facility: CLINIC | Age: 65
End: 2025-08-04
Payer: MEDICARE

## 2025-08-04 VITALS
HEIGHT: 73 IN | WEIGHT: 315 LBS | RESPIRATION RATE: 18 BRPM | BODY MASS INDEX: 41.75 KG/M2 | OXYGEN SATURATION: 95 % | HEART RATE: 74 BPM | SYSTOLIC BLOOD PRESSURE: 134 MMHG | DIASTOLIC BLOOD PRESSURE: 82 MMHG

## 2025-08-04 DIAGNOSIS — Z00.00 MEDICARE ANNUAL WELLNESS VISIT, SUBSEQUENT: ICD-10-CM

## 2025-08-04 DIAGNOSIS — G62.9 PERIPHERAL POLYNEUROPATHY: ICD-10-CM

## 2025-08-04 DIAGNOSIS — Z56.6 WORK-RELATED STRESS: ICD-10-CM

## 2025-08-04 DIAGNOSIS — R05.8 ALLERGIC COUGH: ICD-10-CM

## 2025-08-04 DIAGNOSIS — M10.071 ACUTE IDIOPATHIC GOUT INVOLVING TOE OF RIGHT FOOT: ICD-10-CM

## 2025-08-04 DIAGNOSIS — I10 ESSENTIAL HYPERTENSION: ICD-10-CM

## 2025-08-04 DIAGNOSIS — L30.9 DERMATITIS: ICD-10-CM

## 2025-08-04 DIAGNOSIS — R73.01 IMPAIRED FASTING GLUCOSE: ICD-10-CM

## 2025-08-04 DIAGNOSIS — E79.0 HYPERURICEMIA: ICD-10-CM

## 2025-08-04 DIAGNOSIS — D51.3 OTHER DIETARY VITAMIN B12 DEFICIENCY ANEMIA: ICD-10-CM

## 2025-08-04 DIAGNOSIS — F41.1 GENERALIZED ANXIETY DISORDER: ICD-10-CM

## 2025-08-04 DIAGNOSIS — I48.20 CHRONIC ATRIAL FIBRILLATION (HCC): Primary | ICD-10-CM

## 2025-08-04 DIAGNOSIS — E03.9 HYPOTHYROIDISM, UNSPECIFIED TYPE: Chronic | ICD-10-CM

## 2025-08-04 DIAGNOSIS — E03.8 OTHER SPECIFIED HYPOTHYROIDISM: ICD-10-CM

## 2025-08-04 DIAGNOSIS — F33.1 MODERATE EPISODE OF RECURRENT MAJOR DEPRESSIVE DISORDER (HCC): ICD-10-CM

## 2025-08-04 PROBLEM — T31.0 BURN (ANY DEGREE) INVOLVING LESS THAN 10% OF BODY SURFACE: Status: RESOLVED | Noted: 2021-08-02 | Resolved: 2025-08-04

## 2025-08-04 PROCEDURE — G0439 PPPS, SUBSEQ VISIT: HCPCS | Performed by: FAMILY MEDICINE

## 2025-08-04 PROCEDURE — G2211 COMPLEX E/M VISIT ADD ON: HCPCS | Performed by: FAMILY MEDICINE

## 2025-08-04 PROCEDURE — 99214 OFFICE O/P EST MOD 30 MIN: CPT | Performed by: FAMILY MEDICINE

## 2025-08-04 RX ORDER — NEBIVOLOL 5 MG/1
5 TABLET ORAL DAILY
Qty: 90 TABLET | Refills: 1 | Status: SHIPPED | OUTPATIENT
Start: 2025-08-04

## 2025-08-04 RX ORDER — ROSUVASTATIN CALCIUM 5 MG/1
5 TABLET, COATED ORAL DAILY
Qty: 90 TABLET | Refills: 1 | Status: SHIPPED | OUTPATIENT
Start: 2025-08-04

## 2025-08-04 RX ORDER — BETAMETHASONE VALERATE 1.2 MG/G
CREAM TOPICAL 2 TIMES DAILY
Qty: 45 G | Refills: 0 | Status: SHIPPED | OUTPATIENT
Start: 2025-08-04

## 2025-08-04 RX ORDER — AMLODIPINE BESYLATE 10 MG/1
10 TABLET ORAL DAILY
Qty: 90 TABLET | Refills: 1 | Status: SHIPPED | OUTPATIENT
Start: 2025-08-04

## 2025-08-04 RX ORDER — DULOXETINE 40 MG/1
1 CAPSULE, DELAYED RELEASE ORAL DAILY
Qty: 90 CAPSULE | Refills: 1 | Status: SHIPPED | OUTPATIENT
Start: 2025-08-04

## 2025-08-04 RX ORDER — ALLOPURINOL 100 MG/1
100 TABLET ORAL DAILY
Qty: 90 TABLET | Refills: 1 | Status: SHIPPED | OUTPATIENT
Start: 2025-08-04

## 2025-08-04 RX ORDER — LEVOTHYROXINE SODIUM 25 UG/1
25 TABLET ORAL DAILY
Qty: 90 TABLET | Refills: 1 | Status: SHIPPED | OUTPATIENT
Start: 2025-08-04

## 2025-08-04 RX ORDER — MONTELUKAST SODIUM 10 MG/1
10 TABLET ORAL
Qty: 90 TABLET | Refills: 1 | Status: SHIPPED | OUTPATIENT
Start: 2025-08-04

## 2025-08-04 RX ORDER — GABAPENTIN 300 MG/1
300 CAPSULE ORAL
Qty: 90 CAPSULE | Refills: 1 | Status: SHIPPED | OUTPATIENT
Start: 2025-08-04

## 2025-08-04 SDOH — HEALTH STABILITY - MENTAL HEALTH: OTHER PHYSICAL AND MENTAL STRAIN RELATED TO WORK: Z56.6

## 2025-08-12 ENCOUNTER — TELEPHONE (OUTPATIENT)
Dept: PAIN MEDICINE | Facility: CLINIC | Age: 65
End: 2025-08-12

## (undated) DEVICE — 3M™ TEGADERM™ TRANSPARENT FILM DRESSING FRAME STYLE, 1626W, 4 IN X 4-3/4 IN (10 CM X 12 CM), 50/CT 4CT/CASE: Brand: 3M™ TEGADERM™

## (undated) DEVICE — GLOVE SRG BIOGEL 6.5

## (undated) DEVICE — TELFA NON-ADHERENT ABSORBENT DRESSING: Brand: TELFA

## (undated) DEVICE — SUT MONOCRYL PLUS 3-0 PS-2 27 IN MCP427H

## (undated) DEVICE — TIBURON SPLIT SHEET: Brand: CONVERTORS

## (undated) DEVICE — ARM SLING: Brand: DEROYAL

## (undated) DEVICE — GLOVE INDICATOR PI UNDERGLOVE SZ 7 BLUE

## (undated) DEVICE — BETHLEHEM UNIVERSAL SPINE, KIT: Brand: CARDINAL HEALTH

## (undated) DEVICE — BETADINE SCRUB BRUSH

## (undated) DEVICE — PROXIMATE PLUS MD MULTI-DIRECTIONAL RELEASE SKIN STAPLERS CONTAINS 35 STAINLESS STEEL STAPLES APPROXIMATE CLOSED DIMENSIONS: 6.9MM X 3.9MM WIDE: Brand: PROXIMATE

## (undated) DEVICE — PENCIL ELECTROSURG E-Z CLEAN -0035H

## (undated) DEVICE — TUBING SUCTION 5MM X 12 FT

## (undated) DEVICE — GLOVE INDICATOR PI UNDERGLOVE SZ 8.5 BLUE

## (undated) DEVICE — GLOVE SRG BIOGEL 8

## (undated) DEVICE — STOCKINETTE REGULAR

## (undated) DEVICE — IV CATH INTROCAN 2 SAFETY 18G X 1.25IN PUR M

## (undated) DEVICE — ULTRACLEAN ACCESSORY ELECTRODE 1" (2.54 CM) COATED BLADE: Brand: ULTRACLEAN

## (undated) DEVICE — TOOL 14MH30 LEGEND 14CM 3MM: Brand: MIDAS REX ™

## (undated) DEVICE — TUNNELING TOOL 20IN

## (undated) DEVICE — ROCKER SWITCH PENCIL HOLSTER: Brand: VALLEYLAB

## (undated) DEVICE — INTENDED FOR TISSUE SEPARATION, AND OTHER PROCEDURES THAT REQUIRE A SHARP SURGICAL BLADE TO PUNCTURE OR CUT.: Brand: BARD-PARKER SAFETY BLADES SIZE 10, STERILE

## (undated) DEVICE — PREP SURGICAL PURPREP 26ML

## (undated) DEVICE — VIAL DECANTER

## (undated) DEVICE — GLOVE INDICATOR PI UNDERGLOVE SZ 8 BLUE

## (undated) DEVICE — INTENDED FOR TISSUE SEPARATION, AND OTHER PROCEDURES THAT REQUIRE A SHARP SURGICAL BLADE TO PUNCTURE OR CUT.: Brand: BARD-PARKER SAFETY BLADES SIZE 15, STERILE

## (undated) DEVICE — SUT VICRYL PLUS 3-0 RB-1 CR/8 18 IN VCP713D

## (undated) DEVICE — CORD BIPOLAR 12FT REUSEABLE

## (undated) DEVICE — BETHLEHEM UNIVERSAL  MIONR EXT: Brand: CARDINAL HEALTH

## (undated) DEVICE — NEEDLE 25G X 1 1/2

## (undated) DEVICE — PAD GROUNDING DUAL ADULT

## (undated) DEVICE — FLOSEAL MATRIX IS INDICATED IN SURGICAL PROCEDURES (OTHER THAN IN OPHTHALMIC) AS AN ADJUNCT TO HEMOSTASIS WHEN CONTROL OF BLEEDING BY LIGATURE OR CONVENTIONAL PROCEDURES IS INEFFECTIVE OR IMPRACTICAL.: Brand: FLOSEAL HEMOSTATIC MATRIX

## (undated) DEVICE — VESSEL LOOPS X-RAY DETECTABLE: Brand: DEROYAL

## (undated) DEVICE — DRAPE C-ARM X-RAY

## (undated) DEVICE — SYRINGE 10ML LL

## (undated) DEVICE — NEUROSTIM MULTILEAD TRIAL CABLE

## (undated) DEVICE — DRESSING MEPORE FILM ADHESIVE 4 X 5IN

## (undated) DEVICE — 2000CC GUARDIAN II: Brand: GUARDIAN

## (undated) DEVICE — ANTIBACTERIAL VIOLET BRAIDED (POLYGLACTIN 910), SYNTHETIC ABSORBABLE SUTURE: Brand: COATED VICRYL

## (undated) DEVICE — GLOVE INDICATOR PI UNDERGLOVE SZ 6.5 BLUE

## (undated) DEVICE — GLOVE SRG BIOGEL 7

## (undated) DEVICE — SUT SILK 2-0 SH 30 IN K833H

## (undated) DEVICE — SCD SEQUENTIAL COMPRESSION COMFORT SLEEVE MEDIUM KNEE LENGTH: Brand: KENDALL SCD

## (undated) DEVICE — SKN PRP WNG SPNGE PVP SCRB STR: Brand: MEDLINE INDUSTRIES, INC.

## (undated) DEVICE — PROGRAMMER PATIENT PROCLAIM

## (undated) DEVICE — TOOL MR8-9MH30 MR8 9CM MATCH 3MM: Brand: MIDAS REX MR8

## (undated) DEVICE — TIBURON HAND DRAPE: Brand: CONVERTORS